# Patient Record
Sex: MALE | Race: WHITE | Employment: OTHER | URBAN - METROPOLITAN AREA
[De-identification: names, ages, dates, MRNs, and addresses within clinical notes are randomized per-mention and may not be internally consistent; named-entity substitution may affect disease eponyms.]

---

## 2017-08-23 ENCOUNTER — HOSPITAL ENCOUNTER (OUTPATIENT)
Dept: GENERAL RADIOLOGY | Age: 77
Discharge: HOME | End: 2017-08-23
Payer: MEDICARE

## 2017-08-23 DIAGNOSIS — Z77.090 ASBESTOS EXPOSURE: ICD-10-CM

## 2017-08-23 PROCEDURE — 71020 XR CHEST PA LAT: CPT

## 2019-01-01 ENCOUNTER — APPOINTMENT (OUTPATIENT)
Dept: GENERAL RADIOLOGY | Age: 79
DRG: 291 | End: 2019-01-01
Attending: EMERGENCY MEDICINE
Payer: MEDICARE

## 2019-01-01 ENCOUNTER — HOME CARE VISIT (OUTPATIENT)
Dept: SCHEDULING | Facility: HOME HEALTH | Age: 79
End: 2019-01-01
Payer: MEDICARE

## 2019-01-01 ENCOUNTER — HOSPITAL ENCOUNTER (EMERGENCY)
Age: 79
Discharge: HOME | End: 2019-09-12
Attending: EMERGENCY MEDICINE | Admitting: EMERGENCY MEDICINE
Payer: MEDICARE

## 2019-01-01 ENCOUNTER — APPOINTMENT (OUTPATIENT)
Dept: GENERAL RADIOLOGY | Age: 79
DRG: 286 | End: 2019-01-01
Attending: INTERNAL MEDICINE
Payer: MEDICARE

## 2019-01-01 ENCOUNTER — APPOINTMENT (OUTPATIENT)
Dept: NON INVASIVE DIAGNOSTICS | Age: 79
DRG: 291 | End: 2019-01-01
Attending: INTERNAL MEDICINE
Payer: MEDICARE

## 2019-01-01 ENCOUNTER — HOSPITAL ENCOUNTER (EMERGENCY)
Age: 79
Discharge: HOME | DRG: 291 | End: 2019-10-06
Attending: EMERGENCY MEDICINE
Payer: MEDICARE

## 2019-01-01 ENCOUNTER — HOSPITAL ENCOUNTER (INPATIENT)
Age: 79
LOS: 9 days | Discharge: HOME HEALTH | DRG: 291 | End: 2019-08-06
Attending: EMERGENCY MEDICINE | Admitting: INTERNAL MEDICINE
Payer: MEDICARE

## 2019-01-01 ENCOUNTER — APPOINTMENT (OUTPATIENT)
Dept: GENERAL RADIOLOGY | Age: 79
End: 2019-01-01
Attending: EMERGENCY MEDICINE
Payer: MEDICARE

## 2019-01-01 ENCOUNTER — HOSPICE ADMISSION (OUTPATIENT)
Dept: HOSPICE | Facility: HOSPICE | Age: 79
End: 2019-01-01

## 2019-01-01 ENCOUNTER — HOME HEALTH ADMISSION (OUTPATIENT)
Dept: HOME HEALTH SERVICES | Facility: HOME HEALTH | Age: 79
End: 2019-01-01
Payer: MEDICARE

## 2019-01-01 ENCOUNTER — APPOINTMENT (OUTPATIENT)
Dept: ULTRASOUND IMAGING | Age: 79
DRG: 291 | End: 2019-01-01
Attending: INTERNAL MEDICINE
Payer: MEDICARE

## 2019-01-01 ENCOUNTER — HOSPITAL ENCOUNTER (EMERGENCY)
Age: 79
Discharge: HOME | DRG: 291 | End: 2019-07-28
Attending: EMERGENCY MEDICINE
Payer: MEDICARE

## 2019-01-01 ENCOUNTER — HOME CARE VISIT (OUTPATIENT)
Dept: HOME HEALTH SERVICES | Facility: HOME HEALTH | Age: 79
End: 2019-01-01
Payer: MEDICARE

## 2019-01-01 ENCOUNTER — APPOINTMENT (OUTPATIENT)
Dept: CT IMAGING | Age: 79
DRG: 291 | End: 2019-01-01
Attending: EMERGENCY MEDICINE
Payer: MEDICARE

## 2019-01-01 ENCOUNTER — DOCUMENTATION ONLY (OUTPATIENT)
Dept: CASE MANAGEMENT | Age: 79
End: 2019-01-01

## 2019-01-01 ENCOUNTER — HOSPITAL ENCOUNTER (EMERGENCY)
Age: 79
Discharge: HOME | End: 2019-06-08
Attending: EMERGENCY MEDICINE | Admitting: EMERGENCY MEDICINE
Payer: MEDICARE

## 2019-01-01 ENCOUNTER — APPOINTMENT (OUTPATIENT)
Dept: GENERAL RADIOLOGY | Age: 79
DRG: 291 | End: 2019-01-01
Attending: INTERNAL MEDICINE
Payer: MEDICARE

## 2019-01-01 ENCOUNTER — HOSPITAL ENCOUNTER (OUTPATIENT)
Dept: CT IMAGING | Age: 79
Discharge: HOME | End: 2019-03-29
Attending: COLON & RECTAL SURGERY
Payer: MEDICARE

## 2019-01-01 ENCOUNTER — HOSPITAL ENCOUNTER (INPATIENT)
Age: 79
LOS: 11 days | Discharge: HOME HEALTH | DRG: 286 | End: 2019-10-02
Attending: EMERGENCY MEDICINE | Admitting: INTERNAL MEDICINE
Payer: MEDICARE

## 2019-01-01 ENCOUNTER — DOCUMENTATION ONLY (OUTPATIENT)
Dept: PHARMACY | Age: 79
End: 2019-01-01

## 2019-01-01 ENCOUNTER — APPOINTMENT (OUTPATIENT)
Dept: ULTRASOUND IMAGING | Age: 79
DRG: 286 | End: 2019-01-01
Attending: INTERNAL MEDICINE
Payer: MEDICARE

## 2019-01-01 ENCOUNTER — APPOINTMENT (OUTPATIENT)
Dept: GENERAL RADIOLOGY | Age: 79
DRG: 286 | End: 2019-01-01
Attending: EMERGENCY MEDICINE
Payer: MEDICARE

## 2019-01-01 ENCOUNTER — HOSPITAL ENCOUNTER (OUTPATIENT)
Dept: GENERAL RADIOLOGY | Age: 79
Discharge: HOME | End: 2019-06-27
Payer: MEDICARE

## 2019-01-01 ENCOUNTER — HOSPITAL ENCOUNTER (INPATIENT)
Age: 79
LOS: 3 days | DRG: 291 | End: 2019-10-10
Attending: EMERGENCY MEDICINE | Admitting: INTERNAL MEDICINE
Payer: MEDICARE

## 2019-01-01 VITALS
RESPIRATION RATE: 16 BRPM | BODY MASS INDEX: 25.52 KG/M2 | SYSTOLIC BLOOD PRESSURE: 96 MMHG | HEART RATE: 60 BPM | OXYGEN SATURATION: 93 % | TEMPERATURE: 97.7 F | DIASTOLIC BLOOD PRESSURE: 61 MMHG | WEIGHT: 172.84 LBS

## 2019-01-01 VITALS
RESPIRATION RATE: 16 BRPM | TEMPERATURE: 97.6 F | HEART RATE: 77 BPM | DIASTOLIC BLOOD PRESSURE: 60 MMHG | OXYGEN SATURATION: 98 % | SYSTOLIC BLOOD PRESSURE: 108 MMHG

## 2019-01-01 VITALS
WEIGHT: 190.92 LBS | OXYGEN SATURATION: 94 % | BODY MASS INDEX: 27.33 KG/M2 | TEMPERATURE: 99.3 F | SYSTOLIC BLOOD PRESSURE: 99 MMHG | HEART RATE: 78 BPM | HEIGHT: 70 IN | RESPIRATION RATE: 16 BRPM | DIASTOLIC BLOOD PRESSURE: 55 MMHG

## 2019-01-01 VITALS
SYSTOLIC BLOOD PRESSURE: 102 MMHG | HEART RATE: 103 BPM | TEMPERATURE: 98.2 F | OXYGEN SATURATION: 94 % | DIASTOLIC BLOOD PRESSURE: 58 MMHG

## 2019-01-01 VITALS
HEART RATE: 74 BPM | OXYGEN SATURATION: 97 % | DIASTOLIC BLOOD PRESSURE: 80 MMHG | WEIGHT: 166 LBS | TEMPERATURE: 98.3 F | BODY MASS INDEX: 24.51 KG/M2 | SYSTOLIC BLOOD PRESSURE: 128 MMHG | RESPIRATION RATE: 18 BRPM

## 2019-01-01 VITALS
BODY MASS INDEX: 25.8 KG/M2 | DIASTOLIC BLOOD PRESSURE: 68 MMHG | TEMPERATURE: 98.1 F | SYSTOLIC BLOOD PRESSURE: 108 MMHG | WEIGHT: 174.16 LBS | OXYGEN SATURATION: 95 % | RESPIRATION RATE: 23 BRPM | HEART RATE: 80 BPM | HEIGHT: 69 IN

## 2019-01-01 VITALS
TEMPERATURE: 98.5 F | HEART RATE: 74 BPM | DIASTOLIC BLOOD PRESSURE: 64 MMHG | SYSTOLIC BLOOD PRESSURE: 122 MMHG | OXYGEN SATURATION: 97 % | RESPIRATION RATE: 18 BRPM

## 2019-01-01 VITALS
WEIGHT: 165 LBS | DIASTOLIC BLOOD PRESSURE: 60 MMHG | OXYGEN SATURATION: 98 % | BODY MASS INDEX: 24.44 KG/M2 | RESPIRATION RATE: 20 BRPM | HEIGHT: 69 IN | HEART RATE: 111 BPM | SYSTOLIC BLOOD PRESSURE: 98 MMHG | TEMPERATURE: 97.2 F

## 2019-01-01 VITALS
HEART RATE: 101 BPM | SYSTOLIC BLOOD PRESSURE: 101 MMHG | RESPIRATION RATE: 17 BRPM | DIASTOLIC BLOOD PRESSURE: 64 MMHG | BODY MASS INDEX: 26.07 KG/M2 | WEIGHT: 176 LBS | OXYGEN SATURATION: 98 % | HEIGHT: 69 IN | TEMPERATURE: 97.8 F

## 2019-01-01 VITALS
WEIGHT: 170 LBS | TEMPERATURE: 98.4 F | OXYGEN SATURATION: 97 % | RESPIRATION RATE: 18 BRPM | SYSTOLIC BLOOD PRESSURE: 98 MMHG | HEART RATE: 74 BPM | BODY MASS INDEX: 25.1 KG/M2 | DIASTOLIC BLOOD PRESSURE: 58 MMHG

## 2019-01-01 VITALS
HEART RATE: 74 BPM | DIASTOLIC BLOOD PRESSURE: 58 MMHG | SYSTOLIC BLOOD PRESSURE: 98 MMHG | OXYGEN SATURATION: 97 % | RESPIRATION RATE: 20 BRPM

## 2019-01-01 VITALS
SYSTOLIC BLOOD PRESSURE: 103 MMHG | DIASTOLIC BLOOD PRESSURE: 60 MMHG | RESPIRATION RATE: 20 BRPM | BODY MASS INDEX: 27.74 KG/M2 | OXYGEN SATURATION: 97 % | TEMPERATURE: 99.1 F | HEART RATE: 67 BPM | WEIGHT: 187.83 LBS

## 2019-01-01 VITALS
RESPIRATION RATE: 18 BRPM | DIASTOLIC BLOOD PRESSURE: 68 MMHG | HEART RATE: 70 BPM | BODY MASS INDEX: 26.14 KG/M2 | SYSTOLIC BLOOD PRESSURE: 102 MMHG | WEIGHT: 177 LBS | OXYGEN SATURATION: 95 % | TEMPERATURE: 97.8 F

## 2019-01-01 VITALS
HEIGHT: 69 IN | BODY MASS INDEX: 26.26 KG/M2 | DIASTOLIC BLOOD PRESSURE: 48 MMHG | SYSTOLIC BLOOD PRESSURE: 93 MMHG | WEIGHT: 177.3 LBS | RESPIRATION RATE: 16 BRPM | OXYGEN SATURATION: 99 % | TEMPERATURE: 98 F | HEART RATE: 65 BPM

## 2019-01-01 VITALS
OXYGEN SATURATION: 96 % | TEMPERATURE: 98.2 F | RESPIRATION RATE: 18 BRPM | WEIGHT: 170 LBS | SYSTOLIC BLOOD PRESSURE: 110 MMHG | BODY MASS INDEX: 25.1 KG/M2 | DIASTOLIC BLOOD PRESSURE: 58 MMHG | HEART RATE: 76 BPM

## 2019-01-01 VITALS
BODY MASS INDEX: 26.02 KG/M2 | WEIGHT: 175.71 LBS | OXYGEN SATURATION: 99 % | DIASTOLIC BLOOD PRESSURE: 62 MMHG | HEIGHT: 69 IN | HEART RATE: 88 BPM | TEMPERATURE: 97.8 F | SYSTOLIC BLOOD PRESSURE: 98 MMHG | RESPIRATION RATE: 20 BRPM

## 2019-01-01 VITALS
OXYGEN SATURATION: 97 % | DIASTOLIC BLOOD PRESSURE: 63 MMHG | TEMPERATURE: 97.7 F | RESPIRATION RATE: 16 BRPM | HEART RATE: 72 BPM | SYSTOLIC BLOOD PRESSURE: 122 MMHG

## 2019-01-01 VITALS
SYSTOLIC BLOOD PRESSURE: 98 MMHG | DIASTOLIC BLOOD PRESSURE: 76 MMHG | TEMPERATURE: 98 F | WEIGHT: 176 LBS | HEART RATE: 92 BPM | RESPIRATION RATE: 18 BRPM | OXYGEN SATURATION: 98 % | BODY MASS INDEX: 25.99 KG/M2

## 2019-01-01 VITALS
OXYGEN SATURATION: 96 % | TEMPERATURE: 97.6 F | RESPIRATION RATE: 16 BRPM | HEART RATE: 77 BPM | DIASTOLIC BLOOD PRESSURE: 58 MMHG | SYSTOLIC BLOOD PRESSURE: 97 MMHG

## 2019-01-01 VITALS
TEMPERATURE: 97.9 F | HEART RATE: 76 BPM | RESPIRATION RATE: 16 BRPM | SYSTOLIC BLOOD PRESSURE: 91 MMHG | DIASTOLIC BLOOD PRESSURE: 52 MMHG | OXYGEN SATURATION: 97 %

## 2019-01-01 VITALS
SYSTOLIC BLOOD PRESSURE: 122 MMHG | DIASTOLIC BLOOD PRESSURE: 66 MMHG | HEART RATE: 77 BPM | OXYGEN SATURATION: 96 % | TEMPERATURE: 98.5 F

## 2019-01-01 VITALS
OXYGEN SATURATION: 97 % | DIASTOLIC BLOOD PRESSURE: 58 MMHG | TEMPERATURE: 97.4 F | HEART RATE: 77 BPM | RESPIRATION RATE: 16 BRPM | SYSTOLIC BLOOD PRESSURE: 108 MMHG

## 2019-01-01 DIAGNOSIS — R06.02 SOB (SHORTNESS OF BREATH): ICD-10-CM

## 2019-01-01 DIAGNOSIS — R50.9 FEVER, UNSPECIFIED FEVER CAUSE: Primary | ICD-10-CM

## 2019-01-01 DIAGNOSIS — K72.00 SHOCK LIVER: ICD-10-CM

## 2019-01-01 DIAGNOSIS — Z71.89 COUNSELING REGARDING ADVANCE CARE PLANNING AND GOALS OF CARE: ICD-10-CM

## 2019-01-01 DIAGNOSIS — R60.0 LOCALIZED EDEMA: ICD-10-CM

## 2019-01-01 DIAGNOSIS — Z71.89 ADVANCED DIRECTIVES, COUNSELING/DISCUSSION: ICD-10-CM

## 2019-01-01 DIAGNOSIS — R79.89 ELEVATED LFTS: ICD-10-CM

## 2019-01-01 DIAGNOSIS — R06.02 SOB (SHORTNESS OF BREATH): Primary | ICD-10-CM

## 2019-01-01 DIAGNOSIS — R06.02 SHORTNESS OF BREATH: ICD-10-CM

## 2019-01-01 DIAGNOSIS — E87.20 LACTIC ACIDOSIS: ICD-10-CM

## 2019-01-01 DIAGNOSIS — I13.2 CARDIORENAL SYNDROME WITH RENAL FAILURE, STAGE 5 CHRONIC KIDNEY DISEASE OR END STAGE RENAL DISEASE, WITH HEART FAILURE (HCC): ICD-10-CM

## 2019-01-01 DIAGNOSIS — R50.9 FEVER IN ADULT: ICD-10-CM

## 2019-01-01 DIAGNOSIS — K72.00 ACUTE LIVER FAILURE WITHOUT HEPATIC COMA: ICD-10-CM

## 2019-01-01 DIAGNOSIS — I42.4: ICD-10-CM

## 2019-01-01 DIAGNOSIS — E87.5 ACUTE HYPERKALEMIA: ICD-10-CM

## 2019-01-01 DIAGNOSIS — Z71.89 GOALS OF CARE, COUNSELING/DISCUSSION: ICD-10-CM

## 2019-01-01 DIAGNOSIS — R06.01 ORTHOPNEA: ICD-10-CM

## 2019-01-01 DIAGNOSIS — N17.9 ACUTE RENAL FAILURE, UNSPECIFIED ACUTE RENAL FAILURE TYPE (HCC): ICD-10-CM

## 2019-01-01 DIAGNOSIS — J90 BILATERAL PLEURAL EFFUSION: ICD-10-CM

## 2019-01-01 DIAGNOSIS — R10.9 RIGHT SIDED ABDOMINAL PAIN: ICD-10-CM

## 2019-01-01 DIAGNOSIS — I50.9 ACUTE ON CHRONIC CONGESTIVE HEART FAILURE, UNSPECIFIED HEART FAILURE TYPE (HCC): Primary | ICD-10-CM

## 2019-01-01 DIAGNOSIS — I50.9 CONGESTIVE HEART FAILURE, UNSPECIFIED HF CHRONICITY, UNSPECIFIED HEART FAILURE TYPE (HCC): ICD-10-CM

## 2019-01-01 DIAGNOSIS — Z77.090 H/O: ASBESTOS EXPOSURE: ICD-10-CM

## 2019-01-01 DIAGNOSIS — I13.0 CARDIORENAL SYNDROME WITH RENAL FAILURE, STAGE 1-4 OR UNSPECIFIED CHRONIC KIDNEY DISEASE, WITH HEART FAILURE (HCC): ICD-10-CM

## 2019-01-01 DIAGNOSIS — Z79.899 RECEIVING INOTROPIC MEDICATION: ICD-10-CM

## 2019-01-01 DIAGNOSIS — R53.83 FATIGUE, UNSPECIFIED TYPE: ICD-10-CM

## 2019-01-01 DIAGNOSIS — R57.0 CARDIOGENIC SHOCK (HCC): ICD-10-CM

## 2019-01-01 DIAGNOSIS — R60.9 EDEMA, UNSPECIFIED TYPE: ICD-10-CM

## 2019-01-01 DIAGNOSIS — R63.5 WEIGHT GAIN: ICD-10-CM

## 2019-01-01 DIAGNOSIS — R57.0 CARDIOGENIC SHOCK (HCC): Primary | ICD-10-CM

## 2019-01-01 DIAGNOSIS — R09.02 HYPOXIA: Primary | ICD-10-CM

## 2019-01-01 DIAGNOSIS — I42.0 CARDIOMYOPATHY, DILATED, NONISCHEMIC (HCC): Chronic | ICD-10-CM

## 2019-01-01 DIAGNOSIS — I50.23 ACUTE ON CHRONIC SYSTOLIC CHF (CONGESTIVE HEART FAILURE) (HCC): Primary | ICD-10-CM

## 2019-01-01 DIAGNOSIS — N17.9 AKI (ACUTE KIDNEY INJURY) (HCC): ICD-10-CM

## 2019-01-01 DIAGNOSIS — E87.1 ACUTE HYPONATREMIA: ICD-10-CM

## 2019-01-01 DIAGNOSIS — I50.23 ACUTE ON CHRONIC SYSTOLIC (CONGESTIVE) HEART FAILURE (HCC): ICD-10-CM

## 2019-01-01 DIAGNOSIS — R07.9 CHEST PAIN, UNSPECIFIED TYPE: ICD-10-CM

## 2019-01-01 LAB
ALBUMIN SERPL ELPH-MCNC: 3.7 G/DL (ref 2.9–4.4)
ALBUMIN SERPL-MCNC: 2.6 G/DL (ref 3.5–5)
ALBUMIN SERPL-MCNC: 2.7 G/DL (ref 3.5–5)
ALBUMIN SERPL-MCNC: 2.8 G/DL (ref 3.5–5)
ALBUMIN SERPL-MCNC: 2.8 G/DL (ref 3.5–5)
ALBUMIN SERPL-MCNC: 2.9 G/DL (ref 3.5–5)
ALBUMIN SERPL-MCNC: 3.2 G/DL (ref 3.5–5)
ALBUMIN SERPL-MCNC: 3.3 G/DL (ref 3.5–5)
ALBUMIN SERPL-MCNC: 3.3 G/DL (ref 3.5–5)
ALBUMIN SERPL-MCNC: 3.4 G/DL (ref 3.5–5)
ALBUMIN SERPL-MCNC: 3.4 G/DL (ref 3.5–5)
ALBUMIN SERPL-MCNC: 3.5 G/DL (ref 3.5–5)
ALBUMIN SERPL-MCNC: 3.6 G/DL (ref 3.5–5)
ALBUMIN SERPL-MCNC: 3.7 G/DL (ref 3.5–5)
ALBUMIN SERPL-MCNC: 3.8 G/DL (ref 3.5–5)
ALBUMIN SERPL-MCNC: 3.8 G/DL (ref 3.5–5)
ALBUMIN SERPL-MCNC: 3.9 G/DL (ref 3.5–5)
ALBUMIN SERPL-MCNC: 4 G/DL (ref 3.5–5)
ALBUMIN/GLOB SERPL: 0.7 {RATIO} (ref 1.1–2.2)
ALBUMIN/GLOB SERPL: 0.8 {RATIO} (ref 1.1–2.2)
ALBUMIN/GLOB SERPL: 0.9 {RATIO} (ref 1.1–2.2)
ALBUMIN/GLOB SERPL: 0.9 {RATIO} (ref 1.1–2.2)
ALBUMIN/GLOB SERPL: 1.1 {RATIO} (ref 1.1–2.2)
ALBUMIN/GLOB SERPL: 1.2 {RATIO} (ref 1.1–2.2)
ALBUMIN/GLOB SERPL: 1.2 {RATIO} (ref 1.1–2.2)
ALBUMIN/GLOB SERPL: 1.3 {RATIO} (ref 1.1–2.2)
ALBUMIN/GLOB SERPL: 1.3 {RATIO} (ref 1.1–2.2)
ALBUMIN/GLOB SERPL: 1.5 {RATIO} (ref 0.7–1.7)
ALBUMIN/GLOB SERPL: 1.5 {RATIO} (ref 1.1–2.2)
ALP SERPL-CCNC: 115 U/L (ref 45–117)
ALP SERPL-CCNC: 160 U/L (ref 45–117)
ALP SERPL-CCNC: 180 U/L (ref 45–117)
ALP SERPL-CCNC: 192 U/L (ref 45–117)
ALP SERPL-CCNC: 196 U/L (ref 45–117)
ALP SERPL-CCNC: 64 U/L (ref 45–117)
ALP SERPL-CCNC: 69 U/L (ref 45–117)
ALP SERPL-CCNC: 69 U/L (ref 45–117)
ALP SERPL-CCNC: 72 U/L (ref 45–117)
ALP SERPL-CCNC: 74 U/L (ref 45–117)
ALP SERPL-CCNC: 75 U/L (ref 45–117)
ALP SERPL-CCNC: 76 U/L (ref 45–117)
ALP SERPL-CCNC: 79 U/L (ref 45–117)
ALP SERPL-CCNC: 81 U/L (ref 45–117)
ALP SERPL-CCNC: 85 U/L (ref 45–117)
ALP SERPL-CCNC: 89 U/L (ref 45–117)
ALPHA1 GLOB SERPL ELPH-MCNC: 0.3 G/DL (ref 0–0.4)
ALPHA2 GLOB SERPL ELPH-MCNC: 0.6 G/DL (ref 0.4–1)
ALT SERPL-CCNC: 1142 U/L (ref 12–78)
ALT SERPL-CCNC: 123 U/L (ref 12–78)
ALT SERPL-CCNC: 1476 U/L (ref 12–78)
ALT SERPL-CCNC: 1666 U/L (ref 12–78)
ALT SERPL-CCNC: 181 U/L (ref 12–78)
ALT SERPL-CCNC: 183 U/L (ref 12–78)
ALT SERPL-CCNC: 186 U/L (ref 12–78)
ALT SERPL-CCNC: 19 U/L (ref 12–78)
ALT SERPL-CCNC: 202 U/L (ref 12–78)
ALT SERPL-CCNC: 218 U/L (ref 12–78)
ALT SERPL-CCNC: 25 U/L (ref 12–78)
ALT SERPL-CCNC: 33 U/L (ref 12–78)
ALT SERPL-CCNC: 376 U/L (ref 12–78)
ALT SERPL-CCNC: 75 U/L (ref 12–78)
ALT SERPL-CCNC: 962 U/L (ref 12–78)
ALT SERPL-CCNC: 97 U/L (ref 12–78)
ANION GAP SERPL CALC-SCNC: 10 MMOL/L (ref 5–15)
ANION GAP SERPL CALC-SCNC: 11 MMOL/L (ref 5–15)
ANION GAP SERPL CALC-SCNC: 12 MMOL/L (ref 5–15)
ANION GAP SERPL CALC-SCNC: 14 MMOL/L (ref 5–15)
ANION GAP SERPL CALC-SCNC: 4 MMOL/L (ref 5–15)
ANION GAP SERPL CALC-SCNC: 5 MMOL/L (ref 5–15)
ANION GAP SERPL CALC-SCNC: 5 MMOL/L (ref 5–15)
ANION GAP SERPL CALC-SCNC: 7 MMOL/L (ref 5–15)
ANION GAP SERPL CALC-SCNC: 8 MMOL/L (ref 5–15)
ANION GAP SERPL CALC-SCNC: 9 MMOL/L (ref 5–15)
APPEARANCE UR: CLEAR
AST SERPL-CCNC: 123 U/L (ref 15–37)
AST SERPL-CCNC: 1466 U/L (ref 15–37)
AST SERPL-CCNC: 16 U/L (ref 15–37)
AST SERPL-CCNC: 23 U/L (ref 15–37)
AST SERPL-CCNC: 28 U/L (ref 15–37)
AST SERPL-CCNC: 33 U/L (ref 15–37)
AST SERPL-CCNC: 39 U/L (ref 15–37)
AST SERPL-CCNC: 505 U/L (ref 15–37)
AST SERPL-CCNC: 70 U/L (ref 15–37)
AST SERPL-CCNC: 72 U/L (ref 15–37)
AST SERPL-CCNC: 728 U/L (ref 15–37)
AST SERPL-CCNC: 74 U/L (ref 15–37)
AST SERPL-CCNC: 81 U/L (ref 15–37)
AST SERPL-CCNC: 83 U/L (ref 15–37)
AST SERPL-CCNC: 86 U/L (ref 15–37)
AST SERPL-CCNC: 882 U/L (ref 15–37)
ATRIAL RATE: 100 BPM
ATRIAL RATE: 101 BPM
ATRIAL RATE: 106 BPM
ATRIAL RATE: 111 BPM
ATRIAL RATE: 111 BPM
ATRIAL RATE: 113 BPM
ATRIAL RATE: 74 BPM
ATRIAL RATE: 76 BPM
ATRIAL RATE: 84 BPM
ATRIAL RATE: 88 BPM
ATRIAL RATE: 89 BPM
B-GLOBULIN SERPL ELPH-MCNC: 0.7 G/DL (ref 0.7–1.3)
BACTERIA SPEC CULT: NORMAL
BACTERIA URNS QL MICRO: ABNORMAL /HPF
BACTERIA URNS QL MICRO: NEGATIVE /HPF
BASOPHILS # BLD: 0 K/UL (ref 0–0.1)
BASOPHILS # BLD: 0.1 K/UL (ref 0–0.1)
BASOPHILS # BLD: 0.1 K/UL (ref 0–0.1)
BASOPHILS NFR BLD: 0 % (ref 0–1)
BASOPHILS NFR BLD: 1 % (ref 0–1)
BILIRUB DIRECT SERPL-MCNC: 1.2 MG/DL (ref 0–0.2)
BILIRUB SERPL-MCNC: 0.6 MG/DL (ref 0.2–1)
BILIRUB SERPL-MCNC: 0.7 MG/DL (ref 0.2–1)
BILIRUB SERPL-MCNC: 0.8 MG/DL (ref 0.2–1)
BILIRUB SERPL-MCNC: 0.8 MG/DL (ref 0.2–1)
BILIRUB SERPL-MCNC: 0.9 MG/DL (ref 0.2–1)
BILIRUB SERPL-MCNC: 1 MG/DL (ref 0.2–1)
BILIRUB SERPL-MCNC: 1.1 MG/DL (ref 0.2–1)
BILIRUB SERPL-MCNC: 1.5 MG/DL (ref 0.2–1)
BILIRUB SERPL-MCNC: 1.8 MG/DL (ref 0.2–1)
BILIRUB SERPL-MCNC: 2.2 MG/DL (ref 0.2–1)
BILIRUB SERPL-MCNC: 2.3 MG/DL (ref 0.2–1)
BILIRUB SERPL-MCNC: 2.7 MG/DL (ref 0.2–1)
BILIRUB UR QL CFM: NEGATIVE
BILIRUB UR QL: NEGATIVE
BNP SERPL-MCNC: 4912 PG/ML
BNP SERPL-MCNC: 9636 PG/ML
BNP SERPL-MCNC: 9875 PG/ML
BNP SERPL-MCNC: ABNORMAL PG/ML
BUN SERPL-MCNC: 20 MG/DL (ref 6–20)
BUN SERPL-MCNC: 22 MG/DL (ref 6–20)
BUN SERPL-MCNC: 25 MG/DL (ref 6–20)
BUN SERPL-MCNC: 28 MG/DL (ref 6–20)
BUN SERPL-MCNC: 33 MG/DL (ref 6–20)
BUN SERPL-MCNC: 37 MG/DL (ref 6–20)
BUN SERPL-MCNC: 38 MG/DL (ref 6–20)
BUN SERPL-MCNC: 38 MG/DL (ref 6–20)
BUN SERPL-MCNC: 39 MG/DL (ref 6–20)
BUN SERPL-MCNC: 40 MG/DL (ref 6–20)
BUN SERPL-MCNC: 41 MG/DL (ref 6–20)
BUN SERPL-MCNC: 41 MG/DL (ref 6–20)
BUN SERPL-MCNC: 44 MG/DL (ref 6–20)
BUN SERPL-MCNC: 45 MG/DL (ref 6–20)
BUN SERPL-MCNC: 47 MG/DL (ref 6–20)
BUN SERPL-MCNC: 48 MG/DL (ref 6–20)
BUN SERPL-MCNC: 52 MG/DL (ref 6–20)
BUN SERPL-MCNC: 54 MG/DL (ref 6–20)
BUN SERPL-MCNC: 54 MG/DL (ref 6–20)
BUN SERPL-MCNC: 58 MG/DL (ref 6–20)
BUN SERPL-MCNC: 60 MG/DL (ref 6–20)
BUN SERPL-MCNC: 63 MG/DL (ref 6–20)
BUN SERPL-MCNC: 67 MG/DL (ref 6–20)
BUN SERPL-MCNC: 68 MG/DL (ref 6–20)
BUN SERPL-MCNC: 69 MG/DL (ref 6–20)
BUN SERPL-MCNC: 72 MG/DL (ref 6–20)
BUN SERPL-MCNC: 74 MG/DL (ref 6–20)
BUN SERPL-MCNC: 81 MG/DL (ref 6–20)
BUN/CREAT SERPL: 15 (ref 12–20)
BUN/CREAT SERPL: 16 (ref 12–20)
BUN/CREAT SERPL: 17 (ref 12–20)
BUN/CREAT SERPL: 17 (ref 12–20)
BUN/CREAT SERPL: 18 (ref 12–20)
BUN/CREAT SERPL: 18 (ref 12–20)
BUN/CREAT SERPL: 19 (ref 12–20)
BUN/CREAT SERPL: 19 (ref 12–20)
BUN/CREAT SERPL: 20 (ref 12–20)
BUN/CREAT SERPL: 22 (ref 12–20)
BUN/CREAT SERPL: 23 (ref 12–20)
BUN/CREAT SERPL: 24 (ref 12–20)
BUN/CREAT SERPL: 27 (ref 12–20)
BUN/CREAT SERPL: 28 (ref 12–20)
BUN/CREAT SERPL: 29 (ref 12–20)
BUN/CREAT SERPL: 30 (ref 12–20)
BUN/CREAT SERPL: 30 (ref 12–20)
BUN/CREAT SERPL: 32 (ref 12–20)
CALCIUM SERPL-MCNC: 7.4 MG/DL (ref 8.5–10.1)
CALCIUM SERPL-MCNC: 7.8 MG/DL (ref 8.5–10.1)
CALCIUM SERPL-MCNC: 8 MG/DL (ref 8.5–10.1)
CALCIUM SERPL-MCNC: 8 MG/DL (ref 8.5–10.1)
CALCIUM SERPL-MCNC: 8.1 MG/DL (ref 8.5–10.1)
CALCIUM SERPL-MCNC: 8.2 MG/DL (ref 8.5–10.1)
CALCIUM SERPL-MCNC: 8.3 MG/DL (ref 8.5–10.1)
CALCIUM SERPL-MCNC: 8.4 MG/DL (ref 8.5–10.1)
CALCIUM SERPL-MCNC: 8.5 MG/DL (ref 8.5–10.1)
CALCIUM SERPL-MCNC: 8.6 MG/DL (ref 8.5–10.1)
CALCIUM SERPL-MCNC: 8.7 MG/DL (ref 8.5–10.1)
CALCIUM SERPL-MCNC: 8.7 MG/DL (ref 8.5–10.1)
CALCIUM SERPL-MCNC: 8.8 MG/DL (ref 8.5–10.1)
CALCIUM SERPL-MCNC: 8.8 MG/DL (ref 8.5–10.1)
CALCIUM SERPL-MCNC: 8.9 MG/DL (ref 8.5–10.1)
CALCIUM SERPL-MCNC: 9.1 MG/DL (ref 8.5–10.1)
CALCIUM SERPL-MCNC: 9.3 MG/DL (ref 8.5–10.1)
CALCULATED P AXIS, ECG09: 140 DEGREES
CALCULATED P AXIS, ECG09: 38 DEGREES
CALCULATED P AXIS, ECG09: 41 DEGREES
CALCULATED P AXIS, ECG09: 45 DEGREES
CALCULATED P AXIS, ECG09: 53 DEGREES
CALCULATED R AXIS, ECG10: -100 DEGREES
CALCULATED R AXIS, ECG10: -100 DEGREES
CALCULATED R AXIS, ECG10: -103 DEGREES
CALCULATED R AXIS, ECG10: -104 DEGREES
CALCULATED R AXIS, ECG10: -109 DEGREES
CALCULATED R AXIS, ECG10: -110 DEGREES
CALCULATED R AXIS, ECG10: -111 DEGREES
CALCULATED R AXIS, ECG10: -111 DEGREES
CALCULATED R AXIS, ECG10: -78 DEGREES
CALCULATED R AXIS, ECG10: -83 DEGREES
CALCULATED R AXIS, ECG10: 166 DEGREES
CALCULATED T AXIS, ECG11: -42 DEGREES
CALCULATED T AXIS, ECG11: 105 DEGREES
CALCULATED T AXIS, ECG11: 122 DEGREES
CALCULATED T AXIS, ECG11: 125 DEGREES
CALCULATED T AXIS, ECG11: 128 DEGREES
CALCULATED T AXIS, ECG11: 128 DEGREES
CALCULATED T AXIS, ECG11: 129 DEGREES
CALCULATED T AXIS, ECG11: 131 DEGREES
CALCULATED T AXIS, ECG11: 135 DEGREES
CALCULATED T AXIS, ECG11: 42 DEGREES
CALCULATED T AXIS, ECG11: 47 DEGREES
CC UR VC: NORMAL
CHLORIDE SERPL-SCNC: 101 MMOL/L (ref 97–108)
CHLORIDE SERPL-SCNC: 102 MMOL/L (ref 97–108)
CHLORIDE SERPL-SCNC: 102 MMOL/L (ref 97–108)
CHLORIDE SERPL-SCNC: 103 MMOL/L (ref 97–108)
CHLORIDE SERPL-SCNC: 103 MMOL/L (ref 97–108)
CHLORIDE SERPL-SCNC: 104 MMOL/L (ref 97–108)
CHLORIDE SERPL-SCNC: 106 MMOL/L (ref 97–108)
CHLORIDE SERPL-SCNC: 106 MMOL/L (ref 97–108)
CHLORIDE SERPL-SCNC: 107 MMOL/L (ref 97–108)
CHLORIDE SERPL-SCNC: 108 MMOL/L (ref 97–108)
CHLORIDE SERPL-SCNC: 89 MMOL/L (ref 97–108)
CHLORIDE SERPL-SCNC: 91 MMOL/L (ref 97–108)
CHLORIDE SERPL-SCNC: 92 MMOL/L (ref 97–108)
CHLORIDE SERPL-SCNC: 92 MMOL/L (ref 97–108)
CHLORIDE SERPL-SCNC: 93 MMOL/L (ref 97–108)
CHLORIDE SERPL-SCNC: 95 MMOL/L (ref 97–108)
CHLORIDE SERPL-SCNC: 96 MMOL/L (ref 97–108)
CHLORIDE SERPL-SCNC: 97 MMOL/L (ref 97–108)
CHLORIDE SERPL-SCNC: 98 MMOL/L (ref 97–108)
CHLORIDE SERPL-SCNC: 99 MMOL/L (ref 97–108)
CK SERPL-CCNC: 18 U/L (ref 39–308)
CK SERPL-CCNC: 43 U/L (ref 39–308)
CK SERPL-CCNC: 50 U/L (ref 39–308)
CO2 SERPL-SCNC: 18 MMOL/L (ref 21–32)
CO2 SERPL-SCNC: 21 MMOL/L (ref 21–32)
CO2 SERPL-SCNC: 23 MMOL/L (ref 21–32)
CO2 SERPL-SCNC: 23 MMOL/L (ref 21–32)
CO2 SERPL-SCNC: 24 MMOL/L (ref 21–32)
CO2 SERPL-SCNC: 25 MMOL/L (ref 21–32)
CO2 SERPL-SCNC: 26 MMOL/L (ref 21–32)
CO2 SERPL-SCNC: 27 MMOL/L (ref 21–32)
CO2 SERPL-SCNC: 28 MMOL/L (ref 21–32)
CO2 SERPL-SCNC: 29 MMOL/L (ref 21–32)
CO2 SERPL-SCNC: 31 MMOL/L (ref 21–32)
CO2 SERPL-SCNC: 31 MMOL/L (ref 21–32)
CO2 SERPL-SCNC: 33 MMOL/L (ref 21–32)
CO2 SERPL-SCNC: 35 MMOL/L (ref 21–32)
COLOR UR: ABNORMAL
COLOR UR: NORMAL
CORTIS SERPL-MCNC: 35.1 UG/DL
CREAT BLD-MCNC: 1.2 MG/DL (ref 0.6–1.3)
CREAT SERPL-MCNC: 1.25 MG/DL (ref 0.7–1.3)
CREAT SERPL-MCNC: 1.3 MG/DL (ref 0.7–1.3)
CREAT SERPL-MCNC: 1.32 MG/DL (ref 0.7–1.3)
CREAT SERPL-MCNC: 1.32 MG/DL (ref 0.7–1.3)
CREAT SERPL-MCNC: 1.33 MG/DL (ref 0.7–1.3)
CREAT SERPL-MCNC: 1.35 MG/DL (ref 0.7–1.3)
CREAT SERPL-MCNC: 1.35 MG/DL (ref 0.7–1.3)
CREAT SERPL-MCNC: 1.37 MG/DL (ref 0.7–1.3)
CREAT SERPL-MCNC: 1.38 MG/DL (ref 0.7–1.3)
CREAT SERPL-MCNC: 1.5 MG/DL (ref 0.7–1.3)
CREAT SERPL-MCNC: 1.5 MG/DL (ref 0.7–1.3)
CREAT SERPL-MCNC: 1.51 MG/DL (ref 0.7–1.3)
CREAT SERPL-MCNC: 1.63 MG/DL (ref 0.7–1.3)
CREAT SERPL-MCNC: 1.93 MG/DL (ref 0.7–1.3)
CREAT SERPL-MCNC: 1.98 MG/DL (ref 0.7–1.3)
CREAT SERPL-MCNC: 2.08 MG/DL (ref 0.7–1.3)
CREAT SERPL-MCNC: 2.15 MG/DL (ref 0.7–1.3)
CREAT SERPL-MCNC: 2.44 MG/DL (ref 0.7–1.3)
CREAT SERPL-MCNC: 2.47 MG/DL (ref 0.7–1.3)
CREAT SERPL-MCNC: 2.58 MG/DL (ref 0.7–1.3)
CREAT SERPL-MCNC: 2.7 MG/DL (ref 0.7–1.3)
CREAT SERPL-MCNC: 2.75 MG/DL (ref 0.7–1.3)
CREAT SERPL-MCNC: 2.86 MG/DL (ref 0.7–1.3)
CREAT SERPL-MCNC: 2.96 MG/DL (ref 0.7–1.3)
CREAT SERPL-MCNC: 3.2 MG/DL (ref 0.7–1.3)
CREAT SERPL-MCNC: 3.33 MG/DL (ref 0.7–1.3)
CREAT SERPL-MCNC: 3.35 MG/DL (ref 0.7–1.3)
CREAT SERPL-MCNC: 3.72 MG/DL (ref 0.7–1.3)
CREAT SERPL-MCNC: 3.8 MG/DL (ref 0.7–1.3)
CREAT SERPL-MCNC: 4.05 MG/DL (ref 0.7–1.3)
CREAT SERPL-MCNC: 4.12 MG/DL (ref 0.7–1.3)
CREAT UR-MCNC: 166 MG/DL
DIAGNOSIS, 93000: NORMAL
DIFFERENTIAL METHOD BLD: ABNORMAL
EOSINOPHIL # BLD: 0 K/UL (ref 0–0.4)
EOSINOPHIL # BLD: 0.1 K/UL (ref 0–0.4)
EOSINOPHIL # BLD: 0.2 K/UL (ref 0–0.4)
EOSINOPHIL NFR BLD: 0 % (ref 0–7)
EOSINOPHIL NFR BLD: 1 % (ref 0–7)
EOSINOPHIL NFR BLD: 2 % (ref 0–7)
EOSINOPHIL NFR BLD: 3 % (ref 0–7)
EPITH CASTS URNS QL MICRO: ABNORMAL /LPF
EPITH CASTS URNS QL MICRO: NORMAL /LPF
ERYTHROCYTE [DISTWIDTH] IN BLOOD BY AUTOMATED COUNT: 13.1 % (ref 11.5–14.5)
ERYTHROCYTE [DISTWIDTH] IN BLOOD BY AUTOMATED COUNT: 14 % (ref 11.5–14.5)
ERYTHROCYTE [DISTWIDTH] IN BLOOD BY AUTOMATED COUNT: 14.1 % (ref 11.5–14.5)
ERYTHROCYTE [DISTWIDTH] IN BLOOD BY AUTOMATED COUNT: 14.1 % (ref 11.5–14.5)
ERYTHROCYTE [DISTWIDTH] IN BLOOD BY AUTOMATED COUNT: 14.2 % (ref 11.5–14.5)
ERYTHROCYTE [DISTWIDTH] IN BLOOD BY AUTOMATED COUNT: 14.3 % (ref 11.5–14.5)
ERYTHROCYTE [DISTWIDTH] IN BLOOD BY AUTOMATED COUNT: 14.5 % (ref 11.5–14.5)
ERYTHROCYTE [DISTWIDTH] IN BLOOD BY AUTOMATED COUNT: 15.9 % (ref 11.5–14.5)
ERYTHROCYTE [DISTWIDTH] IN BLOOD BY AUTOMATED COUNT: 16.2 % (ref 11.5–14.5)
ERYTHROCYTE [DISTWIDTH] IN BLOOD BY AUTOMATED COUNT: 16.5 % (ref 11.5–14.5)
ERYTHROCYTE [DISTWIDTH] IN BLOOD BY AUTOMATED COUNT: 16.5 % (ref 11.5–14.5)
ERYTHROCYTE [DISTWIDTH] IN BLOOD BY AUTOMATED COUNT: 16.7 % (ref 11.5–14.5)
ERYTHROCYTE [DISTWIDTH] IN BLOOD BY AUTOMATED COUNT: 16.7 % (ref 11.5–14.5)
ERYTHROCYTE [DISTWIDTH] IN BLOOD BY AUTOMATED COUNT: 16.8 % (ref 11.5–14.5)
ERYTHROCYTE [DISTWIDTH] IN BLOOD BY AUTOMATED COUNT: 17 % (ref 11.5–14.5)
ERYTHROCYTE [DISTWIDTH] IN BLOOD BY AUTOMATED COUNT: 17.1 % (ref 11.5–14.5)
ERYTHROCYTE [DISTWIDTH] IN BLOOD BY AUTOMATED COUNT: 17.2 % (ref 11.5–14.5)
ERYTHROCYTE [DISTWIDTH] IN BLOOD BY AUTOMATED COUNT: 17.5 % (ref 11.5–14.5)
FERRITIN SERPL-MCNC: 6468 NG/ML (ref 26–388)
GAMMA GLOB SERPL ELPH-MCNC: 1.1 G/DL (ref 0.4–1.8)
GLOBULIN SER CALC-MCNC: 2.6 G/DL (ref 2–4)
GLOBULIN SER CALC-MCNC: 2.7 G/DL (ref 2–4)
GLOBULIN SER CALC-MCNC: 2.8 G/DL (ref 2–4)
GLOBULIN SER CALC-MCNC: 3 G/DL (ref 2–4)
GLOBULIN SER CALC-MCNC: 3.1 G/DL (ref 2–4)
GLOBULIN SER CALC-MCNC: 3.2 G/DL (ref 2–4)
GLOBULIN SER CALC-MCNC: 3.4 G/DL (ref 2–4)
GLOBULIN SER CALC-MCNC: 3.4 G/DL (ref 2–4)
GLOBULIN SER CALC-MCNC: 3.5 G/DL (ref 2–4)
GLOBULIN SER CALC-MCNC: 3.5 G/DL (ref 2–4)
GLOBULIN SER CALC-MCNC: 3.8 G/DL (ref 2–4)
GLOBULIN SER CALC-MCNC: 3.9 G/DL (ref 2–4)
GLOBULIN SER CALC-MCNC: 4 G/DL (ref 2–4)
GLOBULIN SER CALC-MCNC: 4.2 G/DL (ref 2–4)
GLOBULIN SER-MCNC: 2.6 G/DL (ref 2.2–3.9)
GLUCOSE SERPL-MCNC: 101 MG/DL (ref 65–100)
GLUCOSE SERPL-MCNC: 102 MG/DL (ref 65–100)
GLUCOSE SERPL-MCNC: 103 MG/DL (ref 65–100)
GLUCOSE SERPL-MCNC: 104 MG/DL (ref 65–100)
GLUCOSE SERPL-MCNC: 105 MG/DL (ref 65–100)
GLUCOSE SERPL-MCNC: 106 MG/DL (ref 65–100)
GLUCOSE SERPL-MCNC: 108 MG/DL (ref 65–100)
GLUCOSE SERPL-MCNC: 109 MG/DL (ref 65–100)
GLUCOSE SERPL-MCNC: 109 MG/DL (ref 65–100)
GLUCOSE SERPL-MCNC: 112 MG/DL (ref 65–100)
GLUCOSE SERPL-MCNC: 114 MG/DL (ref 65–100)
GLUCOSE SERPL-MCNC: 115 MG/DL (ref 65–100)
GLUCOSE SERPL-MCNC: 116 MG/DL (ref 65–100)
GLUCOSE SERPL-MCNC: 116 MG/DL (ref 65–100)
GLUCOSE SERPL-MCNC: 118 MG/DL (ref 65–100)
GLUCOSE SERPL-MCNC: 119 MG/DL (ref 65–100)
GLUCOSE SERPL-MCNC: 119 MG/DL (ref 65–100)
GLUCOSE SERPL-MCNC: 120 MG/DL (ref 65–100)
GLUCOSE SERPL-MCNC: 121 MG/DL (ref 65–100)
GLUCOSE SERPL-MCNC: 131 MG/DL (ref 65–100)
GLUCOSE SERPL-MCNC: 133 MG/DL (ref 65–100)
GLUCOSE SERPL-MCNC: 140 MG/DL (ref 65–100)
GLUCOSE SERPL-MCNC: 149 MG/DL (ref 65–100)
GLUCOSE SERPL-MCNC: 152 MG/DL (ref 65–100)
GLUCOSE SERPL-MCNC: 173 MG/DL (ref 65–100)
GLUCOSE SERPL-MCNC: 187 MG/DL (ref 65–100)
GLUCOSE SERPL-MCNC: 84 MG/DL (ref 65–100)
GLUCOSE SERPL-MCNC: 86 MG/DL (ref 65–100)
GLUCOSE SERPL-MCNC: 86 MG/DL (ref 65–100)
GLUCOSE UR STRIP.AUTO-MCNC: NEGATIVE MG/DL
HAV IGM SER QL: NONREACTIVE
HBV CORE IGM SER QL: NONREACTIVE
HBV SURFACE AG SER QL: <0.1 INDEX
HBV SURFACE AG SER QL: NEGATIVE
HCT VFR BLD AUTO: 31.3 % (ref 36.6–50.3)
HCT VFR BLD AUTO: 31.3 % (ref 36.6–50.3)
HCT VFR BLD AUTO: 31.6 % (ref 36.6–50.3)
HCT VFR BLD AUTO: 32 % (ref 36.6–50.3)
HCT VFR BLD AUTO: 32.7 % (ref 36.6–50.3)
HCT VFR BLD AUTO: 33.8 % (ref 36.6–50.3)
HCT VFR BLD AUTO: 33.9 % (ref 36.6–50.3)
HCT VFR BLD AUTO: 34.5 % (ref 36.6–50.3)
HCT VFR BLD AUTO: 34.6 % (ref 36.6–50.3)
HCT VFR BLD AUTO: 35.4 % (ref 36.6–50.3)
HCT VFR BLD AUTO: 36 % (ref 36.6–50.3)
HCT VFR BLD AUTO: 36.5 % (ref 36.6–50.3)
HCT VFR BLD AUTO: 37.7 % (ref 36.6–50.3)
HCT VFR BLD AUTO: 37.8 % (ref 36.6–50.3)
HCT VFR BLD AUTO: 38.4 % (ref 36.6–50.3)
HCT VFR BLD AUTO: 38.6 % (ref 36.6–50.3)
HCT VFR BLD AUTO: 39.1 % (ref 36.6–50.3)
HCT VFR BLD AUTO: 39.4 % (ref 36.6–50.3)
HCT VFR BLD AUTO: 39.7 % (ref 36.6–50.3)
HCT VFR BLD AUTO: 42.4 % (ref 36.6–50.3)
HCV AB SERPL QL IA: NONREACTIVE
HCV COMMENT,HCGAC: NORMAL
HGB BLD-MCNC: 10.2 G/DL (ref 12.1–17)
HGB BLD-MCNC: 10.2 G/DL (ref 12.1–17)
HGB BLD-MCNC: 10.6 G/DL (ref 12.1–17)
HGB BLD-MCNC: 10.9 G/DL (ref 12.1–17)
HGB BLD-MCNC: 10.9 G/DL (ref 12.1–17)
HGB BLD-MCNC: 11 G/DL (ref 12.1–17)
HGB BLD-MCNC: 11.2 G/DL (ref 12.1–17)
HGB BLD-MCNC: 11.3 G/DL (ref 12.1–17)
HGB BLD-MCNC: 11.5 G/DL (ref 12.1–17)
HGB BLD-MCNC: 11.5 G/DL (ref 12.1–17)
HGB BLD-MCNC: 11.6 G/DL (ref 12.1–17)
HGB BLD-MCNC: 12 G/DL (ref 12.1–17)
HGB BLD-MCNC: 12 G/DL (ref 12.1–17)
HGB BLD-MCNC: 12.2 G/DL (ref 12.1–17)
HGB BLD-MCNC: 12.3 G/DL (ref 12.1–17)
HGB BLD-MCNC: 12.7 G/DL (ref 12.1–17)
HGB BLD-MCNC: 12.9 G/DL (ref 12.1–17)
HGB BLD-MCNC: 13.7 G/DL (ref 12.1–17)
HGB UR QL STRIP: NEGATIVE
HYALINE CASTS URNS QL MICRO: ABNORMAL /LPF (ref 0–5)
HYALINE CASTS URNS QL MICRO: ABNORMAL /LPF (ref 0–5)
HYALINE CASTS URNS QL MICRO: NORMAL /LPF (ref 0–5)
IGA SERPL-MCNC: 337 MG/DL (ref 61–437)
IGG SERPL-MCNC: 1068 MG/DL (ref 700–1600)
IGM SERPL-MCNC: 102 MG/DL (ref 15–143)
IMM GRANULOCYTES # BLD AUTO: 0 K/UL (ref 0–0.04)
IMM GRANULOCYTES # BLD AUTO: 0.1 K/UL (ref 0–0.04)
IMM GRANULOCYTES NFR BLD AUTO: 0 % (ref 0–0.5)
IMM GRANULOCYTES NFR BLD AUTO: 1 % (ref 0–0.5)
IMM GRANULOCYTES NFR BLD AUTO: 2 % (ref 0–0.5)
INR PPP: 2 (ref 0.9–1.1)
INTERPRETATION SERPL IEP-IMP: ABNORMAL
IRON SATN MFR SERPL: 35 % (ref 20–50)
IRON SERPL-MCNC: 79 UG/DL (ref 35–150)
KAPPA LC FREE SER-MCNC: 98.2 MG/L (ref 3.3–19.4)
KAPPA LC FREE/LAMBDA FREE SER: 1.86 {RATIO} (ref 0.26–1.65)
KETONES UR QL STRIP.AUTO: NEGATIVE MG/DL
LACTATE SERPL-SCNC: 1.9 MMOL/L (ref 0.4–2)
LACTATE SERPL-SCNC: 2 MMOL/L (ref 0.4–2)
LACTATE SERPL-SCNC: 5.2 MMOL/L (ref 0.4–2)
LAMBDA LC FREE SERPL-MCNC: 52.8 MG/L (ref 5.7–26.3)
LEUKOCYTE ESTERASE UR QL STRIP.AUTO: NEGATIVE
LIPASE SERPL-CCNC: 225 U/L (ref 73–393)
LIPASE SERPL-CCNC: 54 U/L (ref 73–393)
LYMPHOCYTES # BLD: 0.4 K/UL (ref 0.8–3.5)
LYMPHOCYTES # BLD: 0.4 K/UL (ref 0.8–3.5)
LYMPHOCYTES # BLD: 0.5 K/UL (ref 0.8–3.5)
LYMPHOCYTES # BLD: 0.6 K/UL (ref 0.8–3.5)
LYMPHOCYTES # BLD: 0.6 K/UL (ref 0.8–3.5)
LYMPHOCYTES # BLD: 0.7 K/UL (ref 0.8–3.5)
LYMPHOCYTES # BLD: 0.8 K/UL (ref 0.8–3.5)
LYMPHOCYTES # BLD: 0.9 K/UL (ref 0.8–3.5)
LYMPHOCYTES # BLD: 1 K/UL (ref 0.8–3.5)
LYMPHOCYTES # BLD: 1 K/UL (ref 0.8–3.5)
LYMPHOCYTES # BLD: 1.2 K/UL (ref 0.8–3.5)
LYMPHOCYTES # BLD: 1.4 K/UL (ref 0.8–3.5)
LYMPHOCYTES # BLD: 1.4 K/UL (ref 0.8–3.5)
LYMPHOCYTES NFR BLD: 10 % (ref 12–49)
LYMPHOCYTES NFR BLD: 13 % (ref 12–49)
LYMPHOCYTES NFR BLD: 13 % (ref 12–49)
LYMPHOCYTES NFR BLD: 16 % (ref 12–49)
LYMPHOCYTES NFR BLD: 22 % (ref 12–49)
LYMPHOCYTES NFR BLD: 23 % (ref 12–49)
LYMPHOCYTES NFR BLD: 27 % (ref 12–49)
LYMPHOCYTES NFR BLD: 5 % (ref 12–49)
LYMPHOCYTES NFR BLD: 7 % (ref 12–49)
LYMPHOCYTES NFR BLD: 7 % (ref 12–49)
LYMPHOCYTES NFR BLD: 8 % (ref 12–49)
LYMPHOCYTES NFR BLD: 9 % (ref 12–49)
M PROTEIN SERPL ELPH-MCNC: ABNORMAL G/DL
MAGNESIUM SERPL-MCNC: 2.1 MG/DL (ref 1.6–2.4)
MAGNESIUM SERPL-MCNC: 2.3 MG/DL (ref 1.6–2.4)
MAGNESIUM SERPL-MCNC: 2.4 MG/DL (ref 1.6–2.4)
MAGNESIUM SERPL-MCNC: 2.5 MG/DL (ref 1.6–2.4)
MCH RBC QN AUTO: 30 PG (ref 26–34)
MCH RBC QN AUTO: 30.2 PG (ref 26–34)
MCH RBC QN AUTO: 30.3 PG (ref 26–34)
MCH RBC QN AUTO: 30.4 PG (ref 26–34)
MCH RBC QN AUTO: 30.4 PG (ref 26–34)
MCH RBC QN AUTO: 30.5 PG (ref 26–34)
MCH RBC QN AUTO: 30.6 PG (ref 26–34)
MCH RBC QN AUTO: 30.6 PG (ref 26–34)
MCH RBC QN AUTO: 30.7 PG (ref 26–34)
MCH RBC QN AUTO: 30.7 PG (ref 26–34)
MCH RBC QN AUTO: 30.8 PG (ref 26–34)
MCH RBC QN AUTO: 30.8 PG (ref 26–34)
MCH RBC QN AUTO: 30.9 PG (ref 26–34)
MCH RBC QN AUTO: 30.9 PG (ref 26–34)
MCH RBC QN AUTO: 31.1 PG (ref 26–34)
MCH RBC QN AUTO: 31.4 PG (ref 26–34)
MCH RBC QN AUTO: 31.7 PG (ref 26–34)
MCH RBC QN AUTO: 31.7 PG (ref 26–34)
MCH RBC QN AUTO: 31.8 PG (ref 26–34)
MCH RBC QN AUTO: 32.4 PG (ref 26–34)
MCHC RBC AUTO-ENTMCNC: 31.6 G/DL (ref 30–36.5)
MCHC RBC AUTO-ENTMCNC: 31.8 G/DL (ref 30–36.5)
MCHC RBC AUTO-ENTMCNC: 32 G/DL (ref 30–36.5)
MCHC RBC AUTO-ENTMCNC: 32.2 G/DL (ref 30–36.5)
MCHC RBC AUTO-ENTMCNC: 32.3 G/DL (ref 30–36.5)
MCHC RBC AUTO-ENTMCNC: 32.3 G/DL (ref 30–36.5)
MCHC RBC AUTO-ENTMCNC: 32.4 G/DL (ref 30–36.5)
MCHC RBC AUTO-ENTMCNC: 32.5 G/DL (ref 30–36.5)
MCHC RBC AUTO-ENTMCNC: 32.6 G/DL (ref 30–36.5)
MCHC RBC AUTO-ENTMCNC: 32.9 G/DL (ref 30–36.5)
MCHC RBC AUTO-ENTMCNC: 33.3 G/DL (ref 30–36.5)
MCHC RBC AUTO-ENTMCNC: 33.4 G/DL (ref 30–36.5)
MCHC RBC AUTO-ENTMCNC: 33.6 G/DL (ref 30–36.5)
MCHC RBC AUTO-ENTMCNC: 33.6 G/DL (ref 30–36.5)
MCHC RBC AUTO-ENTMCNC: 33.9 G/DL (ref 30–36.5)
MCHC RBC AUTO-ENTMCNC: 34.1 G/DL (ref 30–36.5)
MCV RBC AUTO: 90.7 FL (ref 80–99)
MCV RBC AUTO: 91.8 FL (ref 80–99)
MCV RBC AUTO: 92.4 FL (ref 80–99)
MCV RBC AUTO: 92.7 FL (ref 80–99)
MCV RBC AUTO: 93.1 FL (ref 80–99)
MCV RBC AUTO: 93.7 FL (ref 80–99)
MCV RBC AUTO: 93.8 FL (ref 80–99)
MCV RBC AUTO: 94.3 FL (ref 80–99)
MCV RBC AUTO: 94.3 FL (ref 80–99)
MCV RBC AUTO: 94.5 FL (ref 80–99)
MCV RBC AUTO: 94.5 FL (ref 80–99)
MCV RBC AUTO: 94.7 FL (ref 80–99)
MCV RBC AUTO: 95.5 FL (ref 80–99)
MCV RBC AUTO: 95.7 FL (ref 80–99)
MCV RBC AUTO: 95.7 FL (ref 80–99)
MCV RBC AUTO: 95.8 FL (ref 80–99)
MCV RBC AUTO: 96.3 FL (ref 80–99)
MCV RBC AUTO: 96.3 FL (ref 80–99)
MCV RBC AUTO: 96.7 FL (ref 80–99)
MCV RBC AUTO: 98.1 FL (ref 80–99)
MONOCYTES # BLD: 0.4 K/UL (ref 0–1)
MONOCYTES # BLD: 0.6 K/UL (ref 0–1)
MONOCYTES # BLD: 0.7 K/UL (ref 0–1)
MONOCYTES # BLD: 0.8 K/UL (ref 0–1)
MONOCYTES # BLD: 0.8 K/UL (ref 0–1)
MONOCYTES # BLD: 1 K/UL (ref 0–1)
MONOCYTES # BLD: 1.1 K/UL (ref 0–1)
MONOCYTES # BLD: 1.2 K/UL (ref 0–1)
MONOCYTES # BLD: 1.4 K/UL (ref 0–1)
MONOCYTES NFR BLD: 10 % (ref 5–13)
MONOCYTES NFR BLD: 12 % (ref 5–13)
MONOCYTES NFR BLD: 13 % (ref 5–13)
MONOCYTES NFR BLD: 15 % (ref 5–13)
MONOCYTES NFR BLD: 16 % (ref 5–13)
MONOCYTES NFR BLD: 16 % (ref 5–13)
MONOCYTES NFR BLD: 6 % (ref 5–13)
MONOCYTES NFR BLD: 7 % (ref 5–13)
MONOCYTES NFR BLD: 8 % (ref 5–13)
MONOCYTES NFR BLD: 9 % (ref 5–13)
MONOCYTES NFR BLD: 9 % (ref 5–13)
MUCOUS THREADS URNS QL MICRO: ABNORMAL /LPF
MUCOUS THREADS URNS QL MICRO: ABNORMAL /LPF
NEUTS BAND NFR BLD MANUAL: 5 %
NEUTS SEG # BLD: 2.8 K/UL (ref 1.8–8)
NEUTS SEG # BLD: 3.9 K/UL (ref 1.8–8)
NEUTS SEG # BLD: 4.2 K/UL (ref 1.8–8)
NEUTS SEG # BLD: 4.5 K/UL (ref 1.8–8)
NEUTS SEG # BLD: 4.5 K/UL (ref 1.8–8)
NEUTS SEG # BLD: 4.7 K/UL (ref 1.8–8)
NEUTS SEG # BLD: 4.9 K/UL (ref 1.8–8)
NEUTS SEG # BLD: 5 K/UL (ref 1.8–8)
NEUTS SEG # BLD: 5 K/UL (ref 1.8–8)
NEUTS SEG # BLD: 5.9 K/UL (ref 1.8–8)
NEUTS SEG # BLD: 5.9 K/UL (ref 1.8–8)
NEUTS SEG # BLD: 6 K/UL (ref 1.8–8)
NEUTS SEG # BLD: 6.4 K/UL (ref 1.8–8)
NEUTS SEG # BLD: 6.5 K/UL (ref 1.8–8)
NEUTS SEG # BLD: 6.8 K/UL (ref 1.8–8)
NEUTS SEG # BLD: 7 K/UL (ref 1.8–8)
NEUTS SEG # BLD: 7 K/UL (ref 1.8–8)
NEUTS SEG # BLD: 7.5 K/UL (ref 1.8–8)
NEUTS SEG # BLD: 7.8 K/UL (ref 1.8–8)
NEUTS SEG NFR BLD: 61 % (ref 32–75)
NEUTS SEG NFR BLD: 64 % (ref 32–75)
NEUTS SEG NFR BLD: 65 % (ref 32–75)
NEUTS SEG NFR BLD: 67 % (ref 32–75)
NEUTS SEG NFR BLD: 69 % (ref 32–75)
NEUTS SEG NFR BLD: 72 % (ref 32–75)
NEUTS SEG NFR BLD: 72 % (ref 32–75)
NEUTS SEG NFR BLD: 73 % (ref 32–75)
NEUTS SEG NFR BLD: 75 % (ref 32–75)
NEUTS SEG NFR BLD: 76 % (ref 32–75)
NEUTS SEG NFR BLD: 78 % (ref 32–75)
NEUTS SEG NFR BLD: 78 % (ref 32–75)
NEUTS SEG NFR BLD: 80 % (ref 32–75)
NEUTS SEG NFR BLD: 81 % (ref 32–75)
NEUTS SEG NFR BLD: 82 % (ref 32–75)
NEUTS SEG NFR BLD: 84 % (ref 32–75)
NEUTS SEG NFR BLD: 88 % (ref 32–75)
NITRITE UR QL STRIP.AUTO: NEGATIVE
NRBC # BLD: 0 K/UL (ref 0–0.01)
NRBC # BLD: 0.02 K/UL (ref 0–0.01)
NRBC # BLD: 0.02 K/UL (ref 0–0.01)
NRBC # BLD: 0.03 K/UL (ref 0–0.01)
NRBC # BLD: 0.03 K/UL (ref 0–0.01)
NRBC # BLD: 0.13 K/UL (ref 0–0.01)
NRBC BLD-RTO: 0 PER 100 WBC
NRBC BLD-RTO: 0.2 PER 100 WBC
NRBC BLD-RTO: 0.3 PER 100 WBC
NRBC BLD-RTO: 0.3 PER 100 WBC
NRBC BLD-RTO: 0.4 PER 100 WBC
NRBC BLD-RTO: 1.5 PER 100 WBC
P-R INTERVAL, ECG05: 112 MS
P-R INTERVAL, ECG05: 128 MS
P-R INTERVAL, ECG05: 138 MS
P-R INTERVAL, ECG05: 220 MS
P-R INTERVAL, ECG05: 234 MS
P-R INTERVAL, ECG05: 72 MS
P-R INTERVAL, ECG05: 72 MS
P-R INTERVAL, ECG05: 74 MS
P-R INTERVAL, ECG05: 80 MS
P-R INTERVAL, ECG05: 80 MS
PH UR STRIP: 5 [PH] (ref 5–8)
PH UR STRIP: 7.5 [PH] (ref 5–8)
PHOSPHATE SERPL-MCNC: 2.9 MG/DL (ref 2.6–4.7)
PHOSPHATE SERPL-MCNC: 3 MG/DL (ref 2.6–4.7)
PHOSPHATE SERPL-MCNC: 3 MG/DL (ref 2.6–4.7)
PHOSPHATE SERPL-MCNC: 3.3 MG/DL (ref 2.6–4.7)
PHOSPHATE SERPL-MCNC: 3.9 MG/DL (ref 2.6–4.7)
PHOSPHATE SERPL-MCNC: 4.6 MG/DL (ref 2.6–4.7)
PHOSPHATE SERPL-MCNC: 5.1 MG/DL (ref 2.6–4.7)
PHOSPHATE SERPL-MCNC: 5.3 MG/DL (ref 2.6–4.7)
PHOSPHATE SERPL-MCNC: 5.6 MG/DL (ref 2.6–4.7)
PHOSPHATE SERPL-MCNC: 7.1 MG/DL (ref 2.6–4.7)
PLATELET # BLD AUTO: 112 K/UL (ref 150–400)
PLATELET # BLD AUTO: 112 K/UL (ref 150–400)
PLATELET # BLD AUTO: 116 K/UL (ref 150–400)
PLATELET # BLD AUTO: 126 K/UL (ref 150–400)
PLATELET # BLD AUTO: 129 K/UL (ref 150–400)
PLATELET # BLD AUTO: 129 K/UL (ref 150–400)
PLATELET # BLD AUTO: 141 K/UL (ref 150–400)
PLATELET # BLD AUTO: 150 K/UL (ref 150–400)
PLATELET # BLD AUTO: 154 K/UL (ref 150–400)
PLATELET # BLD AUTO: 165 K/UL (ref 150–400)
PLATELET # BLD AUTO: 170 K/UL (ref 150–400)
PLATELET # BLD AUTO: 185 K/UL (ref 150–400)
PLATELET # BLD AUTO: 187 K/UL (ref 150–400)
PLATELET # BLD AUTO: 188 K/UL (ref 150–400)
PLATELET # BLD AUTO: 196 K/UL (ref 150–400)
PLATELET # BLD AUTO: 206 K/UL (ref 150–400)
PLATELET # BLD AUTO: 216 K/UL (ref 150–400)
PLATELET # BLD AUTO: 218 K/UL (ref 150–400)
PLATELET # BLD AUTO: 243 K/UL (ref 150–400)
PLATELET # BLD AUTO: 251 K/UL (ref 150–400)
PMV BLD AUTO: 10.7 FL (ref 8.9–12.9)
PMV BLD AUTO: 10.7 FL (ref 8.9–12.9)
PMV BLD AUTO: 10.8 FL (ref 8.9–12.9)
PMV BLD AUTO: 10.9 FL (ref 8.9–12.9)
PMV BLD AUTO: 11 FL (ref 8.9–12.9)
PMV BLD AUTO: 11 FL (ref 8.9–12.9)
PMV BLD AUTO: 11.1 FL (ref 8.9–12.9)
PMV BLD AUTO: 11.2 FL (ref 8.9–12.9)
PMV BLD AUTO: 11.2 FL (ref 8.9–12.9)
PMV BLD AUTO: 11.4 FL (ref 8.9–12.9)
PMV BLD AUTO: 11.5 FL (ref 8.9–12.9)
PMV BLD AUTO: 11.6 FL (ref 8.9–12.9)
PMV BLD AUTO: 11.8 FL (ref 8.9–12.9)
PMV BLD AUTO: 11.9 FL (ref 8.9–12.9)
POTASSIUM SERPL-SCNC: 2.8 MMOL/L (ref 3.5–5.1)
POTASSIUM SERPL-SCNC: 2.9 MMOL/L (ref 3.5–5.1)
POTASSIUM SERPL-SCNC: 3.1 MMOL/L (ref 3.5–5.1)
POTASSIUM SERPL-SCNC: 3.2 MMOL/L (ref 3.5–5.1)
POTASSIUM SERPL-SCNC: 3.2 MMOL/L (ref 3.5–5.1)
POTASSIUM SERPL-SCNC: 3.4 MMOL/L (ref 3.5–5.1)
POTASSIUM SERPL-SCNC: 3.5 MMOL/L (ref 3.5–5.1)
POTASSIUM SERPL-SCNC: 3.6 MMOL/L (ref 3.5–5.1)
POTASSIUM SERPL-SCNC: 3.6 MMOL/L (ref 3.5–5.1)
POTASSIUM SERPL-SCNC: 3.7 MMOL/L (ref 3.5–5.1)
POTASSIUM SERPL-SCNC: 3.8 MMOL/L (ref 3.5–5.1)
POTASSIUM SERPL-SCNC: 3.9 MMOL/L (ref 3.5–5.1)
POTASSIUM SERPL-SCNC: 4.1 MMOL/L (ref 3.5–5.1)
POTASSIUM SERPL-SCNC: 4.2 MMOL/L (ref 3.5–5.1)
POTASSIUM SERPL-SCNC: 4.2 MMOL/L (ref 3.5–5.1)
POTASSIUM SERPL-SCNC: 4.3 MMOL/L (ref 3.5–5.1)
POTASSIUM SERPL-SCNC: 4.3 MMOL/L (ref 3.5–5.1)
POTASSIUM SERPL-SCNC: 4.6 MMOL/L (ref 3.5–5.1)
POTASSIUM SERPL-SCNC: 5 MMOL/L (ref 3.5–5.1)
POTASSIUM SERPL-SCNC: 5.2 MMOL/L (ref 3.5–5.1)
POTASSIUM SERPL-SCNC: 5.7 MMOL/L (ref 3.5–5.1)
POTASSIUM SERPL-SCNC: 6 MMOL/L (ref 3.5–5.1)
PROCALCITONIN SERPL-MCNC: 0.3 NG/ML
PROCALCITONIN SERPL-MCNC: 0.7 NG/ML
PROT SERPL-MCNC: 6.1 G/DL (ref 6.4–8.2)
PROT SERPL-MCNC: 6.3 G/DL (ref 6–8.5)
PROT SERPL-MCNC: 6.4 G/DL (ref 6.4–8.2)
PROT SERPL-MCNC: 6.5 G/DL (ref 6.4–8.2)
PROT SERPL-MCNC: 6.6 G/DL (ref 6.4–8.2)
PROT SERPL-MCNC: 6.6 G/DL (ref 6.4–8.2)
PROT SERPL-MCNC: 6.7 G/DL (ref 6.4–8.2)
PROT SERPL-MCNC: 6.7 G/DL (ref 6.4–8.2)
PROT SERPL-MCNC: 6.8 G/DL (ref 6.4–8.2)
PROT SERPL-MCNC: 7 G/DL (ref 6.4–8.2)
PROT SERPL-MCNC: 7.1 G/DL (ref 6.4–8.2)
PROT SERPL-MCNC: 7.7 G/DL (ref 6.4–8.2)
PROT UR STRIP-MCNC: 30 MG/DL
PROT UR STRIP-MCNC: ABNORMAL MG/DL
PROT UR STRIP-MCNC: NEGATIVE MG/DL
PROT UR STRIP-MCNC: NEGATIVE MG/DL
PROTHROMBIN TIME: 19.8 SEC (ref 9–11.1)
Q-T INTERVAL, ECG07: 228 MS
Q-T INTERVAL, ECG07: 372 MS
Q-T INTERVAL, ECG07: 372 MS
Q-T INTERVAL, ECG07: 388 MS
Q-T INTERVAL, ECG07: 396 MS
Q-T INTERVAL, ECG07: 402 MS
Q-T INTERVAL, ECG07: 426 MS
Q-T INTERVAL, ECG07: 430 MS
Q-T INTERVAL, ECG07: 468 MS
Q-T INTERVAL, ECG07: 492 MS
Q-T INTERVAL, ECG07: 540 MS
QRS DURATION, ECG06: 102 MS
QRS DURATION, ECG06: 108 MS
QRS DURATION, ECG06: 112 MS
QRS DURATION, ECG06: 114 MS
QRS DURATION, ECG06: 114 MS
QRS DURATION, ECG06: 122 MS
QRS DURATION, ECG06: 142 MS
QRS DURATION, ECG06: 154 MS
QRS DURATION, ECG06: 158 MS
QRS DURATION, ECG06: 98 MS
QRS DURATION, ECG06: 98 MS
QTC CALCULATION (BEZET), ECG08: 344 MS
QTC CALCULATION (BEZET), ECG08: 452 MS
QTC CALCULATION (BEZET), ECG08: 472 MS
QTC CALCULATION (BEZET), ECG08: 482 MS
QTC CALCULATION (BEZET), ECG08: 510 MS
QTC CALCULATION (BEZET), ECG08: 526 MS
QTC CALCULATION (BEZET), ECG08: 527 MS
QTC CALCULATION (BEZET), ECG08: 546 MS
QTC CALCULATION (BEZET), ECG08: 571 MS
QTC CALCULATION (BEZET), ECG08: 581 MS
QTC CALCULATION (BEZET), ECG08: 653 MS
RBC # BLD AUTO: 3.27 M/UL (ref 4.1–5.7)
RBC # BLD AUTO: 3.34 M/UL (ref 4.1–5.7)
RBC # BLD AUTO: 3.35 M/UL (ref 4.1–5.7)
RBC # BLD AUTO: 3.46 M/UL (ref 4.1–5.7)
RBC # BLD AUTO: 3.53 M/UL (ref 4.1–5.7)
RBC # BLD AUTO: 3.54 M/UL (ref 4.1–5.7)
RBC # BLD AUTO: 3.66 M/UL (ref 4.1–5.7)
RBC # BLD AUTO: 3.68 M/UL (ref 4.1–5.7)
RBC # BLD AUTO: 3.72 M/UL (ref 4.1–5.7)
RBC # BLD AUTO: 3.74 M/UL (ref 4.1–5.7)
RBC # BLD AUTO: 3.82 M/UL (ref 4.1–5.7)
RBC # BLD AUTO: 3.84 M/UL (ref 4.1–5.7)
RBC # BLD AUTO: 3.9 M/UL (ref 4.1–5.7)
RBC # BLD AUTO: 4.01 M/UL (ref 4.1–5.7)
RBC # BLD AUTO: 4.06 M/UL (ref 4.1–5.7)
RBC # BLD AUTO: 4.07 M/UL (ref 4.1–5.7)
RBC # BLD AUTO: 4.09 M/UL (ref 4.1–5.7)
RBC # BLD AUTO: 4.15 M/UL (ref 4.1–5.7)
RBC # BLD AUTO: 4.23 M/UL (ref 4.1–5.7)
RBC # BLD AUTO: 4.32 M/UL (ref 4.1–5.7)
RBC #/AREA URNS HPF: ABNORMAL /HPF (ref 0–5)
RBC #/AREA URNS HPF: NORMAL /HPF (ref 0–5)
RBC MORPH BLD: ABNORMAL
SERVICE CMNT-IMP: NORMAL
SODIUM SERPL-SCNC: 126 MMOL/L (ref 136–145)
SODIUM SERPL-SCNC: 127 MMOL/L (ref 136–145)
SODIUM SERPL-SCNC: 129 MMOL/L (ref 136–145)
SODIUM SERPL-SCNC: 130 MMOL/L (ref 136–145)
SODIUM SERPL-SCNC: 130 MMOL/L (ref 136–145)
SODIUM SERPL-SCNC: 131 MMOL/L (ref 136–145)
SODIUM SERPL-SCNC: 133 MMOL/L (ref 136–145)
SODIUM SERPL-SCNC: 134 MMOL/L (ref 136–145)
SODIUM SERPL-SCNC: 134 MMOL/L (ref 136–145)
SODIUM SERPL-SCNC: 135 MMOL/L (ref 136–145)
SODIUM SERPL-SCNC: 136 MMOL/L (ref 136–145)
SODIUM SERPL-SCNC: 136 MMOL/L (ref 136–145)
SODIUM SERPL-SCNC: 137 MMOL/L (ref 136–145)
SODIUM SERPL-SCNC: 137 MMOL/L (ref 136–145)
SODIUM SERPL-SCNC: 138 MMOL/L (ref 136–145)
SODIUM SERPL-SCNC: 139 MMOL/L (ref 136–145)
SODIUM SERPL-SCNC: 142 MMOL/L (ref 136–145)
SODIUM UR-SCNC: 14 MMOL/L
SP GR UR REFRACTOMETRY: 1.01 (ref 1–1.03)
SP GR UR REFRACTOMETRY: 1.02 (ref 1–1.03)
SP GR UR REFRACTOMETRY: 1.02 (ref 1–1.03)
SP GR UR REFRACTOMETRY: 1.03 (ref 1–1.03)
SP1: NORMAL
SP2: NORMAL
SP3: NORMAL
T4 SERPL-MCNC: 12.1 UG/DL (ref 4.5–12.1)
TIBC SERPL-MCNC: 223 UG/DL (ref 250–450)
TROPONIN I SERPL-MCNC: <0.05 NG/ML
TSH SERPL DL<=0.05 MIU/L-ACNC: 2.22 UIU/ML (ref 0.36–3.74)
UA: UC IF INDICATED,UAUC: ABNORMAL
UA: UC IF INDICATED,UAUC: NORMAL
UR CULT HOLD, URHOLD: NORMAL
UROBILINOGEN UR QL STRIP.AUTO: 0.2 EU/DL (ref 0.2–1)
UROBILINOGEN UR QL STRIP.AUTO: 1 EU/DL (ref 0.2–1)
VENTRICULAR RATE, ECG03: 100 BPM
VENTRICULAR RATE, ECG03: 101 BPM
VENTRICULAR RATE, ECG03: 106 BPM
VENTRICULAR RATE, ECG03: 111 BPM
VENTRICULAR RATE, ECG03: 111 BPM
VENTRICULAR RATE, ECG03: 137 BPM
VENTRICULAR RATE, ECG03: 74 BPM
VENTRICULAR RATE, ECG03: 76 BPM
VENTRICULAR RATE, ECG03: 84 BPM
VENTRICULAR RATE, ECG03: 88 BPM
VENTRICULAR RATE, ECG03: 89 BPM
WBC # BLD AUTO: 10.2 K/UL (ref 4.1–11.1)
WBC # BLD AUTO: 4.6 K/UL (ref 4.1–11.1)
WBC # BLD AUTO: 5.8 K/UL (ref 4.1–11.1)
WBC # BLD AUTO: 5.9 K/UL (ref 4.1–11.1)
WBC # BLD AUTO: 6 K/UL (ref 4.1–11.1)
WBC # BLD AUTO: 6.3 K/UL (ref 4.1–11.1)
WBC # BLD AUTO: 6.5 K/UL (ref 4.1–11.1)
WBC # BLD AUTO: 6.6 K/UL (ref 4.1–11.1)
WBC # BLD AUTO: 6.7 K/UL (ref 4.1–11.1)
WBC # BLD AUTO: 6.8 K/UL (ref 4.1–11.1)
WBC # BLD AUTO: 6.9 K/UL (ref 4.1–11.1)
WBC # BLD AUTO: 7.5 K/UL (ref 4.1–11.1)
WBC # BLD AUTO: 7.6 K/UL (ref 4.1–11.1)
WBC # BLD AUTO: 7.9 K/UL (ref 4.1–11.1)
WBC # BLD AUTO: 8 K/UL (ref 4.1–11.1)
WBC # BLD AUTO: 8.1 K/UL (ref 4.1–11.1)
WBC # BLD AUTO: 8.4 K/UL (ref 4.1–11.1)
WBC # BLD AUTO: 8.8 K/UL (ref 4.1–11.1)
WBC # BLD AUTO: 9.1 K/UL (ref 4.1–11.1)
WBC # BLD AUTO: 9.2 K/UL (ref 4.1–11.1)
WBC MORPH BLD: ABNORMAL
WBC URNS QL MICRO: ABNORMAL /HPF (ref 0–4)
WBC URNS QL MICRO: NORMAL /HPF (ref 0–4)

## 2019-01-01 PROCEDURE — 36415 COLL VENOUS BLD VENIPUNCTURE: CPT

## 2019-01-01 PROCEDURE — C1751 CATH, INF, PER/CENT/MIDLINE: HCPCS

## 2019-01-01 PROCEDURE — 74011250637 HC RX REV CODE- 250/637: Performed by: INTERNAL MEDICINE

## 2019-01-01 PROCEDURE — 3331090002 HH PPS REVENUE DEBIT

## 2019-01-01 PROCEDURE — 65660000000 HC RM CCU STEPDOWN

## 2019-01-01 PROCEDURE — 3331090001 HH PPS REVENUE CREDIT

## 2019-01-01 PROCEDURE — 80053 COMPREHEN METABOLIC PANEL: CPT

## 2019-01-01 PROCEDURE — 85025 COMPLETE CBC W/AUTO DIFF WBC: CPT

## 2019-01-01 PROCEDURE — 74011250636 HC RX REV CODE- 250/636: Performed by: INTERNAL MEDICINE

## 2019-01-01 PROCEDURE — 82570 ASSAY OF URINE CREATININE: CPT

## 2019-01-01 PROCEDURE — 5A2204Z RESTORATION OF CARDIAC RHYTHM, SINGLE: ICD-10-PCS | Performed by: INTERNAL MEDICINE

## 2019-01-01 PROCEDURE — 99285 EMERGENCY DEPT VISIT HI MDM: CPT

## 2019-01-01 PROCEDURE — 71046 X-RAY EXAM CHEST 2 VIEWS: CPT

## 2019-01-01 PROCEDURE — 83735 ASSAY OF MAGNESIUM: CPT

## 2019-01-01 PROCEDURE — 94760 N-INVAS EAR/PLS OXIMETRY 1: CPT

## 2019-01-01 PROCEDURE — 74011000258 HC RX REV CODE- 258: Performed by: INTERNAL MEDICINE

## 2019-01-01 PROCEDURE — 96365 THER/PROPH/DIAG IV INF INIT: CPT

## 2019-01-01 PROCEDURE — 84484 ASSAY OF TROPONIN QUANT: CPT

## 2019-01-01 PROCEDURE — 71275 CT ANGIOGRAPHY CHEST: CPT

## 2019-01-01 PROCEDURE — 82784 ASSAY IGA/IGD/IGG/IGM EACH: CPT

## 2019-01-01 PROCEDURE — G0299 HHS/HOSPICE OF RN EA 15 MIN: HCPCS

## 2019-01-01 PROCEDURE — G0151 HHCP-SERV OF PT,EA 15 MIN: HCPCS

## 2019-01-01 PROCEDURE — 74011250636 HC RX REV CODE- 250/636: Performed by: EMERGENCY MEDICINE

## 2019-01-01 PROCEDURE — 80069 RENAL FUNCTION PANEL: CPT

## 2019-01-01 PROCEDURE — C1894 INTRO/SHEATH, NON-LASER: HCPCS | Performed by: INTERNAL MEDICINE

## 2019-01-01 PROCEDURE — 83605 ASSAY OF LACTIC ACID: CPT

## 2019-01-01 PROCEDURE — 77010033678 HC OXYGEN DAILY

## 2019-01-01 PROCEDURE — P9047 ALBUMIN (HUMAN), 25%, 50ML: HCPCS | Performed by: INTERNAL MEDICINE

## 2019-01-01 PROCEDURE — 81001 URINALYSIS AUTO W/SCOPE: CPT

## 2019-01-01 PROCEDURE — 3331090003 HH PPS REVENUE ADJ

## 2019-01-01 PROCEDURE — 77030018729 HC ELECTRD DEFIB PAD CARD -B

## 2019-01-01 PROCEDURE — 74011250637 HC RX REV CODE- 250/637: Performed by: FAMILY MEDICINE

## 2019-01-01 PROCEDURE — 51798 US URINE CAPACITY MEASURE: CPT

## 2019-01-01 PROCEDURE — 99152 MOD SED SAME PHYS/QHP 5/>YRS: CPT

## 2019-01-01 PROCEDURE — 84145 PROCALCITONIN (PCT): CPT

## 2019-01-01 PROCEDURE — 94761 N-INVAS EAR/PLS OXIMETRY MLT: CPT

## 2019-01-01 PROCEDURE — 80048 BASIC METABOLIC PNL TOTAL CA: CPT

## 2019-01-01 PROCEDURE — 99233 SBSQ HOSP IP/OBS HIGH 50: CPT | Performed by: INTERNAL MEDICINE

## 2019-01-01 PROCEDURE — 83540 ASSAY OF IRON: CPT

## 2019-01-01 PROCEDURE — 74011000250 HC RX REV CODE- 250: Performed by: EMERGENCY MEDICINE

## 2019-01-01 PROCEDURE — 84443 ASSAY THYROID STIM HORMONE: CPT

## 2019-01-01 PROCEDURE — 74011000250 HC RX REV CODE- 250: Performed by: INTERNAL MEDICINE

## 2019-01-01 PROCEDURE — 96375 TX/PRO/DX INJ NEW DRUG ADDON: CPT

## 2019-01-01 PROCEDURE — 71045 X-RAY EXAM CHEST 1 VIEW: CPT

## 2019-01-01 PROCEDURE — 82550 ASSAY OF CK (CPK): CPT

## 2019-01-01 PROCEDURE — 97116 GAIT TRAINING THERAPY: CPT

## 2019-01-01 PROCEDURE — 83880 ASSAY OF NATRIURETIC PEPTIDE: CPT

## 2019-01-01 PROCEDURE — 74011636637 HC RX REV CODE- 636/637: Performed by: EMERGENCY MEDICINE

## 2019-01-01 PROCEDURE — 65610000006 HC RM INTENSIVE CARE

## 2019-01-01 PROCEDURE — 74011250636 HC RX REV CODE- 250/636

## 2019-01-01 PROCEDURE — G0495 RN CARE TRAIN/EDU IN HH: HCPCS

## 2019-01-01 PROCEDURE — 74011250636 HC RX REV CODE- 250/636: Performed by: HOSPITALIST

## 2019-01-01 PROCEDURE — 87040 BLOOD CULTURE FOR BACTERIA: CPT

## 2019-01-01 PROCEDURE — 97530 THERAPEUTIC ACTIVITIES: CPT

## 2019-01-01 PROCEDURE — 74011250637 HC RX REV CODE- 250/637: Performed by: EMERGENCY MEDICINE

## 2019-01-01 PROCEDURE — 82728 ASSAY OF FERRITIN: CPT

## 2019-01-01 PROCEDURE — 400013 HH SOC

## 2019-01-01 PROCEDURE — 77030027138 HC INCENT SPIROMETER -A

## 2019-01-01 PROCEDURE — 99152 MOD SED SAME PHYS/QHP 5/>YRS: CPT | Performed by: INTERNAL MEDICINE

## 2019-01-01 PROCEDURE — 77030029684 HC NEB SM VOL KT MONA -A

## 2019-01-01 PROCEDURE — 93005 ELECTROCARDIOGRAM TRACING: CPT

## 2019-01-01 PROCEDURE — 94640 AIRWAY INHALATION TREATMENT: CPT

## 2019-01-01 PROCEDURE — 96368 THER/DIAG CONCURRENT INF: CPT

## 2019-01-01 PROCEDURE — 87086 URINE CULTURE/COLONY COUNT: CPT

## 2019-01-01 PROCEDURE — 96374 THER/PROPH/DIAG INJ IV PUSH: CPT

## 2019-01-01 PROCEDURE — 74011636320 HC RX REV CODE- 636/320: Performed by: EMERGENCY MEDICINE

## 2019-01-01 PROCEDURE — 83690 ASSAY OF LIPASE: CPT

## 2019-01-01 PROCEDURE — 74011250637 HC RX REV CODE- 250/637: Performed by: HOSPITALIST

## 2019-01-01 PROCEDURE — 76775 US EXAM ABDO BACK WALL LIM: CPT

## 2019-01-01 PROCEDURE — 97535 SELF CARE MNGMENT TRAINING: CPT | Performed by: OCCUPATIONAL THERAPIST

## 2019-01-01 PROCEDURE — 84436 ASSAY OF TOTAL THYROXINE: CPT

## 2019-01-01 PROCEDURE — 80076 HEPATIC FUNCTION PANEL: CPT

## 2019-01-01 PROCEDURE — 97165 OT EVAL LOW COMPLEX 30 MIN: CPT

## 2019-01-01 PROCEDURE — 96367 TX/PROPH/DG ADDL SEQ IV INF: CPT

## 2019-01-01 PROCEDURE — 93312 ECHO TRANSESOPHAGEAL: CPT

## 2019-01-01 PROCEDURE — 96366 THER/PROPH/DIAG IV INF ADDON: CPT

## 2019-01-01 PROCEDURE — 76705 ECHO EXAM OF ABDOMEN: CPT

## 2019-01-01 PROCEDURE — 77030040361 HC SLV COMPR DVT MDII -B

## 2019-01-01 PROCEDURE — P9045 ALBUMIN (HUMAN), 5%, 250 ML: HCPCS | Performed by: INTERNAL MEDICINE

## 2019-01-01 PROCEDURE — 02HV33Z INSERTION OF INFUSION DEVICE INTO SUPERIOR VENA CAVA, PERCUTANEOUS APPROACH: ICD-10-PCS | Performed by: INTERNAL MEDICINE

## 2019-01-01 PROCEDURE — 36573 INSJ PICC RS&I 5 YR+: CPT | Performed by: INTERNAL MEDICINE

## 2019-01-01 PROCEDURE — 74011250636 HC RX REV CODE- 250/636: Performed by: PHYSICIAN ASSISTANT

## 2019-01-01 PROCEDURE — 85610 PROTHROMBIN TIME: CPT

## 2019-01-01 PROCEDURE — 97535 SELF CARE MNGMENT TRAINING: CPT

## 2019-01-01 PROCEDURE — 84100 ASSAY OF PHOSPHORUS: CPT

## 2019-01-01 PROCEDURE — 97110 THERAPEUTIC EXERCISES: CPT

## 2019-01-01 PROCEDURE — 97165 OT EVAL LOW COMPLEX 30 MIN: CPT | Performed by: OCCUPATIONAL THERAPIST

## 2019-01-01 PROCEDURE — 82533 TOTAL CORTISOL: CPT

## 2019-01-01 PROCEDURE — 97116 GAIT TRAINING THERAPY: CPT | Performed by: PHYSICAL THERAPIST

## 2019-01-01 PROCEDURE — 84300 ASSAY OF URINE SODIUM: CPT

## 2019-01-01 PROCEDURE — 97161 PT EVAL LOW COMPLEX 20 MIN: CPT | Performed by: PHYSICAL THERAPIST

## 2019-01-01 PROCEDURE — 85027 COMPLETE CBC AUTOMATED: CPT

## 2019-01-01 PROCEDURE — 71250 CT THORAX DX C-: CPT

## 2019-01-01 PROCEDURE — B246ZZ4 ULTRASONOGRAPHY OF RIGHT AND LEFT HEART, TRANSESOPHAGEAL: ICD-10-PCS | Performed by: INTERNAL MEDICINE

## 2019-01-01 PROCEDURE — 74011000258 HC RX REV CODE- 258: Performed by: EMERGENCY MEDICINE

## 2019-01-01 PROCEDURE — 4A023N6 MEASUREMENT OF CARDIAC SAMPLING AND PRESSURE, RIGHT HEART, PERCUTANEOUS APPROACH: ICD-10-PCS | Performed by: INTERNAL MEDICINE

## 2019-01-01 PROCEDURE — 99153 MOD SED SAME PHYS/QHP EA: CPT | Performed by: INTERNAL MEDICINE

## 2019-01-01 PROCEDURE — C1751 CATH, INF, PER/CENT/MIDLINE: HCPCS | Performed by: INTERNAL MEDICINE

## 2019-01-01 PROCEDURE — G0152 HHCP-SERV OF OT,EA 15 MIN: HCPCS

## 2019-01-01 PROCEDURE — 76937 US GUIDE VASCULAR ACCESS: CPT

## 2019-01-01 PROCEDURE — 77030018786 HC NDL GD F/USND BARD -B

## 2019-01-01 PROCEDURE — 74177 CT ABD & PELVIS W/CONTRAST: CPT

## 2019-01-01 PROCEDURE — 80074 ACUTE HEPATITIS PANEL: CPT

## 2019-01-01 PROCEDURE — G0157 HHC PT ASSISTANT EA 15: HCPCS

## 2019-01-01 PROCEDURE — 97161 PT EVAL LOW COMPLEX 20 MIN: CPT

## 2019-01-01 PROCEDURE — 65270000029 HC RM PRIVATE

## 2019-01-01 PROCEDURE — 93451 RIGHT HEART CATH: CPT | Performed by: INTERNAL MEDICINE

## 2019-01-01 PROCEDURE — 74011636320 HC RX REV CODE- 636/320: Performed by: COLON & RECTAL SURGERY

## 2019-01-01 PROCEDURE — 82565 ASSAY OF CREATININE: CPT

## 2019-01-01 RX ORDER — TORSEMIDE 20 MG/1
20 TABLET ORAL 2 TIMES DAILY
Status: DISCONTINUED | OUTPATIENT
Start: 2019-01-01 | End: 2019-01-01

## 2019-01-01 RX ORDER — POTASSIUM CHLORIDE 20 MEQ/1
40 TABLET, EXTENDED RELEASE ORAL
Status: COMPLETED | OUTPATIENT
Start: 2019-01-01 | End: 2019-01-01

## 2019-01-01 RX ORDER — MORPHINE SULFATE 2 MG/ML
2 INJECTION, SOLUTION INTRAMUSCULAR; INTRAVENOUS
Status: DISCONTINUED | OUTPATIENT
Start: 2019-01-01 | End: 2019-01-01 | Stop reason: HOSPADM

## 2019-01-01 RX ORDER — HEPARIN 100 UNIT/ML
300 SYRINGE INTRAVENOUS AS NEEDED
Status: DISCONTINUED | OUTPATIENT
Start: 2019-01-01 | End: 2019-01-01 | Stop reason: HOSPADM

## 2019-01-01 RX ORDER — DOBUTAMINE HYDROCHLORIDE 200 MG/100ML
0-10 INJECTION INTRAVENOUS
Status: DISCONTINUED | OUTPATIENT
Start: 2019-01-01 | End: 2019-01-01

## 2019-01-01 RX ORDER — HEPARIN SODIUM 200 [USP'U]/100ML
INJECTION, SOLUTION INTRAVENOUS
Status: DISCONTINUED | OUTPATIENT
Start: 2019-01-01 | End: 2019-01-01

## 2019-01-01 RX ORDER — POTASSIUM CHLORIDE 750 MG/1
40 TABLET, FILM COATED, EXTENDED RELEASE ORAL
Status: COMPLETED | OUTPATIENT
Start: 2019-01-01 | End: 2019-01-01

## 2019-01-01 RX ORDER — GUAIFENESIN 100 MG/5ML
100 SOLUTION ORAL
Status: DISCONTINUED | OUTPATIENT
Start: 2019-01-01 | End: 2019-01-01 | Stop reason: HOSPADM

## 2019-01-01 RX ORDER — BUMETANIDE 1 MG/1
2 TABLET ORAL 2 TIMES DAILY
Status: DISCONTINUED | OUTPATIENT
Start: 2019-01-01 | End: 2019-01-01 | Stop reason: HOSPADM

## 2019-01-01 RX ORDER — DOBUTAMINE HYDROCHLORIDE 400 MG/100ML
7.5 INJECTION INTRAVENOUS CONTINUOUS
Qty: 250 ML | Refills: 11 | Status: SHIPPED
Start: 2019-01-01 | End: 2020-09-30

## 2019-01-01 RX ORDER — LORAZEPAM 2 MG/ML
0.5 INJECTION INTRAMUSCULAR
Status: DISCONTINUED | OUTPATIENT
Start: 2019-01-01 | End: 2019-01-01 | Stop reason: HOSPADM

## 2019-01-01 RX ORDER — MIDAZOLAM HYDROCHLORIDE 1 MG/ML
INJECTION, SOLUTION INTRAMUSCULAR; INTRAVENOUS AS NEEDED
Status: DISCONTINUED | OUTPATIENT
Start: 2019-01-01 | End: 2019-01-01

## 2019-01-01 RX ORDER — SODIUM BICARBONATE 84 MG/ML
50 INJECTION, SOLUTION INTRAVENOUS ONCE
Status: DISCONTINUED | OUTPATIENT
Start: 2019-01-01 | End: 2019-01-01

## 2019-01-01 RX ORDER — SCOLOPAMINE TRANSDERMAL SYSTEM 1 MG/1
1 PATCH, EXTENDED RELEASE TRANSDERMAL
Status: DISCONTINUED | OUTPATIENT
Start: 2019-01-01 | End: 2019-01-01 | Stop reason: HOSPADM

## 2019-01-01 RX ORDER — LEVOFLOXACIN 5 MG/ML
750 INJECTION, SOLUTION INTRAVENOUS
Status: DISCONTINUED | OUTPATIENT
Start: 2019-01-01 | End: 2019-01-01

## 2019-01-01 RX ORDER — MORPHINE SULFATE 2 MG/ML
1 INJECTION, SOLUTION INTRAMUSCULAR; INTRAVENOUS
Status: DISPENSED | OUTPATIENT
Start: 2019-01-01 | End: 2019-01-01

## 2019-01-01 RX ORDER — BUMETANIDE 0.25 MG/ML
1 INJECTION INTRAMUSCULAR; INTRAVENOUS
Status: COMPLETED | OUTPATIENT
Start: 2019-01-01 | End: 2019-01-01

## 2019-01-01 RX ORDER — POLYETHYLENE GLYCOL 3350 17 G/17G
17 POWDER, FOR SOLUTION ORAL DAILY
Status: DISCONTINUED | OUTPATIENT
Start: 2019-01-01 | End: 2019-01-01 | Stop reason: HOSPADM

## 2019-01-01 RX ORDER — AMIODARONE HYDROCHLORIDE 200 MG/1
200 TABLET ORAL 2 TIMES DAILY
Status: DISCONTINUED | OUTPATIENT
Start: 2019-01-01 | End: 2019-01-01

## 2019-01-01 RX ORDER — SODIUM CHLORIDE 9 MG/ML
50 INJECTION, SOLUTION INTRAVENOUS CONTINUOUS
Status: DISCONTINUED | OUTPATIENT
Start: 2019-01-01 | End: 2019-01-01

## 2019-01-01 RX ORDER — FUROSEMIDE 10 MG/ML
20 INJECTION INTRAMUSCULAR; INTRAVENOUS DAILY
Status: DISCONTINUED | OUTPATIENT
Start: 2019-01-01 | End: 2019-01-01

## 2019-01-01 RX ORDER — LEVOTHYROXINE SODIUM 100 UG/1
100 TABLET ORAL EVERY EVENING
Status: DISCONTINUED | OUTPATIENT
Start: 2019-01-01 | End: 2019-01-01 | Stop reason: HOSPADM

## 2019-01-01 RX ORDER — DOBUTAMINE HYDROCHLORIDE 400 MG/100ML
0-10 INJECTION INTRAVENOUS
Status: DISCONTINUED | OUTPATIENT
Start: 2019-01-01 | End: 2019-01-01

## 2019-01-01 RX ORDER — DOBUTAMINE HYDROCHLORIDE 200 MG/100ML
2.5-1 INJECTION INTRAVENOUS
Status: DISCONTINUED | OUTPATIENT
Start: 2019-01-01 | End: 2019-01-01

## 2019-01-01 RX ORDER — DOBUTAMINE HYDROCHLORIDE 200 MG/100ML
2.5-1 INJECTION INTRAVENOUS
Status: DISPENSED | OUTPATIENT
Start: 2019-01-01 | End: 2019-01-01

## 2019-01-01 RX ORDER — PREDNISOLONE ACETATE 10 MG/ML
1 SUSPENSION/ DROPS OPHTHALMIC 2 TIMES DAILY
COMMUNITY

## 2019-01-01 RX ORDER — POTASSIUM CHLORIDE 1500 MG/1
20 TABLET, FILM COATED, EXTENDED RELEASE ORAL DAILY
Qty: 30 TAB | Refills: 1 | Status: SHIPPED | OUTPATIENT
Start: 2019-01-01

## 2019-01-01 RX ORDER — FUROSEMIDE 10 MG/ML
40 INJECTION INTRAMUSCULAR; INTRAVENOUS 2 TIMES DAILY
Status: DISCONTINUED | OUTPATIENT
Start: 2019-01-01 | End: 2019-01-01

## 2019-01-01 RX ORDER — SODIUM CHLORIDE 0.9 % (FLUSH) 0.9 %
5-10 SYRINGE (ML) INJECTION AS NEEDED
Status: DISCONTINUED | OUTPATIENT
Start: 2019-01-01 | End: 2019-01-01 | Stop reason: HOSPADM

## 2019-01-01 RX ORDER — NALOXONE HYDROCHLORIDE 0.4 MG/ML
0.4 INJECTION, SOLUTION INTRAMUSCULAR; INTRAVENOUS; SUBCUTANEOUS AS NEEDED
Status: DISCONTINUED | OUTPATIENT
Start: 2019-01-01 | End: 2019-01-01 | Stop reason: HOSPADM

## 2019-01-01 RX ORDER — DOBUTAMINE HYDROCHLORIDE 400 MG/100ML
7.5 INJECTION INTRAVENOUS CONTINUOUS
Status: DISCONTINUED | OUTPATIENT
Start: 2019-01-01 | End: 2019-01-01 | Stop reason: HOSPADM

## 2019-01-01 RX ORDER — KETOROLAC TROMETHAMINE 30 MG/ML
30 INJECTION, SOLUTION INTRAMUSCULAR; INTRAVENOUS
Status: DISCONTINUED | OUTPATIENT
Start: 2019-01-01 | End: 2019-01-01

## 2019-01-01 RX ORDER — LORATADINE 10 MG/1
10 TABLET ORAL DAILY
Status: DISCONTINUED | OUTPATIENT
Start: 2019-01-01 | End: 2019-01-01 | Stop reason: HOSPADM

## 2019-01-01 RX ORDER — FUROSEMIDE 10 MG/ML
20 INJECTION INTRAMUSCULAR; INTRAVENOUS
Status: COMPLETED | OUTPATIENT
Start: 2019-01-01 | End: 2019-01-01

## 2019-01-01 RX ORDER — VANCOMYCIN HYDROCHLORIDE
1250
Status: DISCONTINUED | OUTPATIENT
Start: 2019-01-01 | End: 2019-01-01

## 2019-01-01 RX ORDER — SODIUM CHLORIDE 0.9 % (FLUSH) 0.9 %
5-40 SYRINGE (ML) INJECTION EVERY 8 HOURS
Status: DISCONTINUED | OUTPATIENT
Start: 2019-01-01 | End: 2019-01-01

## 2019-01-01 RX ORDER — SODIUM CHLORIDE 9 MG/ML
250 INJECTION, SOLUTION INTRAVENOUS ONCE
Status: COMPLETED | OUTPATIENT
Start: 2019-01-01 | End: 2019-01-01

## 2019-01-01 RX ORDER — IPRATROPIUM BROMIDE AND ALBUTEROL SULFATE 2.5; .5 MG/3ML; MG/3ML
3 SOLUTION RESPIRATORY (INHALATION)
Status: DISCONTINUED | OUTPATIENT
Start: 2019-01-01 | End: 2019-01-01 | Stop reason: HOSPADM

## 2019-01-01 RX ORDER — PRAVASTATIN SODIUM 40 MG/1
80 TABLET ORAL
Status: DISCONTINUED | OUTPATIENT
Start: 2019-01-01 | End: 2019-01-01

## 2019-01-01 RX ORDER — BACITRACIN 500 UNIT/G
1 PACKET (EA) TOPICAL AS NEEDED
Status: DISCONTINUED | OUTPATIENT
Start: 2019-01-01 | End: 2019-01-01 | Stop reason: HOSPADM

## 2019-01-01 RX ORDER — CARVEDILOL 6.25 MG/1
6.25 TABLET ORAL 2 TIMES DAILY WITH MEALS
Status: DISCONTINUED | OUTPATIENT
Start: 2019-01-01 | End: 2019-01-01

## 2019-01-01 RX ORDER — PREDNISOLONE ACETATE 10 MG/ML
1 SUSPENSION/ DROPS OPHTHALMIC 2 TIMES DAILY
Status: DISCONTINUED | OUTPATIENT
Start: 2019-01-01 | End: 2019-01-01 | Stop reason: HOSPADM

## 2019-01-01 RX ORDER — SODIUM CHLORIDE 0.9 % (FLUSH) 0.9 %
5-40 SYRINGE (ML) INJECTION AS NEEDED
Status: DISCONTINUED | OUTPATIENT
Start: 2019-01-01 | End: 2019-01-01 | Stop reason: HOSPADM

## 2019-01-01 RX ORDER — DOCUSATE SODIUM 100 MG/1
100 CAPSULE, LIQUID FILLED ORAL
Status: DISCONTINUED | OUTPATIENT
Start: 2019-01-01 | End: 2019-01-01 | Stop reason: HOSPADM

## 2019-01-01 RX ORDER — SODIUM CHLORIDE 0.9 % (FLUSH) 0.9 %
5-40 SYRINGE (ML) INJECTION EVERY 8 HOURS
Status: DISCONTINUED | OUTPATIENT
Start: 2019-01-01 | End: 2019-01-01 | Stop reason: HOSPADM

## 2019-01-01 RX ORDER — FUROSEMIDE 10 MG/ML
80 INJECTION INTRAMUSCULAR; INTRAVENOUS DAILY
Status: DISCONTINUED | OUTPATIENT
Start: 2019-01-01 | End: 2019-01-01

## 2019-01-01 RX ORDER — FUROSEMIDE 40 MG/1
TABLET ORAL
Qty: 30 TAB | Refills: 3 | Status: SHIPPED | OUTPATIENT
Start: 2019-01-01 | End: 2019-01-01

## 2019-01-01 RX ORDER — ACETAMINOPHEN 325 MG/1
650 TABLET ORAL
Status: DISCONTINUED | OUTPATIENT
Start: 2019-01-01 | End: 2019-01-01

## 2019-01-01 RX ORDER — DOBUTAMINE HYDROCHLORIDE 200 MG/100ML
5 INJECTION INTRAVENOUS CONTINUOUS
Status: DISCONTINUED | OUTPATIENT
Start: 2019-01-01 | End: 2019-01-01

## 2019-01-01 RX ORDER — MIDAZOLAM HYDROCHLORIDE 1 MG/ML
.5-1 INJECTION, SOLUTION INTRAMUSCULAR; INTRAVENOUS
Status: DISCONTINUED | OUTPATIENT
Start: 2019-01-01 | End: 2019-01-01

## 2019-01-01 RX ORDER — SIMETHICONE 80 MG
80 TABLET,CHEWABLE ORAL
Status: DISCONTINUED | OUTPATIENT
Start: 2019-01-01 | End: 2019-01-01 | Stop reason: HOSPADM

## 2019-01-01 RX ORDER — FUROSEMIDE 10 MG/ML
40 INJECTION INTRAMUSCULAR; INTRAVENOUS
Status: COMPLETED | OUTPATIENT
Start: 2019-01-01 | End: 2019-01-01

## 2019-01-01 RX ORDER — SODIUM POLYSTYRENE SULFONATE 15 G/60ML
30 SUSPENSION ORAL; RECTAL
Status: COMPLETED | OUTPATIENT
Start: 2019-01-01 | End: 2019-01-01

## 2019-01-01 RX ORDER — AMIODARONE HYDROCHLORIDE 200 MG/1
200 TABLET ORAL 2 TIMES DAILY
Qty: 60 TAB | Refills: 2 | Status: SHIPPED | OUTPATIENT
Start: 2019-01-01 | End: 2019-01-01

## 2019-01-01 RX ORDER — MAG HYDROX/ALUMINUM HYD/SIMETH 200-200-20
30 SUSPENSION, ORAL (FINAL DOSE FORM) ORAL DAILY PRN
Status: DISCONTINUED | OUTPATIENT
Start: 2019-01-01 | End: 2019-01-01 | Stop reason: HOSPADM

## 2019-01-01 RX ORDER — VANCOMYCIN 1.75 GRAM/500 ML IN 0.9 % SODIUM CHLORIDE INTRAVENOUS
1750 ONCE
Status: COMPLETED | OUTPATIENT
Start: 2019-01-01 | End: 2019-01-01

## 2019-01-01 RX ORDER — AMIODARONE HYDROCHLORIDE 200 MG/1
200 TABLET ORAL DAILY
Status: DISCONTINUED | OUTPATIENT
Start: 2019-01-01 | End: 2019-01-01

## 2019-01-01 RX ORDER — TRAZODONE HYDROCHLORIDE 50 MG/1
25 TABLET ORAL
Status: DISCONTINUED | OUTPATIENT
Start: 2019-01-01 | End: 2019-01-01 | Stop reason: HOSPADM

## 2019-01-01 RX ORDER — LEVOTHYROXINE SODIUM 100 UG/1
100 TABLET ORAL EVERY EVENING
COMMUNITY

## 2019-01-01 RX ORDER — PRAVASTATIN SODIUM 40 MG/1
80 TABLET ORAL DAILY
Status: DISCONTINUED | OUTPATIENT
Start: 2019-01-01 | End: 2019-01-01

## 2019-01-01 RX ORDER — ACETAMINOPHEN 325 MG/1
650 TABLET ORAL
Status: DISCONTINUED | OUTPATIENT
Start: 2019-01-01 | End: 2019-01-01 | Stop reason: HOSPADM

## 2019-01-01 RX ORDER — BUMETANIDE 2 MG/1
2 TABLET ORAL 2 TIMES DAILY
Qty: 60 TAB | Refills: 1 | Status: SHIPPED | OUTPATIENT
Start: 2019-01-01

## 2019-01-01 RX ORDER — BUMETANIDE 0.25 MG/ML
1 INJECTION INTRAMUSCULAR; INTRAVENOUS
Status: DISCONTINUED | OUTPATIENT
Start: 2019-01-01 | End: 2019-01-01

## 2019-01-01 RX ORDER — HALOPERIDOL 2 MG/ML
2 SOLUTION ORAL
Status: DISCONTINUED | OUTPATIENT
Start: 2019-01-01 | End: 2019-01-01 | Stop reason: HOSPADM

## 2019-01-01 RX ORDER — SODIUM BICARBONATE 84 MG/ML
50 INJECTION, SOLUTION INTRAVENOUS ONCE
Status: COMPLETED | OUTPATIENT
Start: 2019-01-01 | End: 2019-01-01

## 2019-01-01 RX ORDER — FUROSEMIDE 10 MG/ML
80 INJECTION INTRAMUSCULAR; INTRAVENOUS 2 TIMES DAILY
Status: DISCONTINUED | OUTPATIENT
Start: 2019-01-01 | End: 2019-01-01 | Stop reason: HOSPADM

## 2019-01-01 RX ORDER — FUROSEMIDE 10 MG/ML
120 INJECTION INTRAMUSCULAR; INTRAVENOUS 2 TIMES DAILY
Status: DISCONTINUED | OUTPATIENT
Start: 2019-01-01 | End: 2019-01-01

## 2019-01-01 RX ORDER — AMIODARONE HYDROCHLORIDE 200 MG/1
200 TABLET ORAL DAILY
COMMUNITY
End: 2019-01-01

## 2019-01-01 RX ORDER — SIMETHICONE 80 MG
40 TABLET,CHEWABLE ORAL
Status: DISCONTINUED | OUTPATIENT
Start: 2019-01-01 | End: 2019-01-01

## 2019-01-01 RX ORDER — ONDANSETRON 2 MG/ML
2 INJECTION INTRAMUSCULAR; INTRAVENOUS
Status: DISCONTINUED | OUTPATIENT
Start: 2019-01-01 | End: 2019-01-01 | Stop reason: HOSPADM

## 2019-01-01 RX ORDER — ACETAMINOPHEN 500 MG
1000 TABLET ORAL ONCE
Status: COMPLETED | OUTPATIENT
Start: 2019-01-01 | End: 2019-01-01

## 2019-01-01 RX ORDER — METOLAZONE 5 MG/1
5 TABLET ORAL ONCE
Status: COMPLETED | OUTPATIENT
Start: 2019-01-01 | End: 2019-01-01

## 2019-01-01 RX ORDER — SPIRONOLACTONE 25 MG/1
25 TABLET ORAL DAILY
Status: DISCONTINUED | OUTPATIENT
Start: 2019-01-01 | End: 2019-01-01

## 2019-01-01 RX ORDER — LIDOCAINE HYDROCHLORIDE 10 MG/ML
INJECTION, SOLUTION EPIDURAL; INFILTRATION; INTRACAUDAL; PERINEURAL AS NEEDED
Status: DISCONTINUED | OUTPATIENT
Start: 2019-01-01 | End: 2019-01-01

## 2019-01-01 RX ORDER — HALOPERIDOL 5 MG/ML
2 INJECTION INTRAMUSCULAR
Status: DISCONTINUED | OUTPATIENT
Start: 2019-01-01 | End: 2019-01-01 | Stop reason: HOSPADM

## 2019-01-01 RX ORDER — BISACODYL 5 MG
5 TABLET, DELAYED RELEASE (ENTERIC COATED) ORAL DAILY PRN
Status: DISCONTINUED | OUTPATIENT
Start: 2019-01-01 | End: 2019-01-01 | Stop reason: HOSPADM

## 2019-01-01 RX ORDER — SODIUM CHLORIDE 0.9 % (FLUSH) 0.9 %
5-40 SYRINGE (ML) INJECTION AS NEEDED
Status: DISCONTINUED | OUTPATIENT
Start: 2019-01-01 | End: 2019-01-01

## 2019-01-01 RX ORDER — ALBUMIN HUMAN 250 G/1000ML
25 SOLUTION INTRAVENOUS 3 TIMES DAILY
Status: COMPLETED | OUTPATIENT
Start: 2019-01-01 | End: 2019-01-01

## 2019-01-01 RX ORDER — ALBUMIN HUMAN 50 G/1000ML
25 SOLUTION INTRAVENOUS ONCE
Status: COMPLETED | OUTPATIENT
Start: 2019-01-01 | End: 2019-01-01

## 2019-01-01 RX ORDER — FUROSEMIDE 40 MG/1
40 TABLET ORAL DAILY
Status: DISCONTINUED | OUTPATIENT
Start: 2019-01-01 | End: 2019-01-01 | Stop reason: HOSPADM

## 2019-01-01 RX ORDER — ALLOPURINOL 100 MG/1
100 TABLET ORAL DAILY
Status: DISCONTINUED | OUTPATIENT
Start: 2019-01-01 | End: 2019-01-01

## 2019-01-01 RX ORDER — POTASSIUM CHLORIDE 750 MG/1
20 TABLET, FILM COATED, EXTENDED RELEASE ORAL DAILY
Status: DISCONTINUED | OUTPATIENT
Start: 2019-01-01 | End: 2019-01-01 | Stop reason: HOSPADM

## 2019-01-01 RX ORDER — FENTANYL CITRATE 50 UG/ML
25-50 INJECTION, SOLUTION INTRAMUSCULAR; INTRAVENOUS
Status: DISCONTINUED | OUTPATIENT
Start: 2019-01-01 | End: 2019-01-01

## 2019-01-01 RX ORDER — THERA TABS 400 MCG
1 TAB ORAL DAILY
Status: DISCONTINUED | OUTPATIENT
Start: 2019-01-01 | End: 2019-01-01 | Stop reason: HOSPADM

## 2019-01-01 RX ORDER — SPIRONOLACTONE 25 MG/1
12.5 TABLET ORAL EVERY OTHER DAY
Status: DISCONTINUED | OUTPATIENT
Start: 2019-01-01 | End: 2019-01-01

## 2019-01-01 RX ORDER — CALCIUM GLUCONATE 94 MG/ML
2 INJECTION, SOLUTION INTRAVENOUS
Status: DISCONTINUED | OUTPATIENT
Start: 2019-01-01 | End: 2019-01-01

## 2019-01-01 RX ORDER — PREDNISOLONE ACETATE 10 MG/ML
1 SUSPENSION/ DROPS OPHTHALMIC 4 TIMES DAILY
Status: DISCONTINUED | OUTPATIENT
Start: 2019-01-01 | End: 2019-01-01 | Stop reason: HOSPADM

## 2019-01-01 RX ORDER — HEPARIN SODIUM 5000 [USP'U]/ML
5000 INJECTION, SOLUTION INTRAVENOUS; SUBCUTANEOUS EVERY 8 HOURS
Status: DISCONTINUED | OUTPATIENT
Start: 2019-01-01 | End: 2019-01-01

## 2019-01-01 RX ORDER — LANOLIN ALCOHOL/MO/W.PET/CERES
3 CREAM (GRAM) TOPICAL
Status: DISCONTINUED | OUTPATIENT
Start: 2019-01-01 | End: 2019-01-01 | Stop reason: HOSPADM

## 2019-01-01 RX ORDER — LEVOTHYROXINE SODIUM 50 UG/1
100 TABLET ORAL EVERY EVENING
Status: DISCONTINUED | OUTPATIENT
Start: 2019-01-01 | End: 2019-01-01 | Stop reason: HOSPADM

## 2019-01-01 RX ORDER — ALLOPURINOL 100 MG/1
200 TABLET ORAL DAILY
Status: DISCONTINUED | OUTPATIENT
Start: 2019-01-01 | End: 2019-01-01 | Stop reason: HOSPADM

## 2019-01-01 RX ORDER — LEVOTHYROXINE SODIUM 100 UG/1
100 TABLET ORAL
Status: DISCONTINUED | OUTPATIENT
Start: 2019-01-01 | End: 2019-01-01 | Stop reason: HOSPADM

## 2019-01-01 RX ORDER — LIDOCAINE HYDROCHLORIDE 20 MG/ML
15 SOLUTION OROPHARYNGEAL ONCE
Status: COMPLETED | OUTPATIENT
Start: 2019-01-01 | End: 2019-01-01

## 2019-01-01 RX ORDER — FENTANYL CITRATE 50 UG/ML
INJECTION, SOLUTION INTRAMUSCULAR; INTRAVENOUS AS NEEDED
Status: DISCONTINUED | OUTPATIENT
Start: 2019-01-01 | End: 2019-01-01

## 2019-01-01 RX ORDER — ALBUMIN HUMAN 250 G/1000ML
12.5 SOLUTION INTRAVENOUS ONCE
Status: COMPLETED | OUTPATIENT
Start: 2019-01-01 | End: 2019-01-01

## 2019-01-01 RX ORDER — BENZONATATE 100 MG/1
100 CAPSULE ORAL
Qty: 30 CAP | Refills: 0 | Status: SHIPPED | OUTPATIENT
Start: 2019-01-01 | End: 2019-01-01

## 2019-01-01 RX ORDER — FUROSEMIDE 40 MG/1
40 TABLET ORAL 2 TIMES DAILY
Qty: 10 TAB | Refills: 0 | Status: SHIPPED | OUTPATIENT
Start: 2019-01-01 | End: 2019-01-01

## 2019-01-01 RX ORDER — FUROSEMIDE 10 MG/ML
80 INJECTION INTRAMUSCULAR; INTRAVENOUS 2 TIMES DAILY
Status: DISCONTINUED | OUTPATIENT
Start: 2019-01-01 | End: 2019-01-01

## 2019-01-01 RX ORDER — AMIODARONE HYDROCHLORIDE 200 MG/1
200 TABLET ORAL 2 TIMES DAILY
Status: DISCONTINUED | OUTPATIENT
Start: 2019-01-01 | End: 2019-01-01 | Stop reason: HOSPADM

## 2019-01-01 RX ORDER — METOLAZONE 10 MG/1
5 TABLET ORAL DAILY
Qty: 7 TAB | Refills: 0 | Status: SHIPPED | OUTPATIENT
Start: 2019-01-01 | End: 2019-10-20

## 2019-01-01 RX ORDER — HYDROCODONE BITARTRATE AND ACETAMINOPHEN 5; 325 MG/1; MG/1
1 TABLET ORAL
Status: DISCONTINUED | OUTPATIENT
Start: 2019-01-01 | End: 2019-01-01 | Stop reason: HOSPADM

## 2019-01-01 RX ORDER — TORSEMIDE 20 MG/1
40 TABLET ORAL 2 TIMES DAILY
Status: DISCONTINUED | OUTPATIENT
Start: 2019-01-01 | End: 2019-01-01

## 2019-01-01 RX ORDER — FACIAL-BODY WIPES
10 EACH TOPICAL DAILY PRN
Status: DISCONTINUED | OUTPATIENT
Start: 2019-01-01 | End: 2019-01-01 | Stop reason: HOSPADM

## 2019-01-01 RX ORDER — DOBUTAMINE HYDROCHLORIDE 200 MG/100ML
2 INJECTION INTRAVENOUS CONTINUOUS
Status: DISCONTINUED | OUTPATIENT
Start: 2019-01-01 | End: 2019-01-01

## 2019-01-01 RX ORDER — BENZONATATE 100 MG/1
100 CAPSULE ORAL
COMMUNITY

## 2019-01-01 RX ORDER — ALBUTEROL SULFATE 0.83 MG/ML
2.5 SOLUTION RESPIRATORY (INHALATION)
Status: DISCONTINUED | OUTPATIENT
Start: 2019-01-01 | End: 2019-01-01 | Stop reason: HOSPADM

## 2019-01-01 RX ORDER — AMOXICILLIN AND CLAVULANATE POTASSIUM 875; 125 MG/1; MG/1
1 TABLET, FILM COATED ORAL EVERY 12 HOURS
Status: COMPLETED | OUTPATIENT
Start: 2019-01-01 | End: 2019-01-01

## 2019-01-01 RX ORDER — BUMETANIDE 0.25 MG/ML
2 INJECTION INTRAMUSCULAR; INTRAVENOUS
Status: COMPLETED | OUTPATIENT
Start: 2019-01-01 | End: 2019-01-01

## 2019-01-01 RX ORDER — IPRATROPIUM BROMIDE AND ALBUTEROL SULFATE 2.5; .5 MG/3ML; MG/3ML
3 SOLUTION RESPIRATORY (INHALATION)
Status: DISCONTINUED | OUTPATIENT
Start: 2019-01-01 | End: 2019-01-01

## 2019-01-01 RX ORDER — PREDNISOLONE ACETATE 10 MG/ML
1 SUSPENSION/ DROPS OPHTHALMIC 4 TIMES DAILY
COMMUNITY

## 2019-01-01 RX ORDER — POTASSIUM CHLORIDE 7.45 MG/ML
10 INJECTION INTRAVENOUS
Status: COMPLETED | OUTPATIENT
Start: 2019-01-01 | End: 2019-01-01

## 2019-01-01 RX ORDER — BENZONATATE 100 MG/1
100 CAPSULE ORAL
Status: DISCONTINUED | OUTPATIENT
Start: 2019-01-01 | End: 2019-01-01 | Stop reason: HOSPADM

## 2019-01-01 RX ORDER — SODIUM CHLORIDE 0.9 % (FLUSH) 0.9 %
10 SYRINGE (ML) INJECTION
Status: COMPLETED | OUTPATIENT
Start: 2019-01-01 | End: 2019-01-01

## 2019-01-01 RX ORDER — MOXIFLOXACIN 5 MG/ML
1 SOLUTION/ DROPS OPHTHALMIC 2 TIMES DAILY
Status: ON HOLD | COMMUNITY
End: 2019-01-01 | Stop reason: CLARIF

## 2019-01-01 RX ORDER — ONDANSETRON 2 MG/ML
4 INJECTION INTRAMUSCULAR; INTRAVENOUS
Status: DISCONTINUED | OUTPATIENT
Start: 2019-01-01 | End: 2019-01-01 | Stop reason: HOSPADM

## 2019-01-01 RX ORDER — MOXIFLOXACIN 5 MG/ML
1 SOLUTION/ DROPS OPHTHALMIC 2 TIMES DAILY
COMMUNITY

## 2019-01-01 RX ORDER — LEVOTHYROXINE SODIUM 100 UG/1
100 TABLET ORAL EVERY EVENING
Status: DISCONTINUED | OUTPATIENT
Start: 2019-01-01 | End: 2019-01-01

## 2019-01-01 RX ORDER — ACETAMINOPHEN 500 MG
500 TABLET ORAL
Status: DISCONTINUED | OUTPATIENT
Start: 2019-01-01 | End: 2019-01-01 | Stop reason: HOSPADM

## 2019-01-01 RX ORDER — SPIRONOLACTONE 25 MG/1
12.5 TABLET ORAL
Status: DISCONTINUED | OUTPATIENT
Start: 2019-01-01 | End: 2019-01-01

## 2019-01-01 RX ORDER — FAMOTIDINE 20 MG/1
10 TABLET, FILM COATED ORAL DAILY
Status: DISCONTINUED | OUTPATIENT
Start: 2019-01-01 | End: 2019-01-01 | Stop reason: HOSPADM

## 2019-01-01 RX ORDER — ONDANSETRON 2 MG/ML
4 INJECTION INTRAMUSCULAR; INTRAVENOUS
Status: DISCONTINUED | OUTPATIENT
Start: 2019-01-01 | End: 2019-01-01

## 2019-01-01 RX ORDER — ALBUMIN HUMAN 50 G/1000ML
12.5 SOLUTION INTRAVENOUS ONCE
Status: COMPLETED | OUTPATIENT
Start: 2019-01-01 | End: 2019-01-01

## 2019-01-01 RX ORDER — DOBUTAMINE HYDROCHLORIDE 200 MG/100ML
7.5 INJECTION INTRAVENOUS CONTINUOUS
Status: DISCONTINUED | OUTPATIENT
Start: 2019-01-01 | End: 2019-01-01 | Stop reason: HOSPADM

## 2019-01-01 RX ORDER — LORAZEPAM 0.5 MG/1
0.5 TABLET ORAL
Status: DISCONTINUED | OUTPATIENT
Start: 2019-01-01 | End: 2019-01-01

## 2019-01-01 RX ORDER — FUROSEMIDE 10 MG/ML
60 INJECTION INTRAMUSCULAR; INTRAVENOUS 2 TIMES DAILY
Status: DISCONTINUED | OUTPATIENT
Start: 2019-01-01 | End: 2019-01-01

## 2019-01-01 RX ORDER — DEXTROSE 50 % IN WATER (D50W) INTRAVENOUS SYRINGE
50
Status: COMPLETED | OUTPATIENT
Start: 2019-01-01 | End: 2019-01-01

## 2019-01-01 RX ORDER — CARVEDILOL 3.12 MG/1
3.12 TABLET ORAL 2 TIMES DAILY WITH MEALS
Status: DISCONTINUED | OUTPATIENT
Start: 2019-01-01 | End: 2019-01-01

## 2019-01-01 RX ORDER — DOBUTAMINE HYDROCHLORIDE 200 MG/100ML
INJECTION INTRAVENOUS
Status: DISCONTINUED | OUTPATIENT
Start: 2019-01-01 | End: 2019-01-01

## 2019-01-01 RX ADMIN — LEVOTHYROXINE SODIUM 100 MCG: 100 TABLET ORAL at 18:39

## 2019-01-01 RX ADMIN — AMOXICILLIN AND CLAVULANATE POTASSIUM 1 TABLET: 875; 125 TABLET, FILM COATED ORAL at 21:11

## 2019-01-01 RX ADMIN — FENTANYL CITRATE 25 MCG: 50 INJECTION, SOLUTION INTRAMUSCULAR; INTRAVENOUS at 13:07

## 2019-01-01 RX ADMIN — ALLOPURINOL 200 MG: 100 TABLET ORAL at 09:42

## 2019-01-01 RX ADMIN — TRAZODONE HYDROCHLORIDE 25 MG: 50 TABLET ORAL at 21:31

## 2019-01-01 RX ADMIN — GUAIFENESIN 100 MG: 200 SOLUTION ORAL at 09:00

## 2019-01-01 RX ADMIN — AMIODARONE HYDROCHLORIDE 200 MG: 200 TABLET ORAL at 09:45

## 2019-01-01 RX ADMIN — IOHEXOL 50 ML: 240 INJECTION, SOLUTION INTRATHECAL; INTRAVASCULAR; INTRAVENOUS; ORAL at 14:05

## 2019-01-01 RX ADMIN — FUROSEMIDE 40 MG: 10 INJECTION, SOLUTION INTRAMUSCULAR; INTRAVENOUS at 08:50

## 2019-01-01 RX ADMIN — Medication 10 ML: at 06:00

## 2019-01-01 RX ADMIN — Medication 10 ML: at 22:34

## 2019-01-01 RX ADMIN — ALLOPURINOL 200 MG: 100 TABLET ORAL at 10:54

## 2019-01-01 RX ADMIN — FUROSEMIDE 120 MG: 10 INJECTION, SOLUTION INTRAMUSCULAR; INTRAVENOUS at 17:46

## 2019-01-01 RX ADMIN — APIXABAN 2.5 MG: 2.5 TABLET, FILM COATED ORAL at 09:26

## 2019-01-01 RX ADMIN — SODIUM POLYSTYRENE SULFONATE 30 G: 15 SUSPENSION ORAL; RECTAL at 06:35

## 2019-01-01 RX ADMIN — APIXABAN 5 MG: 5 TABLET, FILM COATED ORAL at 22:18

## 2019-01-01 RX ADMIN — Medication 1 AMPULE: at 00:20

## 2019-01-01 RX ADMIN — Medication 10 ML: at 05:25

## 2019-01-01 RX ADMIN — Medication 10 ML: at 14:00

## 2019-01-01 RX ADMIN — CEFEPIME HYDROCHLORIDE 2 G: 2 INJECTION, POWDER, FOR SOLUTION INTRAVENOUS at 13:40

## 2019-01-01 RX ADMIN — SACUBITRIL AND VALSARTAN 1 TABLET: 49; 51 TABLET, FILM COATED ORAL at 21:30

## 2019-01-01 RX ADMIN — AMIODARONE HYDROCHLORIDE 200 MG: 200 TABLET ORAL at 10:10

## 2019-01-01 RX ADMIN — PRAVASTATIN SODIUM 80 MG: 40 TABLET ORAL at 21:41

## 2019-01-01 RX ADMIN — ALBUMIN (HUMAN) 25 G: 0.25 INJECTION, SOLUTION INTRAVENOUS at 15:59

## 2019-01-01 RX ADMIN — CEFEPIME HYDROCHLORIDE 2 G: 2 INJECTION, POWDER, FOR SOLUTION INTRAVENOUS at 14:55

## 2019-01-01 RX ADMIN — Medication 10 ML: at 06:01

## 2019-01-01 RX ADMIN — Medication 10 ML: at 13:25

## 2019-01-01 RX ADMIN — Medication 10 ML: at 21:30

## 2019-01-01 RX ADMIN — FUROSEMIDE 60 MG: 10 INJECTION, SOLUTION INTRAMUSCULAR; INTRAVENOUS at 09:01

## 2019-01-01 RX ADMIN — LORAZEPAM 0.5 MG: 0.5 TABLET ORAL at 10:30

## 2019-01-01 RX ADMIN — THERA TABS 1 TABLET: TAB at 09:55

## 2019-01-01 RX ADMIN — CARVEDILOL 6.25 MG: 6.25 TABLET, FILM COATED ORAL at 09:32

## 2019-01-01 RX ADMIN — PRAVASTATIN SODIUM 80 MG: 40 TABLET ORAL at 20:56

## 2019-01-01 RX ADMIN — Medication 10 ML: at 15:58

## 2019-01-01 RX ADMIN — FUROSEMIDE 80 MG: 10 INJECTION, SOLUTION INTRAMUSCULAR; INTRAVENOUS at 18:13

## 2019-01-01 RX ADMIN — LEVOTHYROXINE SODIUM 100 MCG: 100 TABLET ORAL at 06:53

## 2019-01-01 RX ADMIN — ALLOPURINOL 200 MG: 100 TABLET ORAL at 09:55

## 2019-01-01 RX ADMIN — FUROSEMIDE 5 MG/HR: 10 INJECTION, SOLUTION INTRAMUSCULAR; INTRAVENOUS at 23:19

## 2019-01-01 RX ADMIN — Medication 10 ML: at 13:17

## 2019-01-01 RX ADMIN — CEFEPIME HYDROCHLORIDE 2 G: 2 INJECTION, POWDER, FOR SOLUTION INTRAVENOUS at 03:56

## 2019-01-01 RX ADMIN — LEVOTHYROXINE SODIUM 100 MCG: 100 TABLET ORAL at 06:21

## 2019-01-01 RX ADMIN — AMIODARONE HYDROCHLORIDE 200 MG: 200 TABLET ORAL at 09:14

## 2019-01-01 RX ADMIN — ALLOPURINOL 200 MG: 100 TABLET ORAL at 09:01

## 2019-01-01 RX ADMIN — DOBUTAMINE HYDROCHLORIDE 5 MCG/KG/MIN: 200 INJECTION INTRAVENOUS at 13:34

## 2019-01-01 RX ADMIN — HEPARIN SODIUM 5000 UNITS: 5000 INJECTION INTRAVENOUS; SUBCUTANEOUS at 14:56

## 2019-01-01 RX ADMIN — ALLOPURINOL 200 MG: 100 TABLET ORAL at 10:10

## 2019-01-01 RX ADMIN — FUROSEMIDE 80 MG: 10 INJECTION, SOLUTION INTRAMUSCULAR; INTRAVENOUS at 12:51

## 2019-01-01 RX ADMIN — ALBUMIN (HUMAN) 25 G: 0.25 INJECTION, SOLUTION INTRAVENOUS at 22:34

## 2019-01-01 RX ADMIN — Medication 10 ML: at 13:08

## 2019-01-01 RX ADMIN — APIXABAN 5 MG: 5 TABLET, FILM COATED ORAL at 09:29

## 2019-01-01 RX ADMIN — Medication 10 ML: at 06:07

## 2019-01-01 RX ADMIN — ALLOPURINOL 200 MG: 100 TABLET ORAL at 09:04

## 2019-01-01 RX ADMIN — Medication 10 ML: at 21:35

## 2019-01-01 RX ADMIN — DOBUTAMINE IN DEXTROSE 7.5 MCG/KG/MIN: 400 INJECTION, SOLUTION INTRAVENOUS at 11:27

## 2019-01-01 RX ADMIN — Medication 10 ML: at 14:09

## 2019-01-01 RX ADMIN — POTASSIUM CHLORIDE 40 MEQ: 750 TABLET, FILM COATED, EXTENDED RELEASE ORAL at 09:32

## 2019-01-01 RX ADMIN — Medication 10 ML: at 22:20

## 2019-01-01 RX ADMIN — SIMETHICONE 80 MG: 80 TABLET, CHEWABLE ORAL at 21:31

## 2019-01-01 RX ADMIN — DOBUTAMINE HYDROCHLORIDE 2 MCG/KG/MIN: 200 INJECTION INTRAVENOUS at 03:56

## 2019-01-01 RX ADMIN — FUROSEMIDE 120 MG: 10 INJECTION, SOLUTION INTRAMUSCULAR; INTRAVENOUS at 17:35

## 2019-01-01 RX ADMIN — Medication 10 ML: at 14:16

## 2019-01-01 RX ADMIN — LORATADINE 10 MG: 10 TABLET ORAL at 09:14

## 2019-01-01 RX ADMIN — SODIUM CHLORIDE 0.25 MCG/KG/MIN: 900 INJECTION, SOLUTION INTRAVENOUS at 07:59

## 2019-01-01 RX ADMIN — PRAVASTATIN SODIUM 80 MG: 40 TABLET ORAL at 21:31

## 2019-01-01 RX ADMIN — BENZOCAINE, BUTAMBEN, AND TETRACAINE HYDROCHLORIDE 1 SPRAY: .028; .004; .004 AEROSOL, SPRAY TOPICAL at 13:03

## 2019-01-01 RX ADMIN — LEVOTHYROXINE SODIUM 100 MCG: 100 TABLET ORAL at 05:28

## 2019-01-01 RX ADMIN — THERA TABS 1 TABLET: TAB at 09:15

## 2019-01-01 RX ADMIN — LIDOCAINE HYDROCHLORIDE 15 ML: 20 SOLUTION ORAL; TOPICAL at 13:01

## 2019-01-01 RX ADMIN — LORAZEPAM 0.5 MG: 2 INJECTION INTRAMUSCULAR; INTRAVENOUS at 22:35

## 2019-01-01 RX ADMIN — Medication 10 ML: at 21:43

## 2019-01-01 RX ADMIN — AMIODARONE HYDROCHLORIDE 200 MG: 200 TABLET ORAL at 08:51

## 2019-01-01 RX ADMIN — ALUMINUM HYDROXIDE, MAGNESIUM HYDROXIDE, AND SIMETHICONE 30 ML: 200; 200; 20 SUSPENSION ORAL at 18:03

## 2019-01-01 RX ADMIN — Medication 10 ML: at 21:08

## 2019-01-01 RX ADMIN — THERA TABS 1 TABLET: TAB at 10:10

## 2019-01-01 RX ADMIN — LEVOTHYROXINE SODIUM 100 MCG: 100 TABLET ORAL at 17:51

## 2019-01-01 RX ADMIN — AMIODARONE HYDROCHLORIDE 0.5 MG/MIN: 50 INJECTION, SOLUTION INTRAVENOUS at 05:51

## 2019-01-01 RX ADMIN — Medication 10 ML: at 21:48

## 2019-01-01 RX ADMIN — SODIUM CHLORIDE 500 ML: 900 INJECTION, SOLUTION INTRAVENOUS at 22:43

## 2019-01-01 RX ADMIN — Medication 10 ML: at 05:18

## 2019-01-01 RX ADMIN — LORATADINE 10 MG: 10 TABLET ORAL at 09:10

## 2019-01-01 RX ADMIN — SIMETHICONE 80 MG: 80 TABLET, CHEWABLE ORAL at 14:50

## 2019-01-01 RX ADMIN — APIXABAN 5 MG: 5 TABLET, FILM COATED ORAL at 08:51

## 2019-01-01 RX ADMIN — Medication 10 ML: at 13:32

## 2019-01-01 RX ADMIN — ALBUMIN (HUMAN) 25 G: 12.5 INJECTION, SOLUTION INTRAVENOUS at 10:36

## 2019-01-01 RX ADMIN — BUMETANIDE 2 MG: 1 TABLET ORAL at 09:25

## 2019-01-01 RX ADMIN — CARVEDILOL 6.25 MG: 6.25 TABLET, FILM COATED ORAL at 17:36

## 2019-01-01 RX ADMIN — SPIRONOLACTONE 12.5 MG: 25 TABLET ORAL at 22:22

## 2019-01-01 RX ADMIN — BUMETANIDE 2 MG: 1 TABLET ORAL at 17:40

## 2019-01-01 RX ADMIN — PRAVASTATIN SODIUM 80 MG: 40 TABLET ORAL at 22:16

## 2019-01-01 RX ADMIN — SODIUM CHLORIDE 500 ML: 900 INJECTION, SOLUTION INTRAVENOUS at 22:46

## 2019-01-01 RX ADMIN — LORATADINE 10 MG: 10 TABLET ORAL at 10:03

## 2019-01-01 RX ADMIN — PRAVASTATIN SODIUM 80 MG: 40 TABLET ORAL at 21:03

## 2019-01-01 RX ADMIN — DOBUTAMINE HYDROCHLORIDE 5 MCG/KG/MIN: 200 INJECTION INTRAVENOUS at 09:10

## 2019-01-01 RX ADMIN — DOBUTAMINE HYDROCHLORIDE 5 MCG/KG/MIN: 200 INJECTION INTRAVENOUS at 08:59

## 2019-01-01 RX ADMIN — SIMETHICONE 80 MG: 80 TABLET, CHEWABLE ORAL at 14:22

## 2019-01-01 RX ADMIN — DOCUSATE SODIUM 100 MG: 100 CAPSULE, LIQUID FILLED ORAL at 18:10

## 2019-01-01 RX ADMIN — LORATADINE 10 MG: 10 TABLET ORAL at 09:45

## 2019-01-01 RX ADMIN — AMIODARONE HYDROCHLORIDE 200 MG: 200 TABLET ORAL at 09:10

## 2019-01-01 RX ADMIN — AMOXICILLIN AND CLAVULANATE POTASSIUM 1 TABLET: 875; 125 TABLET, FILM COATED ORAL at 21:28

## 2019-01-01 RX ADMIN — IPRATROPIUM BROMIDE AND ALBUTEROL SULFATE 3 ML: .5; 3 SOLUTION RESPIRATORY (INHALATION) at 15:36

## 2019-01-01 RX ADMIN — HYDROCODONE BITARTRATE AND ACETAMINOPHEN 1 TABLET: 5; 325 TABLET ORAL at 19:53

## 2019-01-01 RX ADMIN — POTASSIUM CHLORIDE 10 MEQ: 10 INJECTION, SOLUTION INTRAVENOUS at 09:12

## 2019-01-01 RX ADMIN — FUROSEMIDE 40 MG: 40 TABLET ORAL at 09:29

## 2019-01-01 RX ADMIN — GUAIFENESIN 100 MG: 200 SOLUTION ORAL at 18:39

## 2019-01-01 RX ADMIN — FUROSEMIDE 40 MG: 10 INJECTION, SOLUTION INTRAMUSCULAR; INTRAVENOUS at 17:37

## 2019-01-01 RX ADMIN — FUROSEMIDE 40 MG: 10 INJECTION, SOLUTION INTRAMUSCULAR; INTRAVENOUS at 09:34

## 2019-01-01 RX ADMIN — POTASSIUM CHLORIDE 40 MEQ: 750 TABLET, FILM COATED, EXTENDED RELEASE ORAL at 12:29

## 2019-01-01 RX ADMIN — MIDAZOLAM HYDROCHLORIDE 1 MG: 1 INJECTION, SOLUTION INTRAMUSCULAR; INTRAVENOUS at 13:17

## 2019-01-01 RX ADMIN — ACETAMINOPHEN 1000 MG: 500 TABLET ORAL at 21:17

## 2019-01-01 RX ADMIN — HEPARIN SODIUM 5000 UNITS: 5000 INJECTION INTRAVENOUS; SUBCUTANEOUS at 21:11

## 2019-01-01 RX ADMIN — DOBUTAMINE IN DEXTROSE 7.5 MCG/KG/MIN: 200 INJECTION, SOLUTION INTRAVENOUS at 08:32

## 2019-01-01 RX ADMIN — FUROSEMIDE 60 MG: 10 INJECTION, SOLUTION INTRAMUSCULAR; INTRAVENOUS at 18:18

## 2019-01-01 RX ADMIN — POTASSIUM CHLORIDE 40 MEQ: 20 TABLET, EXTENDED RELEASE ORAL at 09:10

## 2019-01-01 RX ADMIN — FUROSEMIDE 40 MG: 10 INJECTION, SOLUTION INTRAMUSCULAR; INTRAVENOUS at 16:52

## 2019-01-01 RX ADMIN — LACTULOSE 45 ML: 10 SOLUTION ORAL at 17:27

## 2019-01-01 RX ADMIN — Medication 10 ML: at 21:33

## 2019-01-01 RX ADMIN — SODIUM CHLORIDE 0.25 MCG/KG/MIN: 900 INJECTION, SOLUTION INTRAVENOUS at 18:01

## 2019-01-01 RX ADMIN — TRAZODONE HYDROCHLORIDE 25 MG: 50 TABLET ORAL at 21:03

## 2019-01-01 RX ADMIN — SACUBITRIL AND VALSARTAN 1 TABLET: 49; 51 TABLET, FILM COATED ORAL at 08:32

## 2019-01-01 RX ADMIN — MELATONIN 3 MG: at 22:22

## 2019-01-01 RX ADMIN — POTASSIUM CHLORIDE 20 MEQ: 750 TABLET, FILM COATED, EXTENDED RELEASE ORAL at 09:26

## 2019-01-01 RX ADMIN — MIDAZOLAM HYDROCHLORIDE 1 MG: 1 INJECTION, SOLUTION INTRAMUSCULAR; INTRAVENOUS at 13:16

## 2019-01-01 RX ADMIN — SODIUM CHLORIDE 0.38 MCG/KG/MIN: 900 INJECTION, SOLUTION INTRAVENOUS at 19:03

## 2019-01-01 RX ADMIN — METOLAZONE 5 MG: 5 TABLET ORAL at 17:36

## 2019-01-01 RX ADMIN — LORATADINE 10 MG: 10 TABLET ORAL at 08:51

## 2019-01-01 RX ADMIN — LEVOTHYROXINE SODIUM 100 MCG: 100 TABLET ORAL at 18:40

## 2019-01-01 RX ADMIN — SODIUM CHLORIDE 50 ML/HR: 900 INJECTION, SOLUTION INTRAVENOUS at 10:04

## 2019-01-01 RX ADMIN — Medication 10 ML: at 21:22

## 2019-01-01 RX ADMIN — ALLOPURINOL 200 MG: 100 TABLET ORAL at 09:25

## 2019-01-01 RX ADMIN — APIXABAN 5 MG: 5 TABLET, FILM COATED ORAL at 21:41

## 2019-01-01 RX ADMIN — AMIODARONE HYDROCHLORIDE 200 MG: 200 TABLET ORAL at 09:55

## 2019-01-01 RX ADMIN — THERA TABS 1 TABLET: TAB at 09:14

## 2019-01-01 RX ADMIN — Medication 10 ML: at 17:52

## 2019-01-01 RX ADMIN — POTASSIUM CHLORIDE 40 MEQ: 750 TABLET, FILM COATED, EXTENDED RELEASE ORAL at 13:32

## 2019-01-01 RX ADMIN — SPIRONOLACTONE 12.5 MG: 25 TABLET ORAL at 21:10

## 2019-01-01 RX ADMIN — Medication 10 ML: at 06:21

## 2019-01-01 RX ADMIN — DOCUSATE SODIUM 100 MG: 100 CAPSULE, LIQUID FILLED ORAL at 22:21

## 2019-01-01 RX ADMIN — LORATADINE 10 MG: 10 TABLET ORAL at 09:15

## 2019-01-01 RX ADMIN — LORATADINE 10 MG: 10 TABLET ORAL at 09:41

## 2019-01-01 RX ADMIN — DEXTROSE 50 % IN WATER (D50W) INTRAVENOUS SYRINGE 25 G: at 18:57

## 2019-01-01 RX ADMIN — SACUBITRIL AND VALSARTAN 1 TABLET: 24; 26 TABLET, FILM COATED ORAL at 18:40

## 2019-01-01 RX ADMIN — Medication 10 ML: at 21:29

## 2019-01-01 RX ADMIN — TRAZODONE HYDROCHLORIDE 25 MG: 50 TABLET ORAL at 21:34

## 2019-01-01 RX ADMIN — Medication 10 ML: at 14:47

## 2019-01-01 RX ADMIN — CARVEDILOL 6.25 MG: 6.25 TABLET, FILM COATED ORAL at 09:03

## 2019-01-01 RX ADMIN — LEVOTHYROXINE SODIUM 100 MCG: 100 TABLET ORAL at 21:35

## 2019-01-01 RX ADMIN — CARVEDILOL 6.25 MG: 6.25 TABLET, FILM COATED ORAL at 18:40

## 2019-01-01 RX ADMIN — APIXABAN 5 MG: 5 TABLET, FILM COATED ORAL at 09:55

## 2019-01-01 RX ADMIN — THERA TABS 1 TABLET: TAB at 09:25

## 2019-01-01 RX ADMIN — Medication 10 ML: at 05:45

## 2019-01-01 RX ADMIN — POTASSIUM CHLORIDE 40 MEQ: 20 TABLET, EXTENDED RELEASE ORAL at 09:58

## 2019-01-01 RX ADMIN — SACUBITRIL AND VALSARTAN 1 TABLET: 49; 51 TABLET, FILM COATED ORAL at 21:13

## 2019-01-01 RX ADMIN — DOBUTAMINE IN DEXTROSE 7.5 MCG/KG/MIN: 200 INJECTION, SOLUTION INTRAVENOUS at 18:25

## 2019-01-01 RX ADMIN — CARVEDILOL 6.25 MG: 6.25 TABLET, FILM COATED ORAL at 08:50

## 2019-01-01 RX ADMIN — ACETAMINOPHEN 500 MG: 500 TABLET ORAL at 22:52

## 2019-01-01 RX ADMIN — Medication 5 ML: at 06:00

## 2019-01-01 RX ADMIN — AMOXICILLIN AND CLAVULANATE POTASSIUM 1 TABLET: 875; 125 TABLET, FILM COATED ORAL at 09:01

## 2019-01-01 RX ADMIN — APIXABAN 5 MG: 5 TABLET, FILM COATED ORAL at 21:09

## 2019-01-01 RX ADMIN — LEVOTHYROXINE SODIUM 100 MCG: 100 TABLET ORAL at 17:35

## 2019-01-01 RX ADMIN — DOBUTAMINE IN DEXTROSE 7.5 MCG/KG/MIN: 400 INJECTION, SOLUTION INTRAVENOUS at 13:49

## 2019-01-01 RX ADMIN — GUAIFENESIN 100 MG: 200 SOLUTION ORAL at 22:28

## 2019-01-01 RX ADMIN — FUROSEMIDE 120 MG: 10 INJECTION, SOLUTION INTRAMUSCULAR; INTRAVENOUS at 09:41

## 2019-01-01 RX ADMIN — LEVOTHYROXINE SODIUM 100 MCG: 100 TABLET ORAL at 17:11

## 2019-01-01 RX ADMIN — ALBUMIN (HUMAN) 25 G: 0.25 INJECTION, SOLUTION INTRAVENOUS at 09:13

## 2019-01-01 RX ADMIN — AMOXICILLIN AND CLAVULANATE POTASSIUM 1 TABLET: 875; 125 TABLET, FILM COATED ORAL at 08:43

## 2019-01-01 RX ADMIN — DOBUTAMINE IN DEXTROSE 7.5 MCG/KG/MIN: 400 INJECTION, SOLUTION INTRAVENOUS at 18:06

## 2019-01-01 RX ADMIN — LEVOTHYROXINE SODIUM 100 MCG: 100 TABLET ORAL at 21:43

## 2019-01-01 RX ADMIN — VANCOMYCIN HYDROCHLORIDE 1750 MG: 10 INJECTION, POWDER, LYOPHILIZED, FOR SOLUTION INTRAVENOUS at 14:50

## 2019-01-01 RX ADMIN — FUROSEMIDE 80 MG: 10 INJECTION, SOLUTION INTRAMUSCULAR; INTRAVENOUS at 08:50

## 2019-01-01 RX ADMIN — POTASSIUM CHLORIDE 40 MEQ: 750 TABLET, FILM COATED, EXTENDED RELEASE ORAL at 09:25

## 2019-01-01 RX ADMIN — TRAZODONE HYDROCHLORIDE 25 MG: 50 TABLET ORAL at 20:57

## 2019-01-01 RX ADMIN — AMIODARONE HYDROCHLORIDE 200 MG: 200 TABLET ORAL at 09:03

## 2019-01-01 RX ADMIN — AMIODARONE HYDROCHLORIDE 0.5 MG/MIN: 50 INJECTION, SOLUTION INTRAVENOUS at 02:28

## 2019-01-01 RX ADMIN — FUROSEMIDE 120 MG: 10 INJECTION, SOLUTION INTRAMUSCULAR; INTRAVENOUS at 17:23

## 2019-01-01 RX ADMIN — AMIODARONE HYDROCHLORIDE 200 MG: 200 TABLET ORAL at 17:09

## 2019-01-01 RX ADMIN — LORATADINE 10 MG: 10 TABLET ORAL at 08:32

## 2019-01-01 RX ADMIN — HUMAN INSULIN 10 UNITS: 100 INJECTION, SOLUTION SUBCUTANEOUS at 18:57

## 2019-01-01 RX ADMIN — LEVOTHYROXINE SODIUM 100 MCG: 100 TABLET ORAL at 05:20

## 2019-01-01 RX ADMIN — APIXABAN 5 MG: 5 TABLET, FILM COATED ORAL at 21:31

## 2019-01-01 RX ADMIN — AMIODARONE HYDROCHLORIDE 200 MG: 200 TABLET ORAL at 09:15

## 2019-01-01 RX ADMIN — SPIRONOLACTONE 12.5 MG: 25 TABLET ORAL at 21:07

## 2019-01-01 RX ADMIN — GUAIFENESIN 100 MG: 200 SOLUTION ORAL at 21:22

## 2019-01-01 RX ADMIN — LEVOTHYROXINE SODIUM 100 MCG: 100 TABLET ORAL at 18:21

## 2019-01-01 RX ADMIN — LORATADINE 10 MG: 10 TABLET ORAL at 09:02

## 2019-01-01 RX ADMIN — FUROSEMIDE 20 MG: 10 INJECTION, SOLUTION INTRAMUSCULAR; INTRAVENOUS at 09:55

## 2019-01-01 RX ADMIN — LEVOTHYROXINE SODIUM 100 MCG: 100 TABLET ORAL at 17:05

## 2019-01-01 RX ADMIN — AMIODARONE HYDROCHLORIDE 200 MG: 200 TABLET ORAL at 09:26

## 2019-01-01 RX ADMIN — SIMETHICONE 80 MG: 80 TABLET, CHEWABLE ORAL at 15:59

## 2019-01-01 RX ADMIN — THERA TABS 1 TABLET: TAB at 10:54

## 2019-01-01 RX ADMIN — SODIUM BICARBONATE 50 MEQ: 84 INJECTION, SOLUTION INTRAVENOUS at 09:15

## 2019-01-01 RX ADMIN — SACUBITRIL AND VALSARTAN 1 TABLET: 49; 51 TABLET, FILM COATED ORAL at 17:38

## 2019-01-01 RX ADMIN — FUROSEMIDE 60 MG: 10 INJECTION, SOLUTION INTRAMUSCULAR; INTRAVENOUS at 08:33

## 2019-01-01 RX ADMIN — SODIUM POLYSTYRENE SULFONATE 30 G: 15 SUSPENSION ORAL; RECTAL at 23:19

## 2019-01-01 RX ADMIN — AMOXICILLIN AND CLAVULANATE POTASSIUM 1 TABLET: 875; 125 TABLET, FILM COATED ORAL at 09:30

## 2019-01-01 RX ADMIN — APIXABAN 5 MG: 5 TABLET, FILM COATED ORAL at 09:45

## 2019-01-01 RX ADMIN — FUROSEMIDE 40 MG: 40 TABLET ORAL at 09:02

## 2019-01-01 RX ADMIN — Medication 10 ML: at 22:12

## 2019-01-01 RX ADMIN — Medication 10 ML: at 14:41

## 2019-01-01 RX ADMIN — SPIRONOLACTONE 25 MG: 25 TABLET ORAL at 09:26

## 2019-01-01 RX ADMIN — FUROSEMIDE 40 MG: 10 INJECTION, SOLUTION INTRAMUSCULAR; INTRAVENOUS at 18:40

## 2019-01-01 RX ADMIN — CEFTRIAXONE 2 G: 2 INJECTION, POWDER, FOR SOLUTION INTRAMUSCULAR; INTRAVENOUS at 21:17

## 2019-01-01 RX ADMIN — LEVOTHYROXINE SODIUM 100 MCG: 100 TABLET ORAL at 17:37

## 2019-01-01 RX ADMIN — AMIODARONE HYDROCHLORIDE 150 MG: 50 INJECTION, SOLUTION INTRAVENOUS at 20:19

## 2019-01-01 RX ADMIN — LORATADINE 10 MG: 10 TABLET ORAL at 09:26

## 2019-01-01 RX ADMIN — LORATADINE 10 MG: 10 TABLET ORAL at 09:05

## 2019-01-01 RX ADMIN — APIXABAN 5 MG: 5 TABLET, FILM COATED ORAL at 09:02

## 2019-01-01 RX ADMIN — CARVEDILOL 6.25 MG: 6.25 TABLET, FILM COATED ORAL at 08:43

## 2019-01-01 RX ADMIN — FUROSEMIDE 80 MG: 10 INJECTION, SOLUTION INTRAMUSCULAR; INTRAVENOUS at 17:37

## 2019-01-01 RX ADMIN — POTASSIUM CHLORIDE 10 MEQ: 10 INJECTION, SOLUTION INTRAVENOUS at 06:52

## 2019-01-01 RX ADMIN — ONDANSETRON 2 MG: 2 INJECTION INTRAMUSCULAR; INTRAVENOUS at 13:56

## 2019-01-01 RX ADMIN — LORATADINE 10 MG: 10 TABLET ORAL at 09:25

## 2019-01-01 RX ADMIN — PRAVASTATIN SODIUM 80 MG: 40 TABLET ORAL at 22:00

## 2019-01-01 RX ADMIN — Medication 10 ML: at 05:44

## 2019-01-01 RX ADMIN — SACUBITRIL AND VALSARTAN 1 TABLET: 49; 51 TABLET, FILM COATED ORAL at 09:07

## 2019-01-01 RX ADMIN — IOPAMIDOL 100 ML: 755 INJECTION, SOLUTION INTRAVENOUS at 14:04

## 2019-01-01 RX ADMIN — BISACODYL 5 MG: 5 TABLET, COATED ORAL at 10:34

## 2019-01-01 RX ADMIN — FUROSEMIDE 120 MG: 10 INJECTION, SOLUTION INTRAMUSCULAR; INTRAVENOUS at 09:29

## 2019-01-01 RX ADMIN — Medication 20 ML: at 16:28

## 2019-01-01 RX ADMIN — FUROSEMIDE 60 MG: 10 INJECTION, SOLUTION INTRAMUSCULAR; INTRAVENOUS at 17:05

## 2019-01-01 RX ADMIN — THERA TABS 1 TABLET: TAB at 09:45

## 2019-01-01 RX ADMIN — MIDAZOLAM HYDROCHLORIDE 1 MG: 1 INJECTION, SOLUTION INTRAMUSCULAR; INTRAVENOUS at 13:19

## 2019-01-01 RX ADMIN — POLYETHYLENE GLYCOL 3350 17 G: 17 POWDER, FOR SOLUTION ORAL at 12:56

## 2019-01-01 RX ADMIN — CALCIUM GLUCONATE 2 G: 98 INJECTION, SOLUTION INTRAVENOUS at 23:18

## 2019-01-01 RX ADMIN — SACUBITRIL AND VALSARTAN 1 TABLET: 49; 51 TABLET, FILM COATED ORAL at 22:32

## 2019-01-01 RX ADMIN — SACUBITRIL AND VALSARTAN 1 TABLET: 49; 51 TABLET, FILM COATED ORAL at 09:31

## 2019-01-01 RX ADMIN — ALLOPURINOL 200 MG: 100 TABLET ORAL at 09:02

## 2019-01-01 RX ADMIN — AMIODARONE HYDROCHLORIDE 200 MG: 200 TABLET ORAL at 09:41

## 2019-01-01 RX ADMIN — ALLOPURINOL 200 MG: 100 TABLET ORAL at 09:31

## 2019-01-01 RX ADMIN — Medication 10 ML: at 21:04

## 2019-01-01 RX ADMIN — THERA TABS 1 TABLET: TAB at 08:51

## 2019-01-01 RX ADMIN — Medication 10 ML: at 14:31

## 2019-01-01 RX ADMIN — APIXABAN 5 MG: 5 TABLET, FILM COATED ORAL at 10:10

## 2019-01-01 RX ADMIN — FUROSEMIDE 120 MG: 10 INJECTION, SOLUTION INTRAMUSCULAR; INTRAVENOUS at 09:14

## 2019-01-01 RX ADMIN — Medication 10 ML: at 14:56

## 2019-01-01 RX ADMIN — AMIODARONE HYDROCHLORIDE 200 MG: 200 TABLET ORAL at 18:44

## 2019-01-01 RX ADMIN — FUROSEMIDE 20 MG: 10 INJECTION, SOLUTION INTRAMUSCULAR; INTRAVENOUS at 14:16

## 2019-01-01 RX ADMIN — MELATONIN 3 MG: at 02:17

## 2019-01-01 RX ADMIN — TRAZODONE HYDROCHLORIDE 25 MG: 50 TABLET ORAL at 21:47

## 2019-01-01 RX ADMIN — DOBUTAMINE IN DEXTROSE 5 MCG/KG/MIN: 400 INJECTION, SOLUTION INTRAVENOUS at 10:47

## 2019-01-01 RX ADMIN — LORATADINE 10 MG: 10 TABLET ORAL at 10:54

## 2019-01-01 RX ADMIN — AMIODARONE HYDROCHLORIDE 0.5 MG/MIN: 50 INJECTION, SOLUTION INTRAVENOUS at 18:13

## 2019-01-01 RX ADMIN — APIXABAN 5 MG: 5 TABLET, FILM COATED ORAL at 21:43

## 2019-01-01 RX ADMIN — DOBUTAMINE IN DEXTROSE 7.5 MCG/KG/MIN: 400 INJECTION, SOLUTION INTRAVENOUS at 15:51

## 2019-01-01 RX ADMIN — LORATADINE 10 MG: 10 TABLET ORAL at 09:55

## 2019-01-01 RX ADMIN — SACUBITRIL AND VALSARTAN 1 TABLET: 24; 26 TABLET, FILM COATED ORAL at 08:50

## 2019-01-01 RX ADMIN — HEPARIN SODIUM 5000 UNITS: 5000 INJECTION INTRAVENOUS; SUBCUTANEOUS at 05:54

## 2019-01-01 RX ADMIN — GUAIFENESIN 200 MG: 200 SOLUTION ORAL at 21:10

## 2019-01-01 RX ADMIN — PRAVASTATIN SODIUM 80 MG: 40 TABLET ORAL at 21:34

## 2019-01-01 RX ADMIN — FUROSEMIDE 40 MG: 10 INJECTION, SOLUTION INTRAMUSCULAR; INTRAVENOUS at 17:42

## 2019-01-01 RX ADMIN — HEPARIN SODIUM 5000 UNITS: 5000 INJECTION INTRAVENOUS; SUBCUTANEOUS at 22:21

## 2019-01-01 RX ADMIN — Medication 10 ML: at 21:11

## 2019-01-01 RX ADMIN — SODIUM CHLORIDE 0.25 MCG/KG/MIN: 900 INJECTION, SOLUTION INTRAVENOUS at 18:26

## 2019-01-01 RX ADMIN — PRAVASTATIN SODIUM 80 MG: 40 TABLET ORAL at 21:09

## 2019-01-01 RX ADMIN — Medication 10 ML: at 10:01

## 2019-01-01 RX ADMIN — Medication 10 ML: at 23:22

## 2019-01-01 RX ADMIN — ALLOPURINOL 200 MG: 100 TABLET ORAL at 09:10

## 2019-01-01 RX ADMIN — ALLOPURINOL 200 MG: 100 TABLET ORAL at 08:50

## 2019-01-01 RX ADMIN — SACUBITRIL AND VALSARTAN 1 TABLET: 49; 51 TABLET, FILM COATED ORAL at 21:14

## 2019-01-01 RX ADMIN — THERA TABS 1 TABLET: TAB at 09:41

## 2019-01-01 RX ADMIN — ALBUMIN (HUMAN) 12.5 G: 0.25 INJECTION, SOLUTION INTRAVENOUS at 22:08

## 2019-01-01 RX ADMIN — ALLOPURINOL 200 MG: 100 TABLET ORAL at 08:32

## 2019-01-01 RX ADMIN — VANCOMYCIN HYDROCHLORIDE 1250 MG: 10 INJECTION, POWDER, LYOPHILIZED, FOR SOLUTION INTRAVENOUS at 12:20

## 2019-01-01 RX ADMIN — ALLOPURINOL 200 MG: 100 TABLET ORAL at 09:14

## 2019-01-01 RX ADMIN — HEPARIN SODIUM 5000 UNITS: 5000 INJECTION INTRAVENOUS; SUBCUTANEOUS at 21:07

## 2019-01-01 RX ADMIN — TRAZODONE HYDROCHLORIDE 25 MG: 50 TABLET ORAL at 21:40

## 2019-01-01 RX ADMIN — Medication 10 ML: at 14:05

## 2019-01-01 RX ADMIN — HEPARIN SODIUM 5000 UNITS: 5000 INJECTION INTRAVENOUS; SUBCUTANEOUS at 06:15

## 2019-01-01 RX ADMIN — TRAZODONE HYDROCHLORIDE 25 MG: 50 TABLET ORAL at 22:00

## 2019-01-01 RX ADMIN — POLYETHYLENE GLYCOL 3350 17 G: 17 POWDER, FOR SOLUTION ORAL at 09:24

## 2019-01-01 RX ADMIN — Medication 10 ML: at 03:53

## 2019-01-01 RX ADMIN — AZITHROMYCIN MONOHYDRATE 500 MG: 500 INJECTION, POWDER, LYOPHILIZED, FOR SOLUTION INTRAVENOUS at 22:12

## 2019-01-01 RX ADMIN — HEPARIN SODIUM 5000 UNITS: 5000 INJECTION INTRAVENOUS; SUBCUTANEOUS at 14:41

## 2019-01-01 RX ADMIN — GUAIFENESIN 100 MG: 200 SOLUTION ORAL at 08:44

## 2019-01-01 RX ADMIN — PRAVASTATIN SODIUM 80 MG: 40 TABLET ORAL at 22:32

## 2019-01-01 RX ADMIN — AMIODARONE HYDROCHLORIDE 200 MG: 200 TABLET ORAL at 18:37

## 2019-01-01 RX ADMIN — AMOXICILLIN AND CLAVULANATE POTASSIUM 1 TABLET: 875; 125 TABLET, FILM COATED ORAL at 09:10

## 2019-01-01 RX ADMIN — SODIUM CHLORIDE 250 ML: 9 INJECTION, SOLUTION INTRAVENOUS at 17:00

## 2019-01-01 RX ADMIN — IOPAMIDOL 100 ML: 755 INJECTION, SOLUTION INTRAVENOUS at 10:01

## 2019-01-01 RX ADMIN — TRAZODONE HYDROCHLORIDE 25 MG: 50 TABLET ORAL at 21:09

## 2019-01-01 RX ADMIN — LEVOTHYROXINE SODIUM 100 MCG: 100 TABLET ORAL at 18:44

## 2019-01-01 RX ADMIN — MORPHINE SULFATE 1 MG: 2 INJECTION, SOLUTION INTRAMUSCULAR; INTRAVENOUS at 12:42

## 2019-01-01 RX ADMIN — SPIRONOLACTONE 25 MG: 25 TABLET ORAL at 12:31

## 2019-01-01 RX ADMIN — ALBUMIN (HUMAN) 12.5 G: 12.5 INJECTION, SOLUTION INTRAVENOUS at 16:06

## 2019-01-01 RX ADMIN — AMIODARONE HYDROCHLORIDE 200 MG: 200 TABLET ORAL at 09:30

## 2019-01-01 RX ADMIN — APIXABAN 5 MG: 5 TABLET, FILM COATED ORAL at 17:51

## 2019-01-01 RX ADMIN — ALLOPURINOL 200 MG: 100 TABLET ORAL at 08:43

## 2019-01-01 RX ADMIN — ALLOPURINOL 200 MG: 100 TABLET ORAL at 09:15

## 2019-01-01 RX ADMIN — GUAIFENESIN 100 MG: 200 SOLUTION ORAL at 18:12

## 2019-01-01 RX ADMIN — LORAZEPAM 0.5 MG: 2 INJECTION INTRAMUSCULAR; INTRAVENOUS at 22:12

## 2019-01-01 RX ADMIN — Medication 10 ML: at 22:15

## 2019-01-01 RX ADMIN — LEVOTHYROXINE SODIUM 100 MCG: 100 TABLET ORAL at 06:18

## 2019-01-01 RX ADMIN — THERA TABS 1 TABLET: TAB at 09:26

## 2019-01-01 RX ADMIN — DOBUTAMINE IN DEXTROSE 7.5 MCG/KG/MIN: 200 INJECTION, SOLUTION INTRAVENOUS at 08:01

## 2019-01-01 RX ADMIN — TRAZODONE HYDROCHLORIDE 25 MG: 50 TABLET ORAL at 22:31

## 2019-01-01 RX ADMIN — LEVOTHYROXINE SODIUM 100 MCG: 100 TABLET ORAL at 06:29

## 2019-01-01 RX ADMIN — SODIUM BICARBONATE 50 MEQ: 84 INJECTION, SOLUTION INTRAVENOUS at 14:56

## 2019-01-01 RX ADMIN — Medication 10 ML: at 06:49

## 2019-01-01 RX ADMIN — POTASSIUM CHLORIDE 40 MEQ: 750 TABLET, FILM COATED, EXTENDED RELEASE ORAL at 06:58

## 2019-01-01 RX ADMIN — AMOXICILLIN AND CLAVULANATE POTASSIUM 1 TABLET: 875; 125 TABLET, FILM COATED ORAL at 08:32

## 2019-01-01 RX ADMIN — ALLOPURINOL 200 MG: 100 TABLET ORAL at 09:26

## 2019-01-01 RX ADMIN — AMIODARONE HYDROCHLORIDE 200 MG: 200 TABLET ORAL at 08:50

## 2019-01-01 RX ADMIN — AMOXICILLIN AND CLAVULANATE POTASSIUM 1 TABLET: 875; 125 TABLET, FILM COATED ORAL at 21:22

## 2019-01-01 RX ADMIN — BISACODYL 5 MG: 5 TABLET, COATED ORAL at 14:22

## 2019-01-01 RX ADMIN — GUAIFENESIN 100 MG: 200 SOLUTION ORAL at 17:05

## 2019-01-01 RX ADMIN — Medication 10 ML: at 21:42

## 2019-01-01 RX ADMIN — AMIODARONE HYDROCHLORIDE 200 MG: 200 TABLET ORAL at 09:02

## 2019-01-01 RX ADMIN — Medication 10 ML: at 21:36

## 2019-01-01 RX ADMIN — Medication 10 ML: at 15:44

## 2019-01-01 RX ADMIN — AMIODARONE HYDROCHLORIDE 1 MG/MIN: 50 INJECTION, SOLUTION INTRAVENOUS at 20:37

## 2019-01-01 RX ADMIN — AMIODARONE HYDROCHLORIDE 200 MG: 200 TABLET ORAL at 09:05

## 2019-01-01 RX ADMIN — APIXABAN 5 MG: 5 TABLET, FILM COATED ORAL at 09:41

## 2019-01-01 RX ADMIN — ALBUMIN (HUMAN) 25 G: 0.25 INJECTION, SOLUTION INTRAVENOUS at 13:00

## 2019-01-01 RX ADMIN — LEVOTHYROXINE SODIUM 100 MCG: 100 TABLET ORAL at 06:01

## 2019-01-01 RX ADMIN — BUMETANIDE 2 MG: 0.25 INJECTION INTRAMUSCULAR; INTRAVENOUS at 10:45

## 2019-01-01 RX ADMIN — Medication 10 ML: at 13:51

## 2019-01-01 RX ADMIN — ALLOPURINOL 200 MG: 100 TABLET ORAL at 09:45

## 2019-01-01 RX ADMIN — Medication 10 ML: at 20:55

## 2019-01-01 RX ADMIN — AMIODARONE HYDROCHLORIDE 200 MG: 200 TABLET ORAL at 17:58

## 2019-01-01 RX ADMIN — POLYETHYLENE GLYCOL 3350 17 G: 17 POWDER, FOR SOLUTION ORAL at 09:14

## 2019-01-01 RX ADMIN — APIXABAN 5 MG: 5 TABLET, FILM COATED ORAL at 22:00

## 2019-01-01 RX ADMIN — POTASSIUM CHLORIDE 40 MEQ: 750 TABLET, FILM COATED, EXTENDED RELEASE ORAL at 16:27

## 2019-01-01 RX ADMIN — Medication 10 ML: at 22:01

## 2019-01-01 RX ADMIN — SPIRONOLACTONE 25 MG: 25 TABLET ORAL at 09:42

## 2019-01-01 RX ADMIN — Medication 10 ML: at 13:56

## 2019-01-01 RX ADMIN — HEPARIN SODIUM 5000 UNITS: 5000 INJECTION INTRAVENOUS; SUBCUTANEOUS at 05:57

## 2019-01-01 RX ADMIN — AMIODARONE HYDROCHLORIDE 200 MG: 200 TABLET ORAL at 08:43

## 2019-01-01 RX ADMIN — SACUBITRIL AND VALSARTAN 1 TABLET: 49; 51 TABLET, FILM COATED ORAL at 09:02

## 2019-01-01 RX ADMIN — ALBUMIN (HUMAN) 12.5 G: 0.25 INJECTION, SOLUTION INTRAVENOUS at 18:33

## 2019-01-01 RX ADMIN — HEPARIN SODIUM 5000 UNITS: 5000 INJECTION INTRAVENOUS; SUBCUTANEOUS at 14:16

## 2019-01-01 RX ADMIN — FUROSEMIDE 60 MG: 10 INJECTION, SOLUTION INTRAMUSCULAR; INTRAVENOUS at 08:44

## 2019-01-01 RX ADMIN — MELATONIN 3 MG: at 22:16

## 2019-01-01 RX ADMIN — AMIODARONE HYDROCHLORIDE 200 MG: 200 TABLET ORAL at 10:54

## 2019-01-01 RX ADMIN — DOBUTAMINE IN DEXTROSE 5 MCG/KG/MIN: 400 INJECTION, SOLUTION INTRAVENOUS at 09:24

## 2019-01-01 RX ADMIN — ACETAMINOPHEN 650 MG: 325 TABLET ORAL at 05:31

## 2019-01-01 RX ADMIN — Medication 10 ML: at 06:15

## 2019-01-01 RX ADMIN — GUAIFENESIN 100 MG: 200 SOLUTION ORAL at 16:32

## 2019-01-01 RX ADMIN — CALCIUM GLUCONATE 2 G: 98 INJECTION, SOLUTION INTRAVENOUS at 18:58

## 2019-01-01 RX ADMIN — APIXABAN 5 MG: 5 TABLET, FILM COATED ORAL at 09:26

## 2019-01-01 RX ADMIN — FUROSEMIDE 120 MG: 10 INJECTION, SOLUTION INTRAMUSCULAR; INTRAVENOUS at 18:07

## 2019-01-01 RX ADMIN — DOBUTAMINE IN DEXTROSE 2.5 MCG/KG/MIN: 400 INJECTION, SOLUTION INTRAVENOUS at 09:16

## 2019-01-01 RX ADMIN — LORATADINE 10 MG: 10 TABLET ORAL at 10:10

## 2019-01-01 RX ADMIN — AMOXICILLIN AND CLAVULANATE POTASSIUM 1 TABLET: 875; 125 TABLET, FILM COATED ORAL at 22:39

## 2019-01-01 RX ADMIN — ALLOPURINOL 200 MG: 100 TABLET ORAL at 08:51

## 2019-01-01 RX ADMIN — Medication 20 ML: at 14:00

## 2019-01-01 RX ADMIN — Medication 1 AMPULE: at 08:54

## 2019-01-01 RX ADMIN — AMIODARONE HYDROCHLORIDE 200 MG: 200 TABLET ORAL at 09:13

## 2019-01-01 RX ADMIN — AMIODARONE HYDROCHLORIDE 200 MG: 200 TABLET ORAL at 17:51

## 2019-01-01 RX ADMIN — GUAIFENESIN 100 MG: 200 SOLUTION ORAL at 14:10

## 2019-01-01 RX ADMIN — FUROSEMIDE 120 MG: 10 INJECTION, SOLUTION INTRAMUSCULAR; INTRAVENOUS at 08:51

## 2019-01-01 RX ADMIN — IPRATROPIUM BROMIDE AND ALBUTEROL SULFATE 3 ML: .5; 3 SOLUTION RESPIRATORY (INHALATION) at 07:27

## 2019-01-01 RX ADMIN — APIXABAN 2.5 MG: 2.5 TABLET, FILM COATED ORAL at 17:40

## 2019-01-01 RX ADMIN — TRAZODONE HYDROCHLORIDE 25 MG: 50 TABLET ORAL at 22:17

## 2019-01-01 RX ADMIN — Medication 10 ML: at 13:41

## 2019-01-01 RX ADMIN — LORATADINE 10 MG: 10 TABLET ORAL at 09:01

## 2019-01-01 RX ADMIN — SODIUM POLYSTYRENE SULFONATE 45 G: 15 SUSPENSION ORAL; RECTAL at 11:36

## 2019-01-01 RX ADMIN — FUROSEMIDE 60 MG: 10 INJECTION, SOLUTION INTRAMUSCULAR; INTRAVENOUS at 09:10

## 2019-01-01 RX ADMIN — FUROSEMIDE 40 MG: 10 INJECTION, SOLUTION INTRAMUSCULAR; INTRAVENOUS at 09:06

## 2019-01-01 RX ADMIN — AMOXICILLIN AND CLAVULANATE POTASSIUM 1 TABLET: 875; 125 TABLET, FILM COATED ORAL at 21:43

## 2019-01-01 RX ADMIN — CARVEDILOL 6.25 MG: 6.25 TABLET, FILM COATED ORAL at 17:35

## 2019-01-01 RX ADMIN — BUMETANIDE 1 MG: 0.25 INJECTION INTRAMUSCULAR; INTRAVENOUS at 11:09

## 2019-01-01 RX ADMIN — BISACODYL 10 MG: 10 SUPPOSITORY RECTAL at 18:17

## 2019-01-01 RX ADMIN — LACTULOSE 45 ML: 10 SOLUTION ORAL at 16:28

## 2019-01-01 RX ADMIN — FUROSEMIDE 60 MG: 10 INJECTION, SOLUTION INTRAMUSCULAR; INTRAVENOUS at 17:09

## 2019-01-01 RX ADMIN — Medication 10 ML: at 05:29

## 2019-01-01 RX ADMIN — Medication 10 ML: at 21:41

## 2019-01-01 RX ADMIN — PREDNISOLONE ACETATE 1 DROP: 10 SUSPENSION/ DROPS OPHTHALMIC at 23:18

## 2019-06-08 NOTE — DISCHARGE INSTRUCTIONS
Patient Education        Learning About Heart Failure  What is heart failure? Heart failure means that your heart muscle does not pump as much blood as your body needs. Failure does not mean that your heart has stopped. It means that your heart is not pumping as well as it should. Your body has an amazing ability to make up for heart failure. It may do such a good job that you don't know you have a disease. But at some point, your heart and body will no longer be able to keep up. Then fluid starts to build up in your lungs and other parts of your body. What can you expect when you have heart failure? Heart failure is a lifelong (chronic) disease. Treatment may be able to slow the disease and help you feel better. But heart failure tends to get worse over time. Despite this, there are many steps you can take to feel better and stay healthy longer. Early on, your symptoms may not be too bad. As heart failure gets worse, symptoms typically get worse, and you may need to limit your activities. Heart failure can also get worse suddenly. If this happens, you need emergency care. Then, after treatment, your symptoms may go back to being stable (which means they stay the same) for a long time. Heart failure can lead to other health problems, such as heart rhythm problems. Over time, your treatment options may change, especially as your symptoms get worse. As heart failure gets worse, palliative care can help improve the quality of your life. You can do advance care planning to decide what kind of care you want at the end of your life. What are the symptoms? Symptoms of heart failure start to happen when your heart can't pump enough blood to the rest of your body. In the early stages of heart failure, you may:  · Feel tired easily. · Be short of breath when you exert yourself. · Feel like your heart is pounding or racing (palpitations). · Feel weak or dizzy.   As heart failure gets worse, fluid starts to build up in your lungs and other parts of your body. This may cause you to:  · Feel short of breath even at rest.  · Have swelling (edema), especially in your legs, ankles, and feet. · Gain weight. This may happen over just a day or two, or more slowly. · Cough or wheeze, especially when you lie down. How is heart failure treated? · You'll probably take several medicines. · You might attend cardiac rehabilitation (rehab) to get education and support that help you make lifestyle changes and stay as healthy as possible. · You may get a heart device. A pacemaker helps your heart pump blood. An ICD can stop abnormal heart rhythms. How can you care for yourself? There are many steps you can take to feel better and stay healthy longer. These steps are an important part of treatment. They can help you stay active and enjoy life. · Take your medicine the right way. Avoid medicines that can make your symptoms worse. · Check your weight and symptoms every day. Know what to do if your symptoms get worse. · Limit sodium. This helps keep fluid from building up. It may help you feel better. · Be active. Exercise regularly, but don't exercise too hard. · Be heart-healthy. Eat healthy foods, stay at a healthy weight, limit alcohol, and don't smoke. · Stay as healthy as possible. Avoid colds and flu, get help for depression and anxiety, and manage stress. Follow-up care is a key part of your treatment and safety. Be sure to make and go to all appointments, and call your doctor if you are having problems. It's also a good idea to know your test results and keep a list of the medicines you take. Where can you learn more? Go to http://jordan-rolly.info/. Enter I622 in the search box to learn more about \"Learning About Heart Failure. \"  Current as of: July 22, 2018  Content Version: 11.9  © 9210-8834 [employer].  Care instructions adapted under license by [employer] (which disclaims liability or warranty for this information). If you have questions about a medical condition or this instruction, always ask your healthcare professional. [place] [address] any warranty or liability for your use of this information.

## 2019-06-08 NOTE — ED NOTES
Assumed care of patient with Jonathan Chandra RN. Patient resting comfortably, family at bedside. Pulse ox currently 97% on room air, no signs of distress. Patient denies chest pain. Pending imaging results. Call light in reach, VSS. BP systolic in 84C, will continue to monitor.

## 2019-06-08 NOTE — ED PROVIDER NOTES
EMERGENCY DEPARTMENT HISTORY AND PHYSICAL EXAM 
 
 
Date: 6/8/2019 Patient Name: Gen Francis History of Presenting Illness Chief Complaint Patient presents with  Shortness of Breath  
  pt complaining of not being able to sleep last night and being SOB, pt also having frequent urination History Provided By: Patient HPI: Gen Francis, 77 y.o. male with PMHx significant for non-ischemic cardiomyopathy and chf, presents to the ED orthopnea and sob last night. Denies chest pain, fever, chills, cough, LE edema. His symptoms have improved today, but he wanted to come in and get evaluated. There are no other complaints, changes, or physical findings at this time. PCP: Abdulaziz Guan MD 
 
No current facility-administered medications on file prior to encounter. Current Outpatient Medications on File Prior to Encounter Medication Sig Dispense Refill  sacubitril-valsartan (ENTRESTO) 24 mg/26 mg tablet Take 1 Tab by mouth two (2) times a day.  amiodarone (CORDARONE) 200 mg tablet Take 200 mg by mouth.  levothyroxine (SYNTHROID) 100 mcg tablet Take 100 mcg by mouth Daily (before breakfast).  carvedilol (COREG) 25 mg tablet Take 25 mg by mouth two (2) times daily (with meals).  furosemide (LASIX) 20 mg tablet Take  by mouth daily.  spironolactone (ALDACTONE) 25 mg tablet Take  by mouth every other day.  allopurinol (ZYLOPRIM) 100 mg tablet Take  by mouth daily. Indications: 2 pills daily  loratadine 10 mg Cap Take  by mouth daily.  MULTIVITAMIN PO Take  by mouth.  pravastatin (PRAVACHOL) 40 mg tablet Take 80 mg by mouth daily.  lisinopril (PRINIVIL, ZESTRIL) 10 mg tablet Take  by mouth daily. Past History Past Medical History: 
Past Medical History:  
Diagnosis Date  AICD (automatic cardioverter/defibrillator) present  Arthritis  Cancer ([facility] [address] skin  Heart failure ([facility] [address]  Other ill-defined conditions(799.89)   
 high cholesterol Past Surgical History: 
Past Surgical History:  
Procedure Laterality Date  ABDOMEN SURGERY PROC UNLISTED  6/2/11  
 colon resection  HX HEENT    
 repaired right eardrum  HX OTHER SURGICAL    
 benign cyst removed from back and thyroid  HX OTHER SURGICAL    
 skin graft  HX PACEMAKER    
 aicd  AR COLONOSCOPY FLX DX W/COLLJ SPEC WHEN PFRMD  6/6/2012  AR COLONOSCOPY W/BIOPSY SINGLE/MULTIPLE  4/27/2011 Family History: 
Family History Problem Relation Age of Onset  Cancer Mother   
     colon  Heart Disease Father  Heart Disease Brother Social History: 
Social History Tobacco Use  Smoking status: Former Smoker Substance Use Topics  Alcohol use: Yes Alcohol/week: 2.5 oz Types: 5 Glasses of wine per week  Drug use: No  
 
 
Allergies: 
No Known Allergies Review of Systems Review of Systems Constitutional: Negative for appetite change, chills and fever. Respiratory: Positive for shortness of breath. Negative for cough and chest tightness. Cardiovascular: Negative for chest pain, palpitations and leg swelling. Gastrointestinal: Negative for abdominal distention, abdominal pain, blood in stool, constipation, diarrhea, nausea and vomiting. Genitourinary: Negative for decreased urine volume, difficulty urinating, dysuria, flank pain, frequency and hematuria. Musculoskeletal: Negative for arthralgias, joint swelling, myalgias, neck pain and neck stiffness. Neurological: Negative for dizziness, weakness, light-headedness, numbness and headaches. Hematological: Negative for adenopathy. All other systems reviewed and are negative. Physical Exam  
Physical Exam  
Constitutional: He is oriented to person, place, and time. He appears well-developed and well-nourished. No distress. HENT:  
Head: Atraumatic.   
Mouth/Throat: Oropharynx is clear and moist.  
 Eyes: Pupils are equal, round, and reactive to light. Conjunctivae and EOM are normal. No scleral icterus. Neck: Normal range of motion. Neck supple. No JVD present. Cardiovascular: Normal rate, regular rhythm, normal heart sounds and intact distal pulses. Pulmonary/Chest: Effort normal and breath sounds normal. He exhibits no tenderness. Abdominal: Soft. Bowel sounds are normal. He exhibits no distension. There is no tenderness. Musculoskeletal: Normal range of motion. He exhibits no edema. Neurological: He is alert and oriented to person, place, and time. No cranial nerve deficit. Skin: Skin is warm and dry. He is not diaphoretic. Nursing note and vitals reviewed. Diagnostic Study Results Labs - Recent Results (from the past 24 hour(s)) EKG, 12 LEAD, INITIAL Collection Time: 06/08/19  8:59 AM  
Result Value Ref Range Ventricular Rate 76 BPM  
 Atrial Rate 76 BPM  
 P-R Interval 220 ms QRS Duration 154 ms Q-T Interval 468 ms QTC Calculation (Bezet) 526 ms Calculated P Axis 45 degrees Calculated R Axis -78 degrees Calculated T Axis 42 degrees Diagnosis Sinus rhythm with 1st degree AV block Right bundle branch block Left anterior fascicular block ** Bifascicular block ** When compared with ECG of 24-MAY-2011 12:38, 
VA interval has increased T wave inversion no longer evident in Inferior leads QT has lengthened Radiologic Studies -  
XR CHEST PA LAT    (Results Pending) CT Results  (Last 48 hours) None CXR Results  (Last 48 hours) None Medical Decision Making I am the first provider for this patient. I reviewed the vital signs, available nursing notes, past medical history, past surgical history, family history and social history. Vital Signs-Reviewed the patient's vital signs. Patient Vitals for the past 24 hrs: 
 Temp Pulse Resp BP SpO2  
06/08/19 0854 99.1 °F (37.3 °C) 72 18 105/64 98 % Records Reviewed: Nursing Notes Differential Diagnosis: acs, chf, pna 
 
Provider Notes (Medical Decision Making): Well-appearing 76 yo male presents with sob and orthopnea which she describes began yesterday evening and lasted most of the night. During the course of the day today, most of his symptoms resolved, however. No chest pain, his vital signs are normal now (slightly increased temp but no fever; this resolved without intervention, so unclear significance) and his exam is unremarkable. His history suggests possible flash pulmonary edema that may have improved or resolved during the course of the day morning. He takes a diuretic and reports good urinary output. Workup here today shows no acute abnormalities including no renal dysfunction, no anemia, no electrolyte abnormalities. 2 serial troponins are negative. His bnp is elevated, but he has known chf and I do not have a baseline level. As he has been diuresing adequately, I will continue his home lasix as prescribed. ED Course:  
 
Initial assessment performed. The patients presenting problems have been discussed, and they are in agreement with the care plan formulated and outlined with them. I have encouraged them to ask questions as they arise throughout their visit. CXR is normal. 
  
Pt has remained asymptomatic with normal vital signs and feels comfortable going home. I think this is reasonable as no findings today suggest a life-threatening condition. Pt was discharged and was provided written discharge instructions. Additional verbal instructions and return precautions were given and discussed in detail. Pt was asked to return to the ED immediately for any new or concerning symptoms or if they worsen. Advised to follow-up with PMD or specialist as instructed. Pt was in agreement, endorsed understanding, and all questions were answered. Disposition: 
dc 
 
 
Diagnosis Clinical Impression: 1. SOB (shortness of breath) 2. Congestive heart failure, unspecified HF chronicity, unspecified heart failure type ([facility]

## 2019-07-28 PROBLEM — I50.9 CHF (CONGESTIVE HEART FAILURE) (HCC): Status: ACTIVE | Noted: 2019-01-01

## 2019-07-28 NOTE — ED PROVIDER NOTES
EMERGENCY DEPARTMENT HISTORY AND PHYSICAL EXAM          Date: 7/27/2019  Patient Name: Pravin Andersen    History of Presenting Illness     Chief Complaint   Patient presents with    Shortness of Breath    Fever       History Provided By: Patient    HPI: Pravin Andersen is a 77 y.o. male, pmhx CHF, AICD, arthritis, high cholesterol, who presents ambulatory to the ED c/o fever    Patient notes that he was doing better after his last visit to the emergency room and his cough is clearing but he notes over the past 2 weeks he feels like it is progressively worsening. He states is dry and not really coughing up much phlegm but he has developed some pain in the posterior shoulder blade as well as some mild shortness of breath and fever today. [facility] He had labs obtained which appeared normal and a chest x-ray without obvious pneumonia but he was prescribed cefuroxime and doxycycline which he started today. Patient specifically denies any recent nausea, vomiting, diarrhea, abd pain, CP, urinary sxs, changes in BM, or headache and states he is been eating and drinking well. PCP: Gabi Espinosa MD    Allergies: nkda  Social Hx: Former tobacco, +EtOH, -Illicit Drugs    There are no other complaints, changes, or physical findings at this time. Current Outpatient Medications   Medication Sig Dispense Refill    benzonatate (TESSALON PERLES) 100 mg capsule Take 1 Cap by mouth three (3) times daily as needed for Cough for up to 10 days. 30 Cap 0    sacubitril-valsartan (ENTRESTO) 24 mg/26 mg tablet Take 1 Tab by mouth two (2) times a day.  amiodarone (CORDARONE) 200 mg tablet Take 200 mg by mouth.  levothyroxine (SYNTHROID) 100 mcg tablet Take 100 mcg by mouth Daily (before breakfast).  lisinopril (PRINIVIL, ZESTRIL) 10 mg tablet Take  by mouth daily.  carvedilol (COREG) 25 mg tablet Take 25 mg by mouth two (2) times daily (with meals).       furosemide (LASIX) 20 mg tablet Take  by mouth daily.  spironolactone (ALDACTONE) 25 mg tablet Take  by mouth every other day.  allopurinol (ZYLOPRIM) 100 mg tablet Take  by mouth daily. Indications: 2 pills daily      loratadine 10 mg Cap Take  by mouth daily.  MULTIVITAMIN PO Take  by mouth.  pravastatin (PRAVACHOL) 40 mg tablet Take 80 mg by mouth daily. Past History     Past Medical History:  Past Medical History:   Diagnosis Date    AICD (automatic cardioverter/defibrillator) present     Arthritis     Cancer ([facility] [address]     skin    Heart failure ([facility] [address]     Other ill-defined conditions(799.89)     high cholesterol       Past Surgical History:  Past Surgical History:   Procedure Laterality Date    ABDOMEN SURGERY PROC UNLISTED  6/2/11    colon resection    HX HEENT      repaired right eardrum    HX OTHER SURGICAL      benign cyst removed from back and thyroid    HX OTHER SURGICAL      skin graft    HX PACEMAKER      aicd    MO COLONOSCOPY FLX DX W/COLLJ SPEC WHEN PFRMD  6/6/2012         MO COLONOSCOPY W/BIOPSY SINGLE/MULTIPLE  4/27/2011            Family History:  Family History   Problem Relation Age of Onset    Cancer Mother         colon    Heart Disease Father     Heart Disease Brother        Social History:  Social History     Tobacco Use    Smoking status: Former Smoker   Substance Use Topics    Alcohol use: Yes     Alcohol/week: 4.2 standard drinks     Types: 5 Glasses of wine per week    Drug use: No       Allergies:  No Known Allergies      Review of Systems   Review of Systems   Constitutional: Positive for fever. Negative for activity change, appetite change, chills and unexpected weight change. HENT: Negative for congestion. Eyes: Negative for pain and visual disturbance. Respiratory: Positive for cough and shortness of breath. Cardiovascular: Negative for chest pain. Gastrointestinal: Negative for abdominal pain, diarrhea, nausea and vomiting. Genitourinary: Negative for dysuria. Musculoskeletal: Negative for back pain. Skin: Negative for rash. Neurological: Negative for headaches. Physical Exam   Physical Exam   Constitutional: He is oriented to person, place, and time. He appears well-developed and well-nourished. Elderly gentleman presenting in mild distress   HENT:   Head: Normocephalic and atraumatic. Mouth/Throat: Oropharynx is clear and moist.   Eyes: Pupils are equal, round, and reactive to light. Conjunctivae and EOM are normal. Right eye exhibits no discharge. Left eye exhibits no discharge. Neck: Normal range of motion. Neck supple. Cardiovascular: Normal rate, regular rhythm and normal heart sounds. No murmur heard. Pulmonary/Chest: Effort normal and breath sounds normal. No respiratory distress. He has no wheezes. He has no rales. He exhibits no tenderness. Abdominal: Soft. Bowel sounds are normal. He exhibits no distension. There is no tenderness. There is no rebound and no guarding. Musculoskeletal: Normal range of motion. He exhibits no edema. Neurological: He is alert and oriented to person, place, and time. No cranial nerve deficit. He exhibits normal muscle tone. Skin: Skin is warm and dry. No rash noted. He is not diaphoretic. Hot to touch   Nursing note and vitals reviewed.       Diagnostic Study Results     Labs -     Recent Results (from the past 12 hour(s))   EKG, 12 LEAD, INITIAL    Collection Time: 07/27/19  9:02 PM   Result Value Ref Range    Ventricular Rate 89 BPM    Atrial Rate 89 BPM    P-R Interval 80 ms    QRS Duration 98 ms    Q-T Interval 372 ms    QTC Calculation (Bezet) 452 ms    Calculated P Axis 38 degrees    Calculated R Axis -100 degrees    Calculated T Axis 122 degrees    Diagnosis       Sinus rhythm with short NJ  Inferior-posterior infarct , possibly acute  Anterolateral infarct , new  ** ** ACUTE MI / STEMI ** **  Consider right ventricular involvement in acute inferior infarct  When compared with ECG of 08-JUN-2019 08:59,  Significant changes have occurred     LACTIC ACID    Collection Time: 07/27/19  9:10 PM   Result Value Ref Range    Lactic acid 2.0 0.4 - 2.0 MMOL/L   URINALYSIS W/ REFLEX CULTURE    Collection Time: 07/27/19  9:10 PM   Result Value Ref Range    Color YELLOW/STRAW      Appearance CLEAR CLEAR      Specific gravity 1.028 1.003 - 1.030      pH (UA) 5.0 5.0 - 8.0      Protein 30 (A) NEG mg/dL    Glucose NEGATIVE  NEG mg/dL    Ketone NEGATIVE  NEG mg/dL    Bilirubin NEGATIVE  NEG      Blood NEGATIVE  NEG      Urobilinogen 1.0 0.2 - 1.0 EU/dL    Nitrites NEGATIVE  NEG      Leukocyte Esterase NEGATIVE  NEG      WBC 0-4 0 - 4 /hpf    RBC 0-5 0 - 5 /hpf    Epithelial cells FEW FEW /lpf    Bacteria NEGATIVE  NEG /hpf    UA:UC IF INDICATED CULTURE NOT INDICATED BY UA RESULT CNI      Mucus 1+ (A) NEG /lpf   URINE CULTURE HOLD SAMPLE    Collection Time: 07/27/19  9:10 PM   Result Value Ref Range    Urine culture hold        URINE ON HOLD IN MICROBIOLOGY DEPT FOR 3 DAYS. IF UNPRESERVED URINE IS SUBMITTED, IT CANNOT BE USED FOR ADDITIONAL TESTING AFTER 24 HRS, RECOLLECTION WILL BE REQUIRED. METABOLIC PANEL, COMPREHENSIVE    Collection Time: 07/27/19  9:10 PM   Result Value Ref Range    Sodium 142 136 - 145 mmol/L    Potassium 4.1 3.5 - 5.1 mmol/L    Chloride 108 97 - 108 mmol/L    CO2 25 21 - 32 mmol/L    Anion gap 9 5 - 15 mmol/L    Glucose 173 (H) 65 - 100 mg/dL    BUN 20 6 - 20 MG/DL    Creatinine 1.32 (H) 0.70 - 1.30 MG/DL    BUN/Creatinine ratio 15 12 - 20      GFR est AA >60 >60 ml/min/1.73m2    GFR est non-AA 52 (L) >60 ml/min/1.73m2    Calcium 8.0 (L) 8.5 - 10.1 MG/DL    Bilirubin, total 0.6 0.2 - 1.0 MG/DL    ALT (SGPT) 25 12 - 78 U/L    AST (SGOT) 23 15 - 37 U/L    Alk.  phosphatase 64 45 - 117 U/L    Protein, total 6.6 6.4 - 8.2 g/dL    Albumin 3.4 (L) 3.5 - 5.0 g/dL    Globulin 3.2 2.0 - 4.0 g/dL    A-G Ratio 1.1 1.1 - 2.2     CBC WITH AUTOMATED DIFF    Collection Time: 07/27/19  9:10 PM   Result Value Ref Range    WBC 6.9 4.1 - 11.1 K/uL    RBC 4.09 (L) 4.10 - 5.70 M/uL    HGB 12.7 12.1 - 17.0 g/dL    HCT 39.4 36.6 - 50.3 %    MCV 96.3 80.0 - 99.0 FL    MCH 31.1 26.0 - 34.0 PG    MCHC 32.2 30.0 - 36.5 g/dL    RDW 14.3 11.5 - 14.5 %    PLATELET 291 (L) 214 - 400 K/uL    MPV 11.1 8.9 - 12.9 FL    NRBC 0.0 0  WBC    ABSOLUTE NRBC 0.00 0.00 - 0.01 K/uL    NEUTROPHILS 81 (H) 32 - 75 %    BAND NEUTROPHILS 5 %    LYMPHOCYTES 8 (L) 12 - 49 %    MONOCYTES 6 5 - 13 %    EOSINOPHILS 0 0 - 7 %    BASOPHILS 0 0 - 1 %    IMMATURE GRANULOCYTES 0 0.0 - 0.5 %    ABS. NEUTROPHILS 5.9 1.8 - 8.0 K/UL    ABS. LYMPHOCYTES 0.6 (L) 0.8 - 3.5 K/UL    ABS. MONOCYTES 0.4 0.0 - 1.0 K/UL    ABS. EOSINOPHILS 0.0 0.0 - 0.4 K/UL    ABS. BASOPHILS 0.0 0.0 - 0.1 K/UL    ABS. IMM. GRANS. 0.0 0.00 - 0.04 K/UL    DF AUTOMATED      RBC COMMENTS NORMOCYTIC, NORMOCHROMIC      WBC COMMENTS REACTIVE LYMPHS     CK W/ REFLX CKMB    Collection Time: 07/27/19  9:10 PM   Result Value Ref Range    CK 50 39 - 308 U/L   TROPONIN I    Collection Time: 07/27/19  9:10 PM   Result Value Ref Range    Troponin-I, Qt. <0.05 <0.05 ng/mL       Radiologic Studies -   CT CHEST WO CONT   Final Result   IMPRESSION: No acute disease. XR CHEST PA LAT    (Results Pending)     CT Results  (Last 48 hours)               07/27/19 2310  CT CHEST WO CONT Final result    Impression:  IMPRESSION: No acute disease. Narrative:  INDICATION:  fever, PNA        EXAM: Chest CT   CT dose reduction was achieved through use of a standardized protocol tailored   for this examination and automatic exposure control for dose modulation. Contrast: None. COMPARISON: CXR 6/27/2019. FINDINGS: Lungs show no infiltrate or edema. 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 There is aortic calcification without   aneurysm. There is coronary artery calcification. ICD is present.  There is no significant adenopathy. There is no pleural or pericardial effusion. Adrenals   are not enlarged. Visualized thyroid and lower neck soft tissues are   unremarkable for age. CXR Results  (Last 48 hours)    None            Medical Decision Making   I am the first provider for this patient. I reviewed the vital signs, available nursing notes, past medical history, past surgical history, family history and social history. Vital Signs-Reviewed the patient's vital signs. Patient Vitals for the past 12 hrs:   Temp Pulse Resp BP SpO2   07/28/19 0100  78 16 99/55 94 %   07/28/19 0022  77 21 103/57 93 %   07/28/19 0015  77 19 100/57 94 %   07/27/19 2324    95/54    07/27/19 2256  78 19  94 %   07/27/19 2230  79 21 (!) 88/48 93 %   07/27/19 2210 99.3 °F (37.4 °C)       07/27/19 2115  88 19 98/52 96 %   07/27/19 2050 (!) 102 °F (38.9 °C) 89 20 127/65 92 %       Pulse Oximetry Analysis - 92% on RA    Cardiac Monitor:   Rate: 90bpm  Rhythm: Normal Sinus Rhythm      Records Reviewed: Nursing Notes, Old Medical Records, Previous electrocardiograms, Previous Radiology Studies and Previous Laboratory Studies  [facility]. Patient has some scattered pulmonary nodules which do not appear changed from 2016 imaging. I do not see any pneumothorax or significant, obvious pneumonia. Provider Notes (Medical Decision Making):   MDM: Elderly male presenting with significant fever this only ROS is a cough. Recent diagnosis of pneumonia does not appear to be improving but [facility] does not show an obvious infiltrate. Will initiate sepsis evaluation and send for CT of his chest.    ED Course:   Initial assessment performed. The patients presenting problems have been discussed, and they are in agreement with the care plan formulated and outlined with them. I have encouraged them to ask questions as they arise throughout their visit.     EKG interpretation: (Preliminary)  Rhythm: normal sinus rhythm; and regular . Rate (approx.): 89; Axis: right axis deviation; TX interval: normal; QRS interval: normal ; ST/T wave: T wave flattening 1, aVL, lateral at V4 through 6; Q waves present inferior leads; poor R wave progression. This EKG appears unchanged compared to June 2019. PROGRESS NOTE:  10:30 PM  Pt appears unchanged. He is sitting awake without complaints. Blood pressure is notably dropped from presentation and his temperature is now at baseline. IV fluid bolus to be given and will monitor for fluid overload. 11:30 PM  Patient continues to do well. Blood pressures increasing into the mid 90s. Continue IV fluid infusion While awaiting CT scan results. 12:45 AM  Upon chart review patient's blood pressure is normally in the mid 90s to low 100 range he appears to be at his baseline now 105/70 after 1 L fluid bolus. Recommend patient to follow-up with his primary care physician this week. He will continue the antibiotics as [facility] evening and return to the emergency room for any high fevers vomiting worsening shortness of breath. Discharge note:  01:00AM  Pt re-evaluated and noted to be feeling better, ready for discharge. Updated pt and family on all final lab findings. Will follow up as instructed. All questions have been answered, pt voiced understanding and agreement with plan. Specific return precautions provided as well as instructions to return to the ED should sx worsen at any time. Vital signs stable for discharge. Critical Care Time:   0      Diagnosis     Clinical Impression:   1. Fever, unspecified fever cause        PLAN:  1. Discharge Medication List as of 7/28/2019  1:02 AM      START taking these medications    Details   benzonatate (TESSALON PERLES) 100 mg capsule Take 1 Cap by mouth three (3) times daily as needed for Cough for up to 10 days. , Print, Disp-30 Cap, R-0         CONTINUE these medications which have NOT CHANGED    Details   sacubitril-valsartan (ENTRESTO) 24 mg/26 mg tablet Take 1 Tab by mouth two (2) times a day., Historical Med      amiodarone (CORDARONE) 200 mg tablet Take 200 mg by mouth., Historical Med      levothyroxine (SYNTHROID) 100 mcg tablet Take 100 mcg by mouth Daily (before breakfast). , Historical Med      lisinopril (PRINIVIL, ZESTRIL) 10 mg tablet Take  by mouth daily. , Historical Med      carvedilol (COREG) 25 mg tablet Take 25 mg by mouth two (2) times daily (with meals). , Historical Med      furosemide (LASIX) 20 mg tablet Take  by mouth daily. , Historical Med      spironolactone (ALDACTONE) 25 mg tablet Take  by mouth every other day., Historical Med      allopurinol (ZYLOPRIM) 100 mg tablet Take  by mouth daily. Indications: 2 pills daily, Historical Med      loratadine 10 mg Cap Take  by mouth daily. , Historical Med      MULTIVITAMIN PO Take  by mouth., Historical Med      pravastatin (PRAVACHOL) 40 mg tablet Take 80 mg by mouth daily. , Historical Med           2. Follow-up Information     Follow up With Specialties Details Why Contact Info    Curry Odonnell MD [facility]  Monday for recheck of her symptoms and vital signs [address] [place] [address]  [phone]      [facility] EMERGENCY DEPT Emergency Medicine  If symptoms worsen [address] [place], [place] [address] [facility]  [phone]        Return to ED if worse     Disposition:  home      Please note, this dictation was completed with Down, the [employer] voice recognition software. Quite often unanticipated grammatical, syntax, homophones, and other interpretive errors are inadvertently transcribed by the computer software. Please disregard these errors. Please excuse any errors that have escaped final proof reading.

## 2019-07-28 NOTE — ED NOTES
Bedside and Verbal shift change report given to Chica Dooley RN (oncoming nurse) by Christine Mckeon RN (offgoing nurse). Report included the following information SBAR, Kardex, ED Summary, [facility] and Recent Results.

## 2019-07-28 NOTE — PROGRESS NOTES
Pharmacy Clarification of the Prior to Admission Medication Regimen Retrospective to the Admission Medication Reconciliation    The patient was interviewed regarding clarification of the prior to admission medication regimen. Wife and children were present in room and obtained permission from patient to discuss drug regimen with visitor(s) present. Patient was questioned regarding use of any other inhalers, topical products, over the counter medications, herbal medications, vitamin products or ophthalmic/nasal/otic medication use. Information Obtained From: Patient, med list, pill organizer, RX Query    Recommendations/Findings: The following amendments were made to the patient's active medication list on file at [facility]:     1) Additions: NONE    2) Removals: NONE    3) Changes:  allopurinol (ZYLOPRIM) 100 mg tablet (Old regimen: daily /New regimen: 200 mg daily)  spironolactone (ALDACTONE) 25 mg tablet (Old regimen: 12.5 mg every other day /New regimen: 12.5 mg QHS)    4) Pertinent Pharmacy Findings:  Updated patient[facility] [facility] [place] [id number] [facility]  benzonatate (TESSALON PERLES) 100 mg capsule: This agent was prescribed 7/27/19. Patient has not picked this agent up form the pharmacy as of 7/28/19     PTA medication list was corrected to the following:     Prior to Admission Medications   Prescriptions Last Dose Informant Patient Reported? Taking? MULTIVITAMIN PO 7/27/2019 at Unknown time Self Yes Yes   Sig: Take 1 Tab by mouth daily. Indications: Centrum Silver   allopurinol (ZYLOPRIM) 100 mg tablet 7/27/2019 at Unknown time Self Yes Yes   Sig: Take 200 mg by mouth daily. amiodarone (CORDARONE) 200 mg tablet 7/27/2019 at Unknown time Self Yes Yes   Sig: Take 200 mg by mouth daily.    benzonatate (TESSALON PERLES) 100 mg capsule Not Taking at Unknown time Self No No   Sig: Take 1 Cap by mouth three (3) times daily as needed for Cough for up to 10 days. carvedilol (COREG) 6.25 mg tablet 7/27/2019 at Unknown time Self Yes Yes   Sig: Take 6.25 mg by mouth two (2) times daily (with meals). furosemide (LASIX) 20 mg tablet 7/27/2019 at Unknown time Self Yes Yes   Sig: Take 10 mg by mouth two (2) times a day. levothyroxine (SYNTHROID) 100 mcg tablet 7/26/2019 at Unknown time Self Yes Yes   Sig: Take 100 mcg by mouth every evening.   loratadine (CLARITIN) 10 mg tablet 7/27/2019 at Unknown time Self Yes Yes   Sig: Take 10 mg by mouth daily. pravastatin (PRAVACHOL) 80 mg tablet 7/26/2019 at Unknown time Self Yes Yes   Sig: Take 80 mg by mouth nightly. sacubitril-valsartan (ENTRESTO) 24 mg/26 mg tablet 7/27/2019 at Unknown time Self Yes Yes   Sig: Take 1 Tab by mouth two (2) times a day. spironolactone (ALDACTONE) 25 mg tablet 7/26/2019 at Unknown time Self Yes Yes   Sig: Take 12.5 mg by mouth nightly.       Facility-Administered Medications: None          Thank you,  Liseth Kamara [facility]  Medication History Pharmacy Technician

## 2019-07-28 NOTE — PROGRESS NOTES
Pt seen and examined    CHF on diuretics   ? PNA on abx  ? Hepatitis work up ordered    Full note to follow.

## 2019-07-28 NOTE — ED PROVIDER NOTES
EMERGENCY DEPARTMENT HISTORY AND PHYSICAL EXAM      Date: 7/28/2019  Patient Name: Aldo Dias    History of Presenting Illness     Chief Complaint   Patient presents with    Abdominal Pain     ambulatory to triage, reports abdominal pain x2 days, [facility] that [facility], was seen in the ED and was diagnosed with fever of unspecified source. Was prescribed tessalon and sent home. States that his symptoms continue with SOB and abd pain       History Provided By: Patient and Patient's Wife    HPI: Aldo Dias, 77 y.o. male  presents to the ED with cc of left-sided abdominal pain for 2 days. Patient was seen in our emergency department within the past 24 hours. He was being seen for fever and shortness of breath. [facility] about a week ago and was placed on antibiotics. CT scan last night did not show any signs of pneumonia. The patient continues to have some cough. He is not on any home oxygen and was 88% on room air when he arrived to the emergency department. He has no nausea vomiting. He states he has been constipated has not had a bowel movement 4 days. Denies any urinary symptoms. He states he did have a fever of 100.9 this morning. There are no other complaints, changes, or physical findings at this time.     PCP: Kyung Laboy MD    Current Facility-Administered Medications on File Prior to Encounter   Medication Dose Route Frequency Provider Last Rate Last Dose    [COMPLETED] acetaminophen (TYLENOL) tablet 1,000 mg  1,000 mg Oral ONCE Malcom Puentes MD   1,000 mg at 07/27/19 2117    [COMPLETED] sodium chloride 0.9 % bolus infusion 500 mL  500 mL IntraVENous ONCE Malcom Puentes MD   Stopped at 07/28/19 0118    [COMPLETED] sodium chloride 0.9 % bolus infusion 500 mL  500 mL IntraVENous ONCE Malcom Puentes MD   Stopped at 07/28/19 0118    [DISCONTINUED] sodium chloride (NS) flush 5-40 mL  5-40 mL IntraVENous Q8H Jovita Puentes MD        [DISCONTINUED] sodium chloride (NS) flush 5-40 mL  5-40 mL IntraVENous PRN Jovita Puentes MD        [DISCONTINUED] sodium chloride (NS) flush 5-10 mL  5-10 mL IntraVENous PRN Jovita Puentes MD        [DISCONTINUED] cefTRIAXone (ROCEPHIN) 2 g in 0.9% sodium chloride (MBP/ADV) 50 mL  2 g IntraVENous Q24H Jovita Puentes MD   Stopped at 07/28/19 0117    [DISCONTINUED] azithromycin (ZITHROMAX) 500 mg in 0.9% sodium chloride (MBP/ADV) 250 mL  500 mg IntraVENous Q24H Jovita Puentes MD   Stopped at 07/28/19 0117     Current Outpatient Medications on File Prior to Encounter   Medication Sig Dispense Refill    benzonatate (TESSALON PERLES) 100 mg capsule Take 1 Cap by mouth three (3) times daily as needed for Cough for up to 10 days. 30 Cap 0    sacubitril-valsartan (ENTRESTO) 24 mg/26 mg tablet Take 1 Tab by mouth two (2) times a day.  amiodarone (CORDARONE) 200 mg tablet Take 200 mg by mouth.  levothyroxine (SYNTHROID) 100 mcg tablet Take 100 mcg by mouth Daily (before breakfast).  lisinopril (PRINIVIL, ZESTRIL) 10 mg tablet Take  by mouth daily.  carvedilol (COREG) 25 mg tablet Take 25 mg by mouth two (2) times daily (with meals).  furosemide (LASIX) 20 mg tablet Take  by mouth daily.  spironolactone (ALDACTONE) 25 mg tablet Take  by mouth every other day.  allopurinol (ZYLOPRIM) 100 mg tablet Take  by mouth daily. Indications: 2 pills daily      loratadine 10 mg Cap Take  by mouth daily.  MULTIVITAMIN PO Take  by mouth.  pravastatin (PRAVACHOL) 40 mg tablet Take 80 mg by mouth daily.          Past History     Past Medical History:  Past Medical History:   Diagnosis Date    AICD (automatic cardioverter/defibrillator) present     Arthritis     Cancer ([address]     skin    Heart failure ([address]     Other ill-defined conditions(799.89)     high cholesterol       Past Surgical History:  Past Surgical History:   Procedure Laterality Date    ABDOMEN SURGERY PROC UNLISTED  6/2/11    colon resection    HX HEENT      repaired right eardrum    HX OTHER SURGICAL      benign cyst removed from back and thyroid    HX OTHER SURGICAL      skin graft    HX PACEMAKER      aicd    MI COLONOSCOPY FLX DX W/COLLJ SPEC WHEN PFRMD  6/6/2012         MI COLONOSCOPY W/BIOPSY SINGLE/MULTIPLE  4/27/2011            Family History:  Family History   Problem Relation Age of Onset    Cancer Mother         colon    Heart Disease Father     Heart Disease Brother        Social History:  Social History     Tobacco Use    Smoking status: Former Smoker   Substance Use Topics    Alcohol use: Yes     Alcohol/week: 4.2 standard drinks     Types: 5 Glasses of wine per week    Drug use: No       Allergies:  No Known Allergies      Review of Systems   Review of Systems   Constitutional: Positive for fever. Negative for chills. HENT: Negative for congestion, ear pain, rhinorrhea, sore throat and trouble swallowing. Eyes: Negative for visual disturbance. Respiratory: Positive for cough and shortness of breath. Negative for chest tightness. Cardiovascular: Negative for chest pain and palpitations. Gastrointestinal: Positive for abdominal pain and constipation. Negative for blood in stool, diarrhea, nausea and vomiting. Genitourinary: Negative for decreased urine volume, difficulty urinating, dysuria and frequency. Musculoskeletal: Negative for back pain and neck pain. Skin: Negative for color change and rash. Neurological: Negative for dizziness, weakness, light-headedness and headaches. Physical Exam   Physical Exam   Constitutional: He is oriented to person, place, and time. He appears well-developed and well-nourished. He does not appear ill. No distress. HENT:   Mouth/Throat: Oropharynx is clear and moist.   Eyes: Conjunctivae are normal.   Neck: Neck supple.    Cardiovascular: Normal rate and regular rhythm. Pulmonary/Chest: Effort normal and breath sounds normal. No accessory muscle usage. No respiratory distress. Abdominal: Soft. He exhibits no distension. There is tenderness in the left upper quadrant and left lower quadrant. There is no rebound, no guarding and no CVA tenderness. Lymphadenopathy:     He has no cervical adenopathy. Neurological: He is alert and oriented to person, place, and time. He has normal strength. No cranial nerve deficit or sensory deficit. Skin: Skin is warm and dry. Nursing note and vitals reviewed.       Diagnostic Study Results     Labs -     Recent Results (from the past 24 hour(s))   EKG, 12 LEAD, INITIAL    Collection Time: 07/27/19  9:02 PM   Result Value Ref Range    Ventricular Rate 89 BPM    Atrial Rate 89 BPM    P-R Interval 80 ms    QRS Duration 98 ms    Q-T Interval 372 ms    QTC Calculation (Bezet) 452 ms    Calculated P Axis 38 degrees    Calculated R Axis -100 degrees    Calculated T Axis 122 degrees    Diagnosis       Sinus rhythm  Right bundle branch block  Left anterior fascicular block  When compared with ECG of 08-JUN-2019 08:59,  No significant change was found    Confirmed by Jorge Luis Granados ([id number]) on 7/28/2019 9:33:31 AM     CULTURE, BLOOD    Collection Time: 07/27/19  9:10 PM   Result Value Ref Range    Special Requests: NO SPECIAL REQUESTS      Culture result: NO GROWTH AFTER 11 HOURS     LACTIC ACID    Collection Time: 07/27/19  9:10 PM   Result Value Ref Range    Lactic acid 2.0 0.4 - 2.0 MMOL/L   URINALYSIS W/ REFLEX CULTURE    Collection Time: 07/27/19  9:10 PM   Result Value Ref Range    Color YELLOW/STRAW      Appearance CLEAR CLEAR      Specific gravity 1.028 1.003 - 1.030      pH (UA) 5.0 5.0 - 8.0      Protein 30 (A) NEG mg/dL    Glucose NEGATIVE  NEG mg/dL    Ketone NEGATIVE  NEG mg/dL    Bilirubin NEGATIVE  NEG      Blood NEGATIVE  NEG      Urobilinogen 1.0 0.2 - 1.0 EU/dL    Nitrites NEGATIVE  NEG      Leukocyte Esterase NEGATIVE NEG      WBC 0-4 0 - 4 /hpf    RBC 0-5 0 - 5 /hpf    Epithelial cells FEW FEW /lpf    Bacteria NEGATIVE  NEG /hpf    UA:UC IF INDICATED CULTURE NOT INDICATED BY UA RESULT CNI      Mucus 1+ (A) NEG /lpf   URINE CULTURE HOLD SAMPLE    Collection Time: 07/27/19  9:10 PM   Result Value Ref Range    Urine culture hold        URINE ON HOLD IN MICROBIOLOGY DEPT FOR 3 DAYS. IF UNPRESERVED URINE IS SUBMITTED, IT CANNOT BE USED FOR ADDITIONAL TESTING AFTER 24 HRS, RECOLLECTION WILL BE REQUIRED. METABOLIC PANEL, COMPREHENSIVE    Collection Time: 07/27/19  9:10 PM   Result Value Ref Range    Sodium 142 136 - 145 mmol/L    Potassium 4.1 3.5 - 5.1 mmol/L    Chloride 108 97 - 108 mmol/L    CO2 25 21 - 32 mmol/L    Anion gap 9 5 - 15 mmol/L    Glucose 173 (H) 65 - 100 mg/dL    BUN 20 6 - 20 MG/DL    Creatinine 1.32 (H) 0.70 - 1.30 MG/DL    BUN/Creatinine ratio 15 12 - 20      GFR est AA >60 >60 ml/min/1.73m2    GFR est non-AA 52 (L) >60 ml/min/1.73m2    Calcium 8.0 (L) 8.5 - 10.1 MG/DL    Bilirubin, total 0.6 0.2 - 1.0 MG/DL    ALT (SGPT) 25 12 - 78 U/L    AST (SGOT) 23 15 - 37 U/L    Alk. phosphatase 64 45 - 117 U/L    Protein, total 6.6 6.4 - 8.2 g/dL    Albumin 3.4 (L) 3.5 - 5.0 g/dL    Globulin 3.2 2.0 - 4.0 g/dL    A-G Ratio 1.1 1.1 - 2.2     CBC WITH AUTOMATED DIFF    Collection Time: 07/27/19  9:10 PM   Result Value Ref Range    WBC 6.9 4.1 - 11.1 K/uL    RBC 4.09 (L) 4.10 - 5.70 M/uL    HGB 12.7 12.1 - 17.0 g/dL    HCT 39.4 36.6 - 50.3 %    MCV 96.3 80.0 - 99.0 FL    MCH 31.1 26.0 - 34.0 PG    MCHC 32.2 30.0 - 36.5 g/dL    RDW 14.3 11.5 - 14.5 %    PLATELET 439 (L) 965 - 400 K/uL    MPV 11.1 8.9 - 12.9 FL    NRBC 0.0 0  WBC    ABSOLUTE NRBC 0.00 0.00 - 0.01 K/uL    NEUTROPHILS 81 (H) 32 - 75 %    BAND NEUTROPHILS 5 %    LYMPHOCYTES 8 (L) 12 - 49 %    MONOCYTES 6 5 - 13 %    EOSINOPHILS 0 0 - 7 %    BASOPHILS 0 0 - 1 %    IMMATURE GRANULOCYTES 0 0.0 - 0.5 %    ABS. NEUTROPHILS 5.9 1.8 - 8.0 K/UL    ABS.  LYMPHOCYTES 0.6 (L) 0.8 - 3.5 K/UL    ABS. MONOCYTES 0.4 0.0 - 1.0 K/UL    ABS. EOSINOPHILS 0.0 0.0 - 0.4 K/UL    ABS. BASOPHILS 0.0 0.0 - 0.1 K/UL    ABS. IMM. GRANS. 0.0 0.00 - 0.04 K/UL    DF AUTOMATED      RBC COMMENTS NORMOCYTIC, NORMOCHROMIC      WBC COMMENTS REACTIVE LYMPHS     CK W/ REFLX CKMB    Collection Time: 07/27/19  9:10 PM   Result Value Ref Range    CK 50 39 - 308 U/L   TROPONIN I    Collection Time: 07/27/19  9:10 PM   Result Value Ref Range    Troponin-I, Qt. <0.05 <0.05 ng/mL   CULTURE, BLOOD    Collection Time: 07/27/19  9:27 PM   Result Value Ref Range    Special Requests: NO SPECIAL REQUESTS      Culture result: NO GROWTH AFTER 10 HOURS     EKG, 12 LEAD, INITIAL    Collection Time: 07/28/19  9:24 AM   Result Value Ref Range    Ventricular Rate 88 BPM    Atrial Rate 88 BPM    P-R Interval 80 ms    QRS Duration 98 ms    Q-T Interval 540 ms    QTC Calculation (Bezet) 653 ms    Calculated R Axis -100 degrees    Calculated T Axis 135 degrees    Diagnosis       Sinus rhythm with short [place]  Inferior-posterior infarct , possibly acute  Anterolateral infarct , age undetermined  Prolonged QT  ** ** ACUTE MI / STEMI ** **  Consider right ventricular involvement in acute inferior infarct  When compared with ECG of 27-JUL-2019 21:02,  MANUAL COMPARISON REQUIRED, DATA IS UNCONFIRMED         Radiologic Studies -   CTA CHEST W OR W WO CONT    (Results Pending)   CT ABD PELV W CONT    (Results Pending)     CT Results  (Last 48 hours)               07/27/19 2310  CT CHEST WO CONT Final result    Impression:  IMPRESSION: No acute disease. Narrative:  INDICATION:  fever, PNA        EXAM: Chest CT   CT dose reduction was achieved through use of a standardized protocol tailored   for this examination and automatic exposure control for dose modulation. Contrast: None. COMPARISON: CXR 6/27/2019. FINDINGS: Lungs show no infiltrate or edema.  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 There is aortic calcification without   aneurysm. There is coronary artery calcification. ICD is present. There is no   significant adenopathy. There is no pleural or pericardial effusion. Adrenals   are not enlarged. Visualized thyroid and lower neck soft tissues are   unremarkable for age. CXR Results  (Last 48 hours)    None            Medical Decision Making   I am the first provider for this patient. I reviewed the vital signs, available nursing notes, past medical history, past surgical history, family history and social history. Vital Signs-Reviewed the patient's vital signs. Patient Vitals for the past 24 hrs:   Temp Pulse Resp SpO2   07/28/19 0920 98.4 °F (36.9 °C) 88 20 (!) 88 %       Pulse Oximetry Analysis - 88% on RA    Cardiac Monitor:   Rate: 88 bpm  Rhythm: Normal Sinus Rhythm      EKG interpretation: (Preliminary)  Obtained at 9:24 AM: Normal sinus rhythm, rate 88, regular rate, normal FL interval, right bundle branch block, left anterior fascicular block, nonspecific ST changes, prolonged QT. Records Reviewed: Nursing Notes, Old Medical Records, Previous Radiology Studies and Previous Laboratory Studies    Provider Notes (Medical Decision Making):   Patient presents with abdominal pain today. He has been seen within the past 24 to 48 hours. This all started with [facility] however his most recent ER visit showed a clear CT scan of the lungs. His abdominal pain is new. He is also hypoxic with sats in the mid 80s. CTA of the chest today does not show PE. He does have groundglass opacities in the base of the lungs consistent with possible pneumonia or pulmonary edema. He does have a history of CHF. He may have received too much fluids in the past visit to our department. This may have set him over the edge and to heart failure and pulmonary edema which is causing his hypoxia.   CT of the abdomen does show fluid around his liver which the radiologist is considering acute hepatitis however his liver enzymes are normal.  The patient does continue to have a fever at home. This clouds the whole scenario. This fever could be related to pneumonia in his lungs or could be related to what were seen on the abdominal CT. At this time I have too many unanswered questions and I would recommend admission to the hospitalist service for further work-up. ED Course:   Initial assessment performed. The patients presenting problems have been discussed, and they are in agreement with the care plan formulated and outlined with them. I have encouraged them to ask questions as they arise throughout their visit. Orders Placed This Encounter    CTA CHEST W OR W WO CONT    CT ABDOMEN PELVIS WITH CONTRAST    CBC WITH AUTOMATED DIFF    COMPREHENSIVE METABOLIC PANEL    LIPASE    TROPONIN I    EKG 12 LEAD INITIAL    SALINE LOCK IV ONE TIME STAT    sodium chloride (NS) flush 10 mL    iopamidol (ISOVUE-370) 76 % injection 100 mL         Critical Care Time:   0    Disposition:  Admit        Diagnosis     Clinical Impression:   1. Hypoxia    2. Fever in adult            This note will not be viewable in MyChart.

## 2019-07-28 NOTE — PROGRESS NOTES
Pharmacy Automatic Renal Dosing Protocol - Antimicrobials  Indication for Antimicrobials: possible PNA    Current Regimen of Each Antimicrobial:  Cefepime 2mg IV x1, 1 gm IV q 8h    (Start Date 19 ; Day # 1 of 7)  Vancomycin 1750 mg IV x1, then pharmacy to dose    (Start Date 19 ; Day # 1)    Previous Antimicrobial Therapy:  Zithromax 500mg IV x 1 in ED     Goal Level: VANCOMYCIN TROUGH GOAL RANGE  Vancomycin Trough: 15 - 20 mcg/mL    Date Dose & Interval Measured (mcg/mL) Extrapolated (mcg/mL)                       Date & time of next level:  trough level check before the third dose or ~ =  before the 2200 dose on 19  Significant Cultures:   19  Paired Blood = pending    Radiology / Imaging results: (X-ray, CT scan or MRI):   Paralysis, amputations, malnutrition:     Labs:  Recent Labs     19  0946 19  2110   CREA 1.32* 1.32*   BUN 22* 20   WBC 7.9 6.9     Temp (24hrs), Av.4 °F (37.4 °C), Min:97.4 °F (36.3 °C), Max:102 °F (38.9 °C)    Creatinine Clearance (mL/min) or Dialysis: 45    Impression/Plan:   · Will Cefepime to 2 gm IV q 12h for renal function and indication  · Vancomycin loading dose of 1750 mg IV given, will continue with 1250 mg IV q 18h for trough of 15-20 mcg/ml - trough level check before the third dose as above  · Daily BMP per Vancomycin dosing protocol ordered  · Antimicrobial stop date: Cefepime = 7 days, Vancomycin = to be determined     Pharmacy will follow daily and adjust medications as appropriate for renal function and/or serum levels.     Thank you,  Christiano Ken, [facility]

## 2019-07-28 NOTE — ED NOTES
TRANSFER - OUT REPORT:    Verbal report given to MANJIT(name) on Deepa Arana  being transferred to [facility](unit) for routine progression of care       Report consisted of patients Situation, Background, Assessment and   Recommendations(SBAR). Information from the following report(s) SBAR, Kardex, ED Summary and Recent Results was reviewed with the receiving nurse. Lines:   Peripheral IV 07/28/19 Left Antecubital (Active)   Site Assessment Clean, dry, & intact 7/28/2019  9:46 AM   Phlebitis Assessment 0 7/28/2019  9:46 AM   Infiltration Assessment 0 7/28/2019  9:46 AM   Dressing Status Clean, dry, & intact 7/28/2019  9:46 AM        Opportunity for questions and clarification was provided.       Patient transported with:   O2 @ 2 liters  Tech

## 2019-07-28 NOTE — DISCHARGE INSTRUCTIONS
Patient Education        Learning About Fever  What is a fever? A fever is a high body temperature. It's one way your body fights being sick. A fever shows that the body is responding to infection or other illnesses, both minor and severe. A fever is a symptom, not an illness by itself. A fever can be a sign that you are ill, but most fevers are not caused by a serious problem. You may have a fever with a minor illness, such as a cold. But sometimes a very serious infection may cause little or no fever. It is important to look at other symptoms, other conditions you have, and how you feel in general. In children, notice how they act and see what symptoms they complain of. What is a normal body temperature? A normal body temperature is about 98. 6ºF. Some people have a normal temperature that is a little higher or a little lower than this. Your temperature may be a little lower in the morning than it is later in the day. It may go up during hot weather or when you exercise, wear heavy clothes, or take a hot bath. Your temperature may also be different depending on how you take it. A temperature taken in the mouth (oral) or under the arm may be a little lower than your core temperature (rectal). What is a fever temperature? A core temperature of 100.4°F or above is considered a fever. What can cause a fever? A fever may be caused by:  · Infections. This is the most common cause of a fever. Examples of infections that can cause a fever include the flu, a kidney infection, or pneumonia. · Some medicines. · Severe trauma or injury, such as a heart attack, stroke, heatstroke, or burns. · Other medical conditions, such as arthritis and some cancers. How can you treat a fever at home? · Ask your doctor if you can take an over-the-counter pain medicine, such as acetaminophen (Tylenol), ibuprofen (Advil, Motrin), or naproxen (Aleve). Be safe with medicines. Read and follow all instructions on the label.   · To prevent dehydration, drink plenty of fluids. Choose water and other caffeine-free clear liquids until you feel better. If you have kidney, heart, or liver disease and have to limit fluids, talk with your doctor before you increase the amount of fluids you drink. Follow-up care is a key part of your treatment and safety. Be sure to make and go to all appointments, and call your doctor if you are having problems. It's also a good idea to know your test results and keep a list of the medicines you take. Where can you learn more? Go to http://jordan-rolly.info/. Enter S214 in the search box to learn more about \"Learning About Fever. \"  Current as of: September 23, 2018  Content Version: 12.1  © 8672-5024 [employer]. Care instructions adapted under license by [employer] (which disclaims liability or warranty for this information). If you have questions about a medical condition or this instruction, always ask your healthcare professional. [address] any warranty or liability for your use of this information.

## 2019-07-28 NOTE — ED NOTES
Pt arrives accompanied by wife after being discharged last night Pt reports continued chills and abd pain since being evaluated last night Pt states he began with fever and SOB went to [facility] and was Dx with pneumonia. Pt seen in ED last night and had negative CT scan for pneumonia. Pt reports continued cough and temp today PTA was 100.9 Pt arrived with pulse ox 88% denies home oxygen use. Denies any nausea/vomiting +constipation Last BM 4 days ago.      Pt alert oriented x 4 [place] Wife bedside     Pt placed on monitor x 3 Updated on plan with pending evaluation by MD

## 2019-07-28 NOTE — ACP (ADVANCE CARE PLANNING)
Advance Care Planning Note      NAME: Jayesh Daniels   :  1940   MRN:  [id number]     Date/Time:  2019 7:38 PM    Active Diagnoses:  Hospital Problems  Date Reviewed: 2015          Codes Class Noted POA    CHF (congestive heart failure) ([facility] [address] ICD-10-CM: I50.9  ICD-9-CM: 428.0  2019 Unknown          CHF s/p AICD  Atrial Fib  HTN  Dyslipidemia    These active diagnoses are of sufficient risk that focused discussion on advance care planning is indicated in order to allow the patient to thoughtfully consider personal goals of care, and if situations arise that prevent the ability to personally give input, to ensure appropriate representation of their personal desires for different levels and aggressiveness of care. Discussion:   Code status addressed and wants to be a DNR . Patient wants central line and vasopressors if needed. Patient  would like to assign wife Sisi  as the surrogate decision maker. Persons present and participating in discussion: Cathi Benson MD, Wife Lorin Farias. Time Spent:   Total time spent face-to-face in education and discussion:  16   minutes.          Kalyn Martinez MD   Hospitalist Quality 110: Preventive Care And Screening: Influenza Immunization: Influenza immunization was not ordered or administered, reason not given Detail Level: Detailed

## 2019-07-28 NOTE — H&P
Hospitalist Admission Note    NAME: Harlan Ramos   :  1940   MRN:  [id number]     Date/Time:  2019 7:30 PM    Patient PCP: Che Power MD  ________________________________________________________________________    My assessment of this patient's clinical condition and my plan of care is as follows.     Assessment / Plan:  Acute hypoxic respiratory failure due to  Acute congestive heart failure exacerbation unclear type  Possible healthcare associated pneumonia  -CT chest shows groundglass no opacities.  BN pep is elevated at 9600  -No recent echocardiogram  -Start Lasix 40 mg IV twice daily.  Resume home Entresto and Aldactone.  Resume home Coreg  -Strict I's and O's, daily weights and low-salt diet  -Start empiric vancomycin and cefepime.  Blood cultures have been sent  -Continue oxygen by nasal cannula  -We will add duo neb due to no wheezing    Suspected acute hepatitis due to congestion   -CT shows evidence of nonspecific hepatic and gallbladder disease  -ALT and AST are elevated in the 70s  -Could be related to hepatic condition  -Continue IV Lasix.  Check ultrasound of the liver and biliary tree  -Repeat CMP in a.m.  -Check hepatic panel    History of atrial fibrillation  -Resume home amiodarone and Coreg    Hypertension  Dyslipidemia  Hypothyroidism  -Resume home Coreg, statin and levothyroxine                      Code Status: DNR  Surrogate Decision Maker: Wife angela    DVT Prophylaxis: heparin   GI Prophylaxis: not indicated    Baseline: From home independent        Subjective:   CHIEF COMPLAINT: Sob    HISTORY OF PRESENT ILLNESS:     This is a 78-year-old male with past medical history of congestive heart failure status post AICD, atrial fibrillation, dyslipidemia is coming to the hospital with chief complaints of shortness of breath and also cough.  Patient reports being in his usual state of health until about a week ago when he started not feeling well. Antoinette Caldera reports shortness of breath is worse with even minimal exertion along with cough but not much phlegm.  He also reports having fever and chills at home. [facility] also reports mild swelling of the legs as well.  He also reports feeling weak and in general. [facility] initially presented to the emergency department yesterday and was given prescription for cough and also antibiotics and sent home but in spite of that he continued to get worse with more shortness of breath and he presented to the emergency department again today. On arrival to the hospital today he was noted to have saturations of 88% on room air.  On lab work he was noted to have a creatinine of 1.32.  First troponin was 0.05.  BN pep is elevated at [address] [facility] had a CT angiogram of the chest which showed evidence of nonspecific hepatic and gallbladder findings along with patchy groundglass opacities.  He also had a CT abdomen which was noted as above.  He was given Lasix in the emergency department. We were asked to admit for work up and evaluation of the above problems. Past Medical History:   Diagnosis Date    AICD (automatic cardioverter/defibrillator) present     Arthritis     Cancer ([address]     skin    Heart failure ([address]     Other ill-defined conditions(799.89)     high cholesterol        Past Surgical History:   Procedure Laterality Date    ABDOMEN SURGERY PROC UNLISTED  6/2/11    colon resection    HX HEENT      repaired right eardrum    HX OTHER SURGICAL      benign cyst removed from back and thyroid    HX OTHER SURGICAL      skin graft    HX PACEMAKER      aicd    MO COLONOSCOPY FLX DX W/COLLJ SPEC WHEN PFRMD  6/6/2012         MO COLONOSCOPY W/BIOPSY SINGLE/MULTIPLE  4/27/2011            Social History     Tobacco Use    Smoking status: Former Smoker   Substance Use Topics    Alcohol use:  Yes     Alcohol/week: 4.2 standard drinks     Types: 5 Glasses of wine per week        Family History   Problem Relation Age of Onset    Cancer Mother         colon    Heart Disease Father     Heart Disease Brother      No Known Allergies     Prior to Admission medications    Medication Sig Start Date End Date Taking? Authorizing Provider   sacubitril-valsartan (ENTRESTO) 24 mg/26 mg tablet Take 1 Tab by mouth two (2) times a day. Yes Other, MD Stefani   amiodarone (CORDARONE) 200 mg tablet Take 200 mg by mouth daily. Yes Other, MD Stefani   levothyroxine (SYNTHROID) 100 mcg tablet Take 100 mcg by mouth every evening. Yes Other, MD Stefani   carvedilol (COREG) 6.25 mg tablet Take 6.25 mg by mouth two (2) times daily (with meals). Yes Provider, Historical   furosemide (LASIX) 20 mg tablet Take 10 mg by mouth two (2) times a day. Yes Provider, Historical   spironolactone (ALDACTONE) 25 mg tablet Take 12.5 mg by mouth nightly. 4/26/11  Yes Provider, Historical   allopurinol (ZYLOPRIM) 100 mg tablet Take 200 mg by mouth daily. Indications: 2 pills daily   Yes Provider, Historical   loratadine (CLARITIN) 10 mg tablet Take 10 mg by mouth daily. 4/26/11  Yes Provider, Historical   MULTIVITAMIN PO Take 1 Tab by mouth daily. Indications: Centrum Silver   Yes Provider, Historical   pravastatin (PRAVACHOL) 80 mg tablet Take 80 mg by mouth nightly. Yes Provider, Historical   benzonatate (TESSALON PERLES) 100 mg capsule Take 1 Cap by mouth three (3) times daily as needed for Cough for up to 10 days. 7/28/19 8/7/19  Dawna Puentes MD       REVIEW OF SYSTEMS:     I am not able to complete the review of systems because:    The patient is intubated and sedated    The patient has altered mental status due to his acute medical problems    The patient has baseline aphasia from prior stroke(s)    The patient has baseline dementia and is not reliable historian    The patient is in acute medical distress and unable to provide information           Total of 12 systems reviewed as follows:       POSITIVE= underlined text  Negative = text not underlined  General:  fever, chills, sweats, generalized weakness, weight loss/gain,      loss of appetite   Eyes:    blurred vision, eye pain, loss of vision, double vision  ENT:    rhinorrhea, pharyngitis   Respiratory:   cough, sputum production, SOB, MURILLO, wheezing, pleuritic pain   Cardiology:   chest pain, palpitations, orthopnea, PND, edema, syncope   Gastrointestinal:  abdominal pain , N/V, diarrhea, dysphagia, constipation, bleeding   Genitourinary:  frequency, urgency, dysuria, hematuria, incontinence   Muskuloskeletal :  arthralgia, myalgia, back pain  Hematology:  easy bruising, nose or gum bleeding, lymphadenopathy   Dermatological: rash, ulceration, pruritis, color change / jaundice  Endocrine:   hot flashes or polydipsia   Neurological:  headache, dizziness, confusion, focal weakness, paresthesia,     Speech difficulties, memory loss, gait difficulty  Psychological: Feelings of anxiety, depression, agitation    Objective:   VITALS:    Visit Vitals  /59   Pulse 88   Temp 98.5 °F (36.9 °C)   Resp 22   Ht 5' 9\" (1.753 m)   Wt 86.8 kg (191 lb 5.8 oz)   SpO2 92%   BMI 28.26 kg/m²       PHYSICAL EXAM:    General:    Alert, cooperative, no distress, appears stated age. HEENT: Atraumatic, anicteric sclerae, pink conjunctivae     No oral ulcers, mucosa moist  Neck:  Supple, symmetrical,  thyroid: non tender  Lungs:   Crackles +, mild wheezing +, b/l air entry   Chest wall:  No tenderness  No Accessory muscle use. Heart:   Regular  rhythm,  No  murmur   No edema  Abdomen:   Soft, non-tender. Not distended. Bowel sounds normal  Extremities: No cyanosis. No clubbing,      Skin turgor normal, Capillary refill normal, Radial dial pulse 2+  Skin:     Not pale. Not Jaundiced  No rashes   Psych:  Not anxious or agitated. Neurologic: EOMs intact. No facial asymmetry. No aphasia or slurred speech. Symmetrical strength, Sensation grossly intact. Alert and oriented X 4. _______________________________________________________________________  Care Plan discussed with:    Comments   Patient y    Family      RN y    Care Manager                    Consultant:      _______________________________________________________________________  Expected  Disposition:   Home with Family y   HH/PT/OT/RN    SNF/LTC    LEN    ________________________________________________________________________  TOTAL TIME:  61  Minutes    Critical Care Provided     Minutes non procedure based      Comments    y Reviewed previous records   >50% of visit spent in counseling and coordination of care y Discussion with patient and/or family and questions answered       ________________________________________________________________________  Signed: Amandeep Virgen MD    Procedures: see electronic medical records for all procedures/Xrays and details which were not copied into this note but were reviewed prior to creation of Plan.     LAB DATA REVIEWED:    Recent Results (from the past 24 hour(s))   EKG, 12 LEAD, INITIAL    Collection Time: 07/27/19  9:02 PM   Result Value Ref Range    Ventricular Rate 89 BPM    Atrial Rate 89 BPM    P-R Interval 80 ms    QRS Duration 98 ms    Q-T Interval 372 ms    QTC Calculation (Bezet) 452 ms    Calculated P Axis 38 degrees    Calculated R Axis -100 degrees    Calculated T Axis 122 degrees    Diagnosis       Sinus rhythm  Right bundle branch block  Left anterior fascicular block  When compared with ECG of 08-JUN-2019 08:59,  No significant change was found    Confirmed by Jorge Luis Farooq ([id number]) on 7/28/2019 9:33:31 AM     CULTURE, BLOOD    Collection Time: 07/27/19  9:10 PM   Result Value Ref Range    Special Requests: NO SPECIAL REQUESTS      Culture result: NO GROWTH AFTER 11 HOURS     LACTIC ACID    Collection Time: 07/27/19  9:10 PM   Result Value Ref Range    Lactic acid 2.0 0.4 - 2.0 MMOL/L   URINALYSIS W/ REFLEX CULTURE    Collection Time: 07/27/19  9:10 PM   Result Value Ref Range    Color YELLOW/STRAW      Appearance CLEAR CLEAR      Specific gravity 1.028 1.003 - 1.030      pH (UA) 5.0 5.0 - 8.0      Protein 30 (A) NEG mg/dL    Glucose NEGATIVE  NEG mg/dL    Ketone NEGATIVE  NEG mg/dL    Bilirubin NEGATIVE  NEG      Blood NEGATIVE  NEG      Urobilinogen 1.0 0.2 - 1.0 EU/dL    Nitrites NEGATIVE  NEG      Leukocyte Esterase NEGATIVE  NEG      WBC 0-4 0 - 4 /hpf    RBC 0-5 0 - 5 /hpf    Epithelial cells FEW FEW /lpf    Bacteria NEGATIVE  NEG /hpf    UA:UC IF INDICATED CULTURE NOT INDICATED BY UA RESULT CNI      Mucus 1+ (A) NEG /lpf   URINE CULTURE HOLD SAMPLE    Collection Time: 07/27/19  9:10 PM   Result Value Ref Range    Urine culture hold        URINE ON HOLD IN MICROBIOLOGY DEPT FOR 3 DAYS. IF UNPRESERVED URINE IS SUBMITTED, IT CANNOT BE USED FOR ADDITIONAL TESTING AFTER 24 HRS, RECOLLECTION WILL BE REQUIRED. METABOLIC PANEL, COMPREHENSIVE    Collection Time: 07/27/19  9:10 PM   Result Value Ref Range    Sodium 142 136 - 145 mmol/L    Potassium 4.1 3.5 - 5.1 mmol/L    Chloride 108 97 - 108 mmol/L    CO2 25 21 - 32 mmol/L    Anion gap 9 5 - 15 mmol/L    Glucose 173 (H) 65 - 100 mg/dL    BUN 20 6 - 20 MG/DL    Creatinine 1.32 (H) 0.70 - 1.30 MG/DL    BUN/Creatinine ratio 15 12 - 20      GFR est AA >60 >60 ml/min/1.73m2    GFR est non-AA 52 (L) >60 ml/min/1.73m2    Calcium 8.0 (L) 8.5 - 10.1 MG/DL    Bilirubin, total 0.6 0.2 - 1.0 MG/DL    ALT (SGPT) 25 12 - 78 U/L    AST (SGOT) 23 15 - 37 U/L    Alk.  phosphatase 64 45 - 117 U/L    Protein, total 6.6 6.4 - 8.2 g/dL    Albumin 3.4 (L) 3.5 - 5.0 g/dL    Globulin 3.2 2.0 - 4.0 g/dL    A-G Ratio 1.1 1.1 - 2.2     CBC WITH AUTOMATED DIFF    Collection Time: 07/27/19  9:10 PM   Result Value Ref Range    WBC 6.9 4.1 - 11.1 K/uL    RBC 4.09 (L) 4.10 - 5.70 M/uL    HGB 12.7 12.1 - 17.0 g/dL    HCT 39.4 36.6 - 50.3 %    MCV 96.3 80.0 - 99.0 FL    MCH 31.1 26.0 - 34.0 PG    MCHC 32.2 30.0 - 36.5 g/dL    RDW 14.3 11.5 - 14.5 % PLATELET 923 (L) 750 - 400 K/uL    MPV 11.1 8.9 - 12.9 FL    NRBC 0.0 0  WBC    ABSOLUTE NRBC 0.00 0.00 - 0.01 K/uL    NEUTROPHILS 81 (H) 32 - 75 %    BAND NEUTROPHILS 5 %    LYMPHOCYTES 8 (L) 12 - 49 %    MONOCYTES 6 5 - 13 %    EOSINOPHILS 0 0 - 7 %    BASOPHILS 0 0 - 1 %    IMMATURE GRANULOCYTES 0 0.0 - 0.5 %    ABS. NEUTROPHILS 5.9 1.8 - 8.0 K/UL    ABS. LYMPHOCYTES 0.6 (L) 0.8 - 3.5 K/UL    ABS. MONOCYTES 0.4 0.0 - 1.0 K/UL    ABS. EOSINOPHILS 0.0 0.0 - 0.4 K/UL    ABS. BASOPHILS 0.0 0.0 - 0.1 K/UL    ABS. IMM.  GRANS. 0.0 0.00 - 0.04 K/UL    DF AUTOMATED      RBC COMMENTS NORMOCYTIC, NORMOCHROMIC      WBC COMMENTS REACTIVE LYMPHS     CK W/ REFLX CKMB    Collection Time: 07/27/19  9:10 PM   Result Value Ref Range    CK 50 39 - 308 U/L   TROPONIN I    Collection Time: 07/27/19  9:10 PM   Result Value Ref Range    Troponin-I, Qt. <0.05 <0.05 ng/mL   CULTURE, BLOOD    Collection Time: 07/27/19  9:27 PM   Result Value Ref Range    Special Requests: NO SPECIAL REQUESTS      Culture result: NO GROWTH AFTER 10 HOURS     EKG, 12 LEAD, INITIAL    Collection Time: 07/28/19  9:24 AM   Result Value Ref Range    Ventricular Rate 88 BPM    Atrial Rate 88 BPM    P-R Interval 80 ms    QRS Duration 98 ms    Q-T Interval 540 ms    QTC Calculation (Bezet) 653 ms    Calculated R Axis -100 degrees    Calculated T Axis 135 degrees    Diagnosis       Sinus rhythm with short IN  Inferior-posterior infarct , possibly acute  Anterolateral infarct , age undetermined  Prolonged QT  ** ** ACUTE MI / STEMI ** **  Consider right ventricular involvement in acute inferior infarct  When compared with ECG of 27-JUL-2019 21:02,  MANUAL COMPARISON REQUIRED, DATA IS UNCONFIRMED     CBC WITH AUTOMATED DIFF    Collection Time: 07/28/19  9:46 AM   Result Value Ref Range    WBC 7.9 4.1 - 11.1 K/uL    RBC 3.90 (L) 4.10 - 5.70 M/uL    HGB 12.0 (L) 12.1 - 17.0 g/dL    HCT 37.7 36.6 - 50.3 %    MCV 96.7 80.0 - 99.0 FL    MCH 30.8 26.0 - 34.0 PG MCHC 31.8 30.0 - 36.5 g/dL    RDW 14.5 11.5 - 14.5 %    PLATELET 473 (L) 588 - 400 K/uL    MPV 11.8 8.9 - 12.9 FL    NRBC 0.0 0  WBC    ABSOLUTE NRBC 0.00 0.00 - 0.01 K/uL    NEUTROPHILS 76 (H) 32 - 75 %    LYMPHOCYTES 10 (L) 12 - 49 %    MONOCYTES 13 5 - 13 %    EOSINOPHILS 0 0 - 7 %    BASOPHILS 0 0 - 1 %    IMMATURE GRANULOCYTES 1 (H) 0.0 - 0.5 %    ABS. NEUTROPHILS 6.0 1.8 - 8.0 K/UL    ABS. LYMPHOCYTES 0.8 0.8 - 3.5 K/UL    ABS. MONOCYTES 1.0 0.0 - 1.0 K/UL    ABS. EOSINOPHILS 0.0 0.0 - 0.4 K/UL    ABS. BASOPHILS 0.0 0.0 - 0.1 K/UL    ABS. IMM. GRANS. 0.1 (H) 0.00 - 0.04 K/UL    DF AUTOMATED      RBC COMMENTS NORMOCYTIC, NORMOCHROMIC     METABOLIC PANEL, COMPREHENSIVE    Collection Time: 07/28/19  9:46 AM   Result Value Ref Range    Sodium 139 136 - 145 mmol/L    Potassium 4.1 3.5 - 5.1 mmol/L    Chloride 106 97 - 108 mmol/L    CO2 24 21 - 32 mmol/L    Anion gap 9 5 - 15 mmol/L    Glucose 140 (H) 65 - 100 mg/dL    BUN 22 (H) 6 - 20 MG/DL    Creatinine 1.32 (H) 0.70 - 1.30 MG/DL    BUN/Creatinine ratio 17 12 - 20      GFR est AA >60 >60 ml/min/1.73m2    GFR est non-AA 52 (L) >60 ml/min/1.73m2    Calcium 8.3 (L) 8.5 - 10.1 MG/DL    Bilirubin, total 0.8 0.2 - 1.0 MG/DL    ALT (SGPT) 75 12 - 78 U/L    AST (SGOT) 70 (H) 15 - 37 U/L    Alk.  phosphatase 69 45 - 117 U/L    Protein, total 6.8 6.4 - 8.2 g/dL    Albumin 3.3 (L) 3.5 - 5.0 g/dL    Globulin 3.5 2.0 - 4.0 g/dL    A-G Ratio 0.9 (L) 1.1 - 2.2     LIPASE    Collection Time: 07/28/19  9:46 AM   Result Value Ref Range    Lipase 54 (L) 73 - 393 U/L   TROPONIN I    Collection Time: 07/28/19  9:46 AM   Result Value Ref Range    Troponin-I, Qt. <0.05 <0.05 ng/mL   NT-PRO BNP    Collection Time: 07/28/19  9:46 AM   Result Value Ref Range    NT pro-BNP 9,636 (H) <450 PG/ML   LACTIC ACID    Collection Time: 07/28/19 12:50 PM   Result Value Ref Range    Lactic acid 1.9 0.4 - 2.0 MMOL/L   PROCALCITONIN    Collection Time: 07/28/19 12:50 PM   Result Value Ref Range Procalcitonin 0.7 ng/mL

## 2019-07-28 NOTE — ED NOTES
Patient with increased wheezing, especially in the left lung. O2 sats 92% on 2L NC.   Dr Irma Irene paged

## 2019-07-28 NOTE — ED NOTES
Assumed care of patient at this time. Pt reports [facility]. He states he was rx'ed antibiotics but is having worsening shortness of breath and fever.

## 2019-07-28 NOTE — ED TRIAGE NOTES
Pt states he has had a cough for a couple of months and was [facility] and diagnosed with pneumonia but getting worse. Pt has fever of 102.0.

## 2019-07-29 NOTE — CDMP QUERY
Patient admitted with Acute/chronic Systolic CHF noted to have Afib. If possible, please document in progress notes and d/c summary- the type of atrial fibrillation:   >> Chronic   >> Paroxysmal   >> Permanent   >> Persistent   >> Other (please specify type)__________________   >> Clinically unable to determine   >> Unknown      The medical record reflects the following:     Risk Factors: Acute/chronic Systolic CHF     Clinical Indicators: history of AFIB,      Treatment: resume home meds Coreg and amiodarone     Please clarify and document your clinical opinion in the progress notes and discharge summary including the definitive and/or presumptive diagnosis, (suspected or probable), related to the above clinical findings. Please include clinical findings supporting your diagnosis.     Thank you  Mauri Nicholson RN/CCDS  [phone]

## 2019-07-29 NOTE — PROGRESS NOTES
Bedside and Verbal shift change report GIVEN TO Desi Winn RN. Report included the following information SBAR, Kardex, ED Summary, Procedure Summary, Intake/Output, MAR, Recent Results and Cardiac Rhythm (NSR/Paced). [facility] NURSING NOTE   Admission Date 7/28/2019   Admission Diagnosis CHF (congestive heart failure) ([address] [I50.9]   Consults IP CONSULT TO CARDIOLOGY      Cardiac Monitoring [x] Yes [] No      Purposeful Hourly Rounding [x] Yes    Froylan Score Total Score: 3   Froylan score 3 or > [x] Bed Alarm [] Avasys [] 1:1 sitter [] Patient refused (Signed refusal form in chart)   Arsen Score Arsen Score: 20   Arsen score 14 or < [] PMT consult [] Wound Care consult    []  Specialty bed  [] Nutrition consult      Influenza Vaccine Received Flu Vaccine for Current Season (usually Sept-March): Not Flu Season           Oxygen needs? [] Room air Oxygen @  []1L    []2L    [x]3L   []4L    []5L   []6L via  NC   Chronic home O2 use? [] Yes [x] No  Perform O2 challenge test and document in progress note using [employer] [employer]Home[employer])      Last bowel movement Last Bowel Movement Date: 07/24/19      Urinary Catheter             LDAs               Peripheral IV 07/28/19 Left Antecubital (Active)   Site Assessment Clean, dry, & intact 7/29/2019  3:53 AM   Phlebitis Assessment 0 7/29/2019  3:53 AM   Infiltration Assessment 0 7/29/2019  3:53 AM   Dressing Status Clean, dry, & intact 7/29/2019  3:53 AM   Dressing Type Transparent 7/29/2019  3:53 AM   Hub Color/Line Status Pink; Infusing 7/29/2019  3:53 AM       Peripheral IV 07/28/19 Right Antecubital (Active)   Site Assessment Clean, dry, & intact 7/29/2019  3:53 AM   Phlebitis Assessment 0 7/29/2019  3:53 AM   Infiltration Assessment 0 7/29/2019  3:53 AM   Dressing Status Clean, dry, & intact 7/29/2019  3:53 AM   Dressing Type Transparent 7/29/2019  3:53 AM   Hub Color/Line Status Pink;Flushed 7/29/2019  3:53 AM                         Readmission Risk Assessment Tool Score Low Risk            11       Total Score        2 . Living with Significant Other. Assisted Living. LTAC. SNF. or   Rehab    5 Pt.  Coverage (Medicare=5 , Medicaid, or Self-Pay=4)    4 Charlson Comorbidity Score (Age + Comorbid Conditions)        Criteria that do not apply:    Has Seen PCP in Last 6 Months (Yes=3, No=0)    Patient Length of Stay (>5 days = 3)    IP Visits Last 12 Months (1-3=4, 4=9, >4=11)       Expected Length of Stay - - -   Actual Length of Stay 1

## 2019-07-29 NOTE — PROGRESS NOTES
Consult called for cardiology eval    Pt with ICD implanted - follows with Dr Katey Lancaster. Asked RN to call [facility]    Thank you for allowing me to participate in this patients care.     Nevaeh Jasso MD, [facility]

## 2019-07-29 NOTE — PROGRESS NOTES
[id number] INTERDISCIPLINARY ROUNDS    Cardiopulmonary Care Interdisciplinary Rounds were held today to discuss patient's plan of care and outcomes. The following members were present:  Pharmacy, Nursing and Case Management.   Expected Length of Stay:  4d 2h    PLAN OF CARE:   Continue current treatment plan

## 2019-07-29 NOTE — CONSULTS
Consult    NAME: Kay Tucker   :  1940   MRN:  [id number]     Date/Time:  2019 10:06 AM    Patient PCP: Ricco Garcia MD  ________________________________________________________________________     Assessment:     1. Acute on chronic systolic heart failure  2. Dilated NICM; EF 25%. Echo  w/ EF 20%, dil LV, nml RV, mild MR/TR  3. Remote ICD implantation  4. Recurrent ventricular tachycardia  5. Hyperlipidemia  6. Former smoker  7. DNR  8. Usual Cardiologist:  Dr. Leela Senior now Dr. Elizabeth Whalen (appt )        Plan: TnI neg  BNP 9600  sCr 1.3    EKG; SR, 1st deg AVB, RBBB, IWMI, poor anterior forces    No documented history of atrial fibrillation as detailed in other notes. Says his \"dry weight\" is ~ 180#; today 190#    Last 3 Recorded Weights in this Encounter    19 0920 19 0422   Weight: 86.8 kg (191 lb 5.8 oz) 86.4 kg (190 lb 7.6 oz)     1. Continue diuresis  2. Continue amiodarone 200mg daily  3. Continue coreg 6.25mg BID  4. Continue Entresto; increase to 49/51  5. Continue aldactone  6. Strict I/Os  7. Daily weights    Thank you for this consult and allowing me to take part in this patients care. Please call with questions. [x]        High complexity decision making was performed        Subjective:   CHIEF COMPLAINT: SOB    HISTORY OF PRESENT ILLNESS:     Matty Meier is a 77 y.o.    male who \"is coming to the hospital with chief complaints of shortness of breath and also cough.  Patient reports being in his usual state of health until about a week ago when he started not feeling well.  He reports shortness of breath is worse with even minimal exertion along with cough but not much phlegm.  He also reports having fever and chills at home. Raymond Cervantes also reports mild swelling of the legs as well.  He also reports feeling weak and in general. Raymond Cervantes initially presented to the emergency department yesterday and was given prescription for cough and also antibiotics and sent home but in spite of that he continued to get worse with more shortness of breath and he presented to the emergency department again today. On arrival to the hospital today he was noted to have saturations of 88% on room air.  On lab work he was noted to have a creatinine of 1.32.  First troponin was 0.05.  BN pep is elevated at [address] had a CT angiogram of the chest which showed evidence of nonspecific hepatic and gallbladder findings along with patchy groundglass opacities.  He also had a CT abdomen which was noted as above.  He was given Lasix in the emergency department. \"      We were asked to consult for work up and evaluation of the above problems. Past Medical History:   Diagnosis Date    AICD (automatic cardioverter/defibrillator) present     Arthritis     Cancer ([address]     skin    Heart failure ([address]     Other ill-defined conditions(799.89)     high cholesterol      Past Surgical History:   Procedure Laterality Date    ABDOMEN SURGERY PROC UNLISTED  6/2/11    colon resection    HX HEENT      repaired right eardrum    HX OTHER SURGICAL      benign cyst removed from back and thyroid    HX OTHER SURGICAL      skin graft    HX PACEMAKER      aicd    CA COLONOSCOPY FLX DX W/COLLJ SPEC WHEN PFRMD  6/6/2012         CA COLONOSCOPY W/BIOPSY SINGLE/MULTIPLE  4/27/2011          No Known Allergies   Meds:  See below  Social History     Tobacco Use    Smoking status: Former Smoker   Substance Use Topics    Alcohol use:  Yes     Alcohol/week: 4.2 standard drinks     Types: 5 Glasses of wine per week      Family History   Problem Relation Age of Onset    Cancer Mother         colon    Heart Disease Father     Heart Disease Brother        REVIEW OF SYSTEMS:     []         Unable to obtain  ROS due to ---   [x]         Total of 12 systems reviewed as follows:    Constitutional: negative fever, negative chills, negative weight loss  Eyes:   negative visual changes  ENT:   negative sore throat, tongue or lip swelling  Respiratory:  negative cough, negative dyspnea  Cards:  negative for chest pain, palpitations, lower extremity edema  GI:   negative for nausea, vomiting, diarrhea, and abdominal pain  Genitourinary: negative for frequency, dysuria  Integument:  negative for rash   Hematologic:  negative for easy bruising and gum/nose bleeding  Musculoskel: negative for myalgias,  back pain  Neurological:  negative for headaches, dizziness, vertigo, weakness  Behavl/Psych: negative for feelings of anxiety, depression     Pertinent Positives include :    Objective:      Physical Exam:    Last 24hrs VS reviewed since prior progress note. Most recent are:    Visit Vitals  BP 99/61   Pulse 93   Temp 98.6 °F (37 °C)   Resp 18   Ht 5' 9\" (1.753 m)   Wt 86.4 kg (190 lb 7.6 oz)   SpO2 97%   BMI 28.13 kg/m²       Intake/Output Summary (Last 24 hours) at 7/29/2019 1006  Last data filed at 7/29/2019 0423  Gross per 24 hour   Intake 100 ml   Output 650 ml   Net -550 ml        General Appearance: Well developed, well nourished, alert & oriented x 3,    no acute distress. Ears/Nose/Mouth/Throat: Pupils equal and round, Hearing grossly normal.  Neck: Supple. JVP within normal limits. Carotids good upstrokes, with no bruit. Chest: Lungs dec bilaterally  Cardiovascular: Regular rate and rhythm, S1S2 normal, no murmur, rubs, gallops. Abdomen: Soft, non-tender, bowel sounds are active. No organomegaly. Extremities: No edema bilaterally. Femoral pulses +2, Distal Pulses +1. Skin: Warm and dry. Neuro: CN II-XII grossly intact, Strength and sensation grossly intact. []         Post-cath site without hematoma, bruit, tenderness, or thrill. Distal pulses intact. Data:      Telemetry:  SR    EKG:  SR, 1st deg AVB, RBBB, IWMI, poor anterior forces  []  No new EKG for review. Prior to Admission medications    Medication Sig Start Date End Date Taking?  Authorizing Provider   sacubitril-valsartan Anthony Bella) 24 mg/26 mg tablet Take 1 Tab by mouth two (2) times a day. Yes Other, MD Stefani   amiodarone (CORDARONE) 200 mg tablet Take 200 mg by mouth daily. Yes Other, MD Stefani   levothyroxine (SYNTHROID) 100 mcg tablet Take 100 mcg by mouth every evening. Yes Other, MD Stefani   carvedilol (COREG) 6.25 mg tablet Take 6.25 mg by mouth two (2) times daily (with meals). Yes Provider, Historical   furosemide (LASIX) 20 mg tablet Take 10 mg by mouth two (2) times a day. Yes Provider, Historical   spironolactone (ALDACTONE) 25 mg tablet Take 12.5 mg by mouth nightly. 4/26/11  Yes Provider, Historical   allopurinol (ZYLOPRIM) 100 mg tablet Take 200 mg by mouth daily. Indications: 2 pills daily   Yes Provider, Historical   loratadine (CLARITIN) 10 mg tablet Take 10 mg by mouth daily. 4/26/11  Yes Provider, Historical   MULTIVITAMIN PO Take 1 Tab by mouth daily. Indications: Centrum Silver   Yes Provider, Historical   pravastatin (PRAVACHOL) 80 mg tablet Take 80 mg by mouth nightly. Yes Provider, Historical   benzonatate (TESSALON PERLES) 100 mg capsule Take 1 Cap by mouth three (3) times daily as needed for Cough for up to 10 days.  7/28/19 8/7/19  Delmi Puentes., MD       Recent Results (from the past 24 hour(s))   CULTURE, BLOOD, PAIRED    Collection Time: 07/28/19 12:20 PM   Result Value Ref Range    Special Requests: NO SPECIAL REQUESTS      Culture result: NO GROWTH AFTER 18 HOURS     LACTIC ACID    Collection Time: 07/28/19 12:50 PM   Result Value Ref Range    Lactic acid 1.9 0.4 - 2.0 MMOL/L   PROCALCITONIN    Collection Time: 07/28/19 12:50 PM   Result Value Ref Range    Procalcitonin 0.7 ng/mL   URINALYSIS W/ REFLEX CULTURE    Collection Time: 07/28/19 11:44 PM   Result Value Ref Range    Color YELLOW/STRAW      Appearance CLEAR CLEAR      Specific gravity 1.021 1.003 - 1.030      pH (UA) 5.0 5.0 - 8.0      Protein NEGATIVE  NEG mg/dL    Glucose NEGATIVE  NEG mg/dL    Ketone NEGATIVE  NEG mg/dL    Bilirubin NEGATIVE  NEG      Blood NEGATIVE  NEG      Urobilinogen 0.2 0.2 - 1.0 EU/dL    Nitrites NEGATIVE  NEG      Leukocyte Esterase NEGATIVE  NEG      WBC 0-4 0 - 4 /hpf    RBC 0-5 0 - 5 /hpf    Epithelial cells FEW FEW /lpf    Bacteria 1+ (A) NEG /hpf    UA:UC IF INDICATED URINE CULTURE ORDERED (A) CNI      Mucus TRACE (A) NEG /lpf    Hyaline cast 5-10 0 - 5 /lpf   CBC WITH AUTOMATED DIFF    Collection Time: 07/29/19  4:00 AM   Result Value Ref Range    WBC 9.2 4.1 - 11.1 K/uL    RBC 3.27 (L) 4.10 - 5.70 M/uL    HGB 10.6 (L) 12.1 - 17.0 g/dL    HCT 31.3 (L) 36.6 - 50.3 %    MCV 95.7 80.0 - 99.0 FL    MCH 32.4 26.0 - 34.0 PG    MCHC 33.9 30.0 - 36.5 g/dL    RDW 14.2 11.5 - 14.5 %    PLATELET 038 (L) 295 - 400 K/uL    MPV 11.6 8.9 - 12.9 FL    NRBC 0.0 0  WBC    ABSOLUTE NRBC 0.00 0.00 - 0.01 K/uL    NEUTROPHILS 76 (H) 32 - 75 %    LYMPHOCYTES 9 (L) 12 - 49 %    MONOCYTES 15 (H) 5 - 13 %    EOSINOPHILS 0 0 - 7 %    BASOPHILS 0 0 - 1 %    IMMATURE GRANULOCYTES 0 0.0 - 0.5 %    ABS. NEUTROPHILS 7.0 1.8 - 8.0 K/UL    ABS. LYMPHOCYTES 0.8 0.8 - 3.5 K/UL    ABS. MONOCYTES 1.4 (H) 0.0 - 1.0 K/UL    ABS. EOSINOPHILS 0.0 0.0 - 0.4 K/UL    ABS. BASOPHILS 0.0 0.0 - 0.1 K/UL    ABS. IMM.  GRANS. 0.0 0.00 - 0.04 K/UL    DF AUTOMATED     METABOLIC PANEL, BASIC    Collection Time: 07/29/19  4:00 AM   Result Value Ref Range    Sodium 138 136 - 145 mmol/L    Potassium 3.9 3.5 - 5.1 mmol/L    Chloride 106 97 - 108 mmol/L    CO2 23 21 - 32 mmol/L    Anion gap 9 5 - 15 mmol/L    Glucose 119 (H) 65 - 100 mg/dL    BUN 28 (H) 6 - 20 MG/DL    Creatinine 1.30 0.70 - 1.30 MG/DL    BUN/Creatinine ratio 22 (H) 12 - 20      GFR est AA >60 >60 ml/min/1.73m2    GFR est non-AA 53 (L) >60 ml/min/1.73m2    Calcium 8.2 (L) 8.5 - 10.1 MG/DL        Sarah Tipton III, DO

## 2019-07-29 NOTE — PROGRESS NOTES
Problem: Falls - Risk of  Goal: *Absence of Falls  Description  Document Binta Richardson Fall Risk and appropriate interventions in the flowsheet.   Outcome: Progressing Towards Goal  Note:   Fall Risk Interventions:  Mobility Interventions: Bed/chair exit alarm, Patient to call before getting OOB         Medication Interventions: Evaluate medications/consider consulting pharmacy, Patient to call before getting OOB, Teach patient to arise slowly         History of Falls Interventions: Bed/chair exit alarm, Evaluate medications/consider consulting pharmacy, Investigate reason for fall         Problem: Patient Education: Go to Patient Education Activity  Goal: Patient/Family Education  Outcome: Progressing Towards Goal

## 2019-07-29 NOTE — PROGRESS NOTES
Problem: Falls - Risk of  Goal: *Absence of Falls  Description  Document Christopher Figueroa Fall Risk and appropriate interventions in the flowsheet. Outcome: Progressing Towards Goal  Note:   Fall Risk Interventions:            Medication Interventions: Teach patient to arise slowly                   Problem: Heart Failure: Day 1  Goal: Off Pathway (Use only if patient is Off Pathway)  Outcome: Progressing Towards Goal  Goal: Consults, if ordered  Outcome: Progressing Towards Goal  Goal: Nutrition/Diet  Outcome: Progressing Towards Goal     Problem: Pressure Injury - Risk of  Goal: *Prevention of pressure injury  Description  Document Arsen Scale and appropriate interventions in the flowsheet.   Outcome: Progressing Towards Goal  Note:   Pressure Injury Interventions:  Sensory Interventions: Assess changes in LOC, Avoid rigorous massage over bony prominences    Moisture Interventions: Absorbent underpads, Apply protective barrier, creams and emollients, Limit adult briefs    Activity Interventions: Increase time out of bed, Pressure redistribution bed/mattress(bed type), PT/OT evaluation    Mobility Interventions: Assess need for specialty bed, Chair cushion, Pressure redistribution bed/mattress (bed type), PT/OT evaluation    Nutrition Interventions: Discuss nutritional consult with provider, Document food/fluid/supplement intake    Friction and Shear Interventions: Foam dressings/transparent film/skin sealants, Minimize layers

## 2019-07-29 NOTE — PROGRESS NOTES
Bedside and Verbal shift change report GIVEN TO [facility], RN. Report included the following information SBAR, Kardex, ED Summary, Intake/Output and Cardiac Rhythm paced. SIGNIFICANT CHANGES DURING SHIFT:        CONCERNS TO ADDRESS WITH MD:            [facility] NURSING NOTE   Admission Date 7/28/2019   Admission Diagnosis CHF (congestive heart failure) ([address] [I50.9]   Consults IP CONSULT TO CARDIOLOGY      Cardiac Monitoring [x] Yes [] No      Purposeful Hourly Rounding [x] Yes    Froylan Score Total Score: 1   Froylan score 3 or > [] Bed Alarm [] Avasys [] 1:1 sitter [] Patient refused (Signed refusal form in chart)   Arsen Score Arsen Score: 17   Arsen score 14 or < [] PMT consult [] Wound Care consult    []  Specialty bed  [] Nutrition consult      Influenza Vaccine Received Flu Vaccine for Current Season (usually Sept-March): Not Flu Season           Oxygen needs? [] Room air Oxygen @  []1L    []2L    [x]3L   []4L    []5L   []6L via  NC 3.5L   Chronic home O2 use?  [] Yes [x] No  Perform O2 challenge test and document in progress note using smartphrase (.Homeoxygen)      Last bowel movement Last Bowel Movement Date: 07/24/19      Urinary Catheter             LDAs               Peripheral IV 07/28/19 Left Antecubital (Active)   Site Assessment Clean, dry, & intact 7/28/2019  6:50 PM   Phlebitis Assessment 0 7/28/2019  6:50 PM   Infiltration Assessment 0 7/28/2019  6:50 PM   Dressing Status Clean, dry, & intact 7/28/2019  6:50 PM   Dressing Type Tape;Transparent 7/28/2019  6:50 PM   Hub Color/Line Status Pink 7/28/2019  6:50 PM       Peripheral IV 07/28/19 Left Antecubital (Active)   Site Assessment Clean, dry, & intact 7/28/2019  6:50 PM   Phlebitis Assessment 0 7/28/2019  6:50 PM   Infiltration Assessment 0 7/28/2019  6:50 PM   Dressing Status Clean, dry, & intact 7/28/2019  6:50 PM   Dressing Type Tape;Transparent 7/28/2019  6:50 PM   Hub Color/Line Status Pink 7/28/2019  6:50 PM Readmission Risk Assessment Tool Score Low Risk            11       Total Score        2 . Living with Significant Other. Assisted Living. LTAC. SNF. or   Rehab    5 Pt.  Coverage (Medicare=5 , Medicaid, or Self-Pay=4)    4 Charlson Comorbidity Score (Age + Comorbid Conditions)        Criteria that do not apply:    Has Seen PCP in Last 6 Months (Yes=3, No=0)    Patient Length of Stay (>5 days = 3)    IP Visits Last 12 Months (1-3=4, 4=9, >4=11)       Expected Length of Stay - - -   Actual Length of Stay 0

## 2019-07-29 NOTE — PROGRESS NOTES
Patient had a temp of 100.1 at 1635 and then 98.5 at 1717. MD Siddiqui notified. MD stated that he does not want to give tylenol do too the patient's liver enzyme labs, however if patient spikes a fever again to notify him or on call doctor about possible for fever controll.

## 2019-07-30 NOTE — PROGRESS NOTES
Hospitalist Progress Note    NAME: Janice Saeed   :  1940   MRN:  [id number]       Assessment / Plan:  Acute hypoxic respiratory failure due to  Acute systolic congestive heart failure exacerbation   Possible healthcare associated pneumonia less likely   -CT chest shows groundglass no opacities.  BN pep is elevated at 9600  -Per cards recent ECHO shows EF of 25 %   -cont Lasix 40 mg IV twice daily.  Cont  home Entresto at increased dose and Aldactone.  cont  home Coreg  -Strict I's and O's, daily weights and low-salt diet  -stop  vancomycin and cefepime as PNA is less likely and will start 4 more days of Augmentin and stop due to borderline fever.   Blood cultures NGTD. -Continue oxygen by nasal cannula  -cont  duo neb due to no wheezing    Acute hepatitis due to congestion   -CT shows evidence of nonspecific hepatic and gallbladder disease  -US liver confirms congestive hepatopathy  -Continue IV Lasix.    -Repeat CMP in a.m.  - hepatic panel pending    History of atrial fibrillation  -cont  amiodarone and Coreg    Hypertension  Dyslipidemia  Hypothyroidism  -cont  home Coreg, statin and levothyroxine                           Code Status: DNR  Surrogate Decision Maker: Wife angela     DVT Prophylaxis: heparin   GI Prophylaxis: not indicated     Baseline: From home independent        Body mass index is 28.13 kg/m². Subjective:     Chief Complaint / Reason for Physician Visit  Sob is improving. Mild cough today but no phlegm. No chest pain     Review of Systems:  Symptom Y/N Comments  Symptom Y/N Comments   Fever/Chills    Chest Pain     Poor Appetite    Edema     Cough    Abdominal Pain     Sputum    Joint Pain     SOB/MURILLO    Pruritis/Rash     Nausea/vomit    Tolerating PT/OT     Diarrhea    Tolerating Diet     Constipation    Other       Could NOT obtain due to:      Objective:     VITALS:   Last 24hrs VS reviewed since prior progress note.  Most recent are:  Patient Vitals for the past 24 hrs:   Temp Pulse Resp BP SpO2   07/29/19 2002 99.6 °F (37.6 °C) 73 18 94/50 95 %   07/29/19 1851    93/62    07/29/19 1845    96/58    07/29/19 1840    (!) 83/38    07/29/19 1458 98.3 °F (36.8 °C) 74 16 94/54 92 %   07/29/19 1215    90/55    07/29/19 1205 98.4 °F (36.9 °C) 69 18 (!) 86/50 95 %   07/29/19 0845  93  99/61 97 %   07/29/19 0759 98.6 °F (37 °C) 91 18 92/53 94 %   07/29/19 0353     95 %   07/29/19 0348 98.4 °F (36.9 °C) 71 20 94/64 97 %   07/28/19 2223 98.1 °F (36.7 °C) 77 20 100/52 94 %       Intake/Output Summary (Last 24 hours) at 7/29/2019 2107  Last data filed at 7/29/2019 1136  Gross per 24 hour   Intake 100 ml   Output 550 ml   Net -450 ml        PHYSICAL EXAM:  General: cooperative, no acute distress    EENT:  EOMI. Anicteric sclerae. MMM  Resp:  Mild crackles at bases, wheezing +, no crackles  CV:  Regular  rhythm,  No edema  GI:  Soft, Non distended, Non tender.  +Bowel sounds  Neurologic:  Alert and oriented X 3, normal speech,   Psych:   Good insight. Not anxious nor agitated  Skin:  No rashes.   No jaundice    Reviewed most current lab test results and cultures  YES  Reviewed most current radiology test results   YES  Review and summation of old records today    NO  Reviewed patient's current orders and MAR    YES  PMH/SH reviewed - no change compared to H&P          Current Facility-Administered Medications:     sacubitril-valsartan (ENTRESTO) 49-51 mg tablet 1 Tab, 1 Tab, Oral, Q12H, Jose L Remy III, DO    guaiFENesin (ROBITUSSIN) 100 mg/5 mL oral liquid 100 mg, 100 mg, Oral, TID PRN, Claudie Fabry, MD, 100 mg at 07/29/19 1839    amiodarone (CORDARONE) tablet 200 mg, 200 mg, Oral, DAILY, Chen, Alexx K, MD, 200 mg at 07/29/19 0850    carvedilol (COREG) tablet 6.25 mg, 6.25 mg, Oral, BID WITH MEALS, Claudie Fabry, MD, Stopped at 07/29/19 1900    levothyroxine (SYNTHROID) tablet 100 mcg, 100 mcg, Oral, QPM, Claudie Fabry, MD, 100 mcg at 07/29/19 1839   sodium chloride (NS) flush 5-40 mL, 5-40 mL, IntraVENous, Q8H, Alexx Siddiqui MD, 10 mL at 07/29/19 1456    sodium chloride (NS) flush 5-40 mL, 5-40 mL, IntraVENous, PRN, Cristiana Siddiqui MD    ondansetron (ZOFRAN) injection 4 mg, 4 mg, IntraVENous, Q6H PRN, Cristiana Villanueva MD    docusate sodium (COLACE) capsule 100 mg, 100 mg, Oral, DAILY PRN, Milton Sena MD, 100 mg at 07/28/19 2221    heparin (porcine) injection 5,000 Units, 5,000 Units, SubCUTAneous, Q8H, Alexx Siddiqui MD, 5,000 Units at 07/29/19 1456    furosemide (LASIX) injection 40 mg, 40 mg, IntraVENous, BID, Cristiana Villanueva MD, Stopped at 07/29/19 1800    morphine injection 1 mg, 1 mg, IntraVENous, Q4H PRN, Luna Felty K, MD, 1 mg at 07/28/19 1242    spironolactone (ALDACTONE) tablet 12.5 mg, 12.5 mg, Oral, QHS, Alexx Siddiqui MD, 12.5 mg at 07/28/19 2222    allopurinol (ZYLOPRIM) tablet 200 mg, 200 mg, Oral, DAILY, Alexx Siddiqui MD, 200 mg at 07/29/19 0850    albuterol-ipratropium (DUO-NEB) 2.5 MG-0.5 MG/3 ML, 3 mL, Nebulization, Q6H PRN, Milton Sena MD, 3 mL at 07/28/19 1536    cefepime (MAXIPIME) 2 g in 0.9% sodium chloride (MBP/ADV) 100 mL, 2 g, IntraVENous, Q12H, Alexx Siddiqui MD, Last Rate: 200 mL/hr at 07/29/19 1455, 2 g at 07/29/19 1455    vancomycin (VANCOCIN) 1250 mg in  ml infusion, 1,250 mg, IntraVENous, Q18H, Milton Sena MD, Last Rate: 125 mL/hr at 07/29/19 1220, 1,250 mg at 07/29/19 1220    melatonin tablet 3 mg, 3 mg, Oral, QHS PRN, Damon Perez, DO, 3 mg at 07/28/19 2222  ________________________________________________________________________  Care Plan discussed with:    Comments   Patient y    Family      RN y    Care Manager     Consultant                        Multidiciplinary team rounds were held today with , nursing, pharmacist and clinical coordinator. Patient's plan of care was discussed; medications were reviewed and discharge planning was addressed. ________________________________________________________________________  Total NON critical care TIME:  35   Minutes    Total CRITICAL CARE TIME Spent:   Minutes non procedure based      Comments   >50% of visit spent in counseling and coordination of care     ________________________________________________________________________  Amos Eng MD     Procedures: see electronic medical records for all procedures/Xrays and details which were not copied into this note but were reviewed prior to creation of Plan. LABS:  I reviewed today's most current labs and imaging studies.   Pertinent labs include:  Recent Labs     07/29/19  0400 07/28/19  0946 07/27/19 2110   WBC 9.2 7.9 6.9   HGB 10.6* 12.0* 12.7   HCT 31.3* 37.7 39.4   * 116* 126*     Recent Labs     07/29/19  0400 07/28/19  0946 07/27/19 2110    139 142   K 3.9 4.1 4.1    106 108   CO2 23 24 25   * 140* 173*   BUN 28* 22* 20   CREA 1.30 1.32* 1.32*   CA 8.2* 8.3* 8.0*   ALB  --  3.3* 3.4*   TBILI  --  0.8 0.6   SGOT  --  70* 23   ALT  --  75 25       Signed: Amos Eng MD

## 2019-07-30 NOTE — PROGRESS NOTES
Hospitalist Progress Note    NAME: Karthik Medina   :  1940   MRN:  [id number]       Assessment / Plan:  Acute hypoxic respiratory failure due to  Acute systolic congestive heart failure exacerbation   Possible healthcare associated pneumonia less likely   -CT chest shows groundglass no opacities.  BN pep is elevated at 9600  -Per cards recent ECHO shows EF of 25 %   -cont Lasix 40 mg IV twice daily.  Cont  home Entresto at increased dose and Aldactone.  cont  home Coreg. Wt up today 191  -Strict I's and O's, daily weights and low-salt diet  -stop  vancomycin and cefepime as PNA is less likely and will start 4 more days of Augmentin and stop due to borderline fever.   Blood cultures NGTD. -Continue oxygen by nasal cannula  -cont  duo neb due to no wheezing    Acute hepatitis due to congestion   -CT shows evidence of nonspecific hepatic and gallbladder disease  -US liver confirms congestive hepatopathy  -hepatitis panel neg  -trend LFT    History of atrial fibrillation  -cont  amiodarone and Coreg    Hypertension  Dyslipidemia  Hypothyroidism  -cont  home Coreg, statin and levothyroxine           Code Status: DNR  Surrogate Decision Maker: Wife angela   DVT Prophylaxis: heparin   GI Prophylaxis: not indicated   Baseline: From home independent  Overweight by definition  Body mass index is 28.26 kg/m². Subjective:     Chief Complaint / Reason for Physician Visit  No complaint. Son at bedside, updated pt and his son in regards to plan of care  No chest pain     Review of Systems:  Symptom Y/N Comments  Symptom Y/N Comments   Fever/Chills n   Chest Pain n    Poor Appetite    Edema     Cough    Abdominal Pain n    Sputum    Joint Pain     SOB/MURILLO n   Pruritis/Rash     Nausea/vomit    Tolerating PT/OT     Diarrhea    Tolerating Diet     Constipation    Other       Could NOT obtain due to:      Objective:     VITALS:   Last 24hrs VS reviewed since prior progress note.  Most recent are:  Patient Vitals for the past 24 hrs:   Temp Pulse Resp BP SpO2   07/30/19 1350    (!) 89/48 94 %   07/30/19 1344  75  (!) 78/44 (!) 86 %   07/30/19 1338  74  92/55 92 %   07/30/19 1044 98.5 °F (36.9 °C) 71 18 91/57 94 %   07/30/19 0812 98.4 °F (36.9 °C) 72 20 97/56 94 %   07/30/19 0328 98.2 °F (36.8 °C) 72 20 101/59 94 %   07/29/19 2244 98 °F (36.7 °C) 74 18 95/56 92 %   07/29/19 2002 99.6 °F (37.6 °C) 73 18 94/50 95 %   07/29/19 1851    93/62    07/29/19 1845    96/58    07/29/19 1840    (!) 83/38    07/29/19 1458 98.3 °F (36.8 °C) 74 16 94/54 92 %       Intake/Output Summary (Last 24 hours) at 7/30/2019 1416  Last data filed at 7/30/2019 1306  Gross per 24 hour   Intake 160 ml   Output    Net 160 ml        PHYSICAL EXAM:  General: cooperative, no acute distress    EENT:  EOMI. Anicteric sclerae. MMM  Resp:  Mild crackles at bases, wheezing +, no crackles  CV:  Regular  rhythm,  No edema  GI:  Soft, Non distended, Non tender.  +Bowel sounds  Neurologic:  Alert and oriented X 3, normal speech,   Psych:   Good insight. Not anxious nor agitated  Skin:  No rashes.   No jaundice    Reviewed most current lab test results and cultures  YES  Reviewed most current radiology test results   YES  Review and summation of old records today    NO  Reviewed patient's current orders and MAR    YES  PMH/SH reviewed - no change compared to H&P          Current Facility-Administered Medications:     simethicone (MYLICON) tablet 80 mg, 80 mg, Oral, QID PRN, Musa Lozano MD    sacubitril-valsartan (ENTRESTO) 49-51 mg tablet 1 Tab, 1 Tab, Oral, Q12H, Jose L Remy III, DO, 1 Tab at 07/30/19 0902    guaiFENesin (ROBITUSSIN) 100 mg/5 mL oral liquid 100 mg, 100 mg, Oral, TID PRN, Alex Goodman MD, 100 mg at 07/29/19 1839    amoxicillin-clavulanate (AUGMENTIN) 875-125 mg per tablet 1 Tab, 1 Tab, Oral, Q12H, Alex Goodman MD, 1 Tab at 07/30/19 0901    amiodarone (CORDARONE) tablet 200 mg, 200 mg, Oral, DAILY, Alexx Siddiqui, MD, 200 mg at 07/30/19 0903    carvedilol (COREG) tablet 6.25 mg, 6.25 mg, Oral, BID WITH MEALS, Alexx Palacios MD, 6.25 mg at 07/30/19 0903    levothyroxine (SYNTHROID) tablet 100 mcg, 100 mcg, Oral, QPM, Riki Mckeon MD, 100 mcg at 07/29/19 1839    sodium chloride (NS) flush 5-40 mL, 5-40 mL, IntraVENous, Q8H, Alexx Siddiqui MD, 10 mL at 07/30/19 0600    sodium chloride (NS) flush 5-40 mL, 5-40 mL, IntraVENous, PRN, Janusz Siddiqui MD    ondansetron (ZOFRAN) injection 4 mg, 4 mg, IntraVENous, Q6H PRN, Janusz Palacios MD    docusate sodium (COLACE) capsule 100 mg, 100 mg, Oral, DAILY PRN, Riki Mckeon MD, 100 mg at 07/28/19 2221    heparin (porcine) injection 5,000 Units, 5,000 Units, SubCUTAneous, Q8H, Alexx Siddiqui MD, 5,000 Units at 07/30/19 0554    furosemide (LASIX) injection 40 mg, 40 mg, IntraVENous, BID, Alexx Siddiqui MD, 40 mg at 07/30/19 0906    morphine injection 1 mg, 1 mg, IntraVENous, Q4H PRN, Germaine HAIRSTON MD, 1 mg at 07/28/19 1242    spironolactone (ALDACTONE) tablet 12.5 mg, 12.5 mg, Oral, QHS, Alexx Siddiqui MD, 12.5 mg at 07/29/19 2107    allopurinol (ZYLOPRIM) tablet 200 mg, 200 mg, Oral, DAILY, Alexx Siddiqui MD, 200 mg at 07/30/19 0902    albuterol-ipratropium (DUO-NEB) 2.5 MG-0.5 MG/3 ML, 3 mL, Nebulization, Q6H PRN, Riki Mckeon MD, 3 mL at 07/28/19 1536    melatonin tablet 3 mg, 3 mg, Oral, QHS PRN, Pablo Lemons DO, 3 mg at 07/28/19 2222  ________________________________________________________________________  Care Plan discussed with:    Comments   Patient y    Family  y Pt's son   RN y    Care Manager     Consultant                        Multidiciplinary team rounds were held today with , nursing, pharmacist and clinical coordinator. Patient's plan of care was discussed; medications were reviewed and discharge planning was addressed.      ________________________________________________________________________  Total NON critical care TIME:  35   Minutes    Total CRITICAL CARE TIME Spent:   Minutes non procedure based      Comments   >50% of visit spent in counseling and coordination of care     ________________________________________________________________________  Radha Rodrgiuez MD     Procedures: see electronic medical records for all procedures/Xrays and details which were not copied into this note but were reviewed prior to creation of Plan. LABS:  I reviewed today's most current labs and imaging studies.   Pertinent labs include:  Recent Labs     07/29/19  0400 07/28/19  0946 07/27/19  2110   WBC 9.2 7.9 6.9   HGB 10.6* 12.0* 12.7   HCT 31.3* 37.7 39.4   * 116* 126*     Recent Labs     07/30/19  0600 07/29/19  0400 07/28/19  0946 07/27/19  2110    138 139 142   K 3.9 3.9 4.1 4.1    106 106 108   CO2 21 23 24 25   * 119* 140* 173*   BUN 37* 28* 22* 20   CREA 1.35* 1.30 1.32* 1.32*   CA 8.3* 8.2* 8.3* 8.0*   ALB 2.7*  --  3.3* 3.4*   TBILI 1.1*  --  0.8 0.6   SGOT 123*  --  70* 23   *  --  75 25       Signed: Radha Rodriguez MD

## 2019-07-30 NOTE — PROGRESS NOTES
Problem: Falls - Risk of  Goal: *Absence of Falls  Description  Document Daniela Richardson Fall Risk and appropriate interventions in the flowsheet.   Outcome: Progressing Towards Goal  Note:   Fall Risk Interventions:  Mobility Interventions: Bed/chair exit alarm, Communicate number of staff needed for ambulation/transfer, Patient to call before getting OOB, Strengthening exercises (ROM-active/passive), Utilize walker, cane, or other assistive device         Medication Interventions: Patient to call before getting OOB, Teach patient to arise slowly         History of Falls Interventions: Bed/chair exit alarm, Consult care management for discharge planning, Door open when patient unattended         Problem: Patient Education: Go to Patient Education Activity  Goal: Patient/Family Education  Outcome: Progressing Towards Goal

## 2019-07-30 NOTE — PROGRESS NOTES
Problem: Self Care Deficits Care Plan (Adult)  Goal: *Acute Goals and Plan of Care (Insert Text)  Description  Occupational Therapy Goals  Initiated 7/30/2019  1. Patient will perform grooming standing at the sink with modified independence within 7 day(s). 2.  Patient will perform bathing with supervision using AE as needed within 7 day(s). 3.  Patient will perform lower body dressing with modified independence using AE as needed within 7 day(s). 4.  Patient will perform toilet transfers with modified independence within 7 day(s). 5.  Patient will perform all aspects of toileting with modified independence within 7 day(s). 6.  Patient will independently utilize energy conservation and recommended equipment during ADL within 7 day(s). Outcome: Progressing Towards Goal    OCCUPATIONAL THERAPY EVALUATION  Patient: Rachelle Walker (53 y.o. male)  Date: 7/30/2019  Primary Diagnosis: CHF (congestive heart failure) ([facility]) [I50.9]        Precautions:        ASSESSMENT :  Based on the objective data described below, the patient presents with deficits in self-care, primarily due to decreased activity tolerance, decreased dynamic balance, and general weakness. He was on 2.5 liters O2; attempted room air during activity, but his SpO2 dropped to 86% with minimal activity. Replaced nasal canula and educated on pursed lip breathing. Patient is functioning slightly below his baseline and will benefit from skilled OT treatment to maximize independence and safety in ADL. Patient will benefit from skilled intervention to address the above impairments. Patients rehabilitation potential is considered to be Good  Factors which may influence rehabilitation potential include:   ? None noted  ? Mental ability/status  ? Medical condition  ? Home/family situation and support systems  ? Safety awareness  ? Pain tolerance/management  ?              Other: PLAN :  Recommendations and Planned Interventions:  ?               Self Care Training                  ? Therapeutic Activities  ? Functional Mobility Training    ? Cognitive Retraining  ? Therapeutic Exercises           ? Endurance Activities  ? Balance Training                   ? Neuromuscular Re-Education  ? Visual/Perceptual Training     ? Home Safety Training  ? Patient Education                 ? Family Training/Education  ? Other (comment):    Frequency/Duration: Patient will be followed by occupational therapy 3 times a week to address goals. Discharge Recommendations: None for OT  Further Equipment Recommendations for Discharge: Possible dressing and bathing equipment      SUBJECTIVE:   Patient stated What do you want me to do?     OBJECTIVE DATA SUMMARY:   HISTORY:   Past Medical History:   Diagnosis Date    AICD (automatic cardioverter/defibrillator) present     Arthritis     Cancer ([facility] [address]     skin    Heart failure ([facility] [address]     Other ill-defined conditions(799.89)     high cholesterol     Past Surgical History:   Procedure Laterality Date    ABDOMEN SURGERY PROC UNLISTED  6/2/11    colon resection    HX HEENT      repaired right eardrum    HX OTHER SURGICAL      benign cyst removed from back and thyroid    HX OTHER SURGICAL      skin graft    HX PACEMAKER      aicd    AZ COLONOSCOPY FLX DX W/COLLJ SPEC WHEN PFRMD  6/6/2012         [place] COLONOSCOPY W/BIOPSY SINGLE/MULTIPLE  4/27/2011            Prior Level of Function/Environment/Context: Independent   Expanded or extensive additional review of patient history:     Home Situation  Home Environment: Private residence  # Steps to Enter: 5  Rails to Enter: Yes  Wheelchair Ramp: Yes  One/Two Story Residence: One story(with basement)  Living Alone: No  Support Systems: Spouse/Significant Other/Partner, Friends \ neighbors  Patient Expects to be Discharged to[de-identified] Private residence  Current DME Used/Available at Home: [address], rolling, Wheelchair, Thereasa Marlon, straight, Grab bars  Tub or Shower Type: Shower    Hand dominance: Right    EXAMINATION OF PERFORMANCE DEFICITS:  Cognitive/Behavioral Status:  Neurologic State: Alert  Orientation Level: Oriented X4  Cognition: Appropriate for age attention/concentration; Follows commands  Perception: Appears intact  Perseveration: No perseveration noted  Safety/Judgement: Awareness of environment; Insight into deficits;Home safety; Fall prevention    Hearing: Auditory  Auditory Impairment: Hard of hearing, bilateral    Vision/Perceptual:         Acuity: Within Defined Limits    Corrective Lenses: Glasses    Range of Motion:  AROM: Within functional limits  PROM: Within functional limits                      Strength:  Strength: Generally decreased, functional                Coordination:  Coordination: Generally decreased, functional  Fine Motor Skills-Upper: Left Intact; Right Intact    Gross Motor Skills-Upper: Left Intact; Right Intact    Tone & Sensation:  Tone: Normal  Sensation: Intact                      Balance:  Sitting: Intact  Standing: Impaired  Standing - Static: Good  Standing - Dynamic : Fair    Functional Mobility and Transfers for ADLs:  Bed Mobility:  Rolling: Supervision  Supine to Sit: Supervision  Sit to Supine: Supervision  Scooting: Supervision    Transfers:  Sit to Stand: Stand-by assistance  Stand to Sit: Stand-by assistance  Bathroom Mobility: Stand-by assistance  Toilet Transfer : Stand-by assistance    ADL Assessment:  Feeding: Independent    Oral Facial Hygiene/Grooming: Stand-by assistance    Bathing: Minimum assistance    Upper Body Dressing: Supervision    Lower Body Dressing: Stand-by assistance    Toileting: Stand by assistance                ADL Intervention and task modifications:  Patient educated on pursed lip breathing techniques to increase/maximize SpO2 during activity.   He is not on home O2 and wanted to try activity on room air, but he dropped to 86% with minimal activity. Educated him on the importance of using it at all times for now. Discussed bathroom safety and fall prevention. Cognitive Retraining  Safety/Judgement: Awareness of environment; Insight into deficits;Home safety; Fall prevention    Functional Measure:  Barthel Index:    Bathin  Bladder: 10  Bowels: 10  Groomin  Dressin  Feeding: 10  Mobility: 0  Stairs: 0  Toilet Use: 5  Transfer (Bed to Chair and Back): 10  Total: 55/100        Percentage of impairment   0%   1-19%   20-39%   40-59%   60-79%   80-99%   100%   Barthel Score 0-100 100 99-80 79-60 59-40 20-39 1-19   0     The Barthel ADL Index: Guidelines  1. The index should be used as a record of what a patient does, not as a record of what a patient could do. 2. The main aim is to establish degree of independence from any help, physical or verbal, however minor and for whatever reason. 3. The need for supervision renders the patient not independent. 4. A patient's performance should be established using the best available evidence. Asking the patient, friends/relatives and nurses are the usual sources, but direct observation and common sense are also important. However direct testing is not needed. 5. Usually the patient's performance over the preceding 24-48 hours is important, but occasionally longer periods will be relevant. 6. Middle categories imply that the patient supplies over 50 per cent of the effort. 7. Use of aids to be independent is allowed. John Moore., Barthel, D.W. (0362). Functional evaluation: the Barthel Index. [facility] (14)2. Fior Huber lawanda CARO Wren, Marilu Ramirez., Inez Whitney., Matthew, [address] Measuring the change indisability after inpatient rehabilitation; comparison of the responsiveness of the Barthel Index and Functional Long Measure.  Journal of Neurology, Neurosurgery, and Psychiatry, 66(4), [phone]. MARILYN Lopez, ASHLEY Tamez, & Ching Mrurell M.A. (2004.) Assessment of post-stroke quality of life in cost-effectiveness studies: The usefulness of the Barthel Index and the EuroQoL-5D. Quality of Life Research, 15, 588-93        Occupational Therapy Evaluation Charge Determination   History Examination Decision-Making   LOW Complexity : Brief history review  LOW Complexity : 1-3 performance deficits relating to physical, cognitive , or psychosocial skils that result in activity limitations and / or participation restrictions  LOW Complexity : No comorbidities that affect functional and no verbal or physical assistance needed to complete eval tasks       Based on the above components, the patient evaluation is determined to be of the following complexity level: LOW   Pain:  Pain Scale 1: Numeric (0 - 10)  Pain Intensity 1: 0              Activity Tolerance:   Fair  After treatment:   ? Patient left in no apparent distress sitting up in chair  ? Patient left in no apparent distress in bed  ? Call bell left within reach  ? Nursing notified  ? Caregiver present  ? Bed alarm activated    COMMUNICATION/EDUCATION:   The patients plan of care was discussed with: Physical Therapist and Registered Nurse.  ? Home safety education was provided and the patient/caregiver indicated understanding. ? Patient/family have participated as able in goal setting and plan of care. ? Patient/family agree to work toward stated goals and plan of care. ? Patient understands intent and goals of therapy, but is neutral about his/her participation. ? Patient is unable to participate in goal setting and plan of care. This patients plan of care is appropriate for delegation to [facility].     Thank you for this referral.  MAYRA Sullivan/L  Time Calculation: 29 mins

## 2019-07-30 NOTE — PROGRESS NOTES
Bedside and Verbal shift change report GIVEN TO Liset Moya RN. Report included the following information SBAR, Kardex, ED Summary, Procedure Summary, Intake/Output, MAR, Recent Results and Cardiac Rhythm (NSR). [facility] NURSING NOTE   Admission Date 7/28/2019   Admission Diagnosis CHF (congestive heart failure) ([facility] [address] [I50.9]   Consults IP CONSULT TO CARDIOLOGY      Cardiac Monitoring [x] Yes [] No      Purposeful Hourly Rounding [x] Yes    Froylan Score Total Score: 3   Froylan score 3 or > [x] Bed Alarm [] Avasys [] 1:1 sitter [] Patient refused (Signed refusal form in chart)   Arsen Score Arsen Score: 20   Arsen score 14 or < [] PMT consult [] Wound Care consult    []  Specialty bed  [] Nutrition consult      Influenza Vaccine Received Flu Vaccine for Current Season (usually Sept-March): Not Flu Season           Oxygen needs? [] Room air Oxygen @  []1L    [x]2L    [x]3L   []4L    []5L   []6L via  NC   Chronic home O2 use?  [] Yes [x] No  Perform O2 challenge test and document in progress note using [employer]Home[employer])      Last bowel movement Last Bowel Movement Date: 07/29/19      Urinary Catheter             LDAs               Peripheral IV 07/28/19 Left Antecubital (Active)   Site Assessment Clean, dry, & intact 7/30/2019  2:32 AM   Phlebitis Assessment 0 7/30/2019  2:32 AM   Infiltration Assessment 0 7/30/2019  2:32 AM   Dressing Status Clean, dry, & intact 7/30/2019  2:32 AM   Dressing Type Transparent 7/30/2019  2:32 AM   Hub Color/Line Status Pink;Flushed 7/30/2019  2:32 AM       Peripheral IV 07/28/19 Right Antecubital (Active)   Site Assessment Clean, dry, & intact 7/30/2019  2:32 AM   Phlebitis Assessment 0 7/30/2019  2:32 AM   Infiltration Assessment 0 7/30/2019  2:32 AM   Dressing Status Clean, dry, & intact 7/30/2019  2:32 AM   Dressing Type Transparent 7/30/2019  2:32 AM   Hub Color/Line Status Pink;Flushed 7/30/2019  2:32 AM                         Readmission Risk Assessment Tool Score Low Risk            11       Total Score        2 . Living with Significant Other. Assisted Living. LTAC. SNF. or   Rehab    5 Pt.  Coverage (Medicare=5 , Medicaid, or Self-Pay=4)    4 Charlson Comorbidity Score (Age + Comorbid Conditions)        Criteria that do not apply:    Has Seen PCP in Last 6 Months (Yes=3, No=0)    Patient Length of Stay (>5 days = 3)    IP Visits Last 12 Months (1-3=4, 4=9, >4=11)       Expected Length of Stay 4d 2h   Actual Length of Stay 2
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
## 2019-07-30 NOTE — CARDIO/PULMONARY
Cardiac Rehab Note: chart review     Pt is a 78 yo admitted with acute congestive heart failure exacerbation. CHF BUNDLE      EF 20%  on 7/2018 per echo. Smoking history assessed. Patient is a former smoker. Smoking Cessation Program link has not been added to the AVS.     Current inpatient diet: DIET CARDIAC     \"Living with Heart Failure\" book with daily weight calendar and s/s of heart failure magnet provided to Juni Echevarria on 7/30/19. Educated using teach back method. Instruction given on s/s of CHF, checking weight every am and calling MD if weight is up 2-3 lbs in a day or 5 lbs in a week (or as directed by the physician), fluid/Na restrictions, s/s of worsening CHF and when to call MD. Pt has already begun sodium restriction. Has scale and weighs every morning pre-shower. He has a pillbox for medication compliance. Pt is active in the yard as tolerated. Reviewed activity as tolerated with frequent rest periods as needed, taking medications as prescribed, and the importance of follow up visits with physician. Juni Echevarria verbalized understanding with questions answered.   Neha Seay RN

## 2019-07-30 NOTE — PROGRESS NOTES
0700: Bedside shift change report given to Nico Palma (oncoming nurse) by Lenora Jaramillo (offgoing nurse). Report included the following information SBAR, Kardex, Intake/Output, MAR, Recent Results and Cardiac Rhythm NSR.     1126: Spoke with Dr. Betzy Cervantes regarding PT/OT orders. Verbal orders received for PT/OT. Will place orders. 1201: Pt's wife approached RN regarding pt's behavior. Per wife, pt has stated, \"I don't know how much more of this I can take. \" Pt denies any suicidal ideation. Per wife, pt is having a decreased appetite. Will notify MD. Will continue to monitor. 1416: Pt weaned to 1.5L NC. Sats 94%. Will continue to monitor. 1450: Tigertext sent to Dr. Ger Alaniz regarding pt's SBP in the 90s and medications not having hold parameters. 1551: Spoke to Dr. Ger Alaniz regarding pt's BP and BP medications. Orders received to decrease lasix to 40mg BID. No hold parameters added for other BP medications. 1632: Pt weaned to 0.5L NC. Sats 92%. Will continue to monitor. 1900: Bedside shift change report GIVEN to Aliya Snow RN (on-coming nurse). Report included the following information SBAR, Kardex, Intake/Output, MAR, Recent Results and Cardiac Rhythm NSR.       [facility] PHONE #:  [phone]      SIGNIFICANT CHANGES DURING SHIFT:  See above. CONCERNS TO ADDRESS WITH MD:  None. [facility] NURSING NOTE   Admission Date 7/28/2019   Admission Diagnosis CHF (congestive heart failure) ([facility] [I50.9]   Consults IP CONSULT TO CARDIOLOGY      Cardiac Monitoring [x] Yes [] No      Purposeful Hourly Rounding [x] Yes    Froylan Score Total Score: 3   Froylan score 3 or > [x] Bed Alarm [] Avasys [] 1:1 sitter [] Patient refused (Signed refusal form in chart)   Arsen Score Arsen Score: 20   Arsen score 14 or < [] PMT consult [] Wound Care consult    []  Specialty bed  [] Nutrition consult      Influenza Vaccine Received Flu Vaccine for Current Season (usually Sept-March): Not Flu Season           Oxygen needs?  [] Room air Oxygen @  [x]1L    []2L    []3L   []4L    []5L   []6L via  NC   Chronic home O2 use? [] Yes [x] No  Perform O2 challenge test and document in progress note using smartphrase (.Homeoxygen)      Last bowel movement Last Bowel Movement Date: 07/29/19      Urinary Catheter             LDAs               Peripheral IV 07/28/19 Left Antecubital (Active)   Site Assessment Clean, dry, & intact 7/30/2019  3:45 PM   Phlebitis Assessment 0 7/30/2019  3:45 PM   Infiltration Assessment 0 7/30/2019  3:45 PM   Dressing Status Clean, dry, & intact 7/30/2019  3:45 PM   Dressing Type Tape;Transparent 7/30/2019  3:45 PM   Hub Color/Line Status Pink;Flushed 7/30/2019  3:45 PM       Peripheral IV 07/28/19 Right Antecubital (Active)   Site Assessment Clean, dry, & intact 7/30/2019  3:45 PM   Phlebitis Assessment 0 7/30/2019  3:45 PM   Infiltration Assessment 0 7/30/2019  3:45 PM   Dressing Status Clean, dry, & intact 7/30/2019  3:45 PM   Dressing Type Tape;Transparent 7/30/2019  3:45 PM   Hub Color/Line Status Pink;Flushed 7/30/2019  3:45 PM                         Readmission Risk Assessment Tool Score Medium Risk            14       Total Score        3 Has Seen PCP in Last 6 Months (Yes=3, No=0)    2 . Living with Significant Other. Assisted Living. LTAC. SNF. or   Rehab    5 Pt.  Coverage (Medicare=5 , Medicaid, or Self-Pay=4)    4 Charlson Comorbidity Score (Age + Comorbid Conditions)        Criteria that do not apply:    Patient Length of Stay (>5 days = 3)    IP Visits Last 12 Months (1-3=4, 4=9, >4=11)       Expected Length of Stay 4d 2h   Actual Length of Stay 2

## 2019-07-30 NOTE — PROGRESS NOTES
Problem: Falls - Risk of  Goal: *Absence of Falls  Description  Document Helene Muñiz Fall Risk and appropriate interventions in the flowsheet.   Outcome: Progressing Towards Goal  Note:   Fall Risk Interventions:  Mobility Interventions: Bed/chair exit alarm, Patient to call before getting OOB         Medication Interventions: Bed/chair exit alarm, Evaluate medications/consider consulting pharmacy, Patient to call before getting OOB, Teach patient to arise slowly         History of Falls Interventions: Bed/chair exit alarm, Evaluate medications/consider consulting pharmacy, Investigate reason for fall         Problem: Patient Education: Go to Patient Education Activity  Goal: Patient/Family Education  Outcome: Progressing Towards Goal

## 2019-07-31 NOTE — PROGRESS NOTES
Bedside report received from Aishwarya Gross [facility]. Assumed care of patient.

## 2019-07-31 NOTE — PROGRESS NOTES
9 pm dose of Entresto given early by accident. Blood pressure 103/58. Patient also received Lasix and coreg, and metolozone. MD paged. 1832: Blood pressure rechecked 98/57, heart rate 75. 
 
1839: Spoke to Dr. Martha James regarding Rozella Crow, per MD will hold all other blood pressure medication tonight. 1854: Blood pressure rechecked 96/56, heart rate 76.

## 2019-07-31 NOTE — PROGRESS NOTES
FRANCINE: 
1. [facility] referral - sent 2. OP f/u with PCP and Cardiologist 
3. HF Bundle/Sound Bundle/HUG 
4. O2 walking challenge 24 hours prior to d/c to assess for home O2 needs Reason for Admission: Respiratory failure due to CHF  
               
RRAT Score:   14 Do you (patient/family) have any concerns for transition/discharge? None reported Plan for utilizing home health: [facility] Current Advanced Directive/Advance Care Plan:  AMD not on file. DNR code. Transition of Care Plan:  [facility] w/ follow-up appointments CM met with patient and his wife at the bedside to introduce role, verify demographics, and discuss discharge planning. Patient was awake and alert/oriented x4. Pt is a 76 yo  male admitted to [facility] on 7/28/19 for respiratory failure due to CHF and is being treated for possible PNA and acute hepatitis. PMHx includes chronic a-fib, HTN, dyslipidemia, hypothyroidism, etc. Pt is currently requiring supplemental oxygen which is not chronic. He will need an O2 walking challenge prior to d/c to assess for home O2 needs. Pt is active with his PCP and Cardiologist and understands he will need to follow-up with each after d/c. Pt lives with his wife in a single story home with 5 JUSTEN. Pt is ambulatory and has access to a WC, RW, and cane. Pt does not require post-acute therapy needs at this time. However, he will benefit from a [facility] visit in which he and his wife are agreeable. Referral for [facility] sent to [facility] via [facility]. Pt is independent with ADL/IADLs although his wife and son are available to assist if needed. Pt's wife will provide transportation at discharge as well as to his follow-up appointments. CM will continue to follow-up and facilitate an appropriate FRANCINE plan. PCP: Dr. India Ferreira Cardiologist: Dr. Arleen Torres HF Bundle NN: Nasrin Treadwell Pharmacy: [employer] [facility] [employer] [facility] [address] [facility] Care Management Interventions PCP Verified by CM: Yes(Dr. Leticia Duenas) Last Visit to PCP: 01/01/19 Mode of Transport at Discharge: Other (see comment)(wife) Transition of Care Consult (CM Consult): Discharge Planning(initial eval - anticipate H2H with f/u appts) MyChart Signup: No 
Discharge Durable Medical Equipment: No(has WC, RW, cane) Physical Therapy Consult: Yes Occupational Therapy Consult: Yes Speech Therapy Consult: No 
Current Support Network: Lives with Spouse, Family Lives Nearby(lives with wife in single story home with 5 JUSTEN) Confirm Follow Up Transport: Family Plan discussed with Pt/Family/Caregiver: Yes(discussed with pt and wife at the bedside) Discharge Location Discharge Placement: Home with family assistance Kerby Crigler, MSW Care Manager [phone]

## 2019-07-31 NOTE — PROGRESS NOTES
ADULT PROTOCOL: JET AEROSOL ASSESSMENT Patient  Pravin Andersen     77 y.o.   male     7/31/2019  8:35 AM 
 
Breath Sounds Pre Procedure: Right Breath Sounds: Scattered wheezing Left Breath Sounds: Scattered wheezing Breath Sounds Post Procedure: Right Breath Sounds: Scattered wheezing Left Breath Sounds: Scattered wheezing Breathing pattern: Pre procedure Breathing Pattern: Regular Post procedure Breathing Pattern: Regular Heart Rate: Pre procedure Pulse: 72 Post procedure Resp Rate: Pre procedure Respirations: 18 Post procedure Respirations: 18 
 
            
Oxygen: 1.5 L/NC Changed: NO SpO2: Pre procedure SpO2: 94 % Post procedure SpO2: 94 % Nebulizer Therapy: Current 
medications Aerosolized Medications: DuoNeb Changed:NO Problem List:  
Patient Active Problem List  
Diagnosis Code  Encounter for colonoscopy due to history of colonic polyp Z12.11, Z86.010  
 CHF (congestive heart failure) ([facility]) I50.9 Respiratory Therapist: Airam Napier, RT

## 2019-07-31 NOTE — PROGRESS NOTES
OT note: 
Visit attempted pt politely refused stating he had just walked in hallway and wanted to rest now. Nurse confirmed. Will defer at this time and continue to follow

## 2019-07-31 NOTE — PROGRESS NOTES
0700: Bedside shift change report given to Sai Maria (oncoming nurse) by Kanwal Henley (offgoing nurse). Report included the following information SBAR, Kardex, Intake/Output, MAR and Recent Results. 1310: Pt with c/o coughing up bloody sputum. Will notify MD.  
 
9149: Spoke with Dr. Sofi Mcgovern regarding the bloody sputum. Orders received to discontinue heparin and place SCDs on pt. 1230: Spoke with Dr. Sandy Snider regarding pt's SBP in the 80s. Orders received to hold entresto and coreg this PM if SBP less than 100 and to give metolazone with evening medications if SBP is acceptable. 1747: Pt weaned to 1L NC. Sats 95%. Will continue to monitor. 1759: Spoke with Alma Womack regarding entresto being given early by accident. Per Dr. Alma Ross, will continue to monitor pt and keep pt in the bed. No new orders received. Will continue to monitor. 1848: Pt instructed to use urinal when needing to void, however pt will continue to void in toilet. Pt educated on need for accurate measurement of urine output due to diuresing pt. Pt instructed to call nursing staff when pt uses urinal in order to measure and empty them. 1900: Bedside shift change report GIVEN to Kanwal Henley, RN (on-coming nurse). Report included the following information SBAR, Kardex, Intake/Output, MAR and Recent Results. ZONE PHONE #:  [phone] SIGNIFICANT CHANGES DURING SHIFT:  See above. CONCERNS TO ADDRESS WITH MD:  None. 1360 Emilia Anderson NURSING NOTE Admission Date 7/28/2019 Admission Diagnosis CHF (congestive heart failure) ([facility] [I50.9] Consults IP CONSULT TO CARDIOLOGY Cardiac Monitoring [x] Yes [] No  
  
Purposeful Hourly Rounding [x] Yes   
Froylan Score Total Score: 2 Froylan score 3 or > [] Bed Alarm [] Avasys [] 1:1 sitter [] Patient refused (Signed refusal form in chart) Arsen Score Arsen Score: 20 Arsen score 14 or < [] PMT consult [] Wound Care consult  
 []  Specialty bed  [] Nutrition consult Influenza Vaccine Received Flu Vaccine for Current Season (usually Sept-March): Not Flu Season Oxygen needs? [] Room air Oxygen @  [x]1L    []2L    []3L   []4L    []5L   []6L via NC Chronic home O2 use? [] Yes [x] No 
Perform O2 challenge test and document in progress note using smartphrase (.Homeoxygen) Last bowel movement Last Bowel Movement Date: 07/29/19 Urinary Catheter LDAs Peripheral IV 07/28/19 Left Antecubital (Active) Site Assessment Clean, dry, & intact 7/31/2019  3:54 PM  
Phlebitis Assessment 0 7/31/2019  3:54 PM  
Infiltration Assessment 0 7/31/2019  3:54 PM  
Dressing Status Clean, dry, & intact 7/31/2019  3:54 PM  
Dressing Type Tape;Transparent 7/31/2019  3:54 PM  
Hub Color/Line Status Pink;Flushed 7/31/2019  3:54 PM  
   
Peripheral IV 07/28/19 Right Antecubital (Active) Site Assessment Clean, dry, & intact 7/31/2019  3:54 PM  
Phlebitis Assessment 0 7/31/2019  3:54 PM  
Infiltration Assessment 0 7/31/2019  3:54 PM  
Dressing Status Clean, dry, & intact 7/31/2019  3:54 PM  
Dressing Type Tape;Transparent 7/31/2019  3:54 PM  
Hub Color/Line Status Pink;Flushed 7/31/2019  3:54 PM  
                  
  
Readmission Risk Assessment Tool Score Medium Risk 15 Total Score 3 Has Seen PCP in Last 6 Months (Yes=3, No=0) 2 . Living with Significant Other. Assisted Living. LTAC. SNF. or  
Rehab  
 5 Pt. Coverage (Medicare=5 , Medicaid, or Self-Pay=4) 4 Charlson Comorbidity Score (Age + Comorbid Conditions) Criteria that do not apply:  
 Patient Length of Stay (>5 days = 3) IP Visits Last 12 Months (1-3=4, 4=9, >4=11) Expected Length of Stay 4d 2h Actual Length of Stay 3

## 2019-07-31 NOTE — PROGRESS NOTES
Bedside report given to Margurette Goldberg, RN that included SBAR, recent results, and cardiac rhythm. No acute events overnight.

## 2019-07-31 NOTE — PROGRESS NOTES
Problem: Falls - Risk of 
Goal: *Absence of Falls Description Document Amnaliz Bernheim Fall Risk and appropriate interventions in the flowsheet. Outcome: Progressing Towards Goal 
Note:  
Fall Risk Interventions: 
Mobility Interventions: Bed/chair exit alarm, Communicate number of staff needed for ambulation/transfer, OT consult for ADLs, Patient to call before getting OOB, PT Consult for mobility concerns, Strengthening exercises (ROM-active/passive), Utilize walker, cane, or other assistive device Medication Interventions: Bed/chair exit alarm, Patient to call before getting OOB, Teach patient to arise slowly History of Falls Interventions: Bed/chair exit alarm, Door open when patient unattended, Consult care management for discharge planning Problem: Patient Education: Go to Patient Education Activity Goal: Patient/Family Education Outcome: Progressing Towards Goal 
  
Problem: Pressure Injury - Risk of 
Goal: *Prevention of pressure injury Description Document Arsen Scale and appropriate interventions in the flowsheet. Outcome: Progressing Towards Goal 
Note:  
Pressure Injury Interventions: 
Sensory Interventions: Assess changes in LOC Moisture Interventions: Absorbent underpads, Minimize layers Activity Interventions: Increase time out of bed, PT/OT evaluation Mobility Interventions: Float heels, HOB 30 degrees or less, PT/OT evaluation Nutrition Interventions: Document food/fluid/supplement intake, Offer support with meals,snacks and hydration Friction and Shear Interventions: HOB 30 degrees or less, Lift sheet, Minimize layers Problem: Patient Education: Go to Patient Education Activity Goal: Patient/Family Education Outcome: Progressing Towards Goal

## 2019-07-31 NOTE — PROGRESS NOTES
Visit attempted pt politely refused stating he had just walked in hallway and wanted to rest now. Nurse confirmed. Will defer at this time and continue to follow.

## 2019-07-31 NOTE — PROGRESS NOTES
Problem: Falls - Risk of  Goal: *Absence of Falls  Description  Document Christopher Figueroa Fall Risk and appropriate interventions in the flowsheet.   Outcome: Progressing Towards Goal  Note:   Fall Risk Interventions:  Mobility Interventions: Bed/chair exit alarm, OT consult for ADLs, Patient to call before getting OOB, PT Consult for mobility concerns, Strengthening exercises (ROM-active/passive), Utilize walker, cane, or other assistive device, Communicate number of staff needed for ambulation/transfer         Medication Interventions: Patient to call before getting OOB, Teach patient to arise slowly         History of Falls Interventions: Bed/chair exit alarm, Consult care management for discharge planning, Door open when patient unattended         Problem: Patient Education: Go to Patient Education Activity  Goal: Patient/Family Education  Outcome: Progressing Towards Goal     Problem: Patient Education: Go to Patient Education Activity  Goal: Patient/Family Education  Outcome: Progressing Towards Goal     Problem: Heart Failure: Day 1  Goal: Off Pathway (Use only if patient is Off Pathway)  Outcome: Progressing Towards Goal  Goal: Activity/Safety  Outcome: Progressing Towards Goal  Goal: Consults, if ordered  Outcome: Progressing Towards Goal  Goal: Diagnostic Test/Procedures  Outcome: Progressing Towards Goal  Goal: Nutrition/Diet  Outcome: Progressing Towards Goal  Goal: Discharge Planning  Outcome: Progressing Towards Goal  Goal: Medications  Outcome: Progressing Towards Goal  Goal: Respiratory  Outcome: Progressing Towards Goal  Goal: Treatments/Interventions/Procedures  Outcome: Progressing Towards Goal  Goal: Psychosocial  Outcome: Progressing Towards Goal  Goal: *Oxygen saturation within defined limits  Outcome: Progressing Towards Goal  Goal: *Hemodynamically stable  Outcome: Progressing Towards Goal  Goal: *Optimal pain control at patient's stated goal  Outcome: Progressing Towards Goal  Goal: *Anxiety reduced or absent  Outcome: Progressing Towards Goal     Problem: Heart Failure: Day 2  Goal: Off Pathway (Use only if patient is Off Pathway)  Outcome: Progressing Towards Goal  Goal: Activity/Safety  Outcome: Progressing Towards Goal  Goal: Consults, if ordered  Outcome: Progressing Towards Goal  Goal: Diagnostic Test/Procedures  Outcome: Progressing Towards Goal  Goal: Nutrition/Diet  Outcome: Progressing Towards Goal  Goal: Discharge Planning  Outcome: Progressing Towards Goal  Goal: Medications  Outcome: Progressing Towards Goal  Goal: Respiratory  Outcome: Progressing Towards Goal  Goal: Treatments/Interventions/Procedures  Outcome: Progressing Towards Goal  Goal: Psychosocial  Outcome: Progressing Towards Goal  Goal: *Oxygen saturation within defined limits  Outcome: Progressing Towards Goal  Goal: *Hemodynamically stable  Outcome: Progressing Towards Goal  Goal: *Optimal pain control at patient's stated goal  Outcome: Progressing Towards Goal  Goal: *Anxiety reduced or absent  Outcome: Progressing Towards Goal  Goal: *Demonstrates progressive activity  Outcome: Progressing Towards Goal     Problem: Heart Failure: Day 3  Goal: Off Pathway (Use only if patient is Off Pathway)  Outcome: Progressing Towards Goal  Goal: Activity/Safety  Outcome: Progressing Towards Goal  Goal: Diagnostic Test/Procedures  Outcome: Progressing Towards Goal  Goal: Nutrition/Diet  Outcome: Progressing Towards Goal  Goal: Discharge Planning  Outcome: Progressing Towards Goal  Goal: Medications  Outcome: Progressing Towards Goal  Goal: Respiratory  Outcome: Progressing Towards Goal  Goal: Treatments/Interventions/Procedures  Outcome: Progressing Towards Goal  Goal: Psychosocial  Outcome: Progressing Towards Goal  Goal: *Oxygen saturation within defined limits  Outcome: Progressing Towards Goal  Goal: *Hemodynamically stable  Outcome: Progressing Towards Goal  Goal: *Optimal pain control at patient's stated goal  Outcome: Progressing Towards Goal  Goal: *Anxiety reduced or absent  Outcome: Progressing Towards Goal  Goal: *Demonstrates progressive activity  Outcome: Progressing Towards Goal     Problem: Heart Failure: Day 4  Goal: Off Pathway (Use only if patient is Off Pathway)  Outcome: Progressing Towards Goal  Goal: Activity/Safety  Outcome: Progressing Towards Goal  Goal: Diagnostic Test/Procedures  Outcome: Progressing Towards Goal  Goal: Nutrition/Diet  Outcome: Progressing Towards Goal  Goal: Discharge Planning  Outcome: Progressing Towards Goal  Goal: Medications  Outcome: Progressing Towards Goal  Goal: Respiratory  Outcome: Progressing Towards Goal  Goal: Treatments/Interventions/Procedures  Outcome: Progressing Towards Goal  Goal: Psychosocial  Outcome: Progressing Towards Goal  Goal: *Oxygen saturation within defined limits  Outcome: Progressing Towards Goal  Goal: *Hemodynamically stable  Outcome: Progressing Towards Goal  Goal: *Optimal pain control at patient's stated goal  Outcome: Progressing Towards Goal  Goal: *Anxiety reduced or absent  Outcome: Progressing Towards Goal  Goal: *Demonstrates progressive activity  Outcome: Progressing Towards Goal     Problem: Heart Failure: Day 5  Goal: Off Pathway (Use only if patient is Off Pathway)  Outcome: Progressing Towards Goal  Goal: Activity/Safety  Outcome: Progressing Towards Goal  Goal: Diagnostic Test/Procedures  Outcome: Progressing Towards Goal  Goal: Nutrition/Diet  Outcome: Progressing Towards Goal  Goal: Discharge Planning  Outcome: Progressing Towards Goal  Goal: Medications  Outcome: Progressing Towards Goal  Goal: Respiratory  Outcome: Progressing Towards Goal  Goal: Treatments/Interventions/Procedures  Outcome: Progressing Towards Goal  Goal: Psychosocial  Outcome: Progressing Towards Goal     Problem: Heart Failure: Discharge Outcomes  Goal: *Demonstrates ability to perform prescribed activity without shortness of breath or discomfort  Outcome: Progressing Towards Goal  Goal: *Left ventricular function assessment completed prior to or during stay, or planned for post-discharge  Outcome: Progressing Towards Goal  Goal: *ACEI prescribed if LVEF less than 40% and no contraindications or ARB prescribed  Outcome: Progressing Towards Goal  Goal: *Verbalizes understanding and describes prescribed diet  Outcome: Progressing Towards Goal  Goal: *Verbalizes understanding/describes prescribed medications  Outcome: Progressing Towards Goal  Goal: *Describes available resources and support systems  Description  (eg: Home Health, Palliative Care, Advanced Medical Directive)  Outcome: Progressing Towards Goal  Goal: *Describes smoking cessation resources  Outcome: Progressing Towards Goal  Goal: *Understands and describes signs and symptoms to report to providers(Stroke Metric)  Outcome: Progressing Towards Goal  Goal: *Describes/verbalizes understanding of follow-up/return appt  Description  (eg: to physicians, diabetes treatment coordinator, and other resources  Outcome: Progressing Towards Goal  Goal: *Describes importance of continuing daily weights and changes to report to physician  Outcome: Progressing Towards Goal

## 2019-07-31 NOTE — PROGRESS NOTES
Hospitalist Progress Note NAME: Deepa Arana :  1940 MRN:  [id number] Assessment / Plan: 
Acute hypoxic respiratory failure due to Acute systolic congestive heart failure exacerbation Possible healthcare associated pneumonia less likely  
-CT chest shows groundglass no opacities.  BN pep is elevated at 9600 
-Per cards recent ECHO shows EF of 25 %  
-cont Lasix 40 mg IV twice daily.  Cont  home Entresto at increased dose and Aldactone.  cont  home Coreg. Metolazone 5mg daily added 
-Strict I's and O's, daily weights and low-salt diet 
-stop  vancomycin and cefepime as PNA is less likely and will start 4 more days of Augmentin and stop due to borderline fever.   Blood cultures NGTD. -Continue oxygen by nasal cannula 
-cont  duo neb due to no wheezing Acute hepatitis due to congestion  
-CT shows evidence of nonspecific hepatic and gallbladder disease 
-US liver confirms congestive hepatopathy 
-hepatitis panel neg, slightly improving 
-trend LFTs History of atrial fibrillation 
-cont  amiodarone and Coreg Hypertension Dyslipidemia Hypothyroidism 
-cont  home Coreg, statin and levothyroxine 
  
  
  
Code Status: DNR Surrogate Decision Maker: Wife angela  
DVT Prophylaxis: heparin GI Prophylaxis: not indicated  
Baseline: From home independent Overweight by definition Body mass index is 27.76 kg/m². Subjective: Chief Complaint / Reason for Physician Visit No complaint. Son at bedside, updated pt and his son in regards to plan of care No chest pain Review of Systems: 
Symptom Y/N Comments  Symptom Y/N Comments Fever/Chills n   Chest Pain n   
Poor Appetite    Edema Cough    Abdominal Pain n   
Sputum    Joint Pain SOB/MURILLO n   Pruritis/Rash Nausea/vomit    Tolerating PT/OT Diarrhea    Tolerating Diet Constipation    Other Could NOT obtain due to:   
 
Objective: VITALS:  
 Last 24hrs VS reviewed since prior progress note. Most recent are: 
Patient Vitals for the past 24 hrs: 
 Temp Pulse Resp BP SpO2  
07/31/19 1212    94/55   
07/31/19 1205    (!) 88/56   
07/31/19 1148 98.2 °F (36.8 °C) 70 20 (!) 86/46 96 % 07/31/19 0934  (!) 111  110/56   
07/31/19 0930  (!) 111  110/56   
07/31/19 0736 98.7 °F (37.1 °C) 72 20 94/60 93 % 07/31/19 0728     94 % 07/31/19 0409 99 °F (37.2 °C) 74 20 104/61 93 % 07/30/19 2315 99.4 °F (37.4 °C) 74 21 105/57 91 %  
07/30/19 1959 99.5 °F (37.5 °C) 79 20 100/52 (!) 86 %  
07/30/19 1733  73  106/53   
07/30/19 1634     92 % 07/30/19 1519 98.4 °F (36.9 °C) 72 20 95/53 95 % 07/30/19 1418     94 % 07/30/19 1350    (!) 89/48 94 % 07/30/19 1344  75  (!) 78/44 (!) 86 %  
07/30/19 1338  74  92/55 92 % Intake/Output Summary (Last 24 hours) at 7/31/2019 1230 Last data filed at 7/31/2019 1053 Gross per 24 hour Intake 320 ml Output 750 ml Net -430 ml PHYSICAL EXAM: 
General: cooperative, no acute distress   
EENT:  EOMI. Anicteric sclerae. MMM Resp:  Mild crackles at bases, wheezing +, no crackles CV:  Regular  rhythm,  No edema GI:  Soft, Non distended, Non tender.  +Bowel sounds Neurologic:  Alert and oriented X 3, normal speech Psych:   Good insight. Not anxious nor agitated Skin:  No rashes. No jaundice Reviewed most current lab test results and cultures  YES Reviewed most current radiology test results   YES Review and summation of old records today    NO Reviewed patient's current orders and MAR    YES 
PMH/SH reviewed - no change compared to H&P Current Facility-Administered Medications:  
  metOLazone (ZAROXOLYN) tablet 5 mg, 5 mg, Oral, ONCE, Jose L Remy III, DO 
  simethicone (MYLICON) tablet 80 mg, 80 mg, Oral, QID PRN, Hesham Chen MD 
  furosemide (LASIX) injection 40 mg, 40 mg, IntraVENous, BID, Estuardo Pinon MD, 40 mg at 07/31/19 7890   sacubitril-valsartan (ENTRESTO) 49-51 mg tablet 1 Tab, 1 Tab, Oral, Q12H, Jose L Remy III, DO, 1 Tab at 07/31/19 0931 
  guaiFENesin (ROBITUSSIN) 100 mg/5 mL oral liquid 100 mg, 100 mg, Oral, TID PRN, Teressa Castrejon MD, 200 mg at 07/30/19 2110 
  amoxicillin-clavulanate (AUGMENTIN) 875-125 mg per tablet 1 Tab, 1 Tab, Oral, Q12H, Mckinley Siddiqui MD, 1 Tab at 07/31/19 0930 
  amiodarone (CORDARONE) tablet 200 mg, 200 mg, Oral, DAILY, Alexx Siddiqui MD, 200 mg at 07/31/19 0930   carvedilol (COREG) tablet 6.25 mg, 6.25 mg, Oral, BID WITH MEALS, Mckinley Garcia MD, 6.25 mg at 07/31/19 7939   levothyroxine (SYNTHROID) tablet 100 mcg, 100 mcg, Oral, QPM, Teressa Castrejon MD, 100 mcg at 07/30/19 1735   sodium chloride (NS) flush 5-40 mL, 5-40 mL, IntraVENous, Q8H, Alexx Siddiqui MD, 10 mL at 07/31/19 0244   sodium chloride (NS) flush 5-40 mL, 5-40 mL, IntraVENous, PRN, Mckinley Siddiqui MD 
  ondansetron (ZOFRAN) injection 4 mg, 4 mg, IntraVENous, Q6H PRN, Mckinley Garcia MD 
  docusate sodium (COLACE) capsule 100 mg, 100 mg, Oral, DAILY PRN, Teressa Castrejon MD, 100 mg at 07/28/19 2221   morphine injection 1 mg, 1 mg, IntraVENous, Q4H PRN, Teressa Castrejon MD, 1 mg at 07/28/19 1242   spironolactone (ALDACTONE) tablet 12.5 mg, 12.5 mg, Oral, QHS, Alexx Siddiqui MD, 12.5 mg at 07/30/19 2110 
  allopurinol (ZYLOPRIM) tablet 200 mg, 200 mg, Oral, DAILY, Alexx Siddiqui MD, 200 mg at 07/31/19 0931 
  albuterol-ipratropium (DUO-NEB) 2.5 MG-0.5 MG/3 ML, 3 mL, Nebulization, Q6H PRN, Veryl MD Cash, 3 mL at 07/31/19 0727 
  melatonin tablet 3 mg, 3 mg, Oral, QHS PRN, Adelita HAIRSTON DO, 3 mg at 07/28/19 2222 
________________________________________________________________________ Care Plan discussed with: 
  Comments Patient y Family  y Pt's son/wife RN y   
Care Manager Consultant                   Multidiciplinary team rounds were held today with case manager, nursing, pharmacist and clinical coordinator. Patient's plan of care was discussed; medications were reviewed and discharge planning was addressed. ________________________________________________________________________ Total NON critical care TIME:  35   Minutes Total CRITICAL CARE TIME Spent:   Minutes non procedure based Comments >50% of visit spent in counseling and coordination of care    
________________________________________________________________________ Phani Motley MD  
 
Procedures: see electronic medical records for all procedures/Xrays and details which were not copied into this note but were reviewed prior to creation of Plan. LABS: 
I reviewed today's most current labs and imaging studies. Pertinent labs include: 
Recent Labs  
  07/31/19 
0311 07/29/19 
0400 WBC 6.7 9.2 HGB 10.2* 10.6* HCT 31.6* 31.3*  
* 112* Recent Labs  
  07/31/19 
0311 07/30/19 
0600 07/29/19 
0400  137 138  
K 3.6 3.9 3.9  107 106 CO2 25 21 23 * 102* 119* BUN 38* 37* 28* CREA 1.33* 1.35* 1.30  
CA 8.2* 8.3* 8.2* ALB 2.7* 2.7*  --   
TBILI 1.0 1.1*  --   
SGOT 83* 123*  --   
* 218*  --   
 
 
Signed: Phani Motley MD

## 2019-07-31 NOTE — PROGRESS NOTES
Progress Note      7/30/2019 8:38 PM  NAME: Pravin Andersen   MRN:  [id number]   Admit [facility] [[facility]     Assessment:     1. Acute on chronic systolic heart failure  2. Dilated NICM; EF 25%. Echo 7/18 w/ EF 20%, dil LV, nml RV, mild MR/TR  3. Remote ICD implantation  4. Recurrent ventricular tachycardia  5. Hyperlipidemia  6. Former smoker  7. DNR  8. Usual Cardiologist:  Dr. Chester Lora now Dr. David Wynne (appt 8/1)      Plan:     Did not diurese much. Borderline BP ,     Continue current meds. [x]        High complexity decision making was performed    Subjective:     Pravin Andersen denies chest pain, dyspnea. Discussed with RN events overnight. Patient Active Problem List   Diagnosis Code    Encounter for colonoscopy due to history of colonic polyp Z12.11, Z86.010    CHF (congestive heart failure) ([facility]) I50.9       Review of Systems:    Symptom Y/N Comments  Symptom Y/N Comments   Fever/Chills N   Chest Pain N    Poor Appetite N   Edema N    Cough N   Abdominal Pain N    Sputum N   Joint Pain N    SOB/MURILLO N   Pruritis/Rash N    Nausea/vomit N   Tolerating PT/OT Y    Diarrhea N   Tolerating Diet Y    Constipation N   Other       Could NOT obtain due to:      Objective:      Physical Exam:    Last 24hrs VS reviewed since prior progress note. Most recent are:    Visit Vitals  /52 (BP 1 Location: Right arm, BP Patient Position: At rest)   Pulse 79   Temp 99.5 °F (37.5 °C)   Resp 20   Ht 5' 9\" (1.753 m)   Wt 86.8 kg (191 lb 5.8 oz)   SpO2 (!) 86% Comment: RN notified   BMI 28.26 kg/m²       Intake/Output Summary (Last 24 hours) at 7/30/2019 2038  Last data filed at 7/30/2019 1741  Gross per 24 hour   Intake 260 ml   Output 350 ml   Net -90 ml        General Appearance: Well developed, well nourished, alert & oriented x 3,    no acute distress. Ears/Nose/Mouth/Throat: Hearing grossly normal.  Neck: Supple.   Chest: Lungs clear to auscultation bilaterally. Cardiovascular: Regular rate and rhythm, S1S2 normal, no murmur. Abdomen: Soft, non-tender, bowel sounds are active. Extremities: No edema bilaterally. Skin: Warm and dry. PMH/SH reviewed - no change compared to H&P    Data Review    Telemetry: normal sinus rhythm     Lab Data Personally Reviewed:    Recent Labs     07/29/19  0400 07/28/19  0946   WBC 9.2 7.9   HGB 10.6* 12.0*   HCT 31.3* 37.7   * 116*   LABRCNT(INR:3,PTP:3,APTT:3,)  Recent Labs     07/30/19  0600 07/29/19  0400 07/28/19  0946    138 139   K 3.9 3.9 4.1    106 106   CO2 21 23 24   BUN 37* 28* 22*   CREA 1.35* 1.30 1.32*   * 119* 140*   CA 8.3* 8.2* 8.3*   LABRCNT(CPK:3,CpKMB:3,ckndx:3,troiq:3)No results found for: CHOL, CHOLX, CHLST, CHOLV, HDL, LDL, LDLC, DLDLP, TGLX, TRIGL, TRIGP, CHHD, CHHDXLABRCNT(sgot:3,gpt:3,ap:3,tbiL:3,TP:3,ALB:3,GLOB:3,ggt:3,aml:3,amyp:3,lpse:3,hlpse:3)No results for input(s): PH, PCO2, PO2 in the last 72 hours. No results found for: CHOL, CHOLX, CHLST, CHOLV, HDL, LDL, LDLC, DLDLP, TGLX, TRIGL, TRIGP, CHHD, CHHDXMEDTABLETimlaura Burnett MD  No results for input(s): PH, PCO2, PO2 in the last 72 hours.     Medications Personally Reviewed:    Current Facility-Administered Medications   Medication Dose Route Frequency    simethicone (MYLICON) tablet 80 mg  80 mg Oral QID PRN    furosemide (LASIX) injection 40 mg  40 mg IntraVENous BID    sacubitril-valsartan (ENTRESTO) 49-51 mg tablet 1 Tab  1 Tab Oral Q12H    guaiFENesin (ROBITUSSIN) 100 mg/5 mL oral liquid 100 mg  100 mg Oral TID PRN    amoxicillin-clavulanate (AUGMENTIN) 875-125 mg per tablet 1 Tab  1 Tab Oral Q12H    amiodarone (CORDARONE) tablet 200 mg  200 mg Oral DAILY    carvedilol (COREG) tablet 6.25 mg  6.25 mg Oral BID WITH MEALS    levothyroxine (SYNTHROID) tablet 100 mcg  100 mcg Oral QPM    sodium chloride (NS) flush 5-40 mL  5-40 mL IntraVENous Q8H    sodium chloride (NS) flush 5-40 mL  5-40 mL IntraVENous PRN    ondansetron (ZOFRAN) injection 4 mg  4 mg IntraVENous Q6H PRN    docusate sodium (COLACE) capsule 100 mg  100 mg Oral DAILY PRN    heparin (porcine) injection 5,000 Units  5,000 Units SubCUTAneous Q8H    morphine injection 1 mg  1 mg IntraVENous Q4H PRN    spironolactone (ALDACTONE) tablet 12.5 mg  12.5 mg Oral QHS    allopurinol (ZYLOPRIM) tablet 200 mg  200 mg Oral DAILY    albuterol-ipratropium (DUO-NEB) 2.5 MG-0.5 MG/3 ML  3 mL Nebulization Q6H PRN    melatonin tablet 3 mg  3 mg Oral QHS PRN         Alice Stover MD

## 2019-07-31 NOTE — PROGRESS NOTES
Progress Note 7/31/2019 8:38 PM 
NAME: Patricia Fonseca MRN:  [id number] Admit Diagnosis: [facility] [I50.9] Assessment: 1. Acute on chronic systolic heart failure 2. Dilated NICM; EF 25%. Echo 7/18 w/ EF 20%, dil LV, nml RV, mild MR/TR 3. Remote ICD implantation 4. Recurrent ventricular tachycardia 5. Hyperlipidemia 6. Former smoker 7. DNR 
8. Usual Cardiologist:  Dr. Joshua Olvera now Dr. Lani Guo (appt 8/1) Plan:  
 
Did not diurese much. Borderline BP Continue current meds. Add metolazone 5mg today. Gaylin Nickels Hold evening Entresto and coreg if BP < 100 [x]        High complexity decision making was performed Subjective:  
 
Patricia Fonseca denies chest pain. Still w/ dyspnea on O2 Discussed with RN events overnight. Patient Active Problem List  
Diagnosis Code  Encounter for colonoscopy due to history of colonic polyp Z12.11, Z86.010  
 CHF (congestive heart failure) ([facility]) I50.9 Review of Systems: 
 
Symptom Y/N Comments  Symptom Y/N Comments Fever/Chills N   Chest Pain N Poor Appetite N   Edema N   
Cough N   Abdominal Pain N Sputum N   Joint Pain N   
SOB/MURILLO N   Pruritis/Rash N   
Nausea/vomit N   Tolerating PT/OT Y Diarrhea N   Tolerating Diet Y Constipation N   Other Could NOT obtain due to:   
 
Objective:  
  
Physical Exam: 
 
Last 24hrs VS reviewed since prior progress note. Most recent are: 
 
Visit Vitals /56 Pulse (!) 111 Temp 98.7 °F (37.1 °C) Resp 20 Ht 5' 9\" (1.753 m) Wt 85.3 kg (188 lb) SpO2 93% BMI 27.76 kg/m² Intake/Output Summary (Last 24 hours) at 7/31/2019 1113 Last data filed at 7/31/2019 1053 Gross per 24 hour Intake 320 ml Output 750 ml Net -430 ml General Appearance: Well developed, well nourished, alert & oriented x 3,  
 no acute distress. Ears/Nose/Mouth/Throat: Hearing grossly normal. 
Neck: Supple. Chest: Lungs clear to auscultation bilaterally. Cardiovascular: Regular rate and rhythm, S1S2 normal, no murmur. Abdomen: Soft, non-tender, bowel sounds are active. Extremities: Trivial edema bilaterally. Skin: Warm and dry. PMH/SH reviewed - no change compared to H&P Data Review Telemetry: sinus rhythm Lab Data Personally Reviewed: 
 
Recent Labs  
  07/31/19 
0311 07/29/19 
0400 WBC 6.7 9.2 HGB 10.2* 10.6* HCT 31.6* 31.3*  
* 112* LABRCNT(INR:3,PTP:3,APTT:3,) Recent Labs  
  07/31/19 
0311 07/30/19 
0600 07/29/19 
0400  137 138  
K 3.6 3.9 3.9  107 106 CO2 25 21 23 BUN 38* 37* 28* CREA 1.33* 1.35* 1.30 * 102* 119* CA 8.2* 8.3* 8.2* LABRCNT(CPK:3,CpKMB:3,ckndx:3,troiq:3)No results found for: CHOL, CHOLX, CHLST, CHOLV, HDL, LDL, LDLC, DLDLP, TGLX, TRIGL, TRIGP, CHHD, CHHDXLABRCNT(sgot:3,gpt:3,ap:3,tbiL:3,TP:3,ALB:3,GLOB:3,ggt:3,aml:3,amyp:3,lpse:3,hlpse:3)No results for input(s): PH, PCO2, PO2 in the last 72 hours. No results found for: CHOL, CHOLX, CHLST, CHOLV, HDL, LDL, LDLC, DLDLP, TGLX, TRIGL, TRIGP, CHHD, CHHDXMEDTABLEGeorge H Remy III, DO No results for input(s): PH, PCO2, PO2 in the last 72 hours. Medications Personally Reviewed: 
 
Current Facility-Administered Medications Medication Dose Route Frequency  metOLazone (ZAROXOLYN) tablet 5 mg  5 mg Oral ONCE  
 simethicone (MYLICON) tablet 80 mg  80 mg Oral QID PRN  
 furosemide (LASIX) injection 40 mg  40 mg IntraVENous BID  sacubitril-valsartan (ENTRESTO) 49-51 mg tablet 1 Tab  1 Tab Oral Q12H  
 guaiFENesin (ROBITUSSIN) 100 mg/5 mL oral liquid 100 mg  100 mg Oral TID PRN  
 amoxicillin-clavulanate (AUGMENTIN) 875-125 mg per tablet 1 Tab  1 Tab Oral Q12H  
 amiodarone (CORDARONE) tablet 200 mg  200 mg Oral DAILY  carvedilol (COREG) tablet 6.25 mg  6.25 mg Oral BID WITH MEALS  
 levothyroxine (SYNTHROID) tablet 100 mcg  100 mcg Oral QPM  
  sodium chloride (NS) flush 5-40 mL  5-40 mL IntraVENous Q8H  
 sodium chloride (NS) flush 5-40 mL  5-40 mL IntraVENous PRN  
 ondansetron (ZOFRAN) injection 4 mg  4 mg IntraVENous Q6H PRN  
 docusate sodium (COLACE) capsule 100 mg  100 mg Oral DAILY PRN  
 morphine injection 1 mg  1 mg IntraVENous Q4H PRN  
 spironolactone (ALDACTONE) tablet 12.5 mg  12.5 mg Oral QHS  allopurinol (ZYLOPRIM) tablet 200 mg  200 mg Oral DAILY  albuterol-ipratropium (DUO-NEB) 2.5 MG-0.5 MG/3 ML  3 mL Nebulization Q6H PRN  
 melatonin tablet 3 mg  3 mg Oral QHS PRN Joey Baltazar III, DO

## 2019-08-01 NOTE — PROGRESS NOTES
Pt with MEWS score of 3 due to soft bp. Pt symptomatic and stable. Charge RN aware. No further action required at this time, will continue to monitor.

## 2019-08-01 NOTE — PROGRESS NOTES
Problem: Mobility Impaired (Adult and Pediatric) Goal: *Acute Goals and Plan of Care (Insert Text) Description Physical Therapy Goals Initiated 7/30/2019 1. Patient will move from supine to sit and sit to supine  in bed with modified independence within 7 day(s). 2.  Patient will transfer from bed to chair and chair to bed with modified independence using the least restrictive device within 7 day(s). 3.  Patient will perform sit to stand with modified independence within 7 day(s). 4.  Patient will ambulate with modified independence for 250 feet with the least restrictive device within 7 day(s). 5.  Patient will ascend/descend 5 stairs with bilateral handrail(s) with modified independence within 7 day(s). Outcome: Progressing Towards Goal 
 PHYSICAL THERAPY TREATMENT Patient: Beltran Day (29 y.o. male) Date: 8/1/2019 Diagnosis: [facility] <principal problem not specified> Precautions:   
Chart, physical therapy assessment, plan of care and goals were reviewed. ASSESSMENT: 
Pt cleared by nurse to mobilize. Pt received in bed supine. Pt agreeable to therapy. Pt transferred supine to sit at supervision. Pt performed sit to stand x2 transfer at SBA/supervision to RW with cueing for hand placement. Assisted pt to bathroom for personal care and hygiene at [facility] with RW. Pt ambulated 120ft x2 with RW at [facility]. Pt with increased ambulation distance and tolerance from last session. After first bout pt required a seated rest break due to mild SOB. Visually cued pt for PLB. Pt performed correctly. Throughout mobility O2 stats drop to 82% but increased to 92% in less than 1 min. Pt returned to bed supine at SBA/supervision with call bell in reach. Educated pt on using incentive spirometer throughout the day to improve breathing. Pt with understanding. Pt will benefit from outpatient pulmonary rehab to improve endurance and safe mobility. Progression toward goals: 
?    Improving appropriately and progressing toward goals ? Improving slowly and progressing toward goals ? Not making progress toward goals and plan of care will be adjusted PLAN: 
Patient continues to benefit from skilled intervention to address the above impairments. Continue treatment per established plan of care. Discharge Recommendations:  Outpatient Pulmonary Rehab Further Equipment Recommendations for Discharge:  TBD by rehab SUBJECTIVE:  
Patient stated \"Can my preacher come with me during my walk?  OBJECTIVE DATA SUMMARY:  
Critical Behavior: 
Neurologic State: Alert Orientation Level: Oriented X4 Cognition: Appropriate decision making, Appropriate for age attention/concentration, Decreased attention/concentration Safety/Judgement: Awareness of environment, Insight into deficits, Home safety, Fall prevention Functional Mobility Training: 
Bed Mobility: 
Rolling: Supervision Supine to Sit: Supervision Sit to Supine: Supervision Scooting: Supervision Transfers: 
Sit to Stand: Stand-by assistance Stand to Sit: Stand-by assistance Balance: 
Sitting: Intact Standing: Intact Standing - Static: Good Standing - Dynamic : Good Ambulation/Gait Training: 
Distance (ft): 120 Feet (ft)(x2) 
Assistive Device: Gait belt;Walker, rolling Ambulation - Level of Assistance: Contact guard assistance Gait Abnormalities: Decreased step clearance Base of Support: Widened Stance: Left decreased;Right decreased Speed/Silva: Pace decreased (<100 feet/min) Step Length: Right shortened;Left shortened Pain: 
Pain Scale 1: Numeric (0 - 10) Pain Intensity 1: 0 Activity Tolerance:  
Pt tolerated session fairly with an increase in ambulation distance but with SOB during ambulation. ? Patient left in no apparent distress sitting up in chair ? Patient left in no apparent distress in bed 
? Call bell left within reach ?    Nursing notified ? Caregiver present ? Bed alarm activated COMMUNICATION/COLLABORATION:  
The patients plan of care was discussed with: Registered Nurse Capri Villatoro PTA Student Time Calculation: 24 mins

## 2019-08-01 NOTE — PROGRESS NOTES
Bedside report received from Robynn Severin, [facility]. Assumed care of patient.

## 2019-08-01 NOTE — PROGRESS NOTES
Problem: Falls - Risk of 
Goal: *Absence of Falls Description Document Mariia Cruz Fall Risk and appropriate interventions in the flowsheet. Outcome: Progressing Towards Goal 
Note:  
Fall Risk Interventions: 
Mobility Interventions: Patient to call before getting OOB, PT Consult for mobility concerns, Strengthening exercises (ROM-active/passive), Utilize walker, cane, or other assistive device Medication Interventions: Patient to call before getting OOB, Teach patient to arise slowly History of Falls Interventions: Consult care management for discharge planning Problem: Patient Education: Go to Patient Education Activity Goal: Patient/Family Education Outcome: Progressing Towards Goal 
  
Problem: Patient Education: Go to Patient Education Activity Goal: Patient/Family Education Outcome: Progressing Towards Goal 
  
Problem: Heart Failure: Day 1 Goal: Off Pathway (Use only if patient is Off Pathway) Outcome: Progressing Towards Goal 
Goal: Activity/Safety Outcome: Progressing Towards Goal 
Goal: Consults, if ordered Outcome: Progressing Towards Goal 
Goal: Diagnostic Test/Procedures Outcome: Progressing Towards Goal 
Goal: Nutrition/Diet Outcome: Progressing Towards Goal 
Goal: Discharge Planning Outcome: Progressing Towards Goal 
Goal: Medications Outcome: Progressing Towards Goal 
Goal: Respiratory Outcome: Progressing Towards Goal 
Goal: Treatments/Interventions/Procedures Outcome: Progressing Towards Goal 
Goal: Psychosocial 
Outcome: Progressing Towards Goal 
Goal: *Oxygen saturation within defined limits Outcome: Progressing Towards Goal 
Goal: *Hemodynamically stable Outcome: Progressing Towards Goal 
Goal: *Optimal pain control at patient's stated goal 
Outcome: Progressing Towards Goal 
Goal: *Anxiety reduced or absent Outcome: Progressing Towards Goal 
  
Problem: Heart Failure: Day 2 Goal: Off Pathway (Use only if patient is Off Pathway) Outcome: Progressing Towards Goal 
Goal: Activity/Safety Outcome: Progressing Towards Goal 
Goal: Consults, if ordered Outcome: Progressing Towards Goal 
Goal: Diagnostic Test/Procedures Outcome: Progressing Towards Goal 
Goal: Nutrition/Diet Outcome: Progressing Towards Goal 
Goal: Discharge Planning Outcome: Progressing Towards Goal 
Goal: Medications Outcome: Progressing Towards Goal 
Goal: Respiratory Outcome: Progressing Towards Goal 
Goal: Treatments/Interventions/Procedures Outcome: Progressing Towards Goal 
Goal: Psychosocial 
Outcome: Progressing Towards Goal 
Goal: *Oxygen saturation within defined limits Outcome: Progressing Towards Goal 
Goal: *Hemodynamically stable Outcome: Progressing Towards Goal 
Goal: *Optimal pain control at patient's stated goal 
Outcome: Progressing Towards Goal 
Goal: *Anxiety reduced or absent Outcome: Progressing Towards Goal 
Goal: *Demonstrates progressive activity Outcome: Progressing Towards Goal 
  
Problem: Heart Failure: Day 3 Goal: Off Pathway (Use only if patient is Off Pathway) Outcome: Progressing Towards Goal 
Goal: Activity/Safety Outcome: Progressing Towards Goal 
Goal: Diagnostic Test/Procedures Outcome: Progressing Towards Goal 
Goal: Nutrition/Diet Outcome: Progressing Towards Goal 
Goal: Discharge Planning Outcome: Progressing Towards Goal 
Goal: Medications Outcome: Progressing Towards Goal 
Goal: Respiratory Outcome: Progressing Towards Goal 
Goal: Treatments/Interventions/Procedures Outcome: Progressing Towards Goal 
Goal: Psychosocial 
Outcome: Progressing Towards Goal 
Goal: *Oxygen saturation within defined limits Outcome: Progressing Towards Goal 
Goal: *Hemodynamically stable Outcome: Progressing Towards Goal 
Goal: *Optimal pain control at patient's stated goal 
Outcome: Progressing Towards Goal 
Goal: *Anxiety reduced or absent Outcome: Progressing Towards Goal 
Goal: *Demonstrates progressive activity Outcome: Progressing Towards Goal 
  
Problem: Heart Failure: Day 4 Goal: Off Pathway (Use only if patient is Off Pathway) Outcome: Progressing Towards Goal 
Goal: Activity/Safety Outcome: Progressing Towards Goal 
Goal: Diagnostic Test/Procedures Outcome: Progressing Towards Goal 
Goal: Nutrition/Diet Outcome: Progressing Towards Goal 
Goal: Discharge Planning Outcome: Progressing Towards Goal 
Goal: Medications Outcome: Progressing Towards Goal 
Goal: Respiratory Outcome: Progressing Towards Goal 
Goal: Treatments/Interventions/Procedures Outcome: Progressing Towards Goal 
Goal: Psychosocial 
Outcome: Progressing Towards Goal 
Goal: *Oxygen saturation within defined limits Outcome: Progressing Towards Goal 
Goal: *Hemodynamically stable Outcome: Progressing Towards Goal 
Goal: *Optimal pain control at patient's stated goal 
Outcome: Progressing Towards Goal 
Goal: *Anxiety reduced or absent Outcome: Progressing Towards Goal 
Goal: *Demonstrates progressive activity Outcome: Progressing Towards Goal 
  
Problem: Heart Failure: Day 5 Goal: Off Pathway (Use only if patient is Off Pathway) Outcome: Progressing Towards Goal 
Goal: Activity/Safety Outcome: Progressing Towards Goal 
Goal: Diagnostic Test/Procedures Outcome: Progressing Towards Goal 
Goal: Nutrition/Diet Outcome: Progressing Towards Goal 
Goal: Discharge Planning Outcome: Progressing Towards Goal 
Goal: Medications Outcome: Progressing Towards Goal 
Goal: Respiratory Outcome: Progressing Towards Goal 
Goal: Treatments/Interventions/Procedures Outcome: Progressing Towards Goal 
Goal: Psychosocial 
Outcome: Progressing Towards Goal 
  
Problem: Heart Failure: Discharge Outcomes Goal: *Demonstrates ability to perform prescribed activity without shortness of breath or discomfort Outcome: Progressing Towards Goal 
Goal: *Left ventricular function assessment completed prior to or during stay, or planned for post-discharge Outcome: Progressing Towards Goal 
Goal: *ACEI prescribed if LVEF less than 40% and no contraindications or ARB prescribed Outcome: Progressing Towards Goal 
Goal: *Verbalizes understanding and describes prescribed diet Outcome: Progressing Towards Goal 
Goal: *Verbalizes understanding/describes prescribed medications Outcome: Progressing Towards Goal 
Goal: *Describes available resources and support systems Description 
(eg: Home Health, Palliative Care, Advanced Medical Directive) Outcome: Progressing Towards Goal 
Goal: *Describes smoking cessation resources Outcome: Progressing Towards Goal 
Goal: *Understands and describes signs and symptoms to report to providers(Stroke Metric) Outcome: Progressing Towards Goal 
Goal: *Describes/verbalizes understanding of follow-up/return appt Description 
(eg: to physicians, diabetes treatment coordinator, and other resources Outcome: Progressing Towards Goal 
Goal: *Describes importance of continuing daily weights and changes to report to physician Outcome: Progressing Towards Goal

## 2019-08-01 NOTE — PROGRESS NOTES
[facility] INTERDISCIPLINARY ROUNDS Cardiopulmonary Care Interdisciplinary Rounds were held today to discuss patient's plan of care and outcomes. The following members were present: Pharmacy, Nursing and Case Management. Expected Length of Stay:  4d 2h 
 
PLAN OF CARE:  
Continue current treatment plan

## 2019-08-01 NOTE — PROGRESS NOTES
Progress Note 8/1/2019 8:38 PM 
NAME: Jayesh Daniels MRN:  [id number] Admit Diagnosis: CHF (congestive heart failure) ([facility] [I50.9] Assessment: 1. Acute on chronic systolic heart failure 2. Dilated NICM; EF 25%. Echo 7/18 w/ EF 20%, dil LV, nml RV, mild MR/TR 3. Remote ICD implantation 4. Recurrent ventricular tachycardia 5. Hyperlipidemia 6. Former smoker 7. DNR 
8. Usual Cardiologist:  Dr. Raul Orourke now Dr. Yanira Horner (appt 8/1) Plan:  
Last 3 Recorded Weights in this Encounter 07/30/19 0600 07/31/19 0409 08/01/19 3461 Weight: 86.8 kg (191 lb 5.8 oz) 85.3 kg (188 lb) 84.4 kg (186 lb) Did not diurese much. Borderline BP No metolazone w/ low BPs yesterday Carol Gomez Start dobutamine 5mcg Hold coreg w/ inotrope Hold aldactone May need to hold Rita Floss Continue diuresis; increased Metolazone when BPs better? ? 
May need midodrine? [x]        High complexity decision making was performed Subjective:  
 
Jayesh Daniels denies chest pain. Still w/ dyspnea on O2 Discussed with RN events overnight. Patient Active Problem List  
Diagnosis Code  Encounter for colonoscopy due to history of colonic polyp Z12.11, Z86.010  
 CHF (congestive heart failure) ([facility]) I50.9 Review of Systems: 
 
Symptom Y/N Comments  Symptom Y/N Comments Fever/Chills N   Chest Pain N Poor Appetite N   Edema N   
Cough N   Abdominal Pain N Sputum N   Joint Pain N   
SOB/MURILLO N   Pruritis/Rash N   
Nausea/vomit N   Tolerating PT/OT Y Diarrhea N   Tolerating Diet Y Constipation N   Other Could NOT obtain due to:   
 
Objective:  
  
Physical Exam: 
 
Last 24hrs VS reviewed since prior progress note. Most recent are: 
 
Visit Vitals BP 94/58 (BP 1 Location: Right arm, BP Patient Position: At rest) Pulse 68 Temp 99.4 °F (37.4 °C) Resp 16 Ht 5' 9\" (1.753 m) Wt 84.4 kg (186 lb) SpO2 96% BMI 27.47 kg/m² Intake/Output Summary (Last 24 hours) at 8/1/2019 1144 Last data filed at 8/1/2019 1050 Gross per 24 hour Intake 360 ml Output 1650 ml Net -1290 ml General Appearance: Well developed, well nourished, alert & oriented x 3,  
 no acute distress. Ears/Nose/Mouth/Throat: Hearing grossly normal. 
Neck: Supple. Chest: Lungs clear to auscultation bilaterally. Cardiovascular: Regular rate and rhythm, S1S2 normal, no murmur. Abdomen: Soft, non-tender, bowel sounds are active. Extremities: Trivial edema bilaterally. Skin: Warm and dry. PMH/SH reviewed - no change compared to H&P Data Review Telemetry: sinus rhythm Lab Data Personally Reviewed: 
 
Recent Labs 08/01/19 
0330 07/31/19 
2047 WBC 6.8 6.7 HGB 10.2* 10.2* HCT 31.3* 31.6*  
 141* LABRCNT(INR:3,PTP:3,APTT:3,) Recent Labs 08/01/19 
0330 07/31/19 
0311 07/30/19 
0600  136 137  
K 3.6 3.6 3.9  103 107 CO2 27 25 21 BUN 41* 38* 37* CREA 1.35* 1.33* 1.35* * 116* 102* CA 8.2* 8.2* 8.3* LABRCNT(CPK:3,CpKMB:3,ckndx:3,troiq:3)No results found for: CHOL, CHOLX, CHLST, CHOLV, HDL, LDL, LDLC, DLDLP, TGLX, TRIGL, TRIGP, CHHD, CHHDXLABRCNT(sgot:3,gpt:3,ap:3,tbiL:3,TP:3,ALB:3,GLOB:3,ggt:3,aml:3,amyp:3,lpse:3,hlpse:3)No results for input(s): PH, PCO2, PO2 in the last 72 hours. No results found for: CHOL, CHOLX, CHLST, CHOLV, HDL, LDL, LDLC, DLDLP, TGLX, TRIGL, TRIGP, CHHD, CHHDXMEDTABLEGeorge H Rmey III, DO No results for input(s): PH, PCO2, PO2 in the last 72 hours. Medications Personally Reviewed: 
 
Current Facility-Administered Medications Medication Dose Route Frequency  furosemide (LASIX) injection 60 mg  60 mg IntraVENous BID  loratadine (CLARITIN) tablet 10 mg  10 mg Oral DAILY  DOBUTamine (DOBUTREX) 500 mg/250 mL (2,000 mcg/mL) infusion  5 mcg/kg/min IntraVENous CONTINUOUS  
 simethicone (MYLICON) tablet 80 mg  80 mg Oral QID PRN  
  sacubitril-valsartan (ENTRESTO) 49-51 mg tablet 1 Tab  1 Tab Oral Q12H  
 guaiFENesin (ROBITUSSIN) 100 mg/5 mL oral liquid 100 mg  100 mg Oral TID PRN  
 amoxicillin-clavulanate (AUGMENTIN) 875-125 mg per tablet 1 Tab  1 Tab Oral Q12H  
 amiodarone (CORDARONE) tablet 200 mg  200 mg Oral DAILY  levothyroxine (SYNTHROID) tablet 100 mcg  100 mcg Oral QPM  
 sodium chloride (NS) flush 5-40 mL  5-40 mL IntraVENous Q8H  
 sodium chloride (NS) flush 5-40 mL  5-40 mL IntraVENous PRN  
 ondansetron (ZOFRAN) injection 4 mg  4 mg IntraVENous Q6H PRN  
 docusate sodium (COLACE) capsule 100 mg  100 mg Oral DAILY PRN  
 morphine injection 1 mg  1 mg IntraVENous Q4H PRN  
 allopurinol (ZYLOPRIM) tablet 200 mg  200 mg Oral DAILY  albuterol-ipratropium (DUO-NEB) 2.5 MG-0.5 MG/3 ML  3 mL Nebulization Q6H PRN  
 melatonin tablet 3 mg  3 mg Oral QHS PRN Sarah Tipton III, DO

## 2019-08-01 NOTE — PROGRESS NOTES
Hospitalist Progress Note NAME: Jayesh Daniels :  1940 MRN:  [id number] Assessment / Plan: 
Acute hypoxic respiratory failure due to Acute systolic congestive heart failure exacerbation Possible healthcare associated pneumonia less likely  
-CT chest shows groundglass no opacities.  BN pep is elevated at 9600 
-Per cards recent ECHO shows EF of 25 %  
-cont Lasix, incr dose.   
-dobutamine started. Coreg, aldactone and metolazone held 
-Cont  home [place] -??accurate strict I's and O's, discussed with RN. Wt down 
-stop  vancomycin and cefepime as PNA is less likely and will start 4 more days of Augmentin and stop due to borderline fever.   Blood cultures NGTD. -Continue oxygen by nasal cannula 
-cont  duo neb due to no wheezing Acute hepatitis due to congestion  
-CT shows evidence of nonspecific hepatic and gallbladder disease 
-US liver confirms congestive hepatopathy 
-hepatitis panel neg, LFT improving History of atrial fibrillation 
-cont  amiodarone and Coreg Hypertension Dyslipidemia Hypothyroidism 
-cont  home Coreg, statin and levothyroxine 
  
  
  
Code Status: DNR Surrogate Decision Maker: Wife angela  
DVT Prophylaxis: heparin GI Prophylaxis: not indicated  
Baseline: From home independent Overweight by definition Body mass index is 27.47 kg/m². Subjective: Chief Complaint / Reason for Physician Visit Pt seen at bedside. No complaint. Slightly hypotensive today but is asymptomatic Review of Systems: 
Symptom Y/N Comments  Symptom Y/N Comments Fever/Chills n   Chest Pain n   
Poor Appetite    Edema Cough    Abdominal Pain n   
Sputum    Joint Pain SOB/MURILLO n   Pruritis/Rash Nausea/vomit    Tolerating PT/OT Diarrhea    Tolerating Diet Constipation    Other Could NOT obtain due to:   
 
Objective: VITALS:  
Last 24hrs VS reviewed since prior progress note. Most recent are: 
Patient Vitals for the past 24 hrs: Temp Pulse Resp BP SpO2  
08/01/19 1323  72 22 104/57 92 % 08/01/19 1049 99.4 °F (37.4 °C) 68 16 94/58 96 % 08/01/19 0732 98.5 °F (36.9 °C) 73 18 103/62 96 % 08/01/19 0332 98.5 °F (36.9 °C) 72 20 103/66 92 % 07/31/19 2353 99.4 °F (37.4 °C) 71 21 93/55 94 % 07/31/19 2130    94/53   
07/31/19 1952 99.1 °F (37.3 °C) 75 20 91/51 92 % 07/31/19 1853  76  96/56   
07/31/19 1830  75  98/57 92 % 07/31/19 1748     95 % 07/31/19 1742  72  103/58   
07/31/19 1735  72  103/58   
07/31/19 1554 98.8 °F (37.1 °C) 71 20 96/53 91 % Intake/Output Summary (Last 24 hours) at 8/1/2019 1433 Last data filed at 8/1/2019 1050 Gross per 24 hour Intake 240 ml Output 1650 ml Net -1410 ml PHYSICAL EXAM: 
General: cooperative, no acute distress   
EENT:  EOMI. Anicteric sclerae. MMM Resp:  Mild crackles at bases, wheezing +, no crackles CV:  Regular  rhythm,  No edema GI:  Soft, Non distended, Non tender.  +Bowel sounds Neurologic:  Alert and oriented X 3, normal speech Psych:   Good insight. Not anxious nor agitated Skin:  No rashes. No jaundice Reviewed most current lab test results and cultures  YES Reviewed most current radiology test results   YES Review and summation of old records today    NO Reviewed patient's current orders and MAR    YES 
PMH/SH reviewed - no change compared to H&P Current Facility-Administered Medications:  
  furosemide (LASIX) injection 60 mg, 60 mg, IntraVENous, BID, Raisa Hodges MD, 60 mg at 08/01/19 9633   loratadine (CLARITIN) tablet 10 mg, 10 mg, Oral, DAILY, Raisa Hodges MD, 10 mg at 08/01/19 1003   DOBUTamine (DOBUTREX) 500 mg/250 mL (2,000 mcg/mL) infusion, 5 mcg/kg/min, IntraVENous, CONTINUOUS, Jose L Remy III, DO, Last Rate: 12.7 mL/hr at 08/01/19 1334, 5 mcg/kg/min at 08/01/19 1334   simethicone (MYLICON) tablet 80 mg, 80 mg, Oral, QID PRN, Raisa Hodges MD 
   sacubitril-valsartan (ENTRESTO) 49-51 mg tablet 1 Tab, 1 Tab, Oral, Q12H, Jose L Remy III, DO, Stopped at 08/01/19 0900 
  guaiFENesin (ROBITUSSIN) 100 mg/5 mL oral liquid 100 mg, 100 mg, Oral, TID PRN, Destiney Haro MD, 100 mg at 08/01/19 0844 
  amoxicillin-clavulanate (AUGMENTIN) 875-125 mg per tablet 1 Tab, 1 Tab, Oral, Q12H, Destiney Haro MD, 1 Tab at 08/01/19 0843 
  amiodarone (CORDARONE) tablet 200 mg, 200 mg, Oral, DAILY, Destiney Haro MD, 200 mg at 08/01/19 9164   levothyroxine (SYNTHROID) tablet 100 mcg, 100 mcg, Oral, QPM, Alexx Siddiqui MD, 100 mcg at 07/31/19 1737 
  sodium chloride (NS) flush 5-40 mL, 5-40 mL, IntraVENous, Q8H, Alexx Siddiqui MD, 20 mL at 08/01/19 1400 
  sodium chloride (NS) flush 5-40 mL, 5-40 mL, IntraVENous, PRN, Karina Siddiqui MD 
  ondansetron (ZOFRAN) injection 4 mg, 4 mg, IntraVENous, Q6H PRN, Karina Arreaga MD 
  docusate sodium (COLACE) capsule 100 mg, 100 mg, Oral, DAILY PRN, Destiney Haro MD, 100 mg at 07/28/19 2221   morphine injection 1 mg, 1 mg, IntraVENous, Q4H PRN, Destiney Haro MD, 1 mg at 07/28/19 1242   allopurinol (ZYLOPRIM) tablet 200 mg, 200 mg, Oral, DAILY, Destiney Haro MD, 200 mg at 08/01/19 9603   albuterol-ipratropium (DUO-NEB) 2.5 MG-0.5 MG/3 ML, 3 mL, Nebulization, Q6H PRN, Alexx Arreaga MD, 3 mL at 07/31/19 0727 
  melatonin tablet 3 mg, 3 mg, Oral, QHS PRN, Daren Pass K, DO, 3 mg at 07/28/19 2222 
________________________________________________________________________ Care Plan discussed with: 
  Comments Patient y Family  y Pt's wife RN y   
Care Manager Consultant Multidiciplinary team rounds were held today with , nursing, pharmacist and clinical coordinator. Patient's plan of care was discussed; medications were reviewed and discharge planning was addressed. ________________________________________________________________________ Total NON critical care TIME:  35   Minutes Total CRITICAL CARE TIME Spent:   Minutes non procedure based Comments >50% of visit spent in counseling and coordination of care    
________________________________________________________________________ Noreen Morelos MD  
 
Procedures: see electronic medical records for all procedures/Xrays and details which were not copied into this note but were reviewed prior to creation of Plan. LABS: 
I reviewed today's most current labs and imaging studies. Pertinent labs include: 
Recent Labs 08/01/19 
0330 07/31/19 
0235 WBC 6.8 6.7 HGB 10.2* 10.2* HCT 31.3* 31.6*  
 141* Recent Labs 08/01/19 
0330 07/31/19 
0311 07/30/19 
0600  136 137  
K 3.6 3.6 3.9  103 107 CO2 27 25 21 * 116* 102* BUN 41* 38* 37* CREA 1.35* 1.33* 1.35* CA 8.2* 8.2* 8.3* ALB 2.6* 2.7* 2.7* TBILI 0.8 1.0 1.1*  
SGOT 74* 83* 123* * 202* 218* Signed: Noreen Morelos MD

## 2019-08-02 NOTE — PROGRESS NOTES
Bedside shift change report GIVEN TO Oswaldo Burns RN. Report included the following information SBAR. SIGNIFICANT CHANGES DURING SHIFT:  Tolerating cardiac drip. Practiced incentive spirometer CONCERNS TO ADDRESS WITH MD:  patience [address] NURSING NOTE Admission Date 7/28/2019 Admission Diagnosis CHF (congestive heart failure) ([facility] [address] [I50.9] Consults IP CONSULT TO CARDIOLOGY Cardiac Monitoring [] Yes [] No  
  
Purposeful Hourly Rounding [] Yes   
Froylan Score Total Score: 3 Froylan score 3 or > [] Bed Alarm [] Avasys [] 1:1 sitter [] Patient refused (Signed refusal form in chart) Arsen Score Arsen Score: 18 Arsen score 14 or < [] PMT consult [] Wound Care consult  
 []  Specialty bed  [] Nutrition consult Influenza Vaccine Received Flu Vaccine for Current Season (usually Sept-March): Not Flu Season Oxygen needs? [] Room air Oxygen @  []1L    []2L    []3L   []4L    []5L   []6L via NC Chronic home O2 use? [] Yes [] No 
Perform O2 challenge test and document in progress note using smartphrase (.Homeoxygen) Last bowel movement Last Bowel Movement Date: 07/31/19 Urinary Catheter LDAs Peripheral IV 07/28/19 Right Antecubital (Active) Site Assessment Clean, dry, & intact 8/1/2019  4:23 PM  
Phlebitis Assessment 0 8/1/2019  4:23 PM  
Infiltration Assessment 0 8/1/2019  4:23 PM  
Dressing Status Clean, dry, & intact 8/1/2019  8:56 AM  
Dressing Type Tape;Transparent 8/1/2019  8:56 AM  
Hub Color/Line Status Pink 8/1/2019  8:56 AM  
   
Saline Lock 08/01/19 Anterior;Left;Proximal Forearm (Active) Site Assessment Clean;Dry 8/1/2019  5:35 PM  
Phlebitis Assessment 0 8/1/2019  5:35 PM  
Infiltration Assessment 0 8/1/2019  5:35 PM  
Dressing Status Clean, dry, & intact 8/1/2019  5:35 PM  
Hub Color/Line Status Black 8/1/2019  5:35 PM  
                  
  
Readmission Risk Assessment Tool Score Medium Risk 15 Total Score 3 Has Seen PCP in Last 6 Months (Yes=3, No=0) 2 . Living with Significant Other. Assisted Living. LTAC. SNF. or  
Rehab  
 5 Pt. Coverage (Medicare=5 , Medicaid, or Self-Pay=4) 4 Charlson Comorbidity Score (Age + Comorbid Conditions) Criteria that do not apply:  
 Patient Length of Stay (>5 days = 3) IP Visits Last 12 Months (1-3=4, 4=9, >4=11) Expected Length of Stay 4d 2h Actual Length of Stay 4

## 2019-08-02 NOTE — PROGRESS NOTES
Problem: Falls - Risk of 
Goal: *Absence of Falls Description Document Alex Martins Fall Risk and appropriate interventions in the flowsheet. Outcome: Progressing Towards Goal 
Note:  
Fall Risk Interventions: 
Mobility Interventions: Bed/chair exit alarm Medication Interventions: Bed/chair exit alarm, Evaluate medications/consider consulting pharmacy, Patient to call before getting OOB, Teach patient to arise slowly History of Falls Interventions: Bed/chair exit alarm, Door open when patient unattended, Investigate reason for fall, Room close to nurse's station Problem: Patient Education: Go to Patient Education Activity Goal: Patient/Family Education Outcome: Progressing Towards Goal 
  
Problem: Patient Education: Go to Patient Education Activity Goal: Patient/Family Education Outcome: Progressing Towards Goal 
  
Problem: Heart Failure: Day 1 Goal: Off Pathway (Use only if patient is Off Pathway) Outcome: Progressing Towards Goal 
Goal: Activity/Safety Outcome: Progressing Towards Goal 
Goal: Consults, if ordered Outcome: Progressing Towards Goal 
Goal: Diagnostic Test/Procedures Outcome: Progressing Towards Goal 
Goal: Nutrition/Diet Outcome: Progressing Towards Goal 
Goal: Discharge Planning Outcome: Progressing Towards Goal 
Goal: Medications Outcome: Progressing Towards Goal 
Goal: Respiratory Outcome: Progressing Towards Goal 
Goal: Treatments/Interventions/Procedures Outcome: Progressing Towards Goal 
Goal: Psychosocial 
Outcome: Progressing Towards Goal 
Goal: *Oxygen saturation within defined limits Outcome: Progressing Towards Goal 
Goal: *Hemodynamically stable Outcome: Progressing Towards Goal 
Goal: *Optimal pain control at patient's stated goal 
Outcome: Progressing Towards Goal 
Goal: *Anxiety reduced or absent Outcome: Progressing Towards Goal 
  
Problem: Heart Failure: Day 2 Goal: Off Pathway (Use only if patient is Off Pathway) Outcome: Progressing Towards Goal 
Goal: Activity/Safety Outcome: Progressing Towards Goal 
Goal: Consults, if ordered Outcome: Progressing Towards Goal 
Goal: Diagnostic Test/Procedures Outcome: Progressing Towards Goal 
Goal: Nutrition/Diet Outcome: Progressing Towards Goal 
Goal: Discharge Planning Outcome: Progressing Towards Goal 
Goal: Medications Outcome: Progressing Towards Goal 
Goal: Respiratory Outcome: Progressing Towards Goal 
Goal: Treatments/Interventions/Procedures Outcome: Progressing Towards Goal 
Goal: Psychosocial 
Outcome: Progressing Towards Goal 
Goal: *Oxygen saturation within defined limits Outcome: Progressing Towards Goal 
Goal: *Hemodynamically stable Outcome: Progressing Towards Goal 
Goal: *Optimal pain control at patient's stated goal 
Outcome: Progressing Towards Goal 
Goal: *Anxiety reduced or absent Outcome: Progressing Towards Goal 
Goal: *Demonstrates progressive activity Outcome: Progressing Towards Goal 
  
Problem: Heart Failure: Day 3 Goal: Off Pathway (Use only if patient is Off Pathway) Outcome: Progressing Towards Goal 
Goal: Activity/Safety Outcome: Progressing Towards Goal 
Goal: Diagnostic Test/Procedures Outcome: Progressing Towards Goal 
Goal: Nutrition/Diet Outcome: Progressing Towards Goal 
Goal: Discharge Planning Outcome: Progressing Towards Goal 
Goal: Medications Outcome: Progressing Towards Goal 
Goal: Respiratory Outcome: Progressing Towards Goal 
Goal: Treatments/Interventions/Procedures Outcome: Progressing Towards Goal 
Goal: Psychosocial 
Outcome: Progressing Towards Goal 
Goal: *Oxygen saturation within defined limits Outcome: Progressing Towards Goal 
Goal: *Hemodynamically stable Outcome: Progressing Towards Goal 
Goal: *Optimal pain control at patient's stated goal 
Outcome: Progressing Towards Goal 
Goal: *Anxiety reduced or absent Outcome: Progressing Towards Goal 
Goal: *Demonstrates progressive activity Outcome: Progressing Towards Goal 
  
Problem: Heart Failure: Day 4 Goal: Off Pathway (Use only if patient is Off Pathway) Outcome: Progressing Towards Goal 
Goal: Activity/Safety Outcome: Progressing Towards Goal 
Goal: Diagnostic Test/Procedures Outcome: Progressing Towards Goal 
Goal: Nutrition/Diet Outcome: Progressing Towards Goal 
Goal: Discharge Planning Outcome: Progressing Towards Goal 
Goal: Medications Outcome: Progressing Towards Goal 
Goal: Respiratory Outcome: Progressing Towards Goal 
Goal: Treatments/Interventions/Procedures Outcome: Progressing Towards Goal 
Goal: Psychosocial 
Outcome: Progressing Towards Goal 
Goal: *Oxygen saturation within defined limits Outcome: Progressing Towards Goal 
Goal: *Hemodynamically stable Outcome: Progressing Towards Goal 
Goal: *Optimal pain control at patient's stated goal 
Outcome: Progressing Towards Goal 
Goal: *Anxiety reduced or absent Outcome: Progressing Towards Goal 
Goal: *Demonstrates progressive activity Outcome: Progressing Towards Goal 
  
Problem: Heart Failure: Day 5 Goal: Off Pathway (Use only if patient is Off Pathway) Outcome: Progressing Towards Goal 
Goal: Activity/Safety Outcome: Progressing Towards Goal 
Goal: Diagnostic Test/Procedures Outcome: Progressing Towards Goal 
Goal: Nutrition/Diet Outcome: Progressing Towards Goal 
Goal: Discharge Planning Outcome: Progressing Towards Goal 
Goal: Medications Outcome: Progressing Towards Goal 
Goal: Respiratory Outcome: Progressing Towards Goal 
Goal: Treatments/Interventions/Procedures Outcome: Progressing Towards Goal 
Goal: Psychosocial 
Outcome: Progressing Towards Goal 
  
Problem: Heart Failure: Discharge Outcomes Goal: *Demonstrates ability to perform prescribed activity without shortness of breath or discomfort Outcome: Progressing Towards Goal 
Goal: *Left ventricular function assessment completed prior to or during stay, or planned for post-discharge Outcome: Progressing Towards Goal 
Goal: *ACEI prescribed if LVEF less than 40% and no contraindications or ARB prescribed Outcome: Progressing Towards Goal 
Goal: *Verbalizes understanding and describes prescribed diet Outcome: Progressing Towards Goal 
Goal: *Verbalizes understanding/describes prescribed medications Outcome: Progressing Towards Goal 
Goal: *Describes available resources and support systems Description 
(eg: Home Health, Palliative Care, Advanced Medical Directive) Outcome: Progressing Towards Goal 
Goal: *Describes smoking cessation resources Outcome: Progressing Towards Goal 
Goal: *Understands and describes signs and symptoms to report to providers(Stroke Metric) Outcome: Progressing Towards Goal 
Goal: *Describes/verbalizes understanding of follow-up/return appt Description 
(eg: to physicians, diabetes treatment coordinator, and other resources Outcome: Progressing Towards Goal 
Goal: *Describes importance of continuing daily weights and changes to report to physician Outcome: Progressing Towards Goal

## 2019-08-02 NOTE — PROGRESS NOTES
Transitional Care Team: Initial HUG Note    Date of Assessment: 08/02/19  Time of Assessment:  1:18 PM    Beauty Galeazzi is a 77 y.o. male inpatient at  [facility]. Assessment & Plan   Pt A+O. Denies dyspnea. Continues on Dobutamine gtt along with Entresto. Has diuresed large amount. Per documentation weight is down 12 pounds since admission. Pt states has had diagnosis of HF since 1997. Follows reduced sodium diet. Weighs at home. Anticipates return home, may benefit with [facility] services         Primary Diagnosis: heart failure  Advance Directive:  Has no AMD on file. Spiritual care  has been consulted for potential completion of AMD.    Current Code Status:  Hospital DNR which will become inactive upon discharge    Referral to Hospice/ Palliative Care Appropriate: observe response to medications and treatments. Awareness of Medical Conditions: (Trajectory of illness and pts expectations). Anticipates more years of good quality of life    Discharge Needs: (to include safety issues)  services    Barriers Identified: none  Patient is willing to go to SNF/Inpatient Rehab if recommended: potential need    Medication Review:  Await AVS.    Can patient afford medications:  Current meds. Patient is Compliant with Medication regimen:yes    Who manages medications at home: self    Best Patient Contact Number:  [phone]    HUG (Healthy Understanding of Goals) program introduced to patient/family. The Transitional Care Team bridges the gaps in care and education surrounding discharge from the acute care facility. The objective is to empower the patient and family in taking a proactive role in preventing readmission within the first thirty days after discharge. The team is also involved in the efforts to reduce readmission to the acute care setting after stabilization and discharge from the acute care environment either to skilled nursing facilities or community.      HUG RN/NP [place] [facility] [place] follow up appointments/ Ambulatory Nurse Navigator name and contact information when the patient is ready for discharge. No future appointments. Non-MG follow up appointment(s):none yet    [facility]: call information given to lives outside service area      Patient education focused on readmission zones as described as: The Red Zone: High risk for readmission, days 1-21  The Yellow Zone: Moderate  risk for readmission, days 22-29   The Green Zone: Lower risk for readmission, days                30 and after    The [facility] [place] will attempt to follow the patient from a distance while inpatient as well as be available for further transition/disposition needs. The [facility] team will continue to offer support during the 30- 90 day discharge from acute care setting. Will notify Ambulatory {Blank Single Select Template:[id number][de-identified] \"FRANCINE   RN.     Past Medical History:   Diagnosis Date    AICD (automatic cardioverter/defibrillator) present     Arthritis     Cancer ([address]     skin    Heart failure ([address]     Other ill-defined conditions(799.89)     high cholesterol

## 2019-08-02 NOTE — PROGRESS NOTES
Initial Nutrition Assessment: 
 
INTERVENTIONS/RECOMMENDATIONS:  
· Meals/Snacks: General/healthful diet (Cardiac regular) · Supplements: Commercial supplement (Ensure Enlive Chocolate 1/day) ASSESSMENT:  
Pt. Admitted for CHF. PMH found history of atrial fibrillation, HTN, dyslipidemia, hypothyroidism. Pt. Stated he has had no appetite because the food is bland. Even though current cardiac diet is bland, Mrs. Rajat Mckinley has helped with consumption of food. Wife was present in the room and has been ordering meals via room service. It is unknown if wife has been consuming uneaten food from review of % diet eaten. Pt. stated he would like to try Ensure Enlive chocolate 1/day to increase kcals and protein intake. Pt. Has had no intentional weight loss and the only weight gain has been from fluid due to his current condition. Pt. Will continue to utilize room service for variety of foods and will increase intake. Will continue to monitor progress and plan of care. Diet Order: Cardiac 
% Eaten:   
Patient Vitals for the past 72 hrs: 
 % Diet Eaten 08/02/19 1004 100 % 08/01/19 0859 100 % 07/31/19 1852 25 % 07/31/19 1212 50 % 07/31/19 1053 75 %  
07/30/19 1733 50 % 07/30/19 1306 75 % Pertinent Medications: [x]Reviewed []Other Pertinent Labs: [x]Reviewed []Other Cl 93, Cr 1.38, BUN 39, Na+ 133, K+ 3.2 Food Allergies: [x]None []Other Last BM: 7/31   []Active     []Hyperactive  []Hypoactive       [] Absent BS Skin:    [x] Intact   [] Incision  [] Breakdown  [] Other: Anthropometrics:  
Height: 5' 9\" (175.3 cm) Weight: 81.3 kg (179 lb 3.7 oz) IBW (%IBW):   ( ) UBW (%UBW):   (  %) Last Weight Metrics: 
Weight Loss Metrics 8/2/2019 7/27/2019 6/8/2019 8/26/2015 6/6/2012 6/2/2011 5/24/2011 Today's Wt 179 lb 3.7 oz 190 lb 14.7 oz 187 lb 13.3 oz 186 lb 8 oz 193 lb 4 oz 184 lb 5 oz 189 lb 13.1 oz  
BMI 26.47 kg/m2 27.79 kg/m2 27.74 kg/m2 27.53 kg/m2 27.73 kg/m2 26.84 kg/m2 27.64 kg/m2 BMI: Body mass index is 26.47 kg/m². This BMI is indicative of: 
 []Underweight    [x]Normal    []Overweight    [] Obesity   [] Extreme Obesity (BMI>40) Estimated Nutrition Needs (Based on):  
6610 Kcals/day(1519 x 1.2) , 81 g(1g/kg) Protein Carbohydrate: At Least 130 g/day  Fluids: 1800 mL/day (1ml/kcal) NUTRITION DIAGNOSES:  
Problem:  Inadequate energy intake Etiology: related to lack of appetite Signs/Symptoms: as evidenced by as pt. reported minimum intake NUTRITION INTERVENTIONS: 
Meals/Snacks: General/healthful diet (Cardiac diet) Supplements: Commercial supplement (Ensure Enlive Chocolate 1/day) GOAL:  
PO intake >50% within 5-7- days LEARNING NEEDS (Diet, Food/Nutrient-Drug Interaction):  
 [x] None Identified 
 [] Identified and Education Provided/Documented 
 [] Identified and Pt declined/was not appropriate Cultureal, Samaritan, OR Ethnic Dietary Needs:  
 [x] None Identified 
 [] Identified and Addressed 
 [x] Interdisciplinary Care Plan Reviewed/Documented  
 [x] Discharge Planning:  Continue cardiac Diet MONITORING /EVALUATION:  
Food/Nutrient Intake Outcomes: Total energy intake Physical Signs/Symptoms Outcomes: Weight/weight change NUTRITION RISK:  
 [x] Patient At Nutritional Risk  
 [] Patient Not At Nutritional Risk PT SEEN FOR:  
 []  MD Consult: []Calorie Count []Diabetic Diet Education []Diet Education []Electrolyte Management []General Nutrition Management and Supplements []Management of Tube Feeding []TPN Recommendations []  RN Referral:  []MST score >=2 
   []Enteral/Parenteral Nutrition PTA []Pregnant: Gestational DM or Multigestation 
   []Pressure Ulcer/Wound Care needs     
[]  Low BMI [x]  CARRIE Reese Mealy 
Pager [phone] Weekend Pager [phone]

## 2019-08-02 NOTE — CONSULTS
[facility] Pulmonary, Critical Care, and Sleep Medicine Initial Patient Consult Name: Deepak Lebron MRN: [id number] : 1940 Hospital: [facility] Date: 2019 IMPRESSION:  
· Acute hypoxic respiratory failure · Acute pulmonary edema · Acute/chronic systolic heart failure - nonischemic cardiomyopathy - LVEF 25% · Chronic pleural plaques signalling asbestos exposure - no evidence of mesothelioma or asbestos-related lung disease at this time, had a stable workup in  of this year; follows with Dr. Soha Griffin RECOMMENDATIONS:  
· There is no active asbestos-related pathology contributing to this hospitalization · Continue excellent medical care as you are · Follow up with Dr. Soha Griffin as already scheduled [facility]) · Nothing else to add at this time Subjective: This patient has been seen and evaluated at the request of Dr. Ольга Adler for possible exposure to asbestos. Patient is a 77 y.o. male with known asbestos exposure. He follows regularly with Dr. Soha Griffin. He has had pleural plaques but normal pulmonary parenchyma and normal PFTs, most recently seen in 2019 with stable lung function and imaging with plans for repeat follow up in 1 year. He was admitted here with pulmonary edema (and possible pneumonia) due to his known nonischemic cardiomyopathy. He is being treated for that currently. He was concerned that he may have been misdiagnosed and that his current illness could be due to mesothelioma. Past Medical History:  
Diagnosis Date  AICD (automatic cardioverter/defibrillator) present  Arthritis  Cancer ([address] skin  Heart failure ([address]  Other ill-defined conditions(799.89)   
 high cholesterol Past Surgical History:  
Procedure Laterality Date  ABDOMEN SURGERY PROC UNLISTED  11  
 colon resection  HX HEENT    
 repaired right eardrum  HX OTHER SURGICAL benign cyst removed from back and thyroid  HX OTHER SURGICAL    
 skin graft  HX PACEMAKER    
 aicd  OR COLONOSCOPY FLX DX W/COLLJ SPEC WHEN PFRMD  6/6/2012  OR COLONOSCOPY W/BIOPSY SINGLE/MULTIPLE  4/27/2011 Prior to Admission medications Medication Sig Start Date End Date Taking? Authorizing Provider  
sacubitril-valsartan (ENTRESTO) 24 mg/26 mg tablet Take 1 Tab by mouth two (2) times a day. Yes Other, MD Stefani  
amiodarone (CORDARONE) 200 mg tablet Take 200 mg by mouth daily. Yes Other, MD Stefani  
levothyroxine (SYNTHROID) 100 mcg tablet Take 100 mcg by mouth every evening. Yes Other, MD Stefani  
carvedilol (COREG) 6.25 mg tablet Take 6.25 mg by mouth two (2) times daily (with meals). Yes Provider, Historical  
furosemide (LASIX) 20 mg tablet Take 10 mg by mouth two (2) times a day. Yes Provider, Historical  
spironolactone (ALDACTONE) 25 mg tablet Take 12.5 mg by mouth nightly. 4/26/11  Yes Provider, Historical  
allopurinol (ZYLOPRIM) 100 mg tablet Take 200 mg by mouth daily. Indications: 2 pills daily   Yes Provider, Historical  
loratadine (CLARITIN) 10 mg tablet Take 10 mg by mouth daily. 4/26/11  Yes Provider, Historical  
MULTIVITAMIN PO Take 1 Tab by mouth daily. Indications: Centrum Silver   Yes Provider, Historical  
pravastatin (PRAVACHOL) 80 mg tablet Take 80 mg by mouth nightly. Yes Provider, Historical  
benzonatate (TESSALON PERLES) 100 mg capsule Take 1 Cap by mouth three (3) times daily as needed for Cough for up to 10 days. 7/28/19 8/7/19  Sharlene Puentes MD  
 
No Known Allergies Social History Tobacco Use  Smoking status: Former Smoker Substance Use Topics  Alcohol use: Yes Alcohol/week: 4.2 standard drinks Types: 5 Glasses of wine per week Family History Problem Relation Age of Onset  Cancer Mother   
     colon  Heart Disease Father  Heart Disease Brother Current Facility-Administered Medications Medication Dose Route Frequency  furosemide (LASIX) injection 60 mg  60 mg IntraVENous BID  loratadine (CLARITIN) tablet 10 mg  10 mg Oral DAILY  DOBUTamine (DOBUTREX) 500 mg/250 mL (2,000 mcg/mL) infusion  5 mcg/kg/min IntraVENous CONTINUOUS  
 sacubitril-valsartan (ENTRESTO) 49-51 mg tablet 1 Tab  1 Tab Oral Q12H  
 amoxicillin-clavulanate (AUGMENTIN) 875-125 mg per tablet 1 Tab  1 Tab Oral Q12H  
 amiodarone (CORDARONE) tablet 200 mg  200 mg Oral DAILY  levothyroxine (SYNTHROID) tablet 100 mcg  100 mcg Oral QPM  
 sodium chloride (NS) flush 5-40 mL  5-40 mL IntraVENous Q8H  
 allopurinol (ZYLOPRIM) tablet 200 mg  200 mg Oral DAILY Review of Systems: A comprehensive review of systems was negative except for that written in the HPI. Objective:  
Vital Signs:   
Visit Vitals BP 97/43 (BP 1 Location: Left arm, BP Patient Position: Sitting) Pulse 79 Temp 98.6 °F (37 °C) Resp 20 Ht 5' 9\" (1.753 m) Wt 81.3 kg (179 lb 3.7 oz) SpO2 94% BMI 26.47 kg/m² O2 Device: Nasal cannula O2 Flow Rate (L/min): 2 l/min Temp (24hrs), Av.2 °F (37.3 °C), Min:98.5 °F (36.9 °C), Max:100 °F (37.8 °C) Intake/Output:  
Last shift:      701 -  1900 In: 240 [P.O.:240] Out: 200 [Urine:200] Last 3 shifts: 1901 -  0700 In: 240 [P.O.:240] Out: Penny Cruz [[id number]] Intake/Output Summary (Last 24 hours) at 2019 1341 Last data filed at 2019 1004 Gross per 24 hour Intake 360 ml Output 2650 ml Net -2290 ml Physical Exam:  
General:  Alert, cooperative, no distress, appears stated age. Head:  Normocephalic, without obvious abnormality, atraumatic. Eyes:  Conjunctivae/corneas clear. Nose: Nares normal. Septum midline. Mucosa normal.    
Throat: Lips, mucosa, and tongue normal.    
Neck: Supple, symmetrical, trachea midline Back:   Symmetric, no curvature.  ROM normal.  
 Lungs: Bibasilar rales Chest wall:  No tenderness or deformity. Heart:  Regular rate and rhythm Abdomen:   Soft, non-tender. Bowel sounds normal   
Extremities: Extremities normal, atraumatic, no cyanosis or edema. Skin: Skin color, texture, turgor normal. No rashes or lesions Lymph nodes: Cervical, supraclavicular nodes normal.  
Neurologic: Grossly nonfocal  
 
Data review:  
 
Recent Results (from the past 24 hour(s)) METABOLIC PANEL, COMPREHENSIVE Collection Time: 08/02/19  3:22 AM  
Result Value Ref Range Sodium 133 (L) 136 - 145 mmol/L Potassium 3.2 (L) 3.5 - 5.1 mmol/L Chloride 93 (L) 97 - 108 mmol/L  
 CO2 31 21 - 32 mmol/L Anion gap 9 5 - 15 mmol/L Glucose 116 (H) 65 - 100 mg/dL BUN 39 (H) 6 - 20 MG/DL Creatinine 1.38 (H) 0.70 - 1.30 MG/DL  
 BUN/Creatinine ratio 28 (H) 12 - 20 GFR est AA >60 >60 ml/min/1.73m2 GFR est non-AA 50 (L) >60 ml/min/1.73m2 Calcium 8.6 8.5 - 10.1 MG/DL Bilirubin, total 1.0 0.2 - 1.0 MG/DL  
 ALT (SGPT) 181 (H) 12 - 78 U/L  
 AST (SGOT) 81 (H) 15 - 37 U/L Alk. phosphatase 79 45 - 117 U/L Protein, total 6.7 6.4 - 8.2 g/dL Albumin 2.8 (L) 3.5 - 5.0 g/dL Globulin 3.9 2.0 - 4.0 g/dL A-G Ratio 0.7 (L) 1.1 - 2.2    
CBC WITH AUTOMATED DIFF Collection Time: 08/02/19  3:22 AM  
Result Value Ref Range WBC 8.1 4.1 - 11.1 K/uL  
 RBC 3.68 (L) 4.10 - 5.70 M/uL  
 HGB 11.3 (L) 12.1 - 17.0 g/dL HCT 33.8 (L) 36.6 - 50.3 % MCV 91.8 80.0 - 99.0 FL  
 MCH 30.7 26.0 - 34.0 PG  
 MCHC 33.4 30.0 - 36.5 g/dL  
 RDW 14.0 11.5 - 14.5 % PLATELET 296 005 - 342 K/uL MPV 11.1 8.9 - 12.9 FL  
 NRBC 0.0 0  WBC ABSOLUTE NRBC 0.00 0.00 - 0.01 K/uL NEUTROPHILS 73 32 - 75 % LYMPHOCYTES 9 (L) 12 - 49 % MONOCYTES 15 (H) 5 - 13 % EOSINOPHILS 2 0 - 7 % BASOPHILS 0 0 - 1 % IMMATURE GRANULOCYTES 1 (H) 0.0 - 0.5 % ABS. NEUTROPHILS 5.9 1.8 - 8.0 K/UL  
 ABS. LYMPHOCYTES 0.7 (L) 0.8 - 3.5 K/UL ABS. MONOCYTES 1.2 (H) 0.0 - 1.0 K/UL  
 ABS. EOSINOPHILS 0.1 0.0 - 0.4 K/UL  
 ABS. BASOPHILS 0.0 0.0 - 0.1 K/UL  
 ABS. IMM. GRANS. 0.1 (H) 0.00 - 0.04 K/UL  
 DF AUTOMATED Imaging: 
I have personally reviewed the patients radiographs and have reviewed the reports: 
Pulmonary edema +/- right lower lobe infiltrate. CT from march with no parenchymal lung disease. Has chronic pleural plaques, unchanged over several CTs Jonathan Alicea MD

## 2019-08-02 NOTE — PROGRESS NOTES
Home Oxygen Test 
Date of test: 8/2 Time of test: 1000 Sa02 78% on room air AT REST. Sa02 78 % on room air DURING AMBULATION. Sa02 92% on 4 Liters DURING AMBULATION. Sa02 92 % on 2 Liters AT REST/AFTER AMBULATION.

## 2019-08-02 NOTE — PROGRESS NOTES
Progress Note 8/2/2019 8:38 PM 
NAME: [place] Emmanuel MRN:  [id number] Admit Diagnosis: CHF (congestive heart failure) ([facility] [I50.9] Assessment: 1. Acute on chronic systolic heart failure 2. Dilated NICM; EF 25%. Echo 7/18 w/ EF 20%, dil LV, nml RV, mild MR/TR 3. Remote ICD implantation 4. Recurrent ventricular tachycardia 5. Hyperlipidemia 6. Former smoker 7. DNR 
8. Usual Cardiologist:  Dr. Eckert Me now Dr. Isa Pulido (appt 8/1) Plan:  
 
 
Last 3 Recorded Weights in this Encounter 07/31/19 5354 08/01/19 1680 08/02/19 0067 Weight: 85.3 kg (188 lb) 84.4 kg (186 lb) 81.3 kg (179 lb 3.7 oz) Negative 3L UOP Borderline BP 
 
O2 sats on 2L 90%; on RA 85% Donn Duos Continue dobutamine 5mcg; look to dec/stop over the wknd Continue diuresis; lasix 60mg IV BID Hold coreg w/ inotrope; resume when off inotrope and probably at lower dose of 3.125mg BID Hold aldactone; resume if able if BPs stable Continue w/ Ricka Foil Continue diuresis; increased Metolazone when BPs better? ?  May not be needed. Needs to get UOOB walking around; may need O2 @ discharge Dr. Nell Ortez will be rounding this wknd [x]        High complexity decision making was performed Subjective:  
 
Albania Emmanuel denies chest pain. Still w/ dyspnea on O2 Discussed with RN events overnight. Patient Active Problem List  
Diagnosis Code  Encounter for colonoscopy due to history of colonic polyp Z12.11, Z86.010  
 CHF (congestive heart failure) ([facility]) I50.9 Review of Systems: 
 
Symptom Y/N Comments  Symptom Y/N Comments Fever/Chills N   Chest Pain N Poor Appetite N   Edema N   
Cough N   Abdominal Pain N Sputum N   Joint Pain N   
SOB/MURILLO N   Pruritis/Rash N   
Nausea/vomit N   Tolerating PT/OT Y Diarrhea N   Tolerating Diet Y Constipation N   Other Could NOT obtain due to:   
 
Objective:  
  
Physical Exam: Last 24hrs VS reviewed since prior progress note. Most recent are: 
 
Visit Vitals BP (!) 120/104 (BP 1 Location: Left arm, BP Patient Position: At rest) Comment: RN notified Pulse 74 Temp 99.7 °F (37.6 °C) Resp 24 Ht 5' 9\" (1.753 m) Wt 81.3 kg (179 lb 3.7 oz) SpO2 94% BMI 26.47 kg/m² Intake/Output Summary (Last 24 hours) at 8/2/2019 [facility] Last data filed at 8/2/2019 3078 Gross per 24 hour Intake 240 ml Output 3150 ml Net -2910 ml General Appearance: Well developed, well nourished, alert & oriented x 3,  
 no acute distress. Ears/Nose/Mouth/Throat: Hearing grossly normal. 
Neck: Supple. Chest: Lungs clear to auscultation bilaterally. Cardiovascular: Regular rate and rhythm, S1S2 normal, no murmur. Abdomen: Soft, non-tender, bowel sounds are active. Extremities: Trivial edema bilaterally. Skin: Warm and dry. PMH/SH reviewed - no change compared to H&P Data Review Telemetry: sinus rhythm Lab Data Personally Reviewed: 
 
Recent Labs 08/02/19 
0322 08/01/19 
0330 WBC 8.1 6.8 HGB 11.3* 10.2* HCT 33.8* 31.3*  
 165 LABRCNT(INR:3,PTP:3,APTT:3,) Recent Labs 08/02/19 
8316 08/01/19 
0330 07/31/19 
8016 * 138 136  
K 3.2* 3.6 3.6 CL 93* 102 103 CO2 31 27 25 BUN 39* 41* 38* CREA 1.38* 1.35* 1.33* * 119* 116* CA 8.6 8.2* 8.2* LABRCNT(CPK:3,CpKMB:3,ckndx:3,troiq:3)No results found for: CHOL, CHOLX, CHLST, CHOLV, HDL, LDL, LDLC, DLDLP, TGLX, TRIGL, TRIGP, CHHD, CHHDXLABRCNT(sgot:3,gpt:3,ap:3,tbiL:3,TP:3,ALB:3,GLOB:3,ggt:3,aml:3,amyp:3,lpse:3,hlpse:3)No results for input(s): PH, PCO2, PO2 in the last 72 hours. No results found for: CHOL, CHOLX, CHLST, CHOLV, HDL, LDL, LDLC, DLDLP, TGLX, TRIGL, TRIGP, CHHD, CHHDXMEDTABLEGeorge H Remy III, DO No results for input(s): PH, PCO2, PO2 in the last 72 hours. Medications Personally Reviewed: 
 
Current Facility-Administered Medications Medication Dose Route Frequency  furosemide (LASIX) injection 60 mg  60 mg IntraVENous BID  loratadine (CLARITIN) tablet 10 mg  10 mg Oral DAILY  DOBUTamine (DOBUTREX) 500 mg/250 mL (2,000 mcg/mL) infusion  5 mcg/kg/min IntraVENous CONTINUOUS  
 simethicone (MYLICON) tablet 80 mg  80 mg Oral QID PRN  
 sacubitril-valsartan (ENTRESTO) 49-51 mg tablet 1 Tab  1 Tab Oral Q12H  
 guaiFENesin (ROBITUSSIN) 100 mg/5 mL oral liquid 100 mg  100 mg Oral TID PRN  
 amoxicillin-clavulanate (AUGMENTIN) 875-125 mg per tablet 1 Tab  1 Tab Oral Q12H  
 amiodarone (CORDARONE) tablet 200 mg  200 mg Oral DAILY  levothyroxine (SYNTHROID) tablet 100 mcg  100 mcg Oral QPM  
 sodium chloride (NS) flush 5-40 mL  5-40 mL IntraVENous Q8H  
 sodium chloride (NS) flush 5-40 mL  5-40 mL IntraVENous PRN  
 ondansetron (ZOFRAN) injection 4 mg  4 mg IntraVENous Q6H PRN  
 docusate sodium (COLACE) capsule 100 mg  100 mg Oral DAILY PRN  
 morphine injection 1 mg  1 mg IntraVENous Q4H PRN  
 allopurinol (ZYLOPRIM) tablet 200 mg  200 mg Oral DAILY  albuterol-ipratropium (DUO-NEB) 2.5 MG-0.5 MG/3 ML  3 mL Nebulization Q6H PRN  
 melatonin tablet 3 mg  3 mg Oral QHS PRN Ary Blunt III, DO

## 2019-08-02 NOTE — PROGRESS NOTES
Hospitalist Progress Note NAME: Patricia Fonseca :  1940 MRN:  [id number] Assessment / Plan: 
Acute hypoxic respiratory failure due to Acute systolic congestive heart failure exacerbation Possible healthcare associated pneumonia  
-CT chest shows groundglass no opacities.  BN pep is elevated at 9600 
-Per cards recent ECHO shows EF of 25 %  
-cont IV lasix 
-cont' dobutamine per cardiology. Coreg, aldactone and metolazone held 
-Cont  home Entresto 
-strict I's and O's, discussed with RN. Wt down 
-repeat CXR 
-stop  vancomycin and cefepime as PNA is less likely and will start 4 more days of Augmentin and stop due to borderline fever.   Blood cultures NGTD. -Continue oxygen by nasal cannula 
-cont  duo neb due to no wheezing Hypokalemia 
-replete with KCl Acute hepatitis due to congestion  
-CT shows evidence of nonspecific hepatic and gallbladder disease 
-US liver confirms congestive hepatopathy 
-hepatitis panel neg, LFT improving History of atrial fibrillation 
-cont  amiodarone and Coreg Hypertension Dyslipidemia Hypothyroidism 
-cont  home Coreg, statin and levothyroxine 
  
  
  
Code Status: DNR Surrogate Decision Maker: Wife angela  
DVT Prophylaxis: heparin GI Prophylaxis: not indicated  
Baseline: From home independent Overweight by definition Body mass index is 26.47 kg/m². Subjective: Chief Complaint / Reason for Physician Visit Pt seen at bedside. No complaint. Review of Systems: 
Symptom Y/N Comments  Symptom Y/N Comments Fever/Chills n   Chest Pain n   
Poor Appetite    Edema Cough    Abdominal Pain n   
Sputum    Joint Pain SOB/MURILLO n   Pruritis/Rash Nausea/vomit    Tolerating PT/OT Diarrhea    Tolerating Diet Constipation    Other Could NOT obtain due to:   
 
Objective: VITALS:  
Last 24hrs VS reviewed since prior progress note. Most recent are: 
Patient Vitals for the past 24 hrs: Temp Pulse Resp BP SpO2  
08/02/19 1043 98.6 °F (37 °C) 79 20 97/43 94 % 08/02/19 1001    94/67   
08/02/19 0900  80  94/67   
08/02/19 0814 98.5 °F (36.9 °C) 73 22 99/43 92 % 08/02/19 0649    96/46   
08/02/19 0412 99.7 °F (37.6 °C) 74 24 (!) 120/104 94 % 08/01/19 2223 100 °F (37.8 °C) 84 21 113/57 95 % 08/01/19 1711 100 °F (37.8 °C) 79 20 102/51 97 % 08/01/19 1653 99.1 °F (37.3 °C) 77 18 105/54 92 % 08/01/19 1458 98.6 °F (37 °C) 76 20 97/63 93 % 08/01/19 1323  72 22 104/57 92 % Intake/Output Summary (Last 24 hours) at 8/2/2019 1128 Last data filed at 8/2/2019 1004 Gross per 24 hour Intake 360 ml Output 2650 ml Net -2290 ml PHYSICAL EXAM: 
General: cooperative, no acute distress   
EENT:  EOMI. Anicteric sclerae. MMM Resp:  Mild crackles at bases, wheezing +, no crackles CV:  Regular  rhythm,  No edema GI:  Soft, Non distended, Non tender.  +Bowel sounds Neurologic:  Alert and oriented X 3, normal speech Psych:   Good insight. Not anxious nor agitated Skin:  No rashes. No jaundice Reviewed most current lab test results and cultures  YES Reviewed most current radiology test results   YES Review and summation of old records today    NO Reviewed patient's current orders and MAR    YES 
PMH/SH reviewed - no change compared to H&P Current Facility-Administered Medications:  
  furosemide (LASIX) injection 60 mg, 60 mg, IntraVENous, BID, Bryan Washburn MD, 60 mg at 08/02/19 8401   loratadine (CLARITIN) tablet 10 mg, 10 mg, Oral, DAILY, LeNamita MD, 10 mg at 08/02/19 0910 
  DOBUTamine (DOBUTREX) 500 mg/250 mL (2,000 mcg/mL) infusion, 5 mcg/kg/min, IntraVENous, CONTINUOUS, Jose L Remy III, DO, Last Rate: 12.7 mL/hr at 08/02/19 0859, 5 mcg/kg/min at 08/02/19 6129   simethicone (MYLICON) tablet 80 mg, 80 mg, Oral, QID PRN, Bryan Washburn MD 
  sacubitril-valsartan (ENTRESTO) 49-51 mg tablet 1 Tab, 1 Tab, Oral, Q12H, Garrett Small III DO, Stopped at 08/02/19 0900 
  guaiFENesin (ROBITUSSIN) 100 mg/5 mL oral liquid 100 mg, 100 mg, Oral, TID PRN, Milton Sena MD, 100 mg at 08/01/19 2122   amoxicillin-clavulanate (AUGMENTIN) 875-125 mg per tablet 1 Tab, 1 Tab, Oral, Q12H, Cristiana Siddiqui MD, 1 Tab at 08/02/19 0910 
  amiodarone (CORDARONE) tablet 200 mg, 200 mg, Oral, DAILY, Milton Sena MD, 200 mg at 08/02/19 4788   levothyroxine (SYNTHROID) tablet 100 mcg, 100 mcg, Oral, QPM, Alexx Siddiqui MD, 100 mcg at 08/01/19 1711 
  sodium chloride (NS) flush 5-40 mL, 5-40 mL, IntraVENous, Q8H, Alexx Siddiqui MD, 10 mL at 08/02/19 0649 
  sodium chloride (NS) flush 5-40 mL, 5-40 mL, IntraVENous, PRN, Cristiana Siddiqui MD 
  ondansetron (ZOFRAN) injection 4 mg, 4 mg, IntraVENous, Q6H PRN, Chela Cristiana Mo MD 
  docusate sodium (COLACE) capsule 100 mg, 100 mg, Oral, DAILY PRN, Milton Sena MD, 100 mg at 07/28/19 2221   morphine injection 1 mg, 1 mg, IntraVENous, Q4H PRN, Milton Sena MD, 1 mg at 07/28/19 1242   allopurinol (ZYLOPRIM) tablet 200 mg, 200 mg, Oral, DAILY, Alexx Siddiqui MD, 200 mg at 08/02/19 0910 
  albuterol-ipratropium (DUO-NEB) 2.5 MG-0.5 MG/3 ML, 3 mL, Nebulization, Q6H PRN, Milton Sena MD, 3 mL at 07/31/19 0727 
  melatonin tablet 3 mg, 3 mg, Oral, QHS PRN, Abhi HAIRSTON DO, 3 mg at 07/28/19 2222 
________________________________________________________________________ Care Plan discussed with: 
  Comments Patient y Family  y Pt's wife RN y   
Care Manager Consultant Multidiciplinary team rounds were held today with , nursing, pharmacist and clinical coordinator. Patient's plan of care was discussed; medications were reviewed and discharge planning was addressed. ________________________________________________________________________ Total NON critical care TIME:  35   Minutes Total CRITICAL CARE TIME Spent:   Minutes non procedure based Comments >50% of visit spent in counseling and coordination of care    
________________________________________________________________________ Noreen Morelos MD  
 
Procedures: see electronic medical records for all procedures/Xrays and details which were not copied into this note but were reviewed prior to creation of Plan. LABS: 
I reviewed today's most current labs and imaging studies. Pertinent labs include: 
Recent Labs 08/02/19 
2277 08/01/19 0330 07/31/19 
7937 WBC 8.1 6.8 6.7 HGB 11.3* 10.2* 10.2* HCT 33.8* 31.3* 31.6*  
 165 141* Recent Labs 08/02/19 
2936 08/01/19 0330 07/31/19 
8451 * 138 136  
K 3.2* 3.6 3.6 CL 93* 102 103 CO2 31 27 25 * 119* 116* BUN 39* 41* 38* CREA 1.38* 1.35* 1.33* CA 8.6 8.2* 8.2* ALB 2.8* 2.6* 2.7* TBILI 1.0 0.8 1.0  
SGOT 81* 74* 83* * 186* 202* Signed: Noreen Morelos MD

## 2019-08-02 NOTE — PROGRESS NOTES
Spiritual Care Assessment/Progress Note [address] [facility] 
 
 
NAME: Temo Crabtree      MRN: [id number] AGE: 77 y.o. SEX: male Quaker Affiliation: [facility] Language: English  
 
8/2/2019     Total Time (in minutes): 15 Spiritual Assessment begun in [facility] CARDIOPULMONARY CARE through conversation with: 
  
    [x]Patient        [x] Family    [] Friend(s) Reason for Consult: Initial/Spiritual assessment, patient floor Spiritual beliefs: (Please include comment if needed) [x] Identifies with a dominique tradition:     
   [x] Supported by a dominique community:        
   [] Claims no spiritual orientation:       
   [] Seeking spiritual identity:            
   [] Adheres to an individual form of spirituality:       
   [] Not able to assess:                   
 
    
Identified resources for coping:  
   [x] Prayer                           
   [] Music                  [] Guided Imagery [x] Family/friends                 [] Pet visits [] Devotional reading                         [] Unknown 
   [] Other:                                          
 
 
Interventions offered during this visit: (See comments for more details) Patient Interventions: Affirmation of dominique, Catharsis/review of pertinent events in supportive environment, Iconic (affirming the presence of God/Higher Power), Initial/Spiritual assessment, patient floor, Prayer (assurance of) Family/Friend(s): Affirmation of dominique, Iconic (affirming the presence of God/Higher Power), Prayer (assurance of), Quaker beliefs/image of God discussed Plan of Care: 
 
 [x] Support spiritual and/or cultural needs  
 [] Support AMD and/or advance care planning process    
 [] Support grieving process 
 [] Coordinate Rites and/or Rituals  
 [] Coordination with community clergy [] No spiritual needs identified at this time 
 [] Detailed Plan of Care below (See Comments)  [] Make referral to Music Therapy [] Make referral to Pet Therapy    
[] Make referral to Addiction services 
[] Make referral to [facility] 
[] Make referral to Spiritual Care Partner 
[x] No future visits requested       
[] Follow up visits as needed Comments:  Initial visit on [facility] unit for spiritual assessment. Patient's wife was present. Patient shared briefly about current medical concerns and his hopes of going home soon. He shared he has had good support from friends and from several pastors--their new , their former  as well as their daughter's . He added that all prayers are welcome. Offered him assurance of prayer. Provided supportive listening presence and advised of  availability as needed. MAGED Hernandez, [address],  [facility]  Paging Service  [phone])

## 2019-08-02 NOTE — PROGRESS NOTES
0700: Received bedside report from [facility] Marylin, off going nurse. Assumed care of patient. 0800: Spoke w/ Dr Kayley Edgar regarding BP 99/67, okay to give amioderone, lasix, hold entresto for now and recheck BP in an hour. Give if SBP >95.  
 
0900: Dr. Rosa Rhoades aware of bloody sputum, ordered chest xray Problem: Falls - Risk of 
Goal: *Absence of Falls Description Document Piedad Mejia Fall Risk and appropriate interventions in the flowsheet. Outcome: Progressing Towards Goal 
Note:  
Fall Risk Interventions: 
Mobility Interventions: Bed/chair exit alarm, OT consult for ADLs, PT Consult for mobility concerns, PT Consult for assist device competence, Utilize walker, cane, or other assistive device Medication Interventions: Patient to call before getting OOB, Teach patient to arise slowly History of Falls Interventions: Bed/chair exit alarm, Investigate reason for fall, Room close to nurse's station Problem: Heart Failure: Day 4 Goal: Discharge Planning Outcome: Progressing Towards Goal 
Goal: Medications Outcome: Progressing Towards Goal 
Goal: Respiratory Outcome: Progressing Towards Goal 
Goal: Treatments/Interventions/Procedures Outcome: Progressing Towards Goal 
  
 
 1830: Pt now in afib on the monitor, -120. BP 91/56. Pt not symptomatic. Has a history of afin and an ICD. Paged on call cardiologist Dr. Daniel Diaz. Will get EKG. 1900: Bedside shift change report GIVEN TO Deepak Ko RN. Report included the following information SBAR, Kardex, Intake/Output, MAR, Recent Results and Cardiac Rhythm NSR, ?afib. SIGNIFICANT CHANGES DURING SHIFT:   
 
 
CONCERNS TO ADDRESS WITH MD:   
 
 
 
 
[facility] NURSING NOTE Admission Date 7/28/2019 Admission Diagnosis CHF (congestive heart failure) ([facility] [address] [I50.9] Consults IP CONSULT TO CARDIOLOGY 
IP CONSULT TO PULMONOLOGY Cardiac Monitoring [x] Yes [] No  
  
Purposeful Hourly Rounding [x] Yes   
Froylan Score Total Score: 3 Froylan score 3 or > [] Bed Alarm [] Avasys [] 1:1 sitter [] Patient refused (Signed refusal form in chart) Arsen Score Arsen Score: 21 Arsen score 14 or < [] PMT consult [] Wound Care consult  
 []  Specialty bed  [] Nutrition consult Influenza Vaccine Received Flu Vaccine for Current Season (usually Sept-March): Not Flu Season Oxygen needs? [] Room air Oxygen @  []1L    [x]2L    []3L   []4L    []5L   []6L via NC Chronic home O2 use? [] Yes [x] No 
Perform O2 challenge test and document in progress note using smartMotionDSPe (.Homeoxygen) Last bowel movement Last Bowel Movement Date: 07/31/19 Urinary Catheter LDAs Peripheral IV 07/28/19 Right Antecubital (Active) Site Assessment Clean, dry, & intact 8/2/2019  4:00 PM  
Phlebitis Assessment 0 8/2/2019  4:00 PM  
Infiltration Assessment 0 8/2/2019  4:00 PM  
Dressing Status Clean, dry, & intact 8/2/2019  4:00 PM  
Dressing Type Transparent 8/2/2019  4:00 PM  
Hub Color/Line Status Pink;Flushed;Patent 8/2/2019  4:00 PM  
   
Saline Lock 08/01/19 Anterior;Left;Proximal Forearm (Active) Site Assessment Clean, dry, & intact 8/2/2019  4:00 PM  
Phlebitis Assessment 0 8/2/2019  4:00 PM  
Infiltration Assessment 0 8/2/2019  4:00 PM  
Dressing Status Clean, dry, & intact 8/2/2019  4:00 PM  
Dressing Type Transparent 8/2/2019  4:00 PM  
Hub Color/Line Status Blue;Flushed;Patent 8/2/2019  4:00 PM  
                  
  
Readmission Risk Assessment Tool Score Medium Risk   
      
 17 Total Score 3 Has Seen PCP in Last 6 Months (Yes=3, No=0) 2 . Living with Significant Other. Assisted Living. LTAC. SNF. or  
Rehab  
 3 Patient Length of Stay (>5 days = 3)  
 5 Pt. Coverage (Medicare=5 , Medicaid, or Self-Pay=4) 4 Charlson Comorbidity Score (Age + Comorbid Conditions) Criteria that do not apply:  
 IP Visits Last 12 Months (1-3=4, 4=9, >4=11) Expected Length of Stay 4d 2h  
 Actual Length of Stay 5

## 2019-08-02 NOTE — PROGRESS NOTES
Problem: Mobility Impaired (Adult and Pediatric) Goal: *Acute Goals and Plan of Care (Insert Text) Description Physical Therapy Goals Initiated 7/30/2019 1. Patient will move from supine to sit and sit to supine  in bed with modified independence within 7 day(s). 2.  Patient will transfer from bed to chair and chair to bed with modified independence using the least restrictive device within 7 day(s). 3.  Patient will perform sit to stand with modified independence within 7 day(s). 4.  Patient will ambulate with modified independence for 250 feet with the least restrictive device within 7 day(s). 5.  Patient will ascend/descend 5 stairs with bilateral handrail(s) with modified independence within 7 day(s). Outcome: Progressing Towards Goal 
 PHYSICAL THERAPY TREATMENT Patient: Elaine Miller (23 y.o. male) Date: 8/2/2019 Diagnosis: [facility]congest[facility] [I50.9] <principal problem not specified> Precautions:   
Chart, physical therapy assessment, plan of care and goals were reviewed. ASSESSMENT: 
Pt cleared by nurse to mobilize. Pt received in recliner. Pt agreeable to therapy. Pts O2 stats stable on 2LPM at rest.  Pt transferred sit to stand to RW at [facility]. Pt ambulated 130ft and 110ft with RW at [facility]. Pt required a seated rest break due to mild SOB. Pt reported feeling lightheaded with ambulation. Instructed pt on PLB throughout session and pt performed correctly. Pts O2 stats at 78% on 1LPM on first bout with recovery time over 1min. Pt was bumped up to 2LPM and O2 stats were increased to 92%. On second bout pts O2 stats dropped to 81% on 2LPM.  Pt able to recover to 95% at rest.  Vitals were taken with BP at 95/49 HR 76.  Pt been having low BP all morning. Pt left in chair with call bell in reach. Pt will benefit from outpatient pulmonary rehab to improve endurance and safe mobility. Progression toward goals: ?    Improving appropriately and progressing toward goals ? Improving slowly and progressing toward goals ? Not making progress toward goals and plan of care will be adjusted PLAN: 
Patient continues to benefit from skilled intervention to address the above impairments. Continue treatment per established plan of care. Discharge Recommendations:   Outpatient Pulmonary Rehab Further Equipment Recommendations for Discharge:  None SUBJECTIVE:  
Patient stated I feel lightheaded.  OBJECTIVE DATA SUMMARY:  
Critical Behavior: 
Neurologic State: Alert Orientation Level: Appropriate for age, Oriented X4 Cognition: Appropriate decision making Safety/Judgement: Awareness of environment, Insight into deficits, Home safety, Fall prevention Functional Mobility Training: 
 
Transfers: 
Sit to Stand: Stand-by assistance;Contact guard assistance Stand to Sit: Stand-by assistance;Contact guard assistance Balance: 
Sitting: Intact Standing: Intact Standing - Static: Good Standing - Dynamic : Good Ambulation/Gait Training: 
Distance (ft): 240 Feet (ft)(130ft and 110ft) Assistive Device: Walker, rolling;Gait belt Ambulation - Level of Assistance: Contact guard assistance Gait Abnormalities: Decreased step clearance Base of Support: Widened Stance: Left decreased;Right decreased Speed/Silva: Pace decreased (<100 feet/min) Step Length: Left shortened;Right shortened Pain: 
Pain Scale 1: Numeric (0 - 10) Pain Intensity 1: 0 Activity Tolerance:  
Pt tolerated session fairly with feeling lightheaded due to low BP. ? Patient left in no apparent distress sitting up in chair ? Patient left in no apparent distress in bed 
? Call bell left within reach ? Nursing notified ? Caregiver present ? Bed alarm activated COMMUNICATION/COLLABORATION:  
The patients plan of care was discussed with: Registered Nurse Blas De La Cruz, PTA Student Time Calculation: 23 mins

## 2019-08-02 NOTE — PROGRESS NOTES
Problem: Self Care Deficits Care Plan (Adult) Goal: *Acute Goals and Plan of Care (Insert Text) Description Occupational Therapy Goals Initiated 7/30/2019 1. Patient will perform grooming standing at the sink with modified independence within 7 day(s). 2.  Patient will perform bathing with supervision using AE as needed within 7 day(s). 3.  Patient will perform lower body dressing with modified independence using AE as needed within 7 day(s). 4.  Patient will perform toilet transfers with modified independence within 7 day(s). 5.  Patient will perform all aspects of toileting with modified independence within 7 day(s). 6.  Patient will independently utilize energy conservation and recommended equipment during ADL within 7 day(s). Outcome: Progressing Towards Goal 
 OCCUPATIONAL THERAPY TREATMENT Patient: Kay Tucker (19 y.o. male) Date: 8/2/2019 Diagnosis: [facility] [I[facility]] <principal problem not specified> Precautions:   
Chart, occupational therapy assessment, plan of care, and goals were reviewed. ASSESSMENT: 
Progressing well, remains with need for supplemental O2 2l NC, 90-93% during session for exercises, max A remains for LB dressing, introduced energy conservation with use of AE, shower chair and tailor sitting for ADLs with family present. Recommend home with [facility] OT 
 
Progression toward goals: 
?       Improving appropriately and progressing toward goals ? Improving slowly and progressing toward goals ? Not making progress toward goals and plan of care will be adjusted PLAN: 
Patient continues to benefit from skilled intervention to address the above impairments. Continue treatment per established plan of care. Discharge Recommendations:  Home Health Further Equipment Recommendations for Discharge:  possible AE, pulse ox and thinks they own a shower chair/bench SUBJECTIVE:  
 Patient stated I'm getting better at the breathing.  OBJECTIVE DATA SUMMARY:  
Cognitive/Behavioral Status: 
 alert Functional Mobility and Transfers for ADLs: 
Bed Mobility: 
  
 
Transfers: 
  
  
  
 
Balance: ADL Intervention: 
  
 
 Educated on activity modification, building strength and endurance with ADLs and fall prevention. Patient instructed and indicated understanding energy conservation techniques to increase independence and safety during ADLs with no visual handout provided. Patient instructed and indicated understanding the benefits of maintaining activity tolerance, functional mobility, and independence with self care tasks during acute stay to ensure safe return home and to baseline. Encouraged patient to increase frequency and duration OOB, be out of bed for all meals, perform daily ADLs (as approved by RN/MD regarding bathing etc), and performing functional mobility to/from bathroom with assist.  
 
Patient educated on use of adaptive equipment (ie. Reacher, sock aid, long shoe horn, long handled sponge, and dressing stick) in order to complete LB dressing. Patient instructed on technique to complete. Patient indicated understanding/recalled strategies with verbal cues. Patient educated on breathing technique, in through the nose and out through the mouth (exhaling longer than inhale) to regulate O2 stats during and post activity. Patient with good understanding of benefits and demonstrated technique with cues for correct breathing with exercises. Lower Body Dressing Assistance Socks: Moderate assistance Leg Crossed Method Used: No 
Position Performed: Seated in chair Cues: [facility] Re-Education: 
  
  
  
Therapeutic Exercises:  
B UE forward reach with scapular retraction seated EOB 10x SPO2 91% at rest on 2L Discussed pelvic tile, erect posture in order to improve lung capacity and use breathing technique with each rep Pain: 
Pain Scale 1: Numeric (0 - 10) Pain Intensity 1: 0 Activity Tolerance:  
Fair, improving Please refer to the flowsheet for vital signs taken during this treatment. After treatment:  
? Patient left in no apparent distress sitting up in chair ? Patient left in no apparent distress in bed 
? Call bell left within reach ? Nursing notified ? Caregiver present ? Bed alarm activated COMMUNICATION/COLLABORATION:  
The patients plan of care was discussed with: Physical Therapy Assistant and Registered Nurse Donn Clifton OT Time Calculation: 22 mins
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
## 2019-08-03 NOTE — PROGRESS NOTES
Hospitalist Progress Note NAME: Joaquin Vargas :  1940 MRN:  [id number] Assessment / Plan: 
Acute hypoxic respiratory failure due to Acute systolic congestive heart failure exacerbation Healthcare associated pneumonia Afib 
-CT chest shows groundglass no opacities.  BN pep is elevated at 9600 
-per cards recent ECHO shows EF of 25 %  
   Blood cultures NGTD. -cont IV lasix 
-started on amiodarone gtt, will cont' -cont' dobutamine   Coreg, aldactone, Entresto, and metolazone held 
-strict I's and O's, discussed with RN. Wt down -CXR showed Right lower lobe consolidation, however pt has already completed 8 days of abx on this admission (last dose was on 8/3) -Continue oxygen by nasal cannula 
-cont  duo neb due to no wheezing Hypokalemia 
-replete with KCl Acute hepatitis due to congestion  
-CT shows evidence of nonspecific hepatic and gallbladder disease 
-US liver confirms congestive hepatopathy 
-hepatitis panel neg, LFT improving History of atrial fibrillation 
-cont  amiodarone and Coreg Hypertension Dyslipidemia Hypothyroidism 
-cont  home Coreg, statin and levothyroxine 
  
  
  
Code Status: DNR Surrogate Decision Maker: Wife angela  
DVT Prophylaxis: heparin GI Prophylaxis: not indicated  
Baseline: From home independent Overweight by definition Body mass index is 26.47 kg/m². Subjective: Chief Complaint / Reason for Physician Visit Pt seen at bedside. afib overnight, started on amiodarone gtt. Review of Systems: 
Symptom Y/N Comments  Symptom Y/N Comments Fever/Chills n   Chest Pain n   
Poor Appetite    Edema Cough    Abdominal Pain n   
Sputum    Joint Pain SOB/MURILLO n   Pruritis/Rash Nausea/vomit    Tolerating PT/OT Diarrhea    Tolerating Diet Constipation    Other Could NOT obtain due to:   
 
Objective: VITALS:  
Last 24hrs VS reviewed since prior progress note. Most recent are: Patient Vitals for the past 24 hrs: 
 Temp Pulse Resp BP SpO2  
08/03/19 0739 98.2 °F (36.8 °C) (!) 105 18 90/53 96 % 08/03/19 0255 98.7 °F (37.1 °C) (!) 108 18 105/61 94 % 08/02/19 2242 98.4 °F (36.9 °C) (!) 103 20 100/56 97 % 08/02/19 1947 98.1 °F (36.7 °C) (!) 105 18 93/63 94 % 08/02/19 1843  (!) 118  91/56   
08/02/19 1531 99.3 °F (37.4 °C) 82 18 104/57 92 % 08/02/19 1043 98.6 °F (37 °C) 79 20 97/43 94 % 08/02/19 263 [facility] 90/04  Intake/Output Summary (Last 24 hours) at 8/3/2019 4662 Last data filed at 8/3/2019 3873 Gross per 24 hour Intake 1741.9 ml Output 2050 ml Net -308.1 ml PHYSICAL EXAM: 
General: cooperative, no acute distress   
EENT:  EOMI. Anicteric sclerae. MMM Resp:  Mild crackles at bases, wheezing +, no crackles CV:  irregular  rhythm,  No edema GI:  Soft, Non distended, Non tender.  +Bowel sounds Neurologic:  Alert and oriented X 3, normal speech Psych:   Good insight. Not anxious nor agitated Skin:  No rashes. No jaundice Reviewed most current lab test results and cultures  YES Reviewed most current radiology test results   YES Review and summation of old records today    NO Reviewed patient's current orders and MAR    YES 
PMH/SH reviewed - no change compared to H&P Current Facility-Administered Medications:  
  potassium chloride (K-DUR, KLOR-CON) SR tablet 40 mEq, 40 mEq, Oral, NOW, Veronica Cloud MD 
  amiodarone (CORDARONE) 375 mg/250 mL D5W infusion, 0.5-1 mg/min, IntraVENous, TITRATE, Maxwell Brock MD, Last Rate: 20 mL/hr at 08/03/19 0228, 0.5 mg/min at 08/03/19 0228   furosemide (LASIX) injection 60 mg, 60 mg, IntraVENous, BID, Irving Esquivel MD, 60 mg at 08/03/19 8297   loratadine (CLARITIN) tablet 10 mg, 10 mg, Oral, DAILY, Irving Esquivel MD, 10 mg at 08/03/19 8437   DOBUTamine (DOBUTREX) 500 mg/250 mL (2,000 mcg/mL) infusion, 5 mcg/kg/min, IntraVENous, CONTINUOUS, Jose L Remy III, DO, Last Rate: 12.7 mL/hr at 08/03/19 0910, 5 mcg/kg/min at 08/03/19 0910 
  simethicone (MYLICON) tablet 80 mg, 80 mg, Oral, QID PRN, Nemo Gilliland MD 
  sacubitril-valsartan (ENTRESTO) 49-51 mg tablet 1 Tab, 1 Tab, Oral, Q12H, Jose L Remy III, DO, 1 Tab at 08/03/19 0832 
  guaiFENesin (ROBITUSSIN) 100 mg/5 mL oral liquid 100 mg, 100 mg, Oral, TID PRN, Christie Bell MD, 100 mg at 08/02/19 1705   levothyroxine (SYNTHROID) tablet 100 mcg, 100 mcg, Oral, QPM, Christie Bell MD, 100 mcg at 08/02/19 1705   sodium chloride (NS) flush 5-40 mL, 5-40 mL, IntraVENous, Q8H, Alexx Siddiqui MD, 10 mL at 08/02/19 1400 
  sodium chloride (NS) flush 5-40 mL, 5-40 mL, IntraVENous, PRN, Familia Siddiqui Cera, MD 
  ondansetron (ZOFRAN) injection 4 mg, 4 mg, IntraVENous, Q6H PRN, Familia Mclaughlin Cera, MD 
  docusate sodium (COLACE) capsule 100 mg, 100 mg, Oral, DAILY PRN, Christie Bell MD, 100 mg at 07/28/19 2221   morphine injection 1 mg, 1 mg, IntraVENous, Q4H PRN, Christie Bell MD, 1 mg at 07/28/19 1242   allopurinol (ZYLOPRIM) tablet 200 mg, 200 mg, Oral, DAILY, Christie Bell MD, 200 mg at 08/03/19 6157   albuterol-ipratropium (DUO-NEB) 2.5 MG-0.5 MG/3 ML, 3 mL, Nebulization, Q6H PRN, Alexx Mclaughlin MD, 3 mL at 07/31/19 0727 
  melatonin tablet 3 mg, 3 mg, Oral, QHS PRN, Gladys HAIRSTON DO, 3 mg at 07/28/19 2222 
________________________________________________________________________ Care Plan discussed with: 
  Comments Patient y Family  y Pt's wife RN y   
Care Manager Consultant Multidiciplinary team rounds were held today with , nursing, pharmacist and clinical coordinator. Patient's plan of care was discussed; medications were reviewed and discharge planning was addressed. ________________________________________________________________________ Total NON critical care TIME:  35   Minutes Total CRITICAL CARE TIME Spent:   Minutes non procedure based Comments >50% of visit spent in counseling and coordination of care    
________________________________________________________________________ Spring Boudreaux MD  
 
Procedures: see electronic medical records for all procedures/Xrays and details which were not copied into this note but were reviewed prior to creation of Plan. LABS: 
I reviewed today's most current labs and imaging studies. Pertinent labs include: 
Recent Labs 08/02/19 0322 08/01/19 
0330 WBC 8.1 6.8 HGB 11.3* 10.2* HCT 33.8* 31.3*  
 165 Recent Labs 08/03/19 0228 08/02/19 0322 08/01/19 
0330 * 133* 138  
K 3.4* 3.2* 3.6 CL 91* 93* 102 CO2 35* 31 27 * 116* 119* BUN 41* 39* 41* CREA 1.37* 1.38* 1.35* CA 8.6 8.6 8.2* ALB 2.8* 2.8* 2.6* TBILI 1.1* 1.0 0.8 SGOT 86* 81* 74* * 181* 186* Signed: Spring Boudreaux MD

## 2019-08-03 NOTE — PROGRESS NOTES
Bedside and Verbal shift change report received from [facility]. Report included the following information SBAR, Kardex and Recent Results. SIGNIFICANT CHANGES DURING SHIFT:     
 
2030 Started Amio gtt @ 2025 @ 1mg/min. Afib on telemetry @ this time. Will continue to monitor pt.    
 
2100 Held Entresto tonight due to BP 93/63. Dobutamine @ 5mcg/kg/min infusing without difficulty. No acute distress noted. Will continue to monitor pt.   
 
2315 Bedside and Verbal shift change report given to [facility]. Report included the following information SBAR, Kardex and Recent Results.   
 
 
CONCERNS TO ADDRESS WITH MD:

## 2019-08-03 NOTE — PROGRESS NOTES
Problem: Falls - Risk of 
Goal: *Absence of Falls Description Document [facility] Fall Risk and appropriate interventions in the flowsheet. Outcome: Progressing Towards Goal 
Note:  
Fall Risk Interventions: 
Mobility Interventions: Bed/chair exit alarm Medication Interventions: Assess postural VS orthostatic hypotension, Bed/chair exit alarm, Patient to call before getting OOB, Teach patient to arise slowly Elimination Interventions: Call light in reach, Bed/chair exit alarm History of Falls Interventions: Bed/chair exit alarm, Room close to nurse's station Problem: Patient Education: Go to Patient Education Activity Goal: Patient/Family Education Outcome: Progressing Towards Goal 
  
Problem: Patient Education: Go to Patient Education Activity Goal: Patient/Family Education Outcome: Progressing Towards Goal 
  
Problem: Heart Failure: Day 1 Goal: Off Pathway (Use only if patient is Off Pathway) Outcome: Progressing Towards Goal 
Goal: Activity/Safety Outcome: Progressing Towards Goal 
Goal: Consults, if ordered Outcome: Progressing Towards Goal 
Goal: Diagnostic Test/Procedures Outcome: Progressing Towards Goal 
Goal: Nutrition/Diet Outcome: Progressing Towards Goal 
Goal: Discharge Planning Outcome: Progressing Towards Goal 
Goal: Medications Outcome: Progressing Towards Goal 
Goal: Respiratory Outcome: Progressing Towards Goal 
Goal: Treatments/Interventions/Procedures Outcome: Progressing Towards Goal 
Goal: Psychosocial 
Outcome: Progressing Towards Goal 
Goal: *Oxygen saturation within defined limits Outcome: Progressing Towards Goal 
Goal: *Hemodynamically stable Outcome: Progressing Towards Goal 
Goal: *Optimal pain control at patient's stated goal 
Outcome: Progressing Towards Goal 
Goal: *Anxiety reduced or absent Outcome: Progressing Towards Goal 
  
Problem: Heart Failure: Day 2 Goal: Off Pathway (Use only if patient is Off Pathway) Outcome: Progressing Towards Goal 
Goal: Activity/Safety Outcome: Progressing Towards Goal 
Goal: Consults, if ordered Outcome: Progressing Towards Goal 
Goal: Diagnostic Test/Procedures Outcome: Progressing Towards Goal 
Goal: Nutrition/Diet Outcome: Progressing Towards Goal 
Goal: Discharge Planning Outcome: Progressing Towards Goal 
Goal: Medications Outcome: Progressing Towards Goal 
Goal: Respiratory Outcome: Progressing Towards Goal 
Goal: Treatments/Interventions/Procedures Outcome: Progressing Towards Goal 
Goal: Psychosocial 
Outcome: Progressing Towards Goal 
Goal: *Oxygen saturation within defined limits Outcome: Progressing Towards Goal 
Goal: *Hemodynamically stable Outcome: Progressing Towards Goal 
Goal: *Optimal pain control at patient's stated goal 
Outcome: Progressing Towards Goal 
Goal: *Anxiety reduced or absent Outcome: Progressing Towards Goal 
Goal: *Demonstrates progressive activity Outcome: Progressing Towards Goal 
  
Problem: Heart Failure: Day 3 Goal: Off Pathway (Use only if patient is Off Pathway) Outcome: Progressing Towards Goal 
Goal: Activity/Safety Outcome: Progressing Towards Goal 
Goal: Diagnostic Test/Procedures Outcome: Progressing Towards Goal 
Goal: Nutrition/Diet Outcome: Progressing Towards Goal 
Goal: Discharge Planning Outcome: Progressing Towards Goal 
Goal: Medications Outcome: Progressing Towards Goal 
Goal: Respiratory Outcome: Progressing Towards Goal 
Goal: Treatments/Interventions/Procedures Outcome: Progressing Towards Goal 
Goal: Psychosocial 
Outcome: Progressing Towards Goal 
Goal: *Oxygen saturation within defined limits Outcome: Progressing Towards Goal 
Goal: *Hemodynamically stable Outcome: Progressing Towards Goal 
Goal: *Optimal pain control at patient's stated goal 
Outcome: Progressing Towards Goal 
Goal: *Anxiety reduced or absent Outcome: Progressing Towards Goal 
Goal: *Demonstrates progressive activity Outcome: Progressing Towards Goal 
  
Problem: Heart Failure: Day 4 Goal: Off Pathway (Use only if patient is Off Pathway) Outcome: Progressing Towards Goal 
Goal: Activity/Safety Outcome: Progressing Towards Goal 
Goal: Diagnostic Test/Procedures Outcome: Progressing Towards Goal 
Goal: Nutrition/Diet Outcome: Progressing Towards Goal 
Goal: Discharge Planning Outcome: Progressing Towards Goal 
Goal: Medications Outcome: Progressing Towards Goal 
Goal: Respiratory Outcome: Progressing Towards Goal 
Goal: Treatments/Interventions/Procedures Outcome: Progressing Towards Goal 
Goal: Psychosocial 
Outcome: Progressing Towards Goal 
Goal: *Oxygen saturation within defined limits Outcome: Progressing Towards Goal 
Goal: *Hemodynamically stable Outcome: Progressing Towards Goal 
Goal: *Optimal pain control at patient's stated goal 
Outcome: Progressing Towards Goal 
Goal: *Anxiety reduced or absent Outcome: Progressing Towards Goal 
Goal: *Demonstrates progressive activity Outcome: Progressing Towards Goal 
  
Problem: Heart Failure: Day 5 Goal: Off Pathway (Use only if patient is Off Pathway) Outcome: Progressing Towards Goal 
Goal: Activity/Safety Outcome: Progressing Towards Goal 
Goal: Diagnostic Test/Procedures Outcome: Progressing Towards Goal 
Goal: Nutrition/Diet Outcome: Progressing Towards Goal 
Goal: Discharge Planning Outcome: Progressing Towards Goal 
Goal: Medications Outcome: Progressing Towards Goal 
Goal: Respiratory Outcome: Progressing Towards Goal 
Goal: Treatments/Interventions/Procedures Outcome: Progressing Towards Goal 
Goal: Psychosocial 
Outcome: Progressing Towards Goal 
  
Problem: Heart Failure: Discharge Outcomes Goal: *Demonstrates ability to perform prescribed activity without shortness of breath or discomfort Outcome: Progressing Towards Goal 
Goal: *Left ventricular function assessment completed prior to or during stay, or planned for post-discharge Outcome: Progressing Towards Goal 
Goal: *ACEI prescribed if LVEF less than 40% and no contraindications or ARB prescribed Outcome: Progressing Towards Goal 
Goal: *Verbalizes understanding and describes prescribed diet Outcome: Progressing Towards Goal 
Goal: *Verbalizes understanding/describes prescribed medications Outcome: Progressing Towards Goal 
Goal: *Describes available resources and support systems Description 
(eg: Home Health, Palliative Care, Advanced Medical Directive) Outcome: Progressing Towards Goal 
Goal: *Describes smoking cessation resources Outcome: Progressing Towards Goal 
Goal: *Understands and describes signs and symptoms to report to providers(Stroke Metric) Outcome: Progressing Towards Goal 
Goal: *Describes/verbalizes understanding of follow-up/return appt Description 
(eg: to physicians, diabetes treatment coordinator, and other resources Outcome: Progressing Towards Goal 
Goal: *Describes importance of continuing daily weights and changes to report to physician Outcome: Progressing Towards Goal

## 2019-08-03 NOTE — PROGRESS NOTES
Bedside shift change report given to Camelia (oncoming nurse) by Sean Haji (offgoing nurse).  Report included the following information SBAR.  
 
1400 - MEWS 3, cardiology aware of patient's BP and HR

## 2019-08-03 NOTE — PROGRESS NOTES
Bedside shift change report GIVEN TO Shanika Mcgee RN. Report included the following information SBAR. SIGNIFICANT CHANGES DURING SHIFT:  Patient had 1 BM today. HR still 's, cardiology aware. CONCERNS TO ADDRESS WITH MD:   
 
 
 
 
[facility] NURSING NOTE Admission Date 7/28/2019 Admission Diagnosis CHF (congestive heart failure) ([facility] [address] [I50.9] Consults IP CONSULT TO CARDIOLOGY 
IP CONSULT TO PULMONOLOGY Cardiac Monitoring [x] Yes [] No  
  
Purposeful Hourly Rounding [x] Yes   
Froylan Score Total Score: 2 Froylan score 3 or > [x] Bed Alarm [] Avasys [] 1:1 sitter [] Patient refused (Signed refusal form in chart) Arsen Score Arsen Score: 20 Arsen score 14 or < [] PMT consult [] Wound Care consult  
 []  Specialty bed  [] Nutrition consult Influenza Vaccine Received Flu Vaccine for Current Season (usually Sept-March): Not Flu Season Oxygen needs? [] Room air Oxygen @  []1L    [x]2L    []3L   []4L    []5L   []6L via NC Chronic home O2 use? [] Yes [x] No 
Perform O2 challenge test and document in progress note using smartphrase (.Homeoxygen) Last bowel movement Last Bowel Movement Date: 07/31/19 Urinary Catheter LDAs Peripheral IV 07/28/19 Right Antecubital (Active) Site Assessment Clean, dry, & intact 8/3/2019  2:24 PM  
Phlebitis Assessment 0 8/3/2019  2:24 PM  
Infiltration Assessment 0 8/3/2019  2:24 PM  
Dressing Status Clean, dry, & intact 8/3/2019  2:24 PM  
Dressing Type Transparent;Tape 8/3/2019  2:24 PM  
Hub Color/Line Status Pink; Infusing 8/3/2019  2:24 PM  
Alcohol Cap Used Yes 8/3/2019  8:00 AM  
   
Saline Lock 08/01/19 Anterior;Left;Proximal Forearm (Active) Site Assessment Clean, dry, & intact 8/3/2019  2:24 PM  
Phlebitis Assessment 0 8/3/2019  2:24 PM  
Infiltration Assessment 0 8/3/2019  2:24 PM  
Dressing Status Clean, dry, & intact 8/3/2019  2:24 PM  
Dressing Type Transparent;Tape 8/3/2019  2:24 PM  
 Hub Color/Line Status Blue; Infusing 8/3/2019  2:24 PM  
                  
  
Readmission Risk Assessment Tool Score Medium Risk   
      
 17 Total Score 3 Has Seen PCP in Last 6 Months (Yes=3, No=0) 2 . Living with Significant Other. Assisted Living. LTAC. SNF. or  
Rehab  
 3 Patient Length of Stay (>5 days = 3)  
 5 Pt. Coverage (Medicare=5 , Medicaid, or Self-Pay=4) 4 Charlson Comorbidity Score (Age + Comorbid Conditions) Criteria that do not apply:  
 IP Visits Last 12 Months (1-3=4, 4=9, >4=11) Expected Length of Stay 4d 2h Actual Length of Stay 6

## 2019-08-03 NOTE — PROGRESS NOTES
made follow up visit to patients room per spiritual care order for daily visits. There were lots of family in the room visiting with patient and patient declined visit at this time. See plan of care below.: 
 
Plan of Care B[facility] Spiritual Care Services Patient: [facility] and Room number: [facility] 2 CARDIOPULMONARY CARE Today's Date: 8/3/2019    
 
 __X_ New PC  ___ Revised PC Summary of Plan of Care: There is a spiritual care order in place for daily visits. This is the schedule that should be followed. Spiritual Care partners will visit   On Tuesday, Wednesday, Thursday and Friday. Staff Chaplains will visit on Saturday, Sunday, Monday. Action Steps for Nondenominational/Sacramental Needs:   
 
Action Steps for Spiritual Needs:   
Spiritual Care partners will visit   On Tuesday, Wednesday, Thursday and Friday. Staff Chaplains will visit on Saturday, Sunday, Monday. Action Steps for Sociological Needs:   
 
Action Steps for Psycho-emotional Needs: This Plan of Care will be in effect until an updated Plan of Care is entered. Signed by: Michele Perdomo

## 2019-08-03 NOTE — PROGRESS NOTES
Problem: Falls - Risk of 
Goal: *Absence of Falls Description Document Helene Muñiz Fall Risk and appropriate interventions in the flowsheet. Outcome: Progressing Towards Goal 
Note:  
Fall Risk Interventions: 
Mobility Interventions: Assess mobility with egress test, Bed/chair exit alarm, Communicate number of staff needed for ambulation/transfer, OT consult for ADLs, Patient to call before getting OOB, PT Consult for mobility concerns, Utilize walker, cane, or other assistive device Medication Interventions: Assess postural VS orthostatic hypotension, Evaluate medications/consider consulting pharmacy, Patient to call before getting OOB, Bed/chair exit alarm, Teach patient to arise slowly Elimination Interventions: Bed/chair exit alarm, Call light in reach, Patient to call for help with toileting needs, Stay With Me (per policy), Toilet paper/wipes in reach, Toileting schedule/hourly rounds, Urinal in reach History of Falls Interventions: Bed/chair exit alarm, Consult care management for discharge planning, Evaluate medications/consider consulting pharmacy, Room close to nurse's station, Utilize gait belt for transfer/ambulation, Assess for delayed presentation/identification of injury for 48 hrs (comment for end date), Vital signs minimum Q4HRs X 24 hrs (comment for end date)

## 2019-08-03 NOTE — PROGRESS NOTES
Cardiology Progress Note 8/3/2019     Admit Date: 7/28/2019 Admit Diagnosis: CHF (congestive heart failure) ([facility]) [I50.9]  CC: none currently Assessment (as per Dr Karen Duval):   
  
1. Acute on chronic systolic heart failure 2. Dilated NICM; EF 25%. Kristel Salmeronell 7/18 w/ EF 20%, dil LV, nml RV, mild MR/TR 3. Remote ICD implantation 4. Recurrent ventricular tachycardia 5. Hyperlipidemia 6. Former smoker 7. DNR 
8. Usual Cardiologist:  Dr. Terrell May now Dr. Bermeo Resides (appt 8/1)  
  
Plan (as per Dr Karen Duval):     
     
Last 3 Recorded Weights in this Encounter  
  07/31/19 1843 08/01/19 8410 08/02/19 6423 Weight: 85.3 kg (188 lb) 84.4 kg (186 lb) 81.3 kg (179 lb 3.7 oz)  
  
  
Negative 3L UOP Borderline BP 
  
O2 sats on 2L 90%; on RA 85% 
  
UOOB Continue dobutamine 5mcg; look to dec/stop over the wknd Continue diuresis; lasix 60mg IV BID Hold coreg w/ inotrope; resume when off inotrope and probably at lower dose of 3.125mg BID Hold aldactone; resume if able if BPs stable Continue w/ Fitzpatrick-Ewing Continue diuresis; increased Metolazone when BPs better? ?  May not be needed. Needs to get UOOB walking around; may need O2 @ discharge   
 
8/3: Afib started yesterday: on IV amio (PO PTA); rate better: if persistent, will need anticoag. Decrease dobutamine to 2.5; Cont diuretics; Cr stable (1.37). Probably change to PO diuretics after am dose tomorrow. For other plans, see orders. Hospital problem list  
Active Hospital Problems Diagnosis Date Noted  [facility] 07[facility]28[facility]2019 Subjective: Temo Planargelia reports Chest pain X none  consistent with:  Non-cardiac CP Atypical CP None now  On going  Anginal CP Dyspnea X none  at rest  with exertion    
    improved  unchanged  worse PND X none  overnight Orthopnea X none  improved  unchanged  worse Presyncope X none  improved  unchanged  worse Ambulated in hallway without symptoms   Yes Ambulated in room without symptoms  Yes Objective:  
 Physical Exam: 
Overall VSSAF;   
Visit Vitals BP 93/58 (BP 1 Location: Right arm, BP Patient Position: At rest) Pulse (!) 101 Comment: notified RN Temp 98.2 °F (36.8 °C) Resp 18 Ht 5' 9\" (1.753 m) Wt 81.3 kg (179 lb 3.7 oz) SpO2 94% BMI 26.47 kg/m² Temp (24hrs), Av.5 °F (36.9 °C), Min:98.1 °F (36.7 °C), Max:99.3 °F (37.4 °C) Patient Vitals for the past 8 hrs: 
 Pulse 19 1106 (!) 101  
19 0739 (!) 105 Patient Vitals for the past 8 hrs: 
 Resp  
19 1106 18  
19 0739 18 Patient Vitals for the past 8 hrs: 
 BP  
19 1106 93/58  
19 0739 90/53 Intake/Output Summary (Last 24 hours) at 8/3/2019 1155 Last data filed at 8/3/2019 1038 Gross per 24 hour Intake 1501.9 ml Output 2500 ml Net -998.1 ml  
 
General Appearance: Well developed, well nourished, no acute distress. Ears/Nose/Mouth/Throat:   Normal MM; anicteric. JVP: WNL Resp:   Few crackles on R>L;  Nl resp effort. Cardiovascular:  IRRR, S1, S2 normal, no new murmur. No gallop or rub. Abdomen:   Soft, non-tender, bowel sounds are present. Extremities: No edema bilaterally. Skin: 
Neuro: Warm and dry. A/O x3, grossly nonfocal  
cath site intact w/o hematoma or new bruit; distal pulse unchanged  Yes Data Review:    
Telemetry independently reviewed  sinus  chronic afib x parox afib  NSVT  
 
ECG independently reviewed  NSR  afib  no significant changes  NSST-Tw chgs  
x no new ECG provided for review Lab results reviewed as noted below. Current medications reviewed as noted below. No results for input(s): PH, PCO2, PO2 in the last 72 hours. No results for input(s): CPK, CKMB, CKNDX, TROIQ in the last 72 hours. Recent Labs 19 
0228 19 
0322 19 
0330 * 133* 138  
K 3.4* 3.2* 3.6 CL 91* 93* 102 CO2 35* 31 27 BUN 41* 39* 41* CREA 1.37* 1.38* 1.35* GFRAA >60 >60 >60  
* 116* 119* CA 8.6 8.6 8.2* ALB 2.8* 2.8* 2.6* WBC  --  8.1 6.8 HGB  --  11.3* 10.2* HCT  --  33.8* 31.3*  
PLT  --  216 165 No results found for: CHOL, CHOLX, CHLST, CHOLV, HDL, LDL, LDLC, DLDLP, TGLX, TRIGL, TRIGP, CHHD, CHHDX Recent Labs 08/03/19 
0228 08/02/19 
0322 08/01/19 
0330 SGOT 86* 81* 74* AP 89 79 76  
TP 7.0 6.7 6.1* ALB 2.8* 2.8* 2.6*  
GLOB 4.2* 3.9 3.5 No results for input(s): INR, PTP, APTT in the last 72 hours. No lab exists for component: INREXT No components found for: [place] Current Facility-Administered Medications Medication Dose Route Frequency  amiodarone (CORDARONE) 375 mg/250 mL D5W infusion  0.5-1 mg/min IntraVENous TITRATE  furosemide (LASIX) injection 60 mg  60 mg IntraVENous BID  loratadine (CLARITIN) tablet 10 mg  10 mg Oral DAILY  DOBUTamine (DOBUTREX) 500 mg/250 mL (2,000 mcg/mL) infusion  5 mcg/kg/min IntraVENous CONTINUOUS  
 simethicone (MYLICON) tablet 80 mg  80 mg Oral QID PRN  
 sacubitril-valsartan (ENTRESTO) 49-51 mg tablet 1 Tab  1 Tab Oral Q12H  
 guaiFENesin (ROBITUSSIN) 100 mg/5 mL oral liquid 100 mg  100 mg Oral TID PRN  
 levothyroxine (SYNTHROID) tablet 100 mcg  100 mcg Oral QPM  
 sodium chloride (NS) flush 5-40 mL  5-40 mL IntraVENous Q8H  
 sodium chloride (NS) flush 5-40 mL  5-40 mL IntraVENous PRN  
 ondansetron (ZOFRAN) injection 4 mg  4 mg IntraVENous Q6H PRN  
 docusate sodium (COLACE) capsule 100 mg  100 mg Oral DAILY PRN  
 morphine injection 1 mg  1 mg IntraVENous Q4H PRN  
 allopurinol (ZYLOPRIM) tablet 200 mg  200 mg Oral DAILY  albuterol-ipratropium (DUO-NEB) 2.5 MG-0.5 MG/3 ML  3 mL Nebulization Q6H PRN  
 melatonin tablet 3 mg  3 mg Oral QHS PRN Homero Zaidi MD

## 2019-08-04 NOTE — PROGRESS NOTES
Bedside and Verbal shift change report given received from [facility]. Report included the following information SBAR, Kardex and Recent Results. 0700 Bedside and Verbal shift change report given to Jayne SU[facility]. Report included the following information SBAR, Kardex and Recent Results.

## 2019-08-04 NOTE — PROGRESS NOTES
Cardiology Progress Note 8/4/2019     Admit Date: 7/28/2019 Admit Diagnosis: CHF (congestive heart failure) ([facility]) [I50.9]  CC: none currently Assessment (as per Dr Oksana Bautista):   
  
1. Acute on chronic systolic heart failure 2. Dilated NICM; EF 25%. Michael Louise 7/18 w/ EF 20%, dil LV, nml RV, mild MR/TR 3. Remote ICD implantation 4. Recurrent ventricular tachycardia 5. Hyperlipidemia 6. Former smoker 7. DNR 
8. Usual Cardiologist:  Dr. Katey Lancaster now Dr. Britney Mike (appt 8/1)  
  
Plan (as per Dr Oksana Bautista):     
     
Last 3 Recorded Weights in this Encounter  
  07/31/19 4002 08/01/19 0848 08/02/19 0537 Weight: 85.3 kg (188 lb) 84.4 kg (186 lb) 81.3 kg (179 lb 3.7 oz)  
  
  
Negative 3L UOP Borderline BP 
  
O2 sats on 2L 90%; on RA 85% 
  
UOOB Continue dobutamine 5mcg; look to dec/stop over the wknd Continue diuresis; lasix 60mg IV BID Hold coreg w/ inotrope; resume when off inotrope and probably at lower dose of 3.125mg BID Hold aldactone; resume if able if BPs stable Continue w/ Diana  Continue diuresis; increased Metolazone when BPs better? ?  May not be needed. Needs to get UOOB walking around; may need O2 @ discharge   
 
8/3: Afib started yesterday: on IV amio (PO PTA); rate better: if persistent, will need anticoag. Decrease dobutamine to 2.5; Cont diuretics; Cr stable (1.37). Probably change to PO diuretics after am dose tomorrow. 8/4: No LE edema/lungs much better. Remains in afib; Cont IV amio @ 0.5 (on po PTA); rate is fine; will need to consider CV vs AC. Decreased dobutamine to 2; Cr to 1.5; got am IV lasix: d/c further lasix today as SBPs low (88 currently). For other plans, see orders. Hospital problem list  
Active Hospital Problems Diagnosis Date Noted  CHF (congestive heart failure) ([address] 07/28/2019 Subjective: Lily Hernadez reports Chest pain X none  consistent with:  Non-cardiac CP       Atypical CP  
 None now  On going  Anginal CP Dyspnea X none  at rest  with exertion    
    improved  unchanged  worse PND X none  overnight Orthopnea X none  improved  unchanged  worse Presyncope X none  improved  unchanged  worse Ambulated in hallway without symptoms   Yes Ambulated in room without symptoms  Yes Objective:  
 Physical Exam: 
Overall VSSAF;   
Visit Vitals BP (!) 88/60 (BP 1 Location: Left arm, BP Patient Position: At rest) Comment: notifed VIKASH Godoy Pulse 98 Temp 98.3 °F (36.8 °C) Resp 18 Ht 5' 9\" (1.753 m) Wt 80.4 kg (177 lb 3.2 oz) SpO2 95% BMI 26.17 kg/m² Temp (24hrs), Av.6 °F (37 °C), Min:98.2 °F (36.8 °C), Max:99.3 °F (37.4 °C) Patient Vitals for the past 8 hrs: 
 Pulse 19 1039 98  
19 0718 99  
19 0346 95 Patient Vitals for the past 8 hrs: 
 Resp  
19 1039 18  
19 0718 18  
19 0346 18 Patient Vitals for the past 8 hrs: 
 BP  
19 1039 (!) 88/60  
19 0901 94/56  
19 0718 (!) 88/54  
19 0346 101/51 Intake/Output Summary (Last 24 hours) at 2019 1048 Last data filed at 2019 2291 Gross per 24 hour Intake 1006.92 ml Output 1650 ml Net -643.08 ml General Appearance: Well developed, well nourished, no acute distress. Ears/Nose/Mouth/Throat:   Normal MM; anicteric. JVP: WNL Resp:   Min crackles; Nl resp effort. Cardiovascular:  IRRR, S1, S2 normal, no new murmur. No gallop or rub. Abdomen:   Soft, non-tender, bowel sounds are present. Extremities: No edema bilaterally. Skin: 
Neuro: Warm and dry. A/O x3, grossly nonfocal  
cath site intact w/o hematoma or new bruit; distal pulse unchanged  Yes Data Review:    
Telemetry independently reviewed  sinus  chronic afib x cont afib  NSVT  
 
ECG independently reviewed  NSR  afib  no significant changes  NSST-Tw chgs  
x no new ECG provided for review Lab results reviewed as noted below. Current medications reviewed as noted below. No results for input(s): PH, PCO2, PO2 in the last 72 hours. No results for input(s): CPK, CKMB, CKNDX, TROIQ in the last 72 hours. Recent Labs 08/04/19 
0354 08/03/19 
0333 08/02/19 
9215 * 133* 133* K 3.7 3.4* 3.2*  
CL 89* 91* 93* CO2 33* 35* 31 BUN 48* 41* 39* CREA 1.50* 1.37* 1.38* GFRAA 55* >60 >60  
* 120* 116* CA 8.3* 8.6 8.6 ALB 2.7* 2.8* 2.8* WBC 7.5  --  8.1 HGB 12.3  --  11.3* HCT 37.8  --  33.8*  
  --  216 No results found for: CHOL, CHOLX, CHLST, CHOLV, HDL, LDL, LDLC, DLDLP, TGLX, TRIGL, TRIGP, CHHD, CHHDX Recent Labs 08/04/19 
0354 08/03/19 
9386 08/02/19 
9080 SGOT 39* 86* 81* AP 81 89 79  
TP 6.5 7.0 6.7 ALB 2.7* 2.8* 2.8*  
GLOB 3.8 4.2* 3.9 No results for input(s): INR, PTP, APTT in the last 72 hours. No lab exists for component: INREXT, INREXT No components found for: [place] Current Facility-Administered Medications Medication Dose Route Frequency  amiodarone (CORDARONE) 375 mg/250 mL D5W infusion  0.5 mg/min IntraVENous TITRATE  loratadine (CLARITIN) tablet 10 mg  10 mg Oral DAILY  DOBUTamine (DOBUTREX) 500 mg/250 mL (2,000 mcg/mL) infusion  2 mcg/kg/min IntraVENous CONTINUOUS  
 simethicone (MYLICON) tablet 80 mg  80 mg Oral QID PRN  
 guaiFENesin (ROBITUSSIN) 100 mg/5 mL oral liquid 100 mg  100 mg Oral TID PRN  
 levothyroxine (SYNTHROID) tablet 100 mcg  100 mcg Oral QPM  
 sodium chloride (NS) flush 5-40 mL  5-40 mL IntraVENous Q8H  
 sodium chloride (NS) flush 5-40 mL  5-40 mL IntraVENous PRN  
 ondansetron (ZOFRAN) injection 4 mg  4 mg IntraVENous Q6H PRN  
 docusate sodium (COLACE) capsule 100 mg  100 mg Oral DAILY PRN  
 morphine injection 1 mg  1 mg IntraVENous Q4H PRN  
 allopurinol (ZYLOPRIM) tablet 200 mg  200 mg Oral DAILY  albuterol-ipratropium (DUO-NEB) 2.5 MG-0.5 MG/3 ML  3 mL Nebulization Q6H PRN  
  melatonin tablet 3 mg  3 mg Oral QHS PRN Ev Sandoval MD

## 2019-08-04 NOTE — PROGRESS NOTES
Problem: Falls - Risk of 
Goal: *Absence of Falls Description Document Alex Fells Fall Risk and appropriate interventions in the flowsheet. Outcome: Progressing Towards Goal 
Note:  
Fall Risk Interventions: 
Mobility Interventions: Bed/chair exit alarm Medication Interventions: Bed/chair exit alarm Elimination Interventions: Bed/chair exit alarm, Call light in reach History of Falls Interventions: Bed/chair exit alarm

## 2019-08-04 NOTE — PROGRESS NOTES
The patient was visited as a part of the daily spiritual care order for visits. The patient's grandson was visiting at the time. I provided friendly conversation and a reminder of our commitment to the patient. I inquired if there were any spiritual needs that I could attend to. The patient replied yes, and asked that we pray. The three of us joined hands and prayed together. The patient expressed gratitude for the visit. I recommend continuation of the routine daily visits. Rev. Savannah Dominguez EdD MDiv  [facility] Page [phone]

## 2019-08-04 NOTE — PROGRESS NOTES
Dr. January Andres paged due to patients SBP 83 with a HR 98 A-Fib. Amiodarone placed on hold per parameters. Dr. January Andres ordered to D/C IV Amiodarone and start Amiodarone 200 mg PO BID and continue monitoring patient.

## 2019-08-04 NOTE — PROGRESS NOTES
Hospitalist Progress Note NAME: Temo Crabtree :  1940 MRN:  [id number] Assessment / Plan: 
Acute hypoxic respiratory failure due to Acute systolic congestive heart failure exacerbation Healthcare associated pneumonia Afib with RVR Hypertension 
-CT chest shows groundglass no opacities.  BN pep is elevated at 9600 
-per cards recent ECHO shows EF of 25 % -CXR showed Right lower lobe consolidation, however pt has already completed 8 days of abx on this admission (last dose was on 8/3) - Blood cultures NGTD. -will back down on IV lasix, pt drying up, bun/cr going up. Will also hold Entresto. Net neg 6L. Down 13# 
-started on amiodarone gtt, will cont' -cont' dobutamine and amiodarone gtt per card - Coreg, aldactone, and metolazone held 
-strict I's and O's, discussed with RN. Wt down 
-Continue oxygen by nasal cannula CKD3 
-monitor cr while diuresing. Cr up today 
-meds change as above Hypokalemia 
-replete with KCl.  wnl today Acute hepatitis due to congestion  
-CT shows evidence of nonspecific hepatic and gallbladder disease 
-US liver confirms congestive hepatopathy 
-hepatitis panel neg, LFT improving Dyslipidemia Hypothyroidism 
-con't statin and levothyroxine 
  
  
  
Code Status: DNR Surrogate Decision Maker: Wife angela  
DVT Prophylaxis: heparin GI Prophylaxis: not indicated  
Baseline: From home independent Overweight by definition Body mass index is 26.17 kg/m². Subjective: Chief Complaint / Reason for Physician Visit Pt seen at bedside. No complaint. Review of Systems: 
Symptom Y/N Comments  Symptom Y/N Comments Fever/Chills n   Chest Pain n   
Poor Appetite    Edema Cough    Abdominal Pain n   
Sputum    Joint Pain SOB/MURILLO n   Pruritis/Rash Nausea/vomit    Tolerating PT/OT Diarrhea    Tolerating Diet Constipation    Other Could NOT obtain due to:   
 
Objective: VITALS:  
 Last 24hrs VS reviewed since prior progress note. Most recent are: 
Patient Vitals for the past 24 hrs: 
 Temp Pulse Resp BP SpO2  
08/04/19 0901    94/56   
08/04/19 0718 98.3 °F (36.8 °C) 99 18 (!) 88/54 94 % 08/04/19 0346 99.3 °F (37.4 °C) 95 18 101/51 95 % 08/03/19 2344 98.9 °F (37.2 °C) 93 18 96/52 91 % 08/03/19 2000 98.7 °F (37.1 °C) 96 18 96/57 94 % 08/03/19 1808  99  96/54   
08/03/19 1725 98.6 °F (37 °C) (!) 102 17 90/54 95 % 08/03/19 1325 98.6 °F (37 °C) (!) 104 18 91/57 94 % 08/03/19 1106 98.2 °F (36.8 °C) (!) 101 18 93/58 94 % Intake/Output Summary (Last 24 hours) at 8/4/2019 1023 Last data filed at 8/4/2019 2854 Gross per 24 hour Intake 1006.92 ml Output 2300 ml Net -1293.08 ml PHYSICAL EXAM: 
General: cooperative, no acute distress   
EENT:  EOMI. Anicteric sclerae. MMM Resp:  Mild crackles at bases, wheezing +, no crackles CV:  irregular  rhythm,  No edema GI:  Soft, Non distended, Non tender.  +Bowel sounds Neurologic:  Alert and oriented X 3, normal speech Psych:   Good insight. Not anxious nor agitated Skin:  No rashes. No jaundice Reviewed most current lab test results and cultures  YES Reviewed most current radiology test results   YES Review and summation of old records today    NO Reviewed patient's current orders and MAR    YES 
PMH/SH reviewed - no change compared to H&P Current Facility-Administered Medications:  
  amiodarone (CORDARONE) 375 mg/250 mL D5W infusion, 0.5-1 mg/min, IntraVENous, TITRATE, Caven, Malachi Drew, MD, Last Rate: 20 mL/hr at 08/04/19 0551, 0.5 mg/min at 08/04/19 0551   furosemide (LASIX) injection 60 mg, 60 mg, IntraVENous, BID, Greg Woods MD, 60 mg at 08/04/19 0901   loratadine (CLARITIN) tablet 10 mg, 10 mg, Oral, DAILY, Namita Cloud MD, 10 mg at 08/04/19 0901 
  DOBUTamine (DOBUTREX) 500 mg/250 mL (2,000 mcg/mL) infusion, 2.5 mcg/kg/min, IntraVENous, CONTINUOUS, Caven, Malachi Drew, MD, Last Rate: 6.3 mL/hr at 08/03/19 1235, 2.5 mcg/kg/min at 08/03/19 1235   simethicone (MYLICON) tablet 80 mg, 80 mg, Oral, QID PRN, Sandro Gamino MD 
  sacubitril-valsartan (ENTRESTO) 49-51 mg tablet 1 Tab, 1 Tab, Oral, Q12H, Jose L Remy III, DO, 1 Tab at 08/04/19 0907 
  guaiFENesin (ROBITUSSIN) 100 mg/5 mL oral liquid 100 mg, 100 mg, Oral, TID PRN, Zuleika Hickey MD, 100 mg at 08/02/19 1705   levothyroxine (SYNTHROID) tablet 100 mcg, 100 mcg, Oral, QPM, Alexx Siddiqui MD, 100 mcg at 08/03/19 1821 
  sodium chloride (NS) flush 5-40 mL, 5-40 mL, IntraVENous, Q8H, Alexx Siddiqui MD, 10 mL at 08/04/19 0544   sodium chloride (NS) flush 5-40 mL, 5-40 mL, IntraVENous, PRN, Tia Siddiqui MD 
  ondansetron (ZOFRAN) injection 4 mg, 4 mg, IntraVENous, Q6H PRN, Tia Addison MD 
  docusate sodium (COLACE) capsule 100 mg, 100 mg, Oral, DAILY PRN, Zuleika Hickey MD, 100 mg at 07/28/19 2221   morphine injection 1 mg, 1 mg, IntraVENous, Q4H PRN, Zuleika Hickey MD, 1 mg at 07/28/19 1242   allopurinol (ZYLOPRIM) tablet 200 mg, 200 mg, Oral, DAILY, Alexx Siddiqui MD, 200 mg at 08/04/19 0901 
  albuterol-ipratropium (DUO-NEB) 2.5 MG-0.5 MG/3 ML, 3 mL, Nebulization, Q6H PRN, Zuleika Hickey MD, 3 mL at 07/31/19 0727 
  melatonin tablet 3 mg, 3 mg, Oral, QHS PRN, Lety HAIRSTON DO, 3 mg at 07/28/19 2222 
________________________________________________________________________ Care Plan discussed with: 
  Comments Patient y Family  y Pt's wife RN y   
Care Manager Consultant Multidiciplinary team rounds were held today with , nursing, pharmacist and clinical coordinator. Patient's plan of care was discussed; medications were reviewed and discharge planning was addressed. ________________________________________________________________________ Total NON critical care TIME:  35   Minutes Total CRITICAL CARE TIME Spent:   Minutes non procedure based Comments >50% of visit spent in counseling and coordination of care    
________________________________________________________________________ Kianna Lee MD  
 
Procedures: see electronic medical records for all procedures/Xrays and details which were not copied into this note but were reviewed prior to creation of Plan. LABS: 
I reviewed today's most current labs and imaging studies. Pertinent labs include: 
Recent Labs 08/04/19 
0354 08/02/19 
8361 WBC 7.5 8.1 HGB 12.3 11.3* HCT 37.8 33.8*  
 216 Recent Labs 08/04/19 
0354 08/03/19 
3462 08/02/19 
2177 * 133* 133* K 3.7 3.4* 3.2*  
CL 89* 91* 93* CO2 33* 35* 31 * 120* 116* BUN 48* 41* 39* CREA 1.50* 1.37* 1.38* CA 8.3* 8.6 8.6 ALB 2.7* 2.8* 2.8* TBILI 0.9 1.1* 1.0  
SGOT 39* 86* 81* * 183* 181* Signed: Kianna Lee MD

## 2019-08-05 NOTE — PROGRESS NOTES
Problem: Falls - Risk of 
Goal: *Absence of Falls Description Document Fanny Girt Fall Risk and appropriate interventions in the flowsheet. Outcome: Progressing Towards Goal 
Note:  
Fall Risk Interventions: 
Mobility Interventions: Patient to call before getting OOB Medication Interventions: Patient to call before getting OOB, Teach patient to arise slowly, Bed/chair exit alarm Elimination Interventions: Call light in reach, Urinal in reach, Patient to call for help with toileting needs History of Falls Interventions: Bed/chair exit alarm Problem: Patient Education: Go to Patient Education Activity Goal: Patient/Family Education Outcome: Progressing Towards Goal 
  
Problem: Patient Education: Go to Patient Education Activity Goal: Patient/Family Education Outcome: Progressing Towards Goal 
  
Problem: Heart Failure: Day 1 Goal: Off Pathway (Use only if patient is Off Pathway) Outcome: Progressing Towards Goal 
Goal: Activity/Safety Outcome: Progressing Towards Goal 
Goal: Consults, if ordered Outcome: Progressing Towards Goal 
Goal: Diagnostic Test/Procedures Outcome: Progressing Towards Goal 
Goal: Nutrition/Diet Outcome: Progressing Towards Goal 
Goal: Discharge Planning Outcome: Progressing Towards Goal 
Goal: Medications Outcome: Progressing Towards Goal 
Goal: Respiratory Outcome: Progressing Towards Goal 
Goal: Treatments/Interventions/Procedures Outcome: Progressing Towards Goal 
Goal: Psychosocial 
Outcome: Progressing Towards Goal 
Goal: *Oxygen saturation within defined limits Outcome: Progressing Towards Goal 
Goal: *Hemodynamically stable Outcome: Progressing Towards Goal 
Goal: *Optimal pain control at patient's stated goal 
Outcome: Progressing Towards Goal 
Goal: *Anxiety reduced or absent Outcome: Progressing Towards Goal 
  
Problem: Heart Failure: Day 2 Goal: Off Pathway (Use only if patient is Off Pathway) Outcome: Progressing Towards Goal 
 Goal: Activity/Safety Outcome: Progressing Towards Goal 
Goal: Consults, if ordered Outcome: Progressing Towards Goal 
Goal: Diagnostic Test/Procedures Outcome: Progressing Towards Goal 
Goal: Nutrition/Diet Outcome: Progressing Towards Goal 
Goal: Discharge Planning Outcome: Progressing Towards Goal 
Goal: Medications Outcome: Progressing Towards Goal 
Goal: Respiratory Outcome: Progressing Towards Goal 
Goal: Treatments/Interventions/Procedures Outcome: Progressing Towards Goal 
Goal: Psychosocial 
Outcome: Progressing Towards Goal 
Goal: *Oxygen saturation within defined limits Outcome: Progressing Towards Goal 
Goal: *Hemodynamically stable Outcome: Progressing Towards Goal 
Goal: *Optimal pain control at patient's stated goal 
Outcome: Progressing Towards Goal 
Goal: *Anxiety reduced or absent Outcome: Progressing Towards Goal 
Goal: *Demonstrates progressive activity Outcome: Progressing Towards Goal 
  
Problem: Heart Failure: Day 3 Goal: Off Pathway (Use only if patient is Off Pathway) Outcome: Progressing Towards Goal 
Goal: Activity/Safety Outcome: Progressing Towards Goal 
Goal: Diagnostic Test/Procedures Outcome: Progressing Towards Goal 
Goal: Nutrition/Diet Outcome: Progressing Towards Goal 
Goal: Discharge Planning Outcome: Progressing Towards Goal 
Goal: Medications Outcome: Progressing Towards Goal 
Goal: Respiratory Outcome: Progressing Towards Goal 
Goal: Treatments/Interventions/Procedures Outcome: Progressing Towards Goal 
Goal: Psychosocial 
Outcome: Progressing Towards Goal 
Goal: *Oxygen saturation within defined limits Outcome: Progressing Towards Goal 
Goal: *Hemodynamically stable Outcome: Progressing Towards Goal 
Goal: *Optimal pain control at patient's stated goal 
Outcome: Progressing Towards Goal 
Goal: *Anxiety reduced or absent Outcome: Progressing Towards Goal 
Goal: *Demonstrates progressive activity Outcome: Progressing Towards Goal 
  
 Problem: Heart Failure: Day 4 Goal: Off Pathway (Use only if patient is Off Pathway) Outcome: Progressing Towards Goal 
Goal: Activity/Safety Outcome: Progressing Towards Goal 
Goal: Diagnostic Test/Procedures Outcome: Progressing Towards Goal 
Goal: Nutrition/Diet Outcome: Progressing Towards Goal 
Goal: Discharge Planning Outcome: Progressing Towards Goal 
Goal: Medications Outcome: Progressing Towards Goal 
Goal: Respiratory Outcome: Progressing Towards Goal 
Goal: Treatments/Interventions/Procedures Outcome: Progressing Towards Goal 
Goal: Psychosocial 
Outcome: Progressing Towards Goal 
Goal: *Oxygen saturation within defined limits Outcome: Progressing Towards Goal 
Goal: *Hemodynamically stable Outcome: Progressing Towards Goal 
Goal: *Optimal pain control at patient's stated goal 
Outcome: Progressing Towards Goal 
Goal: *Anxiety reduced or absent Outcome: Progressing Towards Goal 
Goal: *Demonstrates progressive activity Outcome: Progressing Towards Goal 
  
Problem: Heart Failure: Day 5 Goal: Off Pathway (Use only if patient is Off Pathway) Outcome: Progressing Towards Goal 
Goal: Activity/Safety Outcome: Progressing Towards Goal 
Goal: Diagnostic Test/Procedures Outcome: Progressing Towards Goal 
Goal: Nutrition/Diet Outcome: Progressing Towards Goal 
Goal: Discharge Planning Outcome: Progressing Towards Goal 
Goal: Medications Outcome: Progressing Towards Goal 
Goal: Respiratory Outcome: Progressing Towards Goal 
Goal: Treatments/Interventions/Procedures Outcome: Progressing Towards Goal 
Goal: Psychosocial 
Outcome: Progressing Towards Goal 
  
Problem: Heart Failure: Discharge Outcomes Goal: *Demonstrates ability to perform prescribed activity without shortness of breath or discomfort Outcome: Progressing Towards Goal 
Goal: *Left ventricular function assessment completed prior to or during stay, or planned for post-discharge Outcome: Progressing Towards Goal 
 Goal: *ACEI prescribed if LVEF less than 40% and no contraindications or ARB prescribed Outcome: Progressing Towards Goal 
Goal: *Verbalizes understanding and describes prescribed diet Outcome: Progressing Towards Goal 
Goal: *Verbalizes understanding/describes prescribed medications Outcome: Progressing Towards Goal 
Goal: *Describes available resources and support systems Description 
(eg: Home Health, Palliative Care, Advanced Medical Directive) Outcome: Progressing Towards Goal 
Goal: *Describes smoking cessation resources Outcome: Progressing Towards Goal 
Goal: *Understands and describes signs and symptoms to report to providers(Stroke Metric) Outcome: Progressing Towards Goal 
Goal: *Describes/verbalizes understanding of follow-up/return appt Description 
(eg: to physicians, diabetes treatment coordinator, and other resources Outcome: Progressing Towards Goal 
Goal: *Describes importance of continuing daily weights and changes to report to physician Outcome: Progressing Towards Goal

## 2019-08-05 NOTE — PROGRESS NOTES
Brief Procedure Note Patient: Lillian Simon MRN: [id number]  SSN: [id number] YOB: 1940  Age: 77 y.o. Sex: male Date of Procedure: 8/5/2019 Pre-procedure Diagnosis: Persistent atrial fibrillation Post-procedure Diagnosis: Sev red LVEF, no sig valve disease, successful CV to SR Procedure: VIGNESH, DCCV Performed By: Amaya Padgett III, DO Anesthesia: Moderate Sedation Estimated Blood Loss: Less than 10 mL Specimens:  None Findings: as above Complications: None Implants: None Recommendations: Continue medical therapy. Signed By: Amaya Padgett III, DO August 5, 2019

## 2019-08-05 NOTE — PROGRESS NOTES
Pt sedated with 1mg Versed and 25mcg Fentanyl for VIGNESH (monitored sedation from 1302 to 1320) Pt sedated with an additional 2mg Versed, given 1 synchronized shock(s) at 200 Joules, Afib converted to NSR.(monitored sedation from 1302 to 1320)

## 2019-08-05 NOTE — PROGRESS NOTES
Responded to request for daily visits with patient. Patient's spouse was leaving as I arrived and informed me that the patient had just fallen asleep. Rev. Koki Hollingsworth EdD MDiv  [facility] Page [phone]

## 2019-08-05 NOTE — CDMP QUERY
Patient admitted with Possible healthcare associated pneumonia. Noted to have hypotension, fever, AHRF. If possible, please document in progress notes and d/c summary if you are evaluating and/or treating any of the following: 
 
? Severe sepsis  POA with AHRF, in the setting of possible HCAP, hypotension,LA 1.9, now on Dobutamine gtt, Maxipime, Vancomycin. ? Sepsis  POA with AHRF, in the setting of possible HCAP, hypotension,LA 1.9, now on Dobutamine gtt, Maxipime, Vancomycin. ? Other, please specify ? Unable to Determine The medical record reflects the following: 
  Risk Factors: presents with fever and chills, AHRF, possible pneumonia Clinical Indicators: organ dysfunction of AHRF, hypotension bp 88/50-88/54, pulse , LA 1.9 on admission Treatment: Dobutamine Gtt, Maxipime, Vancomycin, blood and urine cultures pending. Thank you Nahco Alicea RN/CCDS 
[phone]

## 2019-08-05 NOTE — PROGRESS NOTES
Progress Note 8/5/2019 8:38 PM 
NAME: Lenora Shea MRN:  [id number] Admit Diagnosis[facility] [I[facility]9] Assessment: 1. Acute on chronic systolic heart failure 2. Dilated NICM; EF 25%. Echo 7/18 w/ EF 20%, dil LV, nml RV, mild MR/TR 3. Persistent AFib (new) 4. Remote ICD implantation 5. Recurrent ventricular tachycardia 6. Hyperlipidemia 7. Former smoker 8. DNR 
9. Usual Cardiologist:  Dr. Major Staff now Dr. Ginna Dunne (appt 8/1) Plan:  
 
 
Last 3 Recorded Weights in this Encounter 08/03/19 1325 08/04/19 0401 08/05/19 9350 Weight: 83.8 kg (184 lb 11.9 oz) 80.4 kg (177 lb 3.2 oz) 80.4 kg (177 lb 4.8 oz) Borderline BP 
 
AFib now for > 48h Stop dobutamine Continue oral amio NPO 
VIGNESH/DCCV this afternoon Start eliquis 5mg BID Diuresis held yesterday; resume oral diuretic; lasix 40mg daily Holding Entresto, coreg, aldactone with marginal/low BPs; add beta blocker back first 
  
 
 [x]        High complexity decision making was performed Subjective:  
 
Lenora Shea denies chest pain. Dyspnea much better. Discussed with RN events overnight. Patient Active Problem List  
Diagnosis Code  Encounter for colonoscopy due to history of colonic polyp Z12.11, Z86.010  
 CHF (congestive heart failure) ([facility]) I50.9 Review of Systems: 
 
Symptom Y/N Comments  Symptom Y/N Comments Fever/Chills N   Chest Pain N Poor Appetite N   Edema N   
Cough N   Abdominal Pain N Sputum N   Joint Pain N   
SOB/MURILLO N   Pruritis/Rash N   
Nausea/vomit N   Tolerating PT/OT Y Diarrhea N   Tolerating Diet Y Constipation N   Other Could NOT obtain due to:   
 
Objective:  
  
Physical Exam: 
 
Last 24hrs VS reviewed since prior progress note. Most recent are: 
 
Visit Vitals BP 97/58 (BP 1 Location: Right arm, BP Patient Position: At rest) Pulse 94 Temp 98.4 °F (36.9 °C) Resp 18 Ht 5' 9\" (1.753 m) Wt 80.4 kg (177 lb 4.8 oz) SpO2 99% BMI 26.18 kg/m² Intake/Output Summary (Last 24 hours) at 8/5/2019 0757 Last data filed at 8/5/2019 8189 Gross per 24 hour Intake 480 ml Output 1000 ml Net -520 ml General Appearance: Well developed, well nourished, alert & oriented x 3,  
 no acute distress. Ears/Nose/Mouth/Throat: Hearing grossly normal. 
Neck: Supple. Chest: Lungs clear to auscultation bilaterally. Cardiovascular: Irregular rate and rhythm, S1S2 normal, no murmur. Abdomen: Soft, non-tender, bowel sounds are active. Extremities: Trivial edema bilaterally. Skin: Warm and dry. PMH/SH reviewed - no change compared to H&P Data Review Telemetry: AF Lab Data Personally Reviewed: 
 
Recent Labs 08/04/19 
0354 WBC 7.5 HGB 12.3 HCT 37.8  LABRCNT(INR:3,PTP:3,APTT:3,) Recent Labs 08/05/19 
0150 08/04/19 
0354 08/03/19 
9246 * 129* 133* K 4.1 3.7 3.4*  
CL 93* 89* 91* CO2 33* 33* 35* BUN 52* 48* 41* CREA 1.63* 1.50* 1.37* * 187* 120* CA 8.5 8.3* 8.6 LABRCNT(CPK:3,CpKMB:3,ckndx:3,troiq:3)No results found for: CHOL, CHOLX, CHLST, CHOLV, HDL, LDL, LDLC, DLDLP, TGLX, TRIGL, TRIGP, CHHD, CHHDXLABRCNT(sgot:3,gpt:3,ap:3,tbiL:3,TP:3,ALB:3,GLOB:3,ggt:3,aml:3,amyp:3,lpse:3,hlpse:3)No results for input(s): PH, PCO2, PO2 in the last 72 hours. No results found for: CHOL, CHOLX, CHLST, CHOLV, HDL, LDL, LDLC, DLDLP, TGLX, TRIGL, TRIGP, CHHD, CHHDXMEDTABLEGeorge  Remy III, DO No results for input(s): PH, PCO2, PO2 in the last 72 hours. Medications Personally Reviewed: 
 
Current Facility-Administered Medications Medication Dose Route Frequency  amiodarone (CORDARONE) tablet 200 mg  200 mg Oral BID  loratadine (CLARITIN) tablet 10 mg  10 mg Oral DAILY  DOBUTamine (DOBUTREX) 500 mg/250 mL (2,000 mcg/mL) infusion  2 mcg/kg/min IntraVENous CONTINUOUS  
 simethicone (MYLICON) tablet 80 mg  80 mg Oral QID PRN  
  guaiFENesin (ROBITUSSIN) 100 mg/5 mL oral liquid 100 mg  100 mg Oral TID PRN  
 levothyroxine (SYNTHROID) tablet 100 mcg  100 mcg Oral QPM  
 sodium chloride (NS) flush 5-40 mL  5-40 mL IntraVENous Q8H  
 sodium chloride (NS) flush 5-40 mL  5-40 mL IntraVENous PRN  
 ondansetron (ZOFRAN) injection 4 mg  4 mg IntraVENous Q6H PRN  
 docusate sodium (COLACE) capsule 100 mg  100 mg Oral DAILY PRN  
 allopurinol (ZYLOPRIM) tablet 200 mg  200 mg Oral DAILY  albuterol-ipratropium (DUO-NEB) 2.5 MG-0.5 MG/3 ML  3 mL Nebulization Q6H PRN  
 melatonin tablet 3 mg  3 mg Oral QHS PRN Oniel Hall III, DO

## 2019-08-05 NOTE — PROGRESS NOTES
Hospitalist Progress Note NAME: Beauty Galeazzi :  1940 MRN:  [id number] Assessment / Plan: 
Acute hypoxic respiratory failure due to Acute systolic congestive heart failure exacerbation Healthcare associated pneumonia Afib with RVR Hypertension 
-CT chest shows groundglass no opacities.  BN pep is elevated at 9600 
-per cards recent ECHO shows EF of 25 % -CXR showed Right lower lobe consolidation, however pt has already completed 8 days of abx (last dose was on 8/3) - Blood cultures NGTD. -started on amiodarone gtt, will cont' 
-off amiodarone and dobutamine gtt. Restarted on amiodarone PO 
-lasix discontinued on , resumed PO lasix today Adalid [place], coreg, aldactone, and metolazone held 
-Eliquis started, need pricing prior to discharge 
-strict I's and O's, discussed with RN. Wt down 
-for VIGNESH/DCCV today 
-wean O2 as tolerated CKD3 
-monitor cr while diuresing.   
-meds change as above Hypokalemia 
-replete with KCl.  wnl today Acute hepatitis due to congestion, resolved 
-CT shows evidence of nonspecific hepatic and gallbladder disease 
-US liver confirms congestive hepatopathy 
-hepatitis panel neg Dyslipidemia Hypothyroidism 
-con't statin and levothyroxine 
  
  
  
Code Status: DNR Surrogate Decision Maker: Wife angela  
DVT Prophylaxis: heparin GI Prophylaxis: not indicated  
Baseline: From home independent Overweight by definition Body mass index is 26.18 kg/m². Subjective: Chief Complaint / Reason for Physician Visit Pt seen at bedside. No complaint. Review of Systems: 
Symptom Y/N Comments  Symptom Y/N Comments Fever/Chills n   Chest Pain n   
Poor Appetite    Edema Cough    Abdominal Pain n   
Sputum    Joint Pain SOB/MURILLO n   Pruritis/Rash Nausea/vomit    Tolerating PT/OT Diarrhea    Tolerating Diet Constipation    Other Could NOT obtain due to:   
 
Objective: VITALS:  
 Last 24hrs VS reviewed since prior progress note. Most recent are: 
Patient Vitals for the past 24 hrs: 
 Temp Pulse Resp BP SpO2  
08/05/19 1340  73 21 91/50 94 % 08/05/19 1335  74 21 (!) 89/49 93 % 08/05/19 1330  77 23 92/54 94 % 08/05/19 1325  74 24 98/50 95 % 08/05/19 1320  75 21 102/62 94 % 08/05/19 1315  99 20 95/66 96 % 08/05/19 1310  94 20 100/62 96 % 08/05/19 1305  90 18 101/61 96 % 08/05/19 1300  98 12 101/55 98 % 08/05/19 1244    100/55   
08/05/19 1200  98 19 100/59 94 % 08/05/19 1129 97.5 °F (36.4 °C) 89 20 98/47 93 % 08/05/19 0913     92 % 08/05/19 0811 97.7 °F (36.5 °C) 93 20 105/60 98 % 08/05/19 0251 98.4 °F (36.9 °C) 94 18 97/58 99 % 08/04/19 2345 98.5 °F (36.9 °C) (!) 106 20 99/52 91 % 08/04/19 1948 98.4 °F (36.9 °C) 95 20 95/63 95 % 08/04/19 1756  99  94/60   
08/04/19 1434 98.3 °F (36.8 °C) 96 18 (!) 88/50 98 % Intake/Output Summary (Last 24 hours) at 8/5/2019 1345 Last data filed at 8/5/2019 2949 Gross per 24 hour Intake 639.78 ml Output 1000 ml Net -360.22 ml PHYSICAL EXAM: 
General: cooperative, no acute distress   
EENT:  EOMI. Anicteric sclerae. MMM Resp:  CTA b/l. wheezing +, no crackles CV:  irregular  rhythm,  No edema GI:  Soft, Non distended, Non tender.  +BS Neurologic:  Alert and oriented X 3, normal speech Psych:   Good insight. Not anxious nor agitated Skin:  No rashes. No jaundice Reviewed most current lab test results and cultures  YES Reviewed most current radiology test results   YES Review and summation of old records today    NO Reviewed patient's current orders and MAR    YES 
PMH/SH reviewed - no change compared to H&P Current Facility-Administered Medications:  
  apixaban (ELIQUIS) tablet 5 mg, 5 mg, Oral, Q12H, Jose L Remy III, DO, 5 mg at 08/05/19 9658   furosemide (LASIX) tablet 40 mg, 40 mg, Oral, DAILY, Jose L Remy III, DO, 40 mg at 08/05/19 5477   amiodarone (CORDARONE) tablet 200 mg, 200 mg, Oral, BID, Negar Clarke MD, 200 mg at 08/05/19 3509   loratadine (CLARITIN) tablet 10 mg, 10 mg, Oral, DAILY, Nemo Gilliland MD, 10 mg at 08/05/19 0190   simethicone (MYLICON) tablet 80 mg, 80 mg, Oral, QID PRN, Nemo Gilliland MD 
  guaiFENesin (ROBITUSSIN) 100 mg/5 mL oral liquid 100 mg, 100 mg, Oral, TID PRN, Christie Bell MD, 100 mg at 08/04/19 2228   levothyroxine (SYNTHROID) tablet 100 mcg, 100 mcg, Oral, QPM, Christie Bell MD, 100 mcg at 08/04/19 2135   sodium chloride (NS) flush 5-40 mL, 5-40 mL, IntraVENous, Q8H, Alexx Siddiqui MD, 10 mL at 08/05/19 0545   sodium chloride (NS) flush 5-40 mL, 5-40 mL, IntraVENous, PRN, Familia Siddiqui Cera, MD 
  ondansetron (ZOFRAN) injection 4 mg, 4 mg, IntraVENous, Q6H PRN, Familia Mclaughlin Cera, MD 
  docusate sodium (COLACE) capsule 100 mg, 100 mg, Oral, DAILY PRN, Christie Bell MD, 100 mg at 07/28/19 2221   allopurinol (ZYLOPRIM) tablet 200 mg, 200 mg, Oral, DAILY, Christie Bell MD, 200 mg at 08/05/19 0378   albuterol-ipratropium (DUO-NEB) 2.5 MG-0.5 MG/3 ML, 3 mL, Nebulization, Q6H PRN, Alexx Mclaughlin MD, 3 mL at 07/31/19 0727 
  melatonin tablet 3 mg, 3 mg, Oral, QHS PRN, Gladys HAIRSTON DO, 3 mg at 07/28/19 2222 
________________________________________________________________________ Care Plan discussed with: 
  Comments Patient y Family  y Pt's wife RN y   
Care Manager Consultant Multidiciplinary team rounds were held today with , nursing, pharmacist and clinical coordinator. Patient's plan of care was discussed; medications were reviewed and discharge planning was addressed. ________________________________________________________________________ Total NON critical care TIME:  35   Minutes Total CRITICAL CARE TIME Spent:   Minutes non procedure based Comments >50% of visit spent in counseling and coordination of care ________________________________________________________________________ Kianna Lee MD  
 
Procedures: see electronic medical records for all procedures/Xrays and details which were not copied into this note but were reviewed prior to creation of Plan. LABS: 
I reviewed today's most current labs and imaging studies. Pertinent labs include: 
Recent Labs 08/04/19 0354 WBC 7.5 HGB 12.3 HCT 37.8  Recent Labs 08/05/19 
0150 08/04/19 
0354 08/03/19 
1121 * 129* 133* K 4.1 3.7 3.4*  
CL 93* 89* 91* CO2 33* 33* 35* * 187* 120* BUN 52* 48* 41* CREA 1.63* 1.50* 1.37* CA 8.5 8.3* 8.6 ALB 2.9* 2.7* 2.8* TBILI 0.7 0.9 1.1*  
SGOT 28 39* 86* ALT 97* 123* 183* Signed: Kianna Lee MD

## 2019-08-05 NOTE — PROGRESS NOTES
TRANSFER - OUT REPORT: 
 
Verbal report given to Hien SUH (name) on Aldo Dias  being transferred to [address] [place] [address] (unit) for routine progression of care Report consisted of patients Situation, Background, Assessment and  
Recommendations(SBAR). Information from the following report(s) SBAR, Kardex, Procedure Summary, Intake/Output, MAR, Recent Results, Cardiac Rhythm NSR and Procedure Verification was reviewed with the receiving nurse. Lines:  
Peripheral IV 07/28/19 Right Antecubital (Active) Site Assessment Clean, dry, & intact 8/5/2019  7:45 AM  
Phlebitis Assessment 0 8/5/2019  7:45 AM  
Infiltration Assessment 0 8/5/2019  7:45 AM  
Dressing Status Clean, dry, & intact 8/5/2019  7:45 AM  
Dressing Type Transparent;Tape 8/5/2019  7:45 AM  
Hub Color/Line Status Pink; Infusing 8/5/2019  7:45 AM  
Alcohol Cap Used Yes 8/4/2019  8:00 AM  
  
 
Opportunity for questions and clarification was provided. Patient transported with: 
Telebox ]

## 2019-08-05 NOTE — PROGRESS NOTES
Jeanette TRANSFER - IN REPORT: 
 
Verbal report received from Noris(name) on Deepak Bernardino  being received from [facility](unit) for routine progression of care Report consisted of patients Situation, Background, Assessment and  
Recommendations(SBAR). Information from the following report(s) SBAR was reviewed with the receiving nurse. Opportunity for questions and clarification was provided. Assessment completed upon patients arrival to unit and care assumed. 1907 Bedside(SBAR) Shift report given to Kayode Petit

## 2019-08-05 NOTE — PROGRESS NOTES
Patient arrived to Non-Invasive Cardiology Lab for Inpatient VIGNESH/DCC Procedure. Staff introduced to patient. Patient identifiers verified with Name and Date of Birth. Procedure verified with patient. Consent forms reviewed and signed by patient or authorized representative and verified. Allergies verified. Patient informed of procedure and plan of care. Questions answered with review. Patient on cardiac monitor, non-invasive blood pressure, SPO2 monitor. On room richard. Patient is A&Ox3. Patient reports no complaints. Patient on stretcher, in low position, with side rails up. Patient instructed to call for assistance as needed. Family in room.

## 2019-08-05 NOTE — PROGRESS NOTES
Problem: Falls - Risk of 
Goal: *Absence of Falls Description Document Yosvany Flores Fall Risk and appropriate interventions in the flowsheet. 8/5/2019 1459 by Belia Blandon Outcome: Progressing Towards Goal 
Note:  
Fall Risk Interventions: 
Mobility Interventions: Bed/chair exit alarm, Communicate number of staff needed for ambulation/transfer, OT consult for ADLs, Patient to call before getting OOB, PT Consult for mobility concerns, PT Consult for assist device competence, Utilize gait belt for transfers/ambulation Medication Interventions: Bed/chair exit alarm, Evaluate medications/consider consulting pharmacy, Patient to call before getting OOB, Teach patient to arise slowly, Utilize gait belt for transfers/ambulation Elimination Interventions: Bed/chair exit alarm, Call light in reach, Elevated toilet seat, Stay With Me (per policy), Patient to call for help with toileting needs, Toilet paper/wipes in reach, Toileting schedule/hourly rounds History of Falls Interventions: Bed/chair exit alarm 8/5/2019 1458 by Belia Blandon Outcome: Progressing Towards Goal 
Note:  
Fall Risk Interventions: 
Mobility Interventions: Bed/chair exit alarm, Communicate number of staff needed for ambulation/transfer, OT consult for ADLs, Patient to call before getting OOB, PT Consult for mobility concerns, PT Consult for assist device competence, Utilize gait belt for transfers/ambulation Medication Interventions: Bed/chair exit alarm, Evaluate medications/consider consulting pharmacy, Patient to call before getting OOB, Teach patient to arise slowly, Utilize gait belt for transfers/ambulation Elimination Interventions: Bed/chair exit alarm, Call light in reach, Elevated toilet seat, Stay With Me (per policy), Patient to call for help with toileting needs, Toilet paper/wipes in reach, Toileting schedule/hourly rounds History of Falls Interventions: Bed/chair exit alarm

## 2019-08-05 NOTE — PROGRESS NOTES
[facility] INTERDISCIPLINARY ROUNDS Cardiopulmonary Care Interdisciplinary Rounds were held today to discuss patient's plan of care and outcomes. The following members were present:  Pharmacy, Nursing and Case Management. Expected Length of Stay:  4d 2h 
 
PLAN OF CARE:  
Continue current treatment plan

## 2019-08-05 NOTE — PROGRESS NOTES
Bedside shift change report given to [address] [place] [address] (oncoming nurse) by Jelena Buitrago (offgoing nurse). Report included the following information SBAR, Kardex, Intake/Output, MAR, Recent Results, Med Rec Status and Cardiac Rhythm AFIB.

## 2019-08-06 NOTE — PROGRESS NOTES
Pt d/c home. I reviewed all d/c instructions, follow up appts, and medications. Pt and wife stated understanding. Removed all IV/tele. Pt left with all personal belongings, personal portable O2 tank and Rxs.

## 2019-08-06 NOTE — PROGRESS NOTES
Pharmacist Discharge Medication Reconciliation Significant PMH:  
Past Medical History:  
Diagnosis Date AICD (automatic cardioverter/defibrillator) present Arthritis Cancer ([facility] [address] skin Heart failure ([address] Other ill-defined conditions(799.89)   
 high cholesterol Chief Complaint for this Admission: Chief Complaint Patient presents with Abdominal Pain  
  ambulatory to triage, reports abdominal pain x2 days, [facility] and was told that he had pneumonia, was seen in the ED and was diagnosed with fever of unspecified source. Was prescribed tessalon and sent home. States that his symptoms continue with SOB and abd pain Allergies: Patient has no known allergies. Discharge Medications:  
Current Discharge Medication List  
  
 
START taking these medications Details  
apixaban (ELIQUIS) 5 mg tablet Take 1 Tab by mouth every twelve (12) hours. Qty: 60 Tab, Refills: 3 CONTINUE these medications which have CHANGED Details  
amiodarone (CORDARONE) 200 mg tablet Take 1 Tab by mouth two (2) times a day. Indications: atrial fibrillation electrically shocked to normal rhythm 
Qty: 60 Tab, Refills: 2  
  
furosemide (LASIX) 40 mg tablet Take one 40mg tablet daily 
Qty: 30 Tab, Refills: 3 CONTINUE these medications which have NOT CHANGED Details  
levothyroxine (SYNTHROID) 100 mcg tablet Take 100 mcg by mouth every evening. allopurinol (ZYLOPRIM) 100 mg tablet Take 200 mg by mouth daily. Indications: 2 pills daily  
  
loratadine (CLARITIN) 10 mg tablet Take 10 mg by mouth daily. MULTIVITAMIN PO Take 1 Tab by mouth daily. Indications: Centrum Silver  
  
pravastatin (PRAVACHOL) 80 mg tablet Take 80 mg by mouth nightly. benzonatate (TESSALON PERLES) 100 mg capsule Take 1 Cap by mouth three (3) times daily as needed for Cough for up to 10 days. Qty: 30 Cap, Refills: 0 STOP taking these medications sacubitril-valsartan (ENTRESTO) 24 mg/26 mg tablet Comments:  
Reason for Stopping:   
   
 carvedilol (COREG) 6.25 mg tablet Comments:  
Reason for Stopping:   
   
 spironolactone (ALDACTONE) 25 mg tablet Comments:  
Reason for Stopping:   
   
  
 
 
The patient's chart, MAR and AVS were reviewed by Conrad Davis PHARMD. Discharging Provider: Dr. Mason Ortiz Thank You, Conrad Davis, NIMCOD

## 2019-08-06 NOTE — PROGRESS NOTES
Problem: Falls - Risk of 
Goal: *Absence of Falls Description Document Julius Mo Fall Risk and appropriate interventions in the flowsheet. Outcome: Progressing Towards Goal 
Note:  
Fall Risk Interventions: 
Mobility Interventions: Patient to call before getting OOB Medication Interventions: Patient to call before getting OOB, Teach patient to arise slowly Elimination Interventions: Call light in reach, Patient to call for help with toileting needs, Urinal in reach History of Falls Interventions: Bed/chair exit alarm Problem: Patient Education: Go to Patient Education Activity Goal: Patient/Family Education Outcome: Progressing Towards Goal 
  
Problem: Patient Education: Go to Patient Education Activity Goal: Patient/Family Education Outcome: Progressing Towards Goal 
  
Problem: Heart Failure: Day 1 Goal: Off Pathway (Use only if patient is Off Pathway) Outcome: Progressing Towards Goal 
Goal: Activity/Safety Outcome: Progressing Towards Goal 
Goal: Consults, if ordered Outcome: Progressing Towards Goal 
Goal: Diagnostic Test/Procedures Outcome: Progressing Towards Goal 
Goal: Nutrition/Diet Outcome: Progressing Towards Goal 
Goal: Discharge Planning Outcome: Progressing Towards Goal 
Goal: Medications Outcome: Progressing Towards Goal 
Goal: Respiratory Outcome: Progressing Towards Goal 
Goal: Treatments/Interventions/Procedures Outcome: Progressing Towards Goal 
Goal: Psychosocial 
Outcome: Progressing Towards Goal 
Goal: *Oxygen saturation within defined limits Outcome: Progressing Towards Goal 
Goal: *Hemodynamically stable Outcome: Progressing Towards Goal 
Goal: *Optimal pain control at patient's stated goal 
Outcome: Progressing Towards Goal 
Goal: *Anxiety reduced or absent Outcome: Progressing Towards Goal 
  
Problem: Heart Failure: Day 2 Goal: Off Pathway (Use only if patient is Off Pathway) Outcome: Progressing Towards Goal 
Goal: Activity/Safety Outcome: Progressing Towards Goal 
Goal: Consults, if ordered Outcome: Progressing Towards Goal 
Goal: Diagnostic Test/Procedures Outcome: Progressing Towards Goal 
Goal: Nutrition/Diet Outcome: Progressing Towards Goal 
Goal: Discharge Planning Outcome: Progressing Towards Goal 
Goal: Medications Outcome: Progressing Towards Goal 
Goal: Respiratory Outcome: Progressing Towards Goal 
Goal: Treatments/Interventions/Procedures Outcome: Progressing Towards Goal 
Goal: Psychosocial 
Outcome: Progressing Towards Goal 
Goal: *Oxygen saturation within defined limits Outcome: Progressing Towards Goal 
Goal: *Hemodynamically stable Outcome: Progressing Towards Goal 
Goal: *Optimal pain control at patient's stated goal 
Outcome: Progressing Towards Goal 
Goal: *Anxiety reduced or absent Outcome: Progressing Towards Goal 
Goal: *Demonstrates progressive activity Outcome: Progressing Towards Goal 
  
Problem: Heart Failure: Day 3 Goal: Off Pathway (Use only if patient is Off Pathway) Outcome: Progressing Towards Goal 
Goal: Activity/Safety Outcome: Progressing Towards Goal 
Goal: Diagnostic Test/Procedures Outcome: Progressing Towards Goal 
Goal: Nutrition/Diet Outcome: Progressing Towards Goal 
Goal: Discharge Planning Outcome: Progressing Towards Goal 
Goal: Medications Outcome: Progressing Towards Goal 
Goal: Respiratory Outcome: Progressing Towards Goal 
Goal: Treatments/Interventions/Procedures Outcome: Progressing Towards Goal 
Goal: Psychosocial 
Outcome: Progressing Towards Goal 
Goal: *Oxygen saturation within defined limits Outcome: Progressing Towards Goal 
Goal: *Hemodynamically stable Outcome: Progressing Towards Goal 
Goal: *Optimal pain control at patient's stated goal 
Outcome: Progressing Towards Goal 
Goal: *Anxiety reduced or absent Outcome: Progressing Towards Goal 
Goal: *Demonstrates progressive activity Outcome: Progressing Towards Goal 
  
Problem: Heart Failure: Day 4 
 Goal: Off Pathway (Use only if patient is Off Pathway) Outcome: Progressing Towards Goal 
Goal: Activity/Safety Outcome: Progressing Towards Goal 
Goal: Diagnostic Test/Procedures Outcome: Progressing Towards Goal 
Goal: Nutrition/Diet Outcome: Progressing Towards Goal 
Goal: Discharge Planning Outcome: Progressing Towards Goal 
Goal: Medications Outcome: Progressing Towards Goal 
Goal: Respiratory Outcome: Progressing Towards Goal 
Goal: Treatments/Interventions/Procedures Outcome: Progressing Towards Goal 
Goal: Psychosocial 
Outcome: Progressing Towards Goal 
Goal: *Oxygen saturation within defined limits Outcome: Progressing Towards Goal 
Goal: *Hemodynamically stable Outcome: Progressing Towards Goal 
Goal: *Optimal pain control at patient's stated goal 
Outcome: Progressing Towards Goal 
Goal: *Anxiety reduced or absent Outcome: Progressing Towards Goal 
Goal: *Demonstrates progressive activity Outcome: Progressing Towards Goal 
  
Problem: Heart Failure: Day 5 Goal: Off Pathway (Use only if patient is Off Pathway) Outcome: Progressing Towards Goal 
Goal: Activity/Safety Outcome: Progressing Towards Goal 
Goal: Diagnostic Test/Procedures Outcome: Progressing Towards Goal 
Goal: Nutrition/Diet Outcome: Progressing Towards Goal 
Goal: Discharge Planning Outcome: Progressing Towards Goal 
Goal: Medications Outcome: Progressing Towards Goal 
Goal: Respiratory Outcome: Progressing Towards Goal 
Goal: Treatments/Interventions/Procedures Outcome: Progressing Towards Goal 
Goal: Psychosocial 
Outcome: Progressing Towards Goal 
  
Problem: Heart Failure: Discharge Outcomes Goal: *Demonstrates ability to perform prescribed activity without shortness of breath or discomfort Outcome: Progressing Towards Goal 
Goal: *Left ventricular function assessment completed prior to or during stay, or planned for post-discharge Outcome: Progressing Towards Goal 
 Goal: *ACEI prescribed if LVEF less than 40% and no contraindications or ARB prescribed Outcome: Progressing Towards Goal 
Goal: *Verbalizes understanding and describes prescribed diet Outcome: Progressing Towards Goal 
Goal: *Verbalizes understanding/describes prescribed medications Outcome: Progressing Towards Goal 
Goal: *Describes available resources and support systems Description 
(eg: Home Health, Palliative Care, Advanced Medical Directive) Outcome: Progressing Towards Goal 
Goal: *Describes smoking cessation resources Outcome: Progressing Towards Goal 
Goal: *Understands and describes signs and symptoms to report to providers(Stroke Metric) Outcome: Progressing Towards Goal 
Goal: *Describes/verbalizes understanding of follow-up/return appt Description 
(eg: to physicians, diabetes treatment coordinator, and other resources Outcome: Progressing Towards Goal 
Goal: *Describes importance of continuing daily weights and changes to report to physician Outcome: Progressing Towards Goal 
  
Problem: Pressure Injury - Risk of 
Goal: *Prevention of pressure injury Description Document Arsen Scale and appropriate interventions in the flowsheet. Outcome: Progressing Towards Goal 
Note:  
Pressure Injury Interventions: 
Sensory Interventions: Assess changes in LOC, Float heels, Keep linens dry and wrinkle-free, Maintain/enhance activity level, Minimize linen layers Moisture Interventions: Absorbent underpads Activity Interventions: Increase time out of bed, PT/OT evaluation Mobility Interventions: HOB 30 degrees or less, Pressure redistribution bed/mattress (bed type), PT/OT evaluation, Turn and reposition approx. every two hours(pillow and wedges) Nutrition Interventions: Document food/fluid/supplement intake, Offer support with meals,snacks and hydration Friction and Shear Interventions: Lift sheet, Lift team/patient mobility team, Minimize layers Problem: Patient Education: Go to Patient Education Activity Goal: Patient/Family Education Outcome: Progressing Towards Goal 
  
Problem: Patient Education: Go to Patient Education Activity Goal: Patient/Family Education Outcome: Progressing Towards Goal 
  
Problem: Patient Education: Go to Patient Education Activity Goal: Patient/Family Education Outcome: Progressing Towards Goal

## 2019-08-06 NOTE — PROGRESS NOTES
Problem: Mobility Impaired (Adult and Pediatric) Goal: *Acute Goals and Plan of Care (Insert Text) Description Physical Therapy Goals Initiated 7/30/2019 1. Patient will move from supine to sit and sit to supine  in bed with modified independence within 7 day(s). 2.  Patient will transfer from bed to chair and chair to bed with modified independence using the least restrictive device within 7 day(s). 3.  Patient will perform sit to stand with modified independence within 7 day(s). 4.  Patient will ambulate with modified independence for 250 feet with the least restrictive device within 7 day(s). 5.  Patient will ascend/descend 5 stairs with bilateral handrail(s) with modified independence within 7 day(s). Revised 8/6/19- goals remain appropriate Outcome: Progressing Towards Goal 
PHYSICAL THERAPY TREATMENT: WEEKLY REASSESSMENT Patient: Patricia Fonseca (89 y.o. male) Date: 8/6/2019 Primary Diagnosis: CH[facility] (congestive heart failure) ([facility] [I50.9] Precautions: fall ASSESSMENT Based on the objective data described below, the patient presents with impaired balance, altered gait and hypoxia. Patient's progression toward goals since last assessment: progress towards all goals Current Level of Function Impacting Discharge (mobility/balance): CGA Other factors to consider for discharge: need for supplemental oxygen Pulse Oximetry Assessment 92% at rest on room air 83% while ambulating on room air 96% at rest on 2LPM 
90% while ambulating on 2LPM 
    
 
PLAN : 
Goals have been updated based on progression since last assessment. Patient continues to benefit from skilled intervention to address the above impairments. Recommendations and Planned Interventions: gait training Frequency/Duration: Patient will be followed by physical therapy:  3 times a week to address goals.  
 
Recommendation for discharge: (in order for the patient to meet his/her long term goals) HHPT and then transition to OP pulmonary rehab if still requires supplemental oxygen This discharge recommendation: 
Has been made in collaboration with case management Equipment recommendations for successful discharge (if) home: portable oxygen SUBJECTIVE:  
Patient stated so do I kneed this forever.  OBJECTIVE DATA SUMMARY:  
HISTORY:   
Past Medical History:  
Diagnosis Date AICD (automatic cardioverter/defibrillator) present Arthritis Cancer ([address] skin Heart failure ([address] Other ill-defined conditions(799.89)   
 high cholesterol Past Surgical History:  
Procedure Laterality Date ABDOMEN SURGERY PROC UNLISTED  6/2/11  
 colon resection HX HEENT    
 repaired right eardrum HX OTHER SURGICAL    
 benign cyst removed from back and thyroid HX OTHER SURGICAL    
 skin graft HX PACEMAKER    
 aicd UT COLONOSCOPY FLX DX W/COLLJ SPEC WHEN PFRMD  6/6/2012 UT COLONOSCOPY W/BIOPSY SINGLE/MULTIPLE  4/27/2011 Personal factors and/or comorbidities impacting plan of care:  
 
Home Situation Home Environment: Private residence # Steps to Enter: 5 Rails to Enter: Yes Wheelchair Ramp: Yes One/Two Story Residence: One story(with basement) Living Alone: No 
Support Systems: Spouse/Significant Other/Partner, Friends \ neighbors Patient Expects to be Discharged to[de-identified] Private residence Current DME Used/Available at Home: Walker, rolling, Wheelchair, [place], straight, Grab bars Tub or Shower Type: Shower EXAMINATION/PRESENTATION/DECISION MAKING:  
Critical Behavior: 
Neurologic State: Alert Orientation Level: Appropriate for age Cognition: Appropriate decision making Safety/Judgement: Awareness of environment Hearing: Auditory Auditory Impairment: Hard of hearing, bilateral 
Skin:  intact Edema: none Range Of Motion: WDL Strength:   
  
  
 GW 
  
  
  
  
  
Functional Mobility: 
Bed Mobility: Session began and ended in sitting Transfers: 
Sit to Stand: Stand-by assistance Stand to Sit: Stand-by assistance Balance:  
Sitting: Intact Standing: Impaired; Without support Standing - Static: Good;Constant support Standing - Dynamic : Fair Ambulation/Gait Training: 
Distance (ft): 400 Feet (ft)(2x) Assistive Device: Gait belt Ambulation - Level of Assistance: Contact guard assistance Gait Abnormalities: Decreased step clearance;Scissoring;Path deviations Base of Support: Widened Speed/Silva: Shuffled; Slow Step Length: Left shortened;Right shortened Activity Tolerance:  
desaturates with exertion and requires oxygen Please refer to the flowsheet for vital signs taken during this treatment. After treatment patient left in no apparent distress:  
Sitting in chair on 2L 
 
COMMUNICATION/EDUCATION:  
The patients plan of care was discussed with: Occupational Therapist and . Fall prevention education was provided and the patient/caregiver indicated understanding. and Patient/family agree to work toward stated goals and plan of care. Thank you for this referral. 
Zev Beard, PT, DPT Time Calculation: 29 mins

## 2019-08-06 NOTE — DISCHARGE SUMMARY
Hospitalist Discharge Summary Patient ID: 
Alejandro Bolanos 
[id number] 
12 y.o. 
1940 
7/28/2019 PCP on record: Shane Hayes MD 
 
Admit date: 7/28/2019 Discharge date and time: 8/6/2019 DISCHARGE DIAGNOSIS: 
 
See below CONSULTATIONS: 
IP CONSULT TO CARDIOLOGY 
IP CONSULT TO PULMONOLOGY Excerpted HPI from H&P of Dhiraj Sellers MD: This is a 65-year-old male with past medical history of congestive heart failure status post AICD, atrial fibrillation, dyslipidemia is coming to the hospital with chief complaints of shortness of breath and also cough.  Patient reports being in his usual state of health until about a week ago when he started not feeling well. Mala Mead reports shortness of breath is worse with even minimal exertion along with cough but not much phlegm.  He also reports having fever and chills at home. Mala Mead also reports mild swelling of the legs as well.  He also reports feeling weak and in general. Mala Mead initially presented to the emergency department yesterday and was given prescription for cough and also antibiotics and sent home but in spite of that he continued to get worse with more shortness of breath and he presented to the emergency department again today. On arrival to the hospital today he was noted to have saturations of 88% on room air.  On lab work he was noted to have a creatinine of 1.32.  First troponin was 0.05.  BN pep is elevated at [address] had a CT angiogram of the chest which showed evidence of nonspecific hepatic and gallbladder findings along with patchy groundglass opacities.  He also had a CT abdomen which was noted as above.  He was given Lasix in the emergency department. 
______________________________________________________________________ DISCHARGE SUMMARY/HOSPITAL COURSE:  for full details see H&P, daily progress notes, labs, consult notes. Acute hypoxic respiratory failure Acute on chronic sCHF -holding entresto, coreg, aldactone d/t borderline bp, restart bb when able 
-OP follow up with PCP in the next week and office of Dr Samina Stephens in 2 weeks 
-increase lasix to 40mg PO daily 
-monitor daily weights 
-off amiodarone and dobutamine gtt 
-remote Hx of implanted ICD 
-stable for DC, will require home O2 with CHF causing hypoxic resp failure New Afib (persistent Afib) 
-cont amio 200mg PO daily 
-cont eliquis 5mg bid 
-Afib rvr during admission 
-s/p DCCV cardioversion 8/5 ? RLL PNA 
-completed empiric antibiotic therapy in hospital 
 
HTN 
-normotensive 
-holding antihypertensives w borderline bp as above CKD3 
-monitor cr while diuresing.   
-meds change as above 
  
Hypokalemia 
-repleted with kcl 
-currently resolved Acute hepatitis due to congestion, resolved 
-CT shows evidence of nonspecific hepatic and gallbladder disease 
-US liver confirms congestive hepatopathy 
-hepatitis panel neg Dyslipidemia Hypothyroidism 
-con't statin and levothyroxine 
_______________________________________________________________________ Patient seen and examined by me on discharge day. Pertinent Findings: 
Gen:    Not in distress Chest: Clear lungs CVS:   Regular rhythm. No edema Abd:  Soft, not distended, not tender Neuro:  Alert, oriented x 3 
_______________________________________________________________________ DISCHARGE MEDICATIONS:  
Current Discharge Medication List  
  
START taking these medications Details  
apixaban (ELIQUIS) 5 mg tablet Take 1 Tab by mouth every twelve (12) hours. Qty: 60 Tab, Refills: 3 CONTINUE these medications which have CHANGED Details  
amiodarone (CORDARONE) 200 mg tablet Take 1 Tab by mouth two (2) times a day. Indications: atrial fibrillation electrically shocked to normal rhythm 
Qty: 60 Tab, Refills: 2  
  
furosemide (LASIX) 40 mg tablet Take one 40mg tablet daily 
Qty: 30 Tab, Refills: 3 CONTINUE these medications which have NOT CHANGED Details  
levothyroxine (SYNTHROID) 100 mcg tablet Take 100 mcg by mouth every evening. allopurinol (ZYLOPRIM) 100 mg tablet Take 200 mg by mouth daily. Indications: 2 pills daily  
  
loratadine (CLARITIN) 10 mg tablet Take 10 mg by mouth daily. MULTIVITAMIN PO Take 1 Tab by mouth daily. Indications: Centrum Silver  
  
pravastatin (PRAVACHOL) 80 mg tablet Take 80 mg by mouth nightly. benzonatate (TESSALON PERLES) 100 mg capsule Take 1 Cap by mouth three (3) times daily as needed for Cough for up to 10 days. Qty: 30 Cap, Refills: 0 STOP taking these medications  
  
 sacubitril-valsartan (ENTRESTO) 24 mg/26 mg tablet Comments:  
Reason for Stopping:   
   
 carvedilol (COREG) 6.25 mg tablet Comments:  
Reason for Stopping:   
   
 spironolactone (ALDACTONE) 25 mg tablet Comments:  
Reason for Stopping:   
   
  
 
 
 
Patient Follow Up Instructions: Activity: PT/OT per Home Health Diet: Cardiac Diet Follow-up Information Follow up With Specialties Details Why Contact Info Shell Cabrera MD Family Practice In 3 days  [address] [place] [address] 83. [phone] Phillip Hall NP Nurse Practitioner In 10 days  [address] 83. [phone] [place] [address]  for physical theraypy, occupational therapy, and skilled nursing [address] [place] [address] Luis Felipe [place] [address] 
[phone]  
  
 
________________________________________________________________ Risk of deterioration: Moderate Condition at Discharge:  Stable 
__________________________________________________________________ Disposition Home with family and home health services 
 
____________________________________________________________________ Code Status: DNR/DNI 
___________________________________________________________________ Total time in minutes spent coordinating this discharge (includes going over instructions, follow-up, prescriptions, and preparing report for sign off to her PCP) :  > 30 minutes Signed: 
Janay Quezada DO

## 2019-08-06 NOTE — DISCHARGE INSTRUCTIONS
AFTER YOUR TRANSESOPHAGEAL ECHOCARDIOGRAM    Be sure someone else drives you home; do not drive today. You may feel drowsy for several hours. Do not eat or drink for at least two hours after your procedure. Your throat will be numb and there is a risk you might have difficulty swallowing for a while. Be careful when you do eat or drink for the first time especially with hot fluids since you could easily burn your throat. Call your doctor if:    · You are bleeding from your throat or mouth. · You have trouble breathing all of a sudden. · You have chest pain or any pain that spreads to your neck, jaw, or arms. · You have questions or concerns. · You have a fever greater than 101°F.    Doctor: Rehan Lemon    Special Instructions:    No driving for 24 hours. Discharge Medications:   {Medication reconciliation information is now added to the patient's AVS automatically when it is printed. There is no need to use this SmartLink in discharge instructions.   Highlight this text and delete it to clear this message}

## 2019-08-06 NOTE — PROGRESS NOTES
FRANCINE plan: Pt will discharge home today, transported by his wife in a personal vehicle. Pt will receive  PT/OT/RN through [facility]. Pt is scheduled to follow-up with his PCP and Cardiologist - MELISSA maynard. Alyson delivered a portable tank to pt's hospital room and will deliver a concentrator to the home later today. Pt and wife verbalized understanding of the plan. No further concerns indicated at this time. Medicare pt has received, reviewed, and signed 2nd IM letter informing them of their right to appeal the discharge. Signed copy has been placed on pt bedside chart. Care Management Interventions PCP Verified by CM: Yes Last Visit to PCP: 01/01/19 Mode of Transport at Discharge: Other (see comment)(wife) Transition of Care Consult (CM Consult): Home Health([facility] PT/OT/RN) [facility] - INPATIENT: Yes MyChart Signup: No 
Discharge Durable Medical Equipment: Yes(O2 through [address]) Physical Therapy Consult: Yes Occupational Therapy Consult: Yes Speech Therapy Consult: No 
Current Support Network: Lives with Spouse, Family Lives Nearby(lives with wife in single story home with 5 JUSTEN) Confirm Follow Up Transport: Family Plan discussed with Pt/Family/Caregiver: Yes Freedom of Choice Offered: Yes Discharge Location Discharge Placement: Home with home health([facility]) SUSU Thornton Care Manager [phone]

## 2019-08-06 NOTE — PROGRESS NOTES
[address] INTERDISCIPLINARY ROUNDS Cardiopulmonary Care Interdisciplinary Rounds were held today to discuss patient's plan of care and outcomes. The following members were present: NP/Physician, Pharmacy, Nursing and Case Management. Expected Length of Stay:  4d 2h 
 
PLAN OF CARE:  
Continue current treatment plan

## 2019-08-06 NOTE — PROGRESS NOTES
Pulse Oximetry Assessment 92% at rest on room air 83% while ambulating on room air 96% at rest on 2LPM 
90% while ambulating on 2LPM

## 2019-08-06 NOTE — PROGRESS NOTES
Follow up with PCP SANDIE Mauro 8/7/19 @ 1:15 and cardiology NP Kelsey Saleh 8/21/19 @ 2:30  On AVS and Hug team notified.

## 2019-08-06 NOTE — PROGRESS NOTES
Problem: Self Care Deficits Care Plan (Adult) Goal: *Acute Goals and Plan of Care (Insert Text) Description Occupational Therapy Goals Initiated 7/30/2019 1. Patient will perform grooming standing at the sink with modified independence within 7 day(s). 2.  Patient will perform bathing with supervision using AE as needed within 7 day(s). 3.  Patient will perform lower body dressing with modified independence using AE as needed within 7 day(s). 4.  Patient will perform toilet transfers with modified independence within 7 day(s). 5.  Patient will perform all aspects of toileting with modified independence within 7 day(s). 6.  Patient will independently utilize energy conservation and recommended equipment during ADL within 7 day(s). FUNCTIONAL STATUS PRIOR TO ADMISSION: Patient was independent and active without use of DME. 
 
HOME SUPPORT PRIOR TO ADMISSION: The patient lived with [facility]. Occupational Therapy Goals Revised 8/6/2019 1. Patient will perform grooming with independence and no LOB within 7 day(s). 2.  Patient will perform bathing at the sink with independence within 7 day(s). 3.  Patient will perform lower body dressing and no LOB with independence within 7 day(s). 4.  Patient will perform toilet transfers with independence within 7 day(s). 5.  Patient will perform all aspects of toileting with independence within 7 day(s). Outcome: Progressing Towards Goal 
 OCCUPATIONAL THERAPY TREATMENT/WEEKLY RE-EVALUATION Patient: Samara Zarate (53 y.o. male) Date: 8/6/2019 Diagnosis: CH[facility]congestive heart failure[facility] ([facility] [I50.9] <principal problem not specified> Precautions:   
Chart, occupational therapy assessment, plan of care, and goals were reviewed. ASSESSMENT Based on the objective data described below, generalized weakness, decreased endurance, strength, functional mobility and balance and ADLs. Pt was on RA and with walking in room and elizabeth his O2% dropped to 83% on RA. He was put on  2 liters and his O2% dropped to 91% but did not drop below 90. Pt also had OB with walking with out AD. He was able to marely and doff his shoes with no assist sitting on EOb. Current Level of Function Impacting Discharge (ADLs): pt is SBA level with transfers and ADLs. Other factors to consider for discharge: pt may be discharge home with home O2 and safety walking around home with O2 lines PLAN : 
Goals have been updated based on progression since last assessment. Patient continues to benefit from skilled intervention to address the above impairments. Continue to follow patient 3 times a week to address goals. Recommend with staff: use of AD would benefit pt Recommend next OT session: perform ADLs at the sink standing. Recommendation for discharge: (in order for the patient to meet his/her long term goals) No skilled occupational therapy/ follow up rehabilitation needs identified at this time. This discharge recommendation: 
Has been made in collaboration with the attending provider and/or case management Equipment recommendations for successful discharge (if) home: tbd SUBJECTIVE:  
Patient stated will I have to use this oxygen all the time forever? Jayashree Marquez OBJECTIVE DATA SUMMARY:  
Cognitive/Behavioral Status: 
Neurologic State: Alert Orientation Level: Appropriate for age Cognition: Appropriate decision making Perception: Appears intact Perseveration: No perseveration noted Safety/Judgement: Awareness of environment Functional Mobility and Transfers for ADLs: 
 
  
 
Transfers: 
Sit to Stand: Stand-by assistance Functional Transfers Bathroom Mobility: Contact guard assistance;Stand-by assistance Toilet Transfer : Contact guard assistance;Stand-by assistance Balance: 
Sitting: Intact Standing: Impaired; Without support Standing - Static: Good;Constant support Standing - Dynamic : Fair ADL Intervention: 
Feeding Feeding Assistance: Independent Pt is setup for UB ADLs Lower Body Dressing Assistance Dressing Assistance: Set-up Underpants: Set-up Socks: Independent Slip on Shoes with Back: Independent Toileting Toileting Assistance: Stand-by assistance;Supervision Bladder Hygiene: Stand-by assistance;Supervision Bowel Hygiene: Stand-by assistance;Supervision Cognitive Retraining Safety/Judgement: Awareness of environment Activity Tolerance:  
Good Please refer to the flowsheet for vital signs taken during this treatment. After treatment patient left in no apparent distress:  
Sitting in chair COMMUNICATION/COLLABORATION:  
The patients plan of care was discussed with: Physical Therapist, Registered Nurse,  and family Ayanna Turner OT Time Calculation: 33 mins

## 2019-08-06 NOTE — PROGRESS NOTES
Spiritual Care Partner Volunteer visited patient in [place] on 8.6. 19. Documented by: 
 Rev. Abilio Quintanilla EdD MDiv  For Hollysathya Page [phone])

## 2019-08-06 NOTE — PROGRESS NOTES
Progress Note 8/6/2019 8:38 PM 
NAME: Pham Reddy MRN:  [id number] Admit Diagnosis: CHF (congestive heart failure) ([facility] [I50.9] Assessment: 1. Acute on chronic systolic heart failure 2. Dilated NICM; EF 25%. Echo 7/18 w/ EF 20%, dil LV, nml RV, mild MR/TR 3. Persistent AFib (new) 4. Remote ICD implantation 5. Recurrent ventricular tachycardia 6. Hyperlipidemia 7. Former smoker 8. DNR 
9. Usual Cardiologist:  Dr. Светлана Xiao now Dr. Malu Haji (appt 8/1) Plan:  
 
 
Last 3 Recorded Weights in this Encounter 08/04/19 0401 08/05/19 2789 08/06/19 0407 Weight: 80.4 kg (177 lb 3.2 oz) 80.4 kg (177 lb 4.8 oz) 80.4 kg (177 lb 4.8 oz) Status post VIGNESH/DCCV to SR 8/5 Off O2 Off dobutamine Continue oral amio; 200mg BID @ discharge Continue eliquis 5mg BID Continue lasix 40mg daily Holding Entresto, coreg, aldactone with marginal/low BPs; add beta blocker back first as able PT/OT to see this AM 
[id number] Angela Womack w/ discharge today Can see Pepe Drake NP in the office in ~ 10 days as Im out for two weeks [x]        High complexity decision making was performed Subjective:  
 
Pham Reddy denies chest pain. Dyspnea much better. Discussed with RN events overnight. Patient Active Problem List  
Diagnosis Code  Encounter for colonoscopy due to history of colonic polyp Z12.11, Z86.010  
 CHF (congestive heart failure) ([facility] I50.9 Review of Systems: 
 
Symptom Y/N Comments  Symptom Y/N Comments Fever/Chills N   Chest Pain N Poor Appetite N   Edema N   
Cough N   Abdominal Pain N Sputum N   Joint Pain N   
SOB/MURILLO N   Pruritis/Rash N   
Nausea/vomit N   Tolerating PT/OT Y Diarrhea N   Tolerating Diet Y Constipation N   Other Could NOT obtain due to:   
 
Objective:  
  
Physical Exam: 
 
Last 24hrs VS reviewed since prior progress note. Most recent are: 
 
Visit Vitals BP 97/54 (BP 1 Location: Right arm, BP Patient Position: At rest) Pulse 71 Temp 98.1 °F (36.7 °C) Resp 18 Ht 5' 9\" (1.753 m) Wt 80.4 kg (177 lb 4.8 oz) SpO2 92% BMI 26.18 kg/m² Intake/Output Summary (Last 24 hours) at 8/6/2019 5571 Last data filed at 8/6/2019 6841 Gross per 24 hour Intake 519.78 ml Output 775 ml Net -255.22 ml General Appearance: Well developed, well nourished, alert & oriented x 3,  
 no acute distress. Ears/Nose/Mouth/Throat: Hearing grossly normal. 
Neck: Supple. Chest: Lungs clear to auscultation bilaterally. Cardiovascular: Irregular rate and rhythm, S1S2 normal, no murmur. Abdomen: Soft, non-tender, bowel sounds are active. Extremities: Trivial edema bilaterally. Skin: Warm and dry. PMH/SH reviewed - no change compared to H&P Data Review Telemetry: SR 
 
Lab Data Personally Reviewed: 
 
Recent Labs 08/06/19 
0405 08/04/19 
0354 WBC 5.9 7.5 HGB 12.7 12.3 HCT 39.1 37.8  251 LABRCNT(INR:3,PTP:3,APTT:3,) Recent Labs 08/06/19 
0405 08/05/19 
0150 08/04/19 
0354 * 131* 129*  
K 4.1 4.1 3.7 CL 96* 93* 89* CO2 33* 33* 33* BUN 48* 52* 48* CREA 1.50* 1.63* 1.50* * 109* 187* CA 8.0* 8.5 8.3* LABRCNT(CPK:3,CpKMB:3,ckndx:3,troiq:3)No results found for: CHOL, CHOLX, CHLST, CHOLV, HDL, LDL, LDLC, DLDLP, TGLX, TRIGL, TRIGP, CHHD, CHHDXLABRCNT(sgot:3,gpt:3,ap:3,tbiL:3,TP:3,ALB:3,GLOB:3,ggt:3,aml:3,amyp:3,lpse:3,hlpse:3)No results for input(s): PH, PCO2, PO2 in the last 72 hours. No results found for: CHOL, CHOLX, CHLST, CHOLV, HDL, LDL, LDLC, DLDLP, TGLX, TRIGL, TRIGP, CHHD, CHHDXMEDTABLEGeorge H Remy III, DO No results for input(s): PH, PCO2, PO2 in the last 72 hours. Medications Personally Reviewed: 
 
Current Facility-Administered Medications Medication Dose Route Frequency  apixaban (ELIQUIS) tablet 5 mg  5 mg Oral Q12H  furosemide (LASIX) tablet 40 mg  40 mg Oral DAILY  amiodarone (CORDARONE) tablet 200 mg  200 mg Oral BID  
  loratadine (CLARITIN) tablet 10 mg  10 mg Oral DAILY  simethicone (MYLICON) tablet 80 mg  80 mg Oral QID PRN  
 guaiFENesin (ROBITUSSIN) 100 mg/5 mL oral liquid 100 mg  100 mg Oral TID PRN  
 levothyroxine (SYNTHROID) tablet 100 mcg  100 mcg Oral QPM  
 sodium chloride (NS) flush 5-40 mL  5-40 mL IntraVENous Q8H  
 sodium chloride (NS) flush 5-40 mL  5-40 mL IntraVENous PRN  
 ondansetron (ZOFRAN) injection 4 mg  4 mg IntraVENous Q6H PRN  
 docusate sodium (COLACE) capsule 100 mg  100 mg Oral DAILY PRN  
 allopurinol (ZYLOPRIM) tablet 200 mg  200 mg Oral DAILY  albuterol-ipratropium (DUO-NEB) 2.5 MG-0.5 MG/3 ML  3 mL Nebulization Q6H PRN  
 melatonin tablet 3 mg  3 mg Oral QHS PRN Erasto Mohamud III, DO

## 2019-08-06 NOTE — PROGRESS NOTES
Transitional Care Team: [facility] Discharge Note    Date of Assessment: 08/06/19  Time of Assessment:  4:11 PM      Sabrina Angel is a 77 y.o. male inpatient at [facility] with heart failure on  7/28. Assessment & Plan   Pt A+O. Denies dyspnea. Md has written discharge orders. Family has accepted [facility] services            Current Code Status:  Hospital DNR which becomes inactive upon discharge    Medication Reconciliation:  was performed. Can patient afford medications:  yes    Who manages medications at home self    Emergency Contact spouse [phone]    Chart reviewed by me and HUG (Healthy Understanding of Goals) program introduced to patient/family. The Transitional Care Team bridges the gaps in care and education surrounding discharge from the acute care facility. The objective is to empower the patient and family in taking a proactive role in the task of preventing readmission within the first thirty days after discharge from the acute care setting, The team is also involved in the efforts to reduce readmission to the acute care setting after stabilization and discharge from the acute care environment either to skilled nursing facilities or community. Discharge With The Following Arrangements in place:[facility]      Non[place] [place](s): on AVS  [facility] call information given to outside service area    Patient / Family was instructed on specific signs/symptoms to look for with regards to worsening of their medical conditions. Learning was assessed using teach back. The patient / family have added upcoming appointment dates to their [facility] Calendar prior to discharge. Patient education focused on readmission zones as described as: The Red Zone: High risk for readmission, days 1-21   The Yellow Zone:  Moderate  risk for readmission, days 22-29   The Green Zone: Lower risk for readmission, days 30 and after    The [facility] Team will follow patient from a distance while inpatient as well as be available for further transition disposition as needed. The FRANCINE TEAM will continue to offer support during the 30- 90 day discharge from acute care setting. Notified Ambulatory {Blank Single Select Template:[id number][de-identified] ,FRANCINE RN.       Signed By: Colt Pressley RN     August 6, 2019

## 2019-08-06 NOTE — PROGRESS NOTES
Bedside shift change report given to Thor Liu (oncoming nurse) by Melissa Allen (offgoing nurse). Report included the following information SBAR, Kardex, Procedure Summary, Intake/Output, MAR, Recent Results and Med Rec Status. Pt in bed, family present. Pt denies SOB or any pain at this time

## 2019-08-06 NOTE — PROGRESS NOTES
Pharmacy Progress Note - Transitions of Care    S/O: Mr. Wade Lehman 77 y.o., referred by [facility] team, to pharmacy for transitions of care. Patient was recently hospitalized at [facility] from 7/28/19 to 8/6/19. Patient's PCP is Avtar Adorno MD .      Reason for hospitalization: Acute on chronic CHF; EF: 21-35%    Bundle patient?: YES  Bundle: Heart Failure    Pertinent Medications change(s)/finding(s):   · Entresto, carvedilol and spironolactone currently held due to low BP  · Patient nearing criteria for dose adjustment with apixaban (currently meets 1/3 [SCr: 1.5 mg/dL] criteria but will turn 79 in October). Please monitor for signs/symptoms of bleeding and follow-up regarding dose adjustment. Wt Readings from Last 3 Encounters:   08/06/19 80.4 kg (177 lb 4.8 oz)   07/27/19 86.6 kg (190 lb 14.7 oz)   06/08/19 85.2 kg (187 lb 13.3 oz)     BP Readings from Last 3 Encounters:   08/06/19 93/48   07/28/19 99/55   06/08/19 103/60     Pulse Readings from Last 3 Encounters:   08/06/19 65   07/28/19 78   06/08/19 67       Past Medical History:   Diagnosis Date    AICD (automatic cardioverter/defibrillator) present     Arthritis     Cancer ([facility] [address]     skin    Heart failure (HCC)     Other ill-defined conditions(799.89)     high cholesterol      No Known Allergies    Current Outpatient Medications   Medication Sig    amiodarone (CORDARONE) 200 mg tablet Take 1 Tab by mouth two (2) times a day. Indications: atrial fibrillation electrically shocked to normal rhythm    [START ON 8/7/2019] furosemide (LASIX) 40 mg tablet Take one 40mg tablet daily    apixaban (ELIQUIS) 5 mg tablet Take 1 Tab by mouth every twelve (12) hours.  benzonatate (TESSALON PERLES) 100 mg capsule Take 1 Cap by mouth three (3) times daily as needed for Cough for up to 10 days.  levothyroxine (SYNTHROID) 100 mcg tablet Take 100 mcg by mouth every evening.  allopurinol (ZYLOPRIM) 100 mg tablet Take 200 mg by mouth daily.  Indications: 2 pills daily    loratadine (CLARITIN) 10 mg tablet Take 10 mg by mouth daily.  MULTIVITAMIN PO Take 1 Tab by mouth daily. Indications: Centrum Silver    pravastatin (PRAVACHOL) 80 mg tablet Take 80 mg by mouth nightly. No current facility-administered medications for this visit. Facility-Administered Medications Ordered in Other Visits   Medication Dose Route Frequency    apixaban (ELIQUIS) tablet 5 mg  5 mg Oral Q12H    furosemide (LASIX) tablet 40 mg  40 mg Oral DAILY    amiodarone (CORDARONE) tablet 200 mg  200 mg Oral BID    loratadine (CLARITIN) tablet 10 mg  10 mg Oral DAILY    simethicone (MYLICON) tablet 80 mg  80 mg Oral QID PRN    guaiFENesin (ROBITUSSIN) 100 mg/5 mL oral liquid 100 mg  100 mg Oral TID PRN    levothyroxine (SYNTHROID) tablet 100 mcg  100 mcg Oral QPM    sodium chloride (NS) flush 5-40 mL  5-40 mL IntraVENous Q8H    sodium chloride (NS) flush 5-40 mL  5-40 mL IntraVENous PRN    ondansetron (ZOFRAN) injection 4 mg  4 mg IntraVENous Q6H PRN    docusate sodium (COLACE) capsule 100 mg  100 mg Oral DAILY PRN    allopurinol (ZYLOPRIM) tablet 200 mg  200 mg Oral DAILY    albuterol-ipratropium (DUO-NEB) 2.5 MG-0.5 MG/3 ML  3 mL Nebulization Q6H PRN    melatonin tablet 3 mg  3 mg Oral QHS PRN       Estimated Creatinine Clearance: 39.9 mL/min (A) (based on SCr of 1.5 mg/dL (H)). - [facility] team notified for review.      Thank you,  Roverto Small, PHARMD

## 2019-08-06 NOTE — PROGRESS NOTES
FRANCINE: Home with HH and f/u appts Pt will require oxygen to go home. Order and referral sent to Marianna Buck via [employer]. Mraianna Buck will need to verify insurance and eligibility prior to delivering portable tank to hospital.   
 
SUSU Alcantara Care Manager [phone]

## 2019-09-12 NOTE — ED PROVIDER NOTES
EMERGENCY DEPARTMENT HISTORY AND PHYSICAL EXAM      Date: 9/12/2019  Patient Name: Francis Johnson    History of Presenting Illness     Chief Complaint   Patient presents with    Shortness of Breath     pt reports shortness of breath that is worse at night, feels as though his abdomen is swelling at night, feeling fatigued, has dry cough x1 week, difficult to breath when lying down, discomfort in chest at night    Fatigue       History Provided By: Patient    HPI: Francis Johnson, 77 y.o. male presents with his family to the ED with cc of \"quite a while\" but worsening over the past couple days not painful but moderately severe shortness of breath at night that is worse with lying down and is associated with the slight dry cough. He does have a history of CHF for which he takes 40 mg of furosemide daily. He tells me is been passing urine just fine, however he does admit to \"not drinking enough water\". There has been no chest pain, per se. He tells me his wedding band feels as though it fits okay and is not tight. He does have supplemental oxygen at the house (and his wife tells me she has some in the car) however he has not needed to wear it recently. They tell me they have been doing continuous pulse oxygen saturation testing at home and while he was trying to sleep last night it did dip into the high 80s on room air a few times but returned to the low to mid 90s. The patient tells me he just recently restarted his 40 mg furosemide in the morning. There are no other complaints, changes, or physical findings at this time. PCP: Dawn Weaver MD    Current Outpatient Medications   Medication Sig Dispense Refill    folic acid/multivit-min/lutein (CENTRUM SILVER PO) Take  by mouth.  furosemide (LASIX) 40 mg tablet Take 1 Tab by mouth two (2) times a day for 5 days. 10 Tab 0    amiodarone (CORDARONE) 200 mg tablet Take 1 Tab by mouth two (2) times a day.  Indications: atrial fibrillation electrically shocked to normal rhythm 60 Tab 2    furosemide (LASIX) 40 mg tablet Take one 40mg tablet daily 30 Tab 3    apixaban (ELIQUIS) 5 mg tablet Take 1 Tab by mouth every twelve (12) hours. 60 Tab 3    levothyroxine (SYNTHROID) 100 mcg tablet Take 100 mcg by mouth every evening.  allopurinol (ZYLOPRIM) 100 mg tablet Take 200 mg by mouth daily. Indications: 2 pills daily      loratadine (CLARITIN) 10 mg tablet Take 10 mg by mouth daily.  pravastatin (PRAVACHOL) 80 mg tablet Take 80 mg by mouth nightly.  OXYGEN-AIR DELIVERY SYSTEMS 2 L/min by Nasal route as needed (shortness of breath).  MULTIVITAMIN PO Take 1 Tab by mouth daily. Indications: Centrum Silver       Past History     Past Medical History:  Past Medical History:   Diagnosis Date    AICD (automatic cardioverter/defibrillator) present     Arthritis     Cancer ([facility] [address]     skin    Heart failure ([address]     Other ill-defined conditions(799.89)     high cholesterol       Past Surgical History:  Past Surgical History:   Procedure Laterality Date    ABDOMEN SURGERY PROC UNLISTED  6/2/11    colon resection    HX CATARACT REMOVAL Left     HX HEENT      repaired right eardrum    HX OTHER SURGICAL      benign cyst removed from back and thyroid    HX OTHER SURGICAL      skin graft    HX PACEMAKER      aicd    [place] COLONOSCOPY FLX DX W/COLLJ SPEC WHEN PFRMD  6/6/2012         [place] COLONOSCOPY W/BIOPSY SINGLE/MULTIPLE  4/27/2011            Family History:  Family History   Problem Relation Age of Onset    Cancer Mother         colon    Heart Disease Father     Heart Disease Brother        Social History:  Social History     Tobacco Use    Smoking status: Former Smoker    Smokeless tobacco: Never Used   Substance Use Topics    Alcohol use:  Yes     Alcohol/week: 4.2 standard drinks     Types: 5 Glasses of wine per week    Drug use: No       Allergies:  No Known Allergies  Review of Systems   Review of Systems   Constitutional: Negative for fatigue and fever. HENT: Negative for congestion, ear pain and rhinorrhea. Eyes: Negative for pain and redness. Respiratory: Negative for cough and wheezing. Cardiovascular: Negative for chest pain and palpitations. Gastrointestinal: Negative for abdominal pain, nausea and vomiting. Genitourinary: Negative for dysuria, frequency and urgency. Musculoskeletal: Negative for back pain, neck pain and neck stiffness. Skin: Negative for rash and wound. Neurological: Negative for weakness, light-headedness, numbness and headaches. Physical Exam   Physical Exam   Constitutional: He is oriented to person, place, and time. He appears well-developed and well-nourished. Non-toxic appearance. No distress. Pleasant; smiling; good historian; no distress   HENT:   Head: Normocephalic and atraumatic. Head is without right periorbital erythema and without left periorbital erythema. Right Ear: External ear normal.   Left Ear: External ear normal.   Nose: Nose normal.   Mouth/Throat: Uvula is midline. No trismus in the jaw. Eyes: Pupils are equal, round, and reactive to light. Conjunctivae and EOM are normal. No scleral icterus. Neck: Normal range of motion and full passive range of motion without pain. Cardiovascular: Normal rate, regular rhythm and normal heart sounds. There is no lower extremity edema  Wedding band on the left ring finger spends freely and is not tight   Pulmonary/Chest: Effort normal and breath sounds normal. No accessory muscle usage. No tachypnea. No respiratory distress. He has no decreased breath sounds. He has no wheezes. Talking in full sentences without having to take a break  Lungs are clear to auscultation bilaterally   Abdominal: Soft. There is no tenderness. There is no rigidity and no guarding. Musculoskeletal: Normal range of motion. Neurological: He is alert and oriented to person, place, and time. He is not disoriented.  No cranial nerve deficit or sensory deficit. GCS eye subscore is 4. GCS verbal subscore is 5. GCS motor subscore is 6. Skin: Skin is intact. No rash noted. Psychiatric: He has a normal mood and affect. His speech is normal.   Nursing note and vitals reviewed. Diagnostic Study Results     Labs -     Recent Results (from the past 12 hour(s))   EKG, 12 LEAD, INITIAL    Collection Time: 09/12/19  1:22 PM   Result Value Ref Range    Ventricular Rate 84 BPM    Atrial Rate 84 BPM    P-R Interval 72 ms    QRS Duration 102 ms    Q-T Interval 492 ms    QTC Calculation (Bezet) 581 ms    Calculated P Axis 41 degrees    Calculated R Axis -104 degrees    Calculated T Axis 131 degrees    Diagnosis       Sinus rhythm with short GA  Possible Left atrial enlargement  Incomplete right bundle branch block  Right ventricular hypertrophy  Inferior infarct , possibly acute  Anterolateral infarct (cited on or before 28-JUL-2019)  Prolonged QT  ** ** ACUTE MI / STEMI ** **  Consider right ventricular involvement in acute inferior infarct  When compared with ECG of 05-AUG-2019 13:39,  Significant changes have occurred     CBC WITH AUTOMATED DIFF    Collection Time: 09/12/19  1:48 PM   Result Value Ref Range    WBC 4.6 4.1 - 11.1 K/uL    RBC 3.72 (L) 4.10 - 5.70 M/uL    HGB 11.5 (L) 12.1 - 17.0 g/dL    HCT 34.5 (L) 36.6 - 50.3 %    MCV 92.7 80.0 - 99.0 FL    MCH 30.9 26.0 - 34.0 PG    MCHC 33.3 30.0 - 36.5 g/dL    RDW 15.9 (H) 11.5 - 14.5 %    PLATELET 940 954 - 803 K/uL    MPV 10.8 8.9 - 12.9 FL    NRBC 0.0 0  WBC    ABSOLUTE NRBC 0.00 0.00 - 0.01 K/uL    NEUTROPHILS 61 32 - 75 %    LYMPHOCYTES 27 12 - 49 %    MONOCYTES 12 5 - 13 %    EOSINOPHILS 0 0 - 7 %    BASOPHILS 0 0 - 1 %    IMMATURE GRANULOCYTES 0 0.0 - 0.5 %    ABS. NEUTROPHILS 2.8 1.8 - 8.0 K/UL    ABS. LYMPHOCYTES 1.2 0.8 - 3.5 K/UL    ABS. MONOCYTES 0.6 0.0 - 1.0 K/UL    ABS. EOSINOPHILS 0.0 0.0 - 0.4 K/UL    ABS. BASOPHILS 0.0 0.0 - 0.1 K/UL    ABS. IMM.  GRANS. 0.0 0.00 - 0.04 K/UL    DF AUTOMATED     METABOLIC PANEL, COMPREHENSIVE    Collection Time: 09/12/19  1:48 PM   Result Value Ref Range    Sodium 137 136 - 145 mmol/L    Potassium 4.1 3.5 - 5.1 mmol/L    Chloride 102 97 - 108 mmol/L    CO2 26 21 - 32 mmol/L    Anion gap 9 5 - 15 mmol/L    Glucose 109 (H) 65 - 100 mg/dL    BUN 33 (H) 6 - 20 MG/DL    Creatinine 1.51 (H) 0.70 - 1.30 MG/DL    BUN/Creatinine ratio 22 (H) 12 - 20      GFR est AA 54 (L) >60 ml/min/1.73m2    GFR est non-AA 45 (L) >60 ml/min/1.73m2    Calcium 8.6 8.5 - 10.1 MG/DL    Bilirubin, total 1.0 0.2 - 1.0 MG/DL    ALT (SGPT) 19 12 - 78 U/L    AST (SGOT) 16 15 - 37 U/L    Alk. phosphatase 74 45 - 117 U/L    Protein, total 7.1 6.4 - 8.2 g/dL    Albumin 3.2 (L) 3.5 - 5.0 g/dL    Globulin 3.9 2.0 - 4.0 g/dL    A-G Ratio 0.8 (L) 1.1 - 2.2     CK W/ REFLX CKMB    Collection Time: 09/12/19  1:48 PM   Result Value Ref Range    CK 18 (L) 39 - 308 U/L   TROPONIN I    Collection Time: 09/12/19  1:48 PM   Result Value Ref Range    Troponin-I, Qt. <0.05 <0.05 ng/mL   NT-PRO BNP    Collection Time: 09/12/19  1:48 PM   Result Value Ref Range    NT pro-BNP 10,249 (H) <450 PG/ML       Radiologic Studies -   XR CHEST PA LAT   Final Result   IMPRESSION: Interstitial edema and right greater than left pleural effusions   likely reflecting congestive failure with areas of patchy airspace infiltrate   which may reflect alveolar edema or superimposed infectious infiltrate. Chronic   changes as above. CT Results  (Last 48 hours)    None        CXR Results  (Last 48 hours)               09/12/19 1431  XR CHEST PA LAT Final result    Impression:  IMPRESSION: Interstitial edema and right greater than left pleural effusions   likely reflecting congestive failure with areas of patchy airspace infiltrate   which may reflect alveolar edema or superimposed infectious infiltrate. Chronic   changes as above.        Narrative:  EXAM: XR CHEST PA LAT       INDICATION: Shortness of breath which is worse at night. Fatigue. Chronic cough   x1 week. Difficulty breathing when lying down and chest discomfort and 9. COMPARISON: Chest x-ray 8/2/2019. CT 7/20/2019. FINDINGS: PA and lateral radiographs of the chest demonstrate small moderate   right pleural effusion and trace left pleural effusion. There is interstitial   edema and areas of patchy airspace opacification in both lower lobes. There is   no pneumothorax with calcific pleural plaque redemonstrated bilaterally. .   Cardiac and mediastinal silhouettes are unremarkable. Atherosclerotic   calcifications affect the aortic arch. Pacemaker/ICD projects over left chest   wall with intact appearing lead traversing in expected course. The chest wall   structures and visualized upper abdomen show no acute findings with incidental   note of degenerative spine and shoulder changes as well as diffuse osteopenia. Medical Decision Making   I am the first provider for this patient. I reviewed the vital signs, available nursing notes, past medical history, past surgical history, family history and social history. Vital Signs-Reviewed the patient's vital signs. Patient Vitals for the past 12 hrs:   Temp Pulse Resp BP SpO2   09/12/19 1808  80 23 108/68 95 %   09/12/19 1715     96 %   09/12/19 1700 98.1 °F (36.7 °C) 79 21 112/67 94 %   09/12/19 1652  79  102/58    09/12/19 1640  81 22 102/58 94 %   09/12/19 1400  79 18 103/64 96 %   09/12/19 1332  85 16  96 %   09/12/19 1331   22 115/71 96 %   09/12/19 1310 97.8 °F (36.6 °C) 83 18 110/63 98 %       Pulse Oximetry Analysis - 98% on RA    Records Reviewed: Nursing Notes, Old Medical Records, Previous Radiology Studies and Previous Laboratory Studies    Provider Notes (Medical Decision Making):   DDx: CHF, pleural effusions, MARCELL, CKD, electrolyte abnormality, ACS    4:16 PM  Discussed case with Dr. Magui Gross. Renal function is stable over time. There is no leukocytosis.   He has not needed oxygen here and his oxygen saturation on room air is 96%. His proBNP is elevated at greater than 10,000. We will give 80 mg of IV Lasix and perform a spot check pulse ox during ambulation. He does have oxygen at home. Discussed all this with the family. We will continue to monitor but anticipate discharge. ED Course:   Initial assessment performed. The patients presenting problems have been discussed, and they are in agreement with the care plan formulated and outlined with them. I have encouraged them to ask questions as they arise throughout their visit. Disposition:  Discharge    PLAN:  1. Discharge Medication List as of 9/12/2019  5:38 PM      START taking these medications    Details   !! furosemide (LASIX) 40 mg tablet Take 1 Tab by mouth two (2) times a day for 5 days. , Print, Disp-10 Tab, R-0       !! - Potential duplicate medications found. Please discuss with provider. CONTINUE these medications which have NOT CHANGED    Details   folic acid/multivit-min/lutein (CENTRUM SILVER PO) Take  by mouth., Historical Med      amiodarone (CORDARONE) 200 mg tablet Take 1 Tab by mouth two (2) times a day. Indications: atrial fibrillation electrically shocked to normal rhythm, Normal, Disp-60 Tab, R-2      !! furosemide (LASIX) 40 mg tablet Take one 40mg tablet daily, Normal, Disp-30 Tab, R-3      apixaban (ELIQUIS) 5 mg tablet Take 1 Tab by mouth every twelve (12) hours. , Normal, Disp-60 Tab, R-3      levothyroxine (SYNTHROID) 100 mcg tablet Take 100 mcg by mouth every evening., Historical Med      allopurinol (ZYLOPRIM) 100 mg tablet Take 200 mg by mouth daily. Indications: 2 pills daily, Historical Med      loratadine (CLARITIN) 10 mg tablet Take 10 mg by mouth daily. , Historical Med      pravastatin (PRAVACHOL) 80 mg tablet Take 80 mg by mouth nightly., Historical Med      OXYGEN-AIR DELIVERY SYSTEMS 2 L/min by Nasal route as needed (shortness of breath). , Historical Med      MULTIVITAMIN PO Take 1 Tab by mouth daily. Indications: Centrum Silver, Historical Med       !! - Potential duplicate medications found. Please discuss with provider. 2.   Follow-up Information     Follow up With Specialties Details Why Contact Info    Miesha Caldera III, DO Cardiology Schedule an appointment as soon as possible for a visit CARDIOLOGY: call to schedule follow up [address]  [address]  [address] [phone]          Return to ED if worse     Diagnosis     Clinical Impression:   1. Acute on chronic congestive heart failure, unspecified heart failure type ([address]    2. Orthopnea    3.  Bilateral pleural effusion

## 2019-09-12 NOTE — ED NOTES
TONYA Vanegas has reviewed discharge instructions with the patient. The patient verbalized understanding. Pt ambulatory out of ED with family.

## 2019-09-12 NOTE — ED NOTES
Pt arrives to ED with complaints of worsening SOB. Pt was recently admitted for the same issue but states that his symptoms have worsened. Pt denies pain at this time.  Wife at bedside

## 2019-09-21 PROBLEM — I42.0 CARDIOMYOPATHY, DILATED, NONISCHEMIC (HCC): Chronic | Status: ACTIVE | Noted: 2019-01-01

## 2019-09-21 PROBLEM — N28.9 ACUTE ON CHRONIC RENAL INSUFFICIENCY: Status: ACTIVE | Noted: 2019-01-01

## 2019-09-21 PROBLEM — E03.9 ACQUIRED HYPOTHYROIDISM: Chronic | Status: ACTIVE | Noted: 2019-01-01

## 2019-09-21 PROBLEM — E78.49 OTHER HYPERLIPIDEMIA: Status: ACTIVE | Noted: 2019-01-01

## 2019-09-21 PROBLEM — Z95.810 AICD (AUTOMATIC CARDIOVERTER/DEFIBRILLATOR) PRESENT: Chronic | Status: ACTIVE | Noted: 2019-01-01

## 2019-09-21 PROBLEM — Z86.79 HISTORY OF ATRIAL FIBRILLATION: Status: ACTIVE | Noted: 2019-01-01

## 2019-09-21 PROBLEM — I50.9 ACUTE ON CHRONIC HEART FAILURE (HCC): Status: ACTIVE | Noted: 2019-01-01

## 2019-09-21 PROBLEM — N18.30 CHRONIC RENAL INSUFFICIENCY, STAGE 3 (MODERATE) (HCC): Chronic | Status: ACTIVE | Noted: 2019-01-01

## 2019-09-21 PROBLEM — J92.9 PLEURAL PLAQUE: Status: ACTIVE | Noted: 2019-01-01

## 2019-09-21 PROBLEM — M10.9 GOUT: Status: ACTIVE | Noted: 2019-01-01

## 2019-09-21 PROBLEM — N18.9 ACUTE ON CHRONIC RENAL INSUFFICIENCY: Status: ACTIVE | Noted: 2019-01-01

## 2019-09-21 NOTE — PROGRESS NOTES
TRANSFER - IN REPORT: 
 
Verbal report received from Meredith(name) on Ny Romo  being received from ED(unit) for routine progression of care Report consisted of patients Situation, Background, Assessment and  
Recommendations(SBAR). Information from the following report(s) SBAR was reviewed with the receiving nurse. Opportunity for questions and clarification was provided. Assessment completed upon patients arrival to unit and care assumed.

## 2019-09-21 NOTE — ROUTINE PROCESS
TRANSFER - OUT REPORT: 
 
Verbal report given to Jasmeet RN(name) on Reg Grad  being transferred to [facility](unit) for routine progression of care Report consisted of patients Situation, Background, Assessment and  
Recommendations(SBAR). Information from the following report(s) SBAR, ED Summary, [facility] and Recent Results was reviewed with the receiving nurse. Lines:  
Peripheral IV 09/21/19 Forearm (Active) Site Assessment Clean, dry, & intact 9/21/2019  8:37 AM  
Phlebitis Assessment 0 9/21/2019  8:37 AM  
Infiltration Assessment 0 9/21/2019  8:37 AM  
Dressing Status Clean, dry, & intact 9/21/2019  8:37 AM  
Dressing Type Tape;Transparent 9/21/2019  8:37 AM  
Hub Color/Line Status Pink;Flushed;Patent 9/21/2019  8:37 AM  
  
 
Opportunity for questions and clarification was provided.

## 2019-09-21 NOTE — Clinical Note
TRANSFER - OUT REPORT:  
 
Verbal report given to: VIKASH SHRESTHA. Report consisted of patient's Situation, Background, Assessment and  
Recommendations(SBAR). Opportunity for questions and clarification was provided. Patient transported with a Registered Nurse and [address] [place] [address] / [employer] [facility] [employer]. Patient transported to: [facility].

## 2019-09-21 NOTE — Clinical Note
PRESSURES OFF DOBUT:    RA 25, RV 59/25/30,  PA 58/34/38  PCW  40  PA SAT 43.5%,   AO SAT 90%  HGB 11.0.,  HR 75,   CO 3.24 PRESSURES ON DOBUT 7.5 MCG/KG//MIN :    RA  26,  RV 65/25,  PA  70/40/50,  PCW  40,  PA SAT 57.2%,  AO SAT 90%  HR 85, VERONICA CO 4.73, THERMAL CO 3.77

## 2019-09-21 NOTE — H&P
History and Physical   
 
   
Pt Name  Albin Motley Date of Birth 1940 Medical Record Number  [id number] Age  77 y.o. PCP Sarah Abad MD  
Admit date:  9/21/2019 Room Number  VW04/73  @ [facility] Date of Service  9/21/2019 Admission Diagnoses:  Acute on chronic heart failure ([facility] [address] Certification: We are admitting Albin Motley 77 y.o. male with a principle diagnosis of Acute on chronic heart failure ([facility] [address] This patient also suffers from other comorbidities listed below. I have a high level of concern for progression of heart failure and end organ damage  leading to life threatening complications Assessment and plan: MARCELL on CRI -pt's baseline creatinine runs around 1.3 - 1.5 but today he presents with creatinine near 2  
· Admit to remote tele · Due to heart failure, we will not push any IVF at this time, instead help stabilize hemodynamics · Check renal US  
· Nephrologist consultation. Acute on chronic systolic heart failure due to Nonischemic dilated cardiomyopathy AICD in situ  -complaints of increased weight, shortness of breath possibly contributed by MARCELL · IV lasix and hold PO lasix · Due to low BP we will not give any beta blocker, Entresto (I am not sure if those were restarted since discharge last month). · Cardiologist to evaluate -  
· Pt might be ready for milrinone drip if his symptoms don't improve Hypothyroidism - 
· Check TSH, Free T4 History of Afib Chronic anticoagulation with Eliquis  -now in NSR  
· Continue Amiodarone · Telemetry monitoring . Hyperlipidemia -chronic · I wonder, if he needs to be on high dose statin, since he suffers from dilated non ischemic cardiomyopathy - I will defer that decision to his PCP. Gout -since renal function is slightly worse than baseline · We will continue home dose of statin as was PTA Body mass index is 25.33 kg/m². - 
 
 
Present on Admission:  Acute on chronic heart failure ([facility] [address]  CHF (congestive heart failure) ([facility] [address]  AICD (automatic cardioverter/defibrillator) present  Other hyperlipidemia  Chronic renal insufficiency, stage 3 (moderate) (HCC)  Acute on chronic renal insufficiency  Acquired hypothyroidism  Cardiomyopathy, dilated, nonischemic (HCC)  Gout  History of atrial fibrillation  Pleural plaque CODE STATUS  DNR Carrying forward ER doctor's order Functional Status  Pt is  and lives with his wife in [address] He does have a walker, that he reports he does not need to use He does not use oxygen at home Surrogate decision maker: Pt's wife Prophylaxis  Eliquis Discharge Plan: [facility] PT, OT, RN, There are currently no Active Isolations [place]or: [place] MED[place] / Plan: [place] MEDICARE PART A & B / Product Type: Medicare /   
Social issues  Date Comment   
09/21/19  12:58 PM - met with pt's wife and son in the ER room. We all talked about above issues in simple language and I answered all questions. Prognosis  Fair Subjective Data History of Present Illness : The pt reports he is quite compliant with his medications and CHF management, including monitoring his intake and output , checking his weight daily. Recently his lasix dose was increased for five days. He just completed that and last two nights he had PND sx. This morning he was very short of breath and came to ER. He also reports about four pounds weight gain in the last few days. He does report that his blood pressure had been low, and we see from recent hospital care that his entresto and coreg were held due to same reason at the time of that previous discharge. Review of Systems - History obtained from spouse and child and the patient General ROS: positive for  - fatigue 
negative for - chills or fever Psychological ROS: negative Respiratory ROS: positive for - shortness of breath negative for - cough or hemoptysis Cardiovascular ROS: positive for - dyspnea on exertion and paroxysmal nocturnal dyspnea 
negative for - chest pain or irregular heartbeat Gastrointestinal ROS: no abdominal pain, change in bowel habits, or black or bloody stools Genito-Urinary ROS: no dysuria, trouble voiding, or hematuria Musculoskeletal ROS: negative for - gait disturbance or joint pain Neurological ROS: no TIA or stroke symptoms Dermatological ROS: negative ROS Past Medical History:  
Diagnosis Date  AICD (automatic cardioverter/defibrillator) present  Arthritis  Cancer ([facility] [address] skin  Heart failure ([facility] [address]  Other ill-defined conditions(799.89)   
 high cholesterol Past Surgical History:  
Procedure Laterality Date  ABDOMEN SURGERY PROC UNLISTED  6/2/11  
 colon resection  HX CATARACT REMOVAL Left  HX HEENT    
 repaired right eardrum  HX OTHER SURGICAL    
 benign cyst removed from back and thyroid  HX OTHER SURGICAL    
 skin graft  HX PACEMAKER    
 aicd  IN COLONOSCOPY FLX DX W/COLLJ SPEC WHEN PFRMD  6/6/2012  IN COLONOSCOPY W/BIOPSY SINGLE/MULTIPLE  4/27/2011 Social History Tobacco Use  Smoking status: Former Smoker  Smokeless tobacco: Never Used Substance Use Topics  Alcohol use: Not Currently Alcohol/week: 4.2 standard drinks Types: 5 Glasses of wine per week Family History Problem Relation Age of Onset  Cancer Mother   
     colon  Heart Disease Father  Heart Disease Brother Objective data Comment:  Pleasant gentleman lying in bed in no distress 
 his wife and son are at his side. Patient Vitals for the past 24 hrs: 
 Temp  
09/21/19 0747 97.7 °F (36.5 °C) Patient Vitals for the past 24 hrs: 
 Pulse  
09/21/19 1218 97  
09/21/19 1215 98  
09/21/19 1200 97  
09/21/19 1145 92  
09/21/19 1115 95  
09/21/19 1109 96  
09/21/19 1045 97  
09/21/19 1015 96 09/21/19 1000 98  
09/21/19 0845 97  
09/21/19 0821 (!) 101  
09/21/19 0747 96 Patient Vitals for the past 24 hrs: 
 Resp  
09/21/19 1218 19  
09/21/19 1215 24  
09/21/19 1200 17  
09/21/19 1145 23  
09/21/19 1115 16  
09/21/19 1045 22  
09/21/19 1015 19  
09/21/19 1000 19  
09/21/19 0845 15  
09/21/19 0821 19  
09/21/19 0747 18 Patient Vitals for the past 24 hrs: 
 BP  
09/21/19 1215 96/64  
09/21/19 1200 93/74  
09/21/19 1145 95/73  
09/21/19 1115 98/64  
09/21/19 1109 96/69  
09/21/19 1045 97/76  
09/21/19 1015 100/67  
09/21/19 1000 96/73  
09/21/19 0845 102/74  
09/21/19 0821 105/75  
09/21/19 0749 98/50  
09/21/19 0747 (!) 89/74 SpO2 Readings from Last 6 Encounters:  
09/21/19 98% 09/12/19 95% 08/27/19 97% 08/26/19 97% 08/26/19 97% 08/23/19 96% O2 Device: Room air Body mass index is 25.33 kg/m². - Wt Readings from Last 10 Encounters:  
09/21/19 77.8 kg (171 lb 8.3 oz) 09/12/19 79 kg (174 lb 2.6 oz) 08/26/19 75.3 kg (166 lb) 08/16/19 77.1 kg (170 lb) 08/14/19 77.1 kg (170 lb) 08/08/19 80.3 kg (177 lb) 08/06/19 80.4 kg (177 lb 4.8 oz)  
07/27/19 86.6 kg (190 lb 14.7 oz) 06/08/19 85.2 kg (187 lb 13.3 oz) 08/26/15 84.6 kg (186 lb 8 oz) Physical Exam:            
General:  Alert, cooperative,  
well noursished,  
well developed,  
appears stated age Ears/Eyes:  Hearing intact Sclera anicteric. Pupils equal  
Mouth/Throat:  Mucous membranes normal pink and moist 
Oral pharynx clear Neck:  No JVD noted . Lungs:  Trachea midline Chest excursion symmetrical  
Auscultation B/L Symmetrical with Vesicular breath sounds No Crepitations noted Percussion note resonant on mid Clavicular line; no sign of pneumothorax CVS:  Regular rate and rhythm No  murmur, No click, rub or gallop S1 normal  
S2 normal  
Pedal pulses  b/l symmetrical  
Abdomen:   
Soft, non-tender Bowel sounds normal 
No distension Percussion note tympanitic Extremities: No cyanosis, jaundice No edema noted No sign of DVT/cord like lesion on palpation No sign of acute trauma Age appropriate OA changes noted. Skin:   
Skin color, texture, turgor normal. no acute rash or lesions Lymph nodes: Musculoskeletal Muscle bulk B/L symmetrical  
Neuro Cranial nerves are intact,  
motor movement b/l symmetrical, Sensory evaluation b/l symmetrical   
Psych:  Alert and oriented, normal mood & affect given the clinical scenario Medications reviewed Current Facility-Administered Medications Medication Dose Route Frequency  acetaminophen (TYLENOL) tablet 650 mg  650 mg Oral Q6H PRN  
 ondansetron (ZOFRAN) injection 4 mg  4 mg IntraVENous Q4H PRN Current Outpatient Medications Medication Sig  
 folic acid/multivit-min/lutein (CENTRUM SILVER PO) Take  by mouth.  OXYGEN-AIR DELIVERY SYSTEMS 2 L/min by Nasal route as needed (shortness of breath).  amiodarone (CORDARONE) 200 mg tablet Take 1 Tab by mouth two (2) times a day. Indications: atrial fibrillation electrically shocked to normal rhythm  furosemide (LASIX) 40 mg tablet Take one 40mg tablet daily  apixaban (ELIQUIS) 5 mg tablet Take 1 Tab by mouth every twelve (12) hours.  levothyroxine (SYNTHROID) 100 mcg tablet Take 100 mcg by mouth every evening.  allopurinol (ZYLOPRIM) 100 mg tablet Take 200 mg by mouth daily. Indications: 2 pills daily  loratadine (CLARITIN) 10 mg tablet Take 10 mg by mouth daily.  MULTIVITAMIN PO Take 1 Tab by mouth daily. Indications: Centrum Silver  pravastatin (PRAVACHOL) 80 mg tablet Take 80 mg by mouth nightly. Relevant other informations: Other medical conditions listed in this following active hospital problem list section; all of these and other pertinent data were taken into consideration when treatment plan is developed and customized to this patient's unique overall circumstances and needs.  We have reviewed available old medical records within the constraints of this admission process. Present on Admission:  Acute on chronic heart failure ([facility] [address]  CHF (congestive heart failure) ([facility] [address]  AICD (automatic cardioverter/defibrillator) present  Other hyperlipidemia  Chronic renal insufficiency, stage 3 (moderate) (HCC)  Acute on chronic renal insufficiency  Acquired hypothyroidism  Cardiomyopathy, dilated, nonischemic (HCC)  Gout  History of atrial fibrillation  Pleural plaque Data Review:  
Recent Days: 
All Micro Results None Recent Labs  
  09/21/19 
8821 WBC 6.6 HGB 12.7 HCT 39.7  Recent Labs  
  09/21/19 
2172 * K 4.6  CO2 23 * BUN 44* CREA 1.98* CA 8.9 ALB 3.7 TBILI 1.1*  
SGOT 33 ALT 33 No results found for: TSH, TSHEXT Care Plan discussed with: Patient/Family and Nurse Other medical conditions are listed in the active hospital problem list section; these and other pertinent data were taken into consideration when the treatment plan was developed and customized to this patient's unique overall circumstances and needs. High complexity decision making was performed for this patient who is at high risk for decompensation with multiple organ involvement. Today total floor/unit time was 65 minutes while caring for this patient and greater than 50% of that time was spent with patient (and/or family) coordinating patients clinical issues. Merry Garcia MD MPH  FACP                              
9/21/2019  
 
 
__________________________________________________________ FOR SOUND PHYSICIAN ADMINISTRATIVE USE ONLY  
MDM 
 
  
INPT 223 Emmett Bernard MD MPH FACP  
12:21 PM 
9/21/2019

## 2019-09-21 NOTE — ED NOTES
Pt presents from triage with cc of non-productive cough x 1 week and shortness of breath x 2 days. States shortness of breath is worse at night when lying flat. He states hx of CHF with AICD in place. States he is followed by cardiologist Dr. Amara Grimes and has scheduled f/u for November. States he has been taking previously prescribed tessalon pearles with minimal relief. Patient denies fever or chills. Placed on cardiac monitor x3. VSS.

## 2019-09-21 NOTE — ED PROVIDER NOTES
EMERGENCY DEPARTMENT HISTORY AND PHYSICAL EXAM 
 
 
Date: 9/21/2019 Patient Name: Chanel Laws History of Presenting Illness Chief Complaint Patient presents with  Cough  
  times one week  Shortness of Breath  
  times two days History Provided By: Patient and Patient's Wife HPI: Chanel Laws, 77 y.o. male  presents to the ED with cc of shortness of breath at night. Patient states he has trouble breathing when he lays down flat. This is been going on for about 2 days. His wife states that he has gained 4 pounds overnight. He has had distention of his abdomen. No swelling in his legs or hands. He states he has a mild amount of chest discomfort. No fevers or chills. He has had cough of clear sputum for 1 week. He is currently taking 12.5 mg of spironolactone and 40 mg of furosemide daily. He was in the emergency department about 2 weeks ago for a similar problem and was diuresed and sent home. He is currently not hypoxic or requiring oxygen. He does have oxygen at home. Cardiologist is Dr. Renetta Contreras. There are no other complaints, changes, or physical findings at this time. PCP: Shiv Lee MD 
 
No current facility-administered medications on file prior to encounter. Current Outpatient Medications on File Prior to Encounter Medication Sig Dispense Refill  folic acid/multivit-min/lutein (CENTRUM SILVER PO) Take  by mouth.  OXYGEN-AIR DELIVERY SYSTEMS 2 L/min by Nasal route as needed (shortness of breath).  amiodarone (CORDARONE) 200 mg tablet Take 1 Tab by mouth two (2) times a day. Indications: atrial fibrillation electrically shocked to normal rhythm 60 Tab 2  
 furosemide (LASIX) 40 mg tablet Take one 40mg tablet daily 30 Tab 3  
 apixaban (ELIQUIS) 5 mg tablet Take 1 Tab by mouth every twelve (12) hours. 60 Tab 3  
 levothyroxine (SYNTHROID) 100 mcg tablet Take 100 mcg by mouth every evening.  allopurinol (ZYLOPRIM) 100 mg tablet Take 200 mg by mouth daily. Indications: 2 pills daily  loratadine (CLARITIN) 10 mg tablet Take 10 mg by mouth daily.  MULTIVITAMIN PO Take 1 Tab by mouth daily. Indications: Centrum Silver  pravastatin (PRAVACHOL) 80 mg tablet Take 80 mg by mouth nightly. Past History Past Medical History: 
Past Medical History:  
Diagnosis Date  AICD (automatic cardioverter/defibrillator) present  Arthritis  Cancer ([facility] [address] skin  Heart failure ([address]  Other ill-defined conditions(799.89)   
 high cholesterol Past Surgical History: 
Past Surgical History:  
Procedure Laterality Date  ABDOMEN SURGERY PROC UNLISTED  6/2/11  
 colon resection  HX CATARACT REMOVAL Left  HX HEENT    
 repaired right eardrum  HX OTHER SURGICAL    
 benign cyst removed from back and thyroid  HX OTHER SURGICAL    
 skin graft  HX PACEMAKER    
 aicd  [facility] COLONOSCOPY FLX DX W/COLLJ SPEC WHEN PFRMD  6/6/2012  [place] COLONOSCOPY W/BIOPSY SINGLE/MULTIPLE  4/27/2011 Family History: 
Family History Problem Relation Age of Onset  Cancer Mother   
     colon  Heart Disease Father  Heart Disease Brother Social History: 
Social History Tobacco Use  Smoking status: Former Smoker  Smokeless tobacco: Never Used Substance Use Topics  Alcohol use: Not Currently Alcohol/week: 4.2 standard drinks Types: 5 Glasses of wine per week  Drug use: No  
 
 
Allergies: 
No Known Allergies Review of Systems Review of Systems Constitutional: Negative for chills and fever. HENT: Negative for congestion, ear pain, rhinorrhea, sore throat and trouble swallowing. Eyes: Negative for visual disturbance. Respiratory: Positive for cough and shortness of breath. Negative for chest tightness. Cardiovascular: Negative for chest pain and palpitations. Gastrointestinal: Negative for abdominal pain, blood in stool, constipation, diarrhea, nausea and vomiting. Genitourinary: Negative for decreased urine volume, difficulty urinating, dysuria and frequency. Musculoskeletal: Negative for back pain and neck pain. Skin: Negative for color change and rash. Neurological: Negative for dizziness, weakness, light-headedness and headaches. Physical Exam  
Physical Exam  
Constitutional: He is oriented to person, place, and time. He appears well-developed and well-nourished. He does not appear ill. No distress. HENT:  
Mouth/Throat: Oropharynx is clear and moist.  
Eyes: Conjunctivae are normal.  
Neck: Neck supple. Cardiovascular: Normal rate and regular rhythm. Pulmonary/Chest: Effort normal. No accessory muscle usage. No respiratory distress. He has rales in the right lower field and the left lower field. Abdominal: Soft. He exhibits no distension. There is no tenderness. Lymphadenopathy:  
  He has no cervical adenopathy. Neurological: He is alert and oriented to person, place, and time. He has normal strength. No cranial nerve deficit or sensory deficit. Skin: Skin is warm and dry. Nursing note and vitals reviewed. Diagnostic Study Results Labs - Recent Results (from the past 24 hour(s)) EKG, 12 LEAD, INITIAL Collection Time: 09/21/19  7:59 AM  
Result Value Ref Range Ventricular Rate 100 BPM  
 Atrial Rate 100 BPM  
 P-R Interval 72 ms QRS Duration 114 ms Q-T Interval 396 ms QTC Calculation (Bezet) 510 ms Calculated R Axis -111 degrees Calculated T Axis 128 degrees Diagnosis Sinus rhythm with short NJ Incomplete right bundle branch block Possible Right ventricular hypertrophy Inferior infarct (cited on or before 28-JUL-2019) Anterolateral infarct (cited on or before 28-JUL-2019) ** ** ACUTE MI / STEMI ** ** 
Consider right ventricular involvement in acute inferior infarct When compared with ECG of 12-SEP-2019 13:22, Nonspecific T wave abnormality has replaced inverted T waves in Lateral leads QT has shortened CBC WITH AUTOMATED DIFF Collection Time: 09/21/19  8:26 AM  
Result Value Ref Range WBC 6.6 4.1 - 11.1 K/uL  
 RBC 4.15 4.10 - 5.70 M/uL  
 HGB 12.7 12.1 - 17.0 g/dL HCT 39.7 36.6 - 50.3 % MCV 95.7 80.0 - 99.0 FL  
 MCH 30.6 26.0 - 34.0 PG  
 MCHC 32.0 30.0 - 36.5 g/dL  
 RDW 16.7 (H) 11.5 - 14.5 % PLATELET 193 360 - 981 K/uL MPV 11.0 8.9 - 12.9 FL  
 NRBC 0.0 0  WBC ABSOLUTE NRBC 0.00 0.00 - 0.01 K/uL NEUTROPHILS 69 32 - 75 % LYMPHOCYTES 22 12 - 49 % MONOCYTES 9 5 - 13 % EOSINOPHILS 0 0 - 7 % BASOPHILS 0 0 - 1 % IMMATURE GRANULOCYTES 0 0.0 - 0.5 % ABS. NEUTROPHILS 4.5 1.8 - 8.0 K/UL  
 ABS. LYMPHOCYTES 1.4 0.8 - 3.5 K/UL  
 ABS. MONOCYTES 0.6 0.0 - 1.0 K/UL  
 ABS. EOSINOPHILS 0.0 0.0 - 0.4 K/UL  
 ABS. BASOPHILS 0.0 0.0 - 0.1 K/UL  
 ABS. IMM. GRANS. 0.0 0.00 - 0.04 K/UL  
 DF AUTOMATED METABOLIC PANEL, COMPREHENSIVE Collection Time: 09/21/19  8:26 AM  
Result Value Ref Range Sodium 135 (L) 136 - 145 mmol/L Potassium 4.6 3.5 - 5.1 mmol/L Chloride 101 97 - 108 mmol/L  
 CO2 23 21 - 32 mmol/L Anion gap 11 5 - 15 mmol/L Glucose 133 (H) 65 - 100 mg/dL BUN 44 (H) 6 - 20 MG/DL Creatinine 1.98 (H) 0.70 - 1.30 MG/DL  
 BUN/Creatinine ratio 22 (H) 12 - 20 GFR est AA 40 (L) >60 ml/min/1.73m2 GFR est non-AA 33 (L) >60 ml/min/1.73m2 Calcium 8.9 8.5 - 10.1 MG/DL Bilirubin, total 1.1 (H) 0.2 - 1.0 MG/DL  
 ALT (SGPT) 33 12 - 78 U/L  
 AST (SGOT) 33 15 - 37 U/L Alk. phosphatase 85 45 - 117 U/L Protein, total 7.7 6.4 - 8.2 g/dL Albumin 3.7 3.5 - 5.0 g/dL Globulin 4.0 2.0 - 4.0 g/dL A-G Ratio 0.9 (L) 1.1 - 2.2 NT-PRO BNP Collection Time: 09/21/19  8:26 AM  
Result Value Ref Range NT pro-BNP 9,875 (H) <450 PG/ML  
TROPONIN I  Collection Time: 09/21/19  8:26 AM  
 Result Value Ref Range Troponin-I, Qt. <0.05 <0.05 ng/mL Radiologic Studies -  
XR CHEST PORT Final Result IMPRESSION: No acute findings. CT Results  (Last 48 hours) None CXR Results  (Last 48 hours) 09/21/19 0823  XR CHEST PORT Final result Impression:  IMPRESSION: No acute findings. Narrative:  EXAM: XR CHEST PORT INDICATION: dyspnea, orthopnea COMPARISON: September 12 FINDINGS: A portable AP radiograph of the chest was obtained at 0804 hours the  
defibrillator is seen in place. . The patient is on a cardiac monitor. A focal  
airspace process is not seen. There are pleural calcifications. The cardiac and  
mediastinal contours and pulmonary vascularity are normal.  The bones and soft  
tissues are grossly within normal limits. Medical Decision Making I am the first provider for this patient. I reviewed the vital signs, available nursing notes, past medical history, past surgical history, family history and social history. Vital Signs-Reviewed the patient's vital signs. Patient Vitals for the past 24 hrs: 
 Temp Pulse Resp BP SpO2  
09/21/19 1000  98 19 96/73 98 %  
09/21/19 0845  97 15 102/74 96 %  
09/21/19 0821  (!) 101 19 105/75 98 %  
09/21/19 0749    98/50   
09/21/19 0747 97.7 °F (36.5 °C) 96 18 (!) 89/74 97 % Pulse Oximetry Analysis - 97% on RA Cardiac Monitor:  
Rate: 96 bpm 
Rhythm: Normal Sinus Rhythm EKG interpretation: (Preliminary) Rhythm: normal sinus rhythm and RBBB; and regular . Rate (approx.): 100; Axis: normal; WI interval: normal; QRS interval: prolonged; ST/T wave: non-specific changes; unchanged from previous EKGs. Records Reviewed: Nursing Notes, Old Medical Records and Previous electrocardiograms Provider Notes (Medical Decision Making): This patient with a history of CHF presents with orthopnea.   He does have rales on exam.  He is not in distress nor hypoxic. He has no swelling in his legs. His chest x-ray is clear. He does have an elevated BNP. His blood pressure has been a little soft with systolics in the 32Y. Since he is not hypoxic or in respiratory distress normally I would give this patient a bolus of diuretics here in the emergency department have him diurese and go home. Unfortunately because of his soft blood pressures and his MARCELL I do not believe that I can do that today. I spoke with cardiology on call and they do recommend giving him some diuretic but may be of benefit to bring him into the hospital.  Will admit to the hospitalist with cardiology to consult. ED Course:  
Initial assessment performed. The patients presenting problems have been discussed, and they are in agreement with the care plan formulated and outlined with them. I have encouraged them to ask questions as they arise throughout their visit. Orders Placed This Encounter  XR CHEST PORT  CBC WITH AUTOMATED DIFF  
 COMPREHENSIVE METABOLIC PANEL  
 PRO-BNP  TROPONIN I  
 DIET CARDIAC Regular  NOTIFY PROVIDER: SPECIFY Notify provider on pt's arrival to floor ONE TIME STAT  
 OUT OF BED WITH ASSISTANCE  
[id number] [address]  DO NOT RESUSCITATE  EKG, 12 LEAD, INITIAL  
 SALINE LOCK IV ONE TIME STAT  bumetanide (BUMEX) injection 1 mg  acetaminophen (TYLENOL) tablet 650 mg  
 ondansetron (ZOFRAN) injection 4 mg  IP CONSULT TO HOSPITALIST  IP CONSULT TO CARDIOLOGY Critical Care Time:  
0 Disposition: 
Admit Diagnosis Clinical Impression: 1. Acute on chronic congestive heart failure, unspecified heart failure type ([address] 2. Orthopnea 3. MARCELL (acute kidney injury) ([address] This note will not be viewable in [address]

## 2019-09-22 NOTE — PROGRESS NOTES
Patient tolerating ambulation/activity with HR in 90's and oxygen 95% on room air. Has no concerns from OT standpoint. Note to follow.

## 2019-09-22 NOTE — PROGRESS NOTES
OCCUPATIONAL THERAPY EVALUATION/DISCHARGE Patient: Zohreh Nash (42 y.o. male) Date: 9/22/2019 Primary Diagnosis: Acute on chronic heart failure ([address] [I50.9] Precautions: fall ASSESSMENT Based on the objective data described below, the patient presents with independently ADL, and ability to tolerate walking I room and into hallway with O2 95% on room air and HR up to 98 from 90. Reviewed general energy conservation principles. Patient and family had no OT related concerns at this time. Current Level of Function (ADLs/self-care): independent Functional Outcome Measure: The patient scored 90/100 on the Barthel Index outcome measure which is indicative of 10% deficit in ADLS and mobility. Other factors to consider for discharge: none PLAN : 
Recommend with staff: up in room to bathroom Recommendation for discharge: (in order for the patient to meet his/her long term goals) No skilled occupational therapy/ follow up rehabilitation needs identified at this time. This discharge recommendation: A follow-up discussion with the attending provider and/or case management is planned Equipment recommendations for successful discharge: none SUBJECTIVE:  
Patient stated I'd like to stretch my legs.  OBJECTIVE DATA SUMMARY:  
HISTORY:  
Past Medical History:  
Diagnosis Date  AICD (automatic cardioverter/defibrillator) present  Arthritis  Cancer ([address] skin  Heart failure ([address]  Other ill-defined conditions(799.89)   
 high cholesterol Past Surgical History:  
Procedure Laterality Date  ABDOMEN SURGERY PROC UNLISTED  6/2/11  
 colon resection  HX CATARACT REMOVAL Left  HX HEENT    
 repaired right eardrum  HX OTHER SURGICAL    
 benign cyst removed from back and thyroid  HX OTHER SURGICAL    
 skin graft  HX PACEMAKER    
 aicd  [place] COLONOSCOPY FLX DX W/COLLJ SPEC WHEN PFRMD  6/6/2012  [place] COLONOSCOPY W/BIOPSY SINGLE/MULTIPLE  2011 Prior Level of Function/Environment/Context: independent Expanded or extensive additional review of patient history:  
Home Situation Home Environment: Private residence # Steps to Enter: 5 Wheelchair Ramp: Yes One/Two Story Residence: One story Living Alone: No 
Support Systems: Child(niki), Family member(s) Patient Expects to be Discharged to[de-identified] Private residence Current DME Used/Available at Home: Nicholette Peeling, Wheelchair Hand dominance: Right EXAMINATION OF PERFORMANCE DEFICITS: 
Cognitive/Behavioral Status: 
  
  
 kitty and cooperative Skin: intact Edema: none Hearing: Auditory Auditory Impairment: Hard of hearing, bilateral, Hearing aid(s) Hearing Aids/Status: At home Vision/Perceptual:   
    
    
    
  
    
Acuity: Impaired near vision; Impaired far vision(had cataract surgery) Range of Motion: 
 
AROM: Generally decreased, functional 
PROM: Within functional limits Strength: 
 
Strength: Generally decreased, functional 
  
  
  
  
 
Coordination: 
Coordination: Within functional limits Fine Motor Skills-Upper: Left Intact; Right Intact Gross Motor Skills-Upper: Left Intact; Right Intact Tone & Sensation: 
 
Tone: Normal 
Sensation: Intact Balance: 
Sitting: Intact Standing: Intact Functional Mobility and Transfers for ADLs: 
Bed Mobility: 
Rolling: Independent Supine to Sit: Independent Sit to Supine: Independent Scooting: Independent Transfers: 
Sit to Stand: Independent Stand to Sit: Independent Bed to Chair: Independent Bathroom Mobility: Independent ADL Assessment: 
Feeding: Independent Oral Facial Hygiene/Grooming: Independent Bathing: Supervision Upper Body Dressing: Independent Lower Body Dressing: Independent Toileting: Independent Functional Measure: 
Barthel Index: 
 
Bathin Bladder: 10 Bowels: 10 
 Groomin Dressing: 10 Feeding: 10 Mobility: 10 Stairs: 5 Toilet Use: 10 Transfer (Bed to Chair and Back): 15 Total: 90/100 The Barthel ADL Index: Guidelines 1. The index should be used as a record of what a patient does, not as a record of what a patient could do. 2. The main aim is to establish degree of independence from any help, physical or verbal, however minor and for whatever reason. 3. The need for supervision renders the patient not independent. 4. A patient's performance should be established using the best available evidence. Asking the patient, friends/relatives and nurses are the usual sources, but direct observation and common sense are also important. However direct testing is not needed. 5. Usually the patient's performance over the preceding 24-48 hours is important, but occasionally longer periods will be relevant. 6. Middle categories imply that the patient supplies over 50 per cent of the effort. 7. Use of aids to be independent is allowed. Bright Andrews., Barthel, D.W. (6363). Functional evaluation: the Barthel Index. [facility] (14)2. CARO Fierro, Antonietta Zarco., Elizabeth Myles., [place], [address] [place] [address] (). Measuring the change indisability after inpatient rehabilitation; comparison of the responsiveness of the Barthel Index and Functional [place] Measure. Journal of Neurology, Neurosurgery, and Psychiatry, 66(4), 875-821. Leanna Martínez, N.J.A, FLOYD Tamez.LAURA, & Dennis Ray MSIERRA. (2004.) Assessment of post-stroke quality of life in cost-effectiveness studies: The usefulness of the Barthel Index and the EuroQoL-5D. [facility], 13, 612-11 Occupational Therapy Evaluation Charge Determination History Examination Decision-Making LOW Complexity : Brief history review  LOW Complexity : 1-3 performance deficits relating to physical, cognitive , or psychosocial skils that result in activity limitations and / or participation restrictions  LOW Complexity : No comorbidities that affect functional and no verbal or physical assistance needed to complete eval tasks Based on the above components, the patient evaluation is determined to be of the following complexity level: LOW Pain Ratin/10 Activity Tolerance:  
Good Please refer to the flowsheet for vital signs taken during this treatment. After treatment patient left in no apparent distress:   
Supine in bed COMMUNICATION/EDUCATION:  
The patients plan of care was discussed with: Registered Nurse. Thank you for this referral. 
Bharath Sabillon Time Calculation: 14 mins

## 2019-09-22 NOTE — PROGRESS NOTES
Bedside shift change report GIVEN TO Kaamr Olea RN. Report included the following information SBAR. SIGNIFICANT CHANGES DURING SHIFT:   
 
 
CONCERNS TO ADDRESS WITH MD:   
 
 
 
 
[facility] NURSING NOTE Admission Date 9/21/2019 Admission Diagnosis Acute on chronic heart failure ([address] [I50.9] Consults IP CONSULT TO CARDIOLOGY 
IP CONSULT TO NEPHROLOGY Cardiac Monitoring [x] Yes [] No  
  
Purposeful Hourly Rounding [x] Yes   
Froylan Score Total Score: 1 Froylan score 3 or > [] Bed Alarm [] Avasys [] 1:1 sitter [] Patient refused (Signed refusal form in chart) Arsen Score Arsen Score: 21 Arsen score 14 or < [] PMT consult [] Wound Care consult  
 []  Specialty bed  [] Nutrition consult Influenza Vaccine Received Flu Vaccine for Current Season (usually Sept-March): Yes Oxygen needs? [x] Room air Oxygen @  []1L    []2L    []3L   []4L    []5L   []6L via NC Chronic home O2 use? [] Yes [] No 
Perform O2 challenge test and document in progress note using smartphrase (.Homeoxygen) Last bowel movement Last Bowel Movement Date: 09/21/19 Urinary Catheter LDAs Peripheral IV 09/21/19 Right Forearm (Active) Site Assessment Clean, dry, & intact 9/21/2019 11:30 PM  
Phlebitis Assessment 0 9/21/2019 11:30 PM  
Infiltration Assessment 0 9/21/2019 11:30 PM  
Dressing Status Clean, dry, & intact 9/21/2019 11:30 PM  
Dressing Type Tape;Transparent 9/21/2019 11:30 PM  
Hub Color/Line Status Blue;Flushed 9/21/2019 11:30 PM  
Alcohol Cap Used Yes 9/21/2019  7:44 PM  
                  
  
Readmission Risk Assessment Tool Score Medium Risk 25 Total Score 4 IP Visits Last 12 Months (1-3=4, 4=9, >4=11) 5 Pt. Coverage (Medicare=5 , Medicaid, or Self-Pay=4) 9 Charlson Comorbidity Score (Age + Comorbid Conditions) Criteria that do not apply:  
 Has Seen PCP in Last 6 Months (Yes=3, No=0) . Living with Significant Other. Assisted Living. LTAC. SNF. or  
Rehab Patient Length of Stay (>5 days = 3) Expected Length of Stay - - - Actual Length of Stay 1

## 2019-09-22 NOTE — CONSULTS
Consult/Admission NAME: Zoe Laws :  1940 MRN:  [id number] Date/Time:  2019 8:14 PM 
 
Patient PCP: Joselo Squires MD 
________________________________________________________________________ Assessment:  
 
Non ischemic dilated Cardiomyopathy LV EF 20% ICD in place. Acute on chronic systolic heart failure. DNR Cardioloy : Porfirio Cordia Plan:  
 
Continue meds. Diurese. [x]           High complexity decision making was performed Subjective: CHIEF COMPLAINT:  SOB HISTORY OF PRESENT ILLNESS:    
Sandro Toth is a 77 y.o.  male who has above Diagnoses. Past Medical History:  
Diagnosis Date  AICD (automatic cardioverter/defibrillator) present  Arthritis  Cancer ([facility] skin  Heart failure ([facility]  Other ill-defined conditions(799.89)   
 high cholesterol Past Surgical History:  
Procedure Laterality Date  ABDOMEN SURGERY PROC UNLISTED  11  
 colon resection  HX CATARACT REMOVAL Left  HX HEENT    
 repaired right eardrum  HX OTHER SURGICAL    
 benign cyst removed from back and thyroid  HX OTHER SURGICAL    
 skin graft  HX PACEMAKER    
 aicd  DC COLONOSCOPY FLX DX W/COLLJ SPEC WHEN PFRMD  2012  DC COLONOSCOPY W/BIOPSY SINGLE/MULTIPLE  2011 No Known Allergies Meds:  See below Social History Tobacco Use  Smoking status: Former Smoker  Smokeless tobacco: Never Used Substance Use Topics  Alcohol use: Not Currently Alcohol/week: 4.2 standard drinks Types: 5 Glasses of wine per week Family History Problem Relation Age of Onset  Cancer Mother   
     colon  Heart Disease Father  Heart Disease Brother REVIEW OF SYSTEMS:   
 []            Unable to obtain  ROS due to --- 
 [x]            Total of 12 systems reviewed as follows: 
 
Constitutional: negative fever, negative chills, negative weight loss Eyes:   negative visual changes ENT:   negative sore throat, tongue or lip swelling Respiratory:  negative cough, negative dyspnea Cards:  negative for chest pain, palpitations, lower extremity edema GI:   negative for nausea, vomiting, diarrhea, and abdominal pain Genitourinary: negative for frequency, dysuria Integument:  negative for rash Hematologic:  negative for easy bruising and gum/nose bleeding Musculoskel: negative for myalgias,  back pain Neurological:  negative for headaches, dizziness, vertigo, weakness Behavl/Psych: negative for feelings of anxiety, depression Pertinent Positives include : 
 
Objective:  
  
Physical Exam: 
 
Last 24hrs VS reviewed since prior progress note. Most recent are: 
 
Visit Vitals BP 94/62 (BP 1 Location: Left arm, BP Patient Position: At rest) Pulse 98 Temp 97.8 °F (36.6 °C) Resp 18 Ht 5' 9\" (1.753 m) Wt 77.8 kg (171 lb 8.3 oz) SpO2 96% BMI 25.33 kg/m² Intake/Output Summary (Last 24 hours) at 9/21/2019 2014 Last data filed at 9/21/2019 1800 Gross per 24 hour Intake 480 ml Output 475 ml Net 5 ml General Appearance: Well developed, well nourished, alert & oriented x 3,  
 no acute distress. Ears/Nose/Mouth/Throat: Pupils equal and round, Hearing grossly normal. 
Neck: Supple. JVP within normal limits. Carotids good upstrokes, with no bruit. Chest: Lungs clear to auscultation bilaterally. Cardiovascular: Regular rate and rhythm, S1S2 normal, no murmur, rubs, gallops. Abdomen: Soft, non-tender, bowel sounds are active. No organomegaly. Extremities: No edema bilaterally. Femoral pulses +2, Distal Pulses +1. Skin: Warm and dry. Neuro: CN II-XII grossly intact, Strength and sensation grossly intact. Data:  
  
Prior to Admission medications Medication Sig Start Date End Date Taking?  Authorizing Provider  
benzonatate (TESSALON) 100 mg capsule Take 100 mg by mouth three (3) times daily as needed for Cough. Yes Provider, Historical  
prednisoLONE acetate (PRED FORTE) 1 % ophthalmic suspension Administer 1 Drop to right eye four (4) times daily. pateint is supposed to take 1 drop four times a day for this week and taper it every week so nest week he will get 1 drop 3 times a day and so forth   Yes Provider, Historical  
moxifloxacin (VIGAMOX) 0.5 % ophthalmic solution Administer 1 Drop to left eye two (2) times a day. pateint is supposed to take 1 drop two times a day for this week and taper it to 1 drop daily next week. Yes Provider, Historical  
prednisoLONE acetate (PRED FORTE) 1 % ophthalmic suspension Administer 1 Drop to left eye two (2) times a day. pateint is supposed to have 1 drop twice a day this week and after that 1 drop daily. Yes Provider, Historical  
moxifloxacin (VIGAMOX) 0.5 % ophthalmic solution Administer 1 Drop to right eye four (4) times daily. pateint is supposed to take 1 drop four times a day for this week and taper it every week so nest week he will get 1 drop 3 times a day and so forth   Yes Provider, Historical  
folic acid/multivit-min/lutein (CENTRUM SILVER PO) Take  by mouth. Yes Other, MD Stefani  
amiodarone (CORDARONE) 200 mg tablet Take 1 Tab by mouth two (2) times a day. Indications: atrial fibrillation electrically shocked to normal rhythm 8/6/19  Yes Chao Gray, DO  
furosemide (LASIX) 40 mg tablet Take one 40mg tablet daily 8/7/19  Yes Chao Gray, DO  
apixaban (ELIQUIS) 5 mg tablet Take 1 Tab by mouth every twelve (12) hours. 8/6/19  Yes Chao Gray, DO  
levothyroxine (SYNTHROID) 100 mcg tablet Take 100 mcg by mouth every evening. Yes Other, MD Stefani  
allopurinol (ZYLOPRIM) 100 mg tablet Take 200 mg by mouth daily. Indications: 2 pills daily   Yes Provider, Historical  
loratadine (CLARITIN) 10 mg tablet Take 10 mg by mouth daily.  4/26/11  Yes Provider, Historical  
 pravastatin (PRAVACHOL) 80 mg tablet Take 80 mg by mouth nightly. Yes Provider, Historical  
OXYGEN-AIR DELIVERY SYSTEMS 2 L/min by Nasal route as needed (shortness of breath). Provider, Historical  
MULTIVITAMIN PO Take 1 Tab by mouth daily. Indications: Centrum Silver    Provider, Historical  
 
 
Recent Results (from the past 24 hour(s)) EKG, 12 LEAD, INITIAL Collection Time: 09/21/19  7:59 AM  
Result Value Ref Range Ventricular Rate 100 BPM  
 Atrial Rate 100 BPM  
 P-R Interval 72 ms QRS Duration 114 ms Q-T Interval 396 ms QTC Calculation (Bezet) 510 ms Calculated R Axis -111 degrees Calculated T Axis 128 degrees Diagnosis Sinus rhythm with short MT Incomplete right bundle branch block Possible Right ventricular hypertrophy Inferior infarct (cited on or before 28-JUL-2019) Anterolateral infarct (cited on or before 28-JUL-2019) ** ** ACUTE MI / STEMI ** ** 
Consider right ventricular involvement in acute inferior infarct When compared with ECG of 12-SEP-2019 13:22, Nonspecific T wave abnormality has replaced inverted T waves in Lateral leads QT has shortened CBC WITH AUTOMATED DIFF Collection Time: 09/21/19  8:26 AM  
Result Value Ref Range WBC 6.6 4.1 - 11.1 K/uL  
 RBC 4.15 4.10 - 5.70 M/uL  
 HGB 12.7 12.1 - 17.0 g/dL HCT 39.7 36.6 - 50.3 % MCV 95.7 80.0 - 99.0 FL  
 MCH 30.6 26.0 - 34.0 PG  
 MCHC 32.0 30.0 - 36.5 g/dL  
 RDW 16.7 (H) 11.5 - 14.5 % PLATELET 310 941 - 737 K/uL MPV 11.0 8.9 - 12.9 FL  
 NRBC 0.0 0  WBC ABSOLUTE NRBC 0.00 0.00 - 0.01 K/uL NEUTROPHILS 69 32 - 75 % LYMPHOCYTES 22 12 - 49 % MONOCYTES 9 5 - 13 % EOSINOPHILS 0 0 - 7 % BASOPHILS 0 0 - 1 % IMMATURE GRANULOCYTES 0 0.0 - 0.5 % ABS. NEUTROPHILS 4.5 1.8 - 8.0 K/UL  
 ABS. LYMPHOCYTES 1.4 0.8 - 3.5 K/UL  
 ABS. MONOCYTES 0.6 0.0 - 1.0 K/UL  
 ABS. EOSINOPHILS 0.0 0.0 - 0.4 K/UL  
 ABS. BASOPHILS 0.0 0.0 - 0.1 K/UL ABS. IMM. GRANS. 0.0 0.00 - 0.04 K/UL  
 DF AUTOMATED METABOLIC PANEL, COMPREHENSIVE Collection Time: 09/21/19  8:26 AM  
Result Value Ref Range Sodium 135 (L) 136 - 145 mmol/L Potassium 4.6 3.5 - 5.1 mmol/L Chloride 101 97 - 108 mmol/L  
 CO2 23 21 - 32 mmol/L Anion gap 11 5 - 15 mmol/L Glucose 133 (H) 65 - 100 mg/dL BUN 44 (H) 6 - 20 MG/DL Creatinine 1.98 (H) 0.70 - 1.30 MG/DL  
 BUN/Creatinine ratio 22 (H) 12 - 20 GFR est AA 40 (L) >60 ml/min/1.73m2 GFR est non-AA 33 (L) >60 ml/min/1.73m2 Calcium 8.9 8.5 - 10.1 MG/DL Bilirubin, total 1.1 (H) 0.2 - 1.0 MG/DL  
 ALT (SGPT) 33 12 - 78 U/L  
 AST (SGOT) 33 15 - 37 U/L Alk. phosphatase 85 45 - 117 U/L Protein, total 7.7 6.4 - 8.2 g/dL Albumin 3.7 3.5 - 5.0 g/dL Globulin 4.0 2.0 - 4.0 g/dL A-G Ratio 0.9 (L) 1.1 - 2.2 NT-PRO BNP Collection Time: 09/21/19  8:26 AM  
Result Value Ref Range NT pro-BNP 9,875 (H) <450 PG/ML  
TROPONIN I Collection Time: 09/21/19  8:26 AM  
Result Value Ref Range Troponin-I, Qt. <0.05 <0.05 ng/mL

## 2019-09-22 NOTE — PROGRESS NOTES
Problem: Falls - Risk of 
Goal: *Absence of Falls Description Document Juan Harrison Fall Risk and appropriate interventions in the flowsheet. Outcome: Progressing Towards Goal 
Note:  
Fall Risk Interventions: 
  
 
  
 
Medication Interventions: Bed/chair exit alarm, Patient to call before getting OOB, Teach patient to arise slowly Problem: Heart Failure: Day 1 Goal: Activity/Safety Outcome: Progressing Towards Goal 
  
Problem: Heart Failure: Day 1 Goal: *Oxygen saturation within defined limits Outcome: Progressing Towards Goal

## 2019-09-22 NOTE — PROGRESS NOTES
Reason for Admission:   Acute on Chronic HF 
               
RRAT Score:   18 Do you (patient/family) have any concerns for transition/discharge? Pt and spouse reported that many of pt's outpatient providers are in the process of retiring, thus starting to look at finding new ones - specifically PCP. Currently sees Dr. Matias Razo, but likely will look at  Dr. Manda Melara at [facility]. Plan for utilizing home health:   PT/OT consults pending at this time - at minimum will qualify for [facility] CHF visit. Current Advanced Directive/Advance Care Plan:  DNR - ACP not on file at this time Transition of Care Plan:  Follow-up Care Appointments, PT/OT consults pending - reported that their pulmonologist recommended Outpatient Cardiopulmonary Rehab & might referring them soon. Possible H2H CHF. Referral sent to [facility]. 76 yo  male admitted on 9/21/19 for Acute on Chronic HF. CHF bundle pt. Lives in a [facility] ([address]) with spouse In [facility]. Hx of HH [facility] & [facility] at [address] [place] [address]. No reported hx of SNF or acute inpatient rehab. During last admission, setup for home oxygen through [address] (2LPM) however reports he hasn't worn it for several weeks and just uses it PRN at this time. Also has WC, RW, and cane at home. Independent in ADLs to include driving, but spouses assists with some tranpsortation. Preferred Rx is [employer] [phone]). CM met with pt to verify demographic info and complete initial assessment, dc planning. Pt is alert & oriented x 4. Spouse is sitting at bedside. Pt sees Dr. Matias Razo (PCP), Dr. Debbie Padgett (Cardiology), and Dr. Gerry Guerra (Pulmonology). Reports that Dr. Matias Razo is planning to retire soon so they may soon switch over to Dr. Manda Melara @ [facility].  Scheduled to see Dr. Gerry Guerra on 11/25, who had recommended OP Cardiopulmonary Rehab in the future. PT/OT consults placed, pending at this time. Eligible for [facility] CHF visit at discharge pending recommendations. [email or web address] CM will continue to follow-up to ensure additional CM needs are met. Care Management Interventions PCP Verified by CM: Yes 
Palliative Care Criteria Met (RRAT>21 & CHF Dx)?: No 
Mode of Transport at Discharge: Other (see comment)(by private vehicle with spouse) Transition of Care Consult (CM Consult): Discharge Planning Discharge Durable Medical Equipment: No([employer] PRN 2LPM O2, WC, RW, Cane at home) Health Maintenance Reviewed: Yes Physical Therapy Consult: Yes Occupational Therapy Consult: Yes Speech Therapy Consult: No 
Current Support Network: Lives with Spouse(One-story house ([address]) with spouse in [facility]) Confirm Follow Up Transport: Family Plan discussed with Pt/Family/Caregiver: Yes Discharge Location Discharge Placement: (TBD) [facility], [place] [address] [place] [address] Road [phone]

## 2019-09-22 NOTE — PROGRESS NOTES
Report received from [facility]. Introduced myself as primary RN. Assumed care of patient. Instructed patient to call for assistance prior to getting up. Pt verbalized understanding. Call bell within reach. 7:50 AM Patient hypotensive, BP 90/61. HR. 82. Patient scheduled to receive IV Lasix and PO Amiodarone. Cardiologist paged, awaiting telephone call back.  on call. 
 
9:04 AM Cardiologist paged re: patients BP, awaiting telephone call back. Dr. Ellyn Hill returned page. Per MD flowers to administer IV Lasix and PO Amiodarone. 2:00 PM Patient with complaints of abd cramping. No complaints of pain when abd is palpated. Attending at bedside. Informed of patients complaints. Orders received for PO Simethicone. 3:39 PM UA obtained and sent to lab.  
 
4:52 PM Patient hypotensive. BP 89/59. HR. 93. Patient scheduled to receive PO Amiodarone. Cardiologist paged, awaiting telephone call back. 5:15 Attending paged re: patients BP as cardiologist has not returned page at this time. Awaiting telephone call back. 6:23 PM Attending paged, awaiting telephone call back. 6:34 PM Attending returned page. Pietro to administer Amiodarone. MD believes he can tolerate medication given the ordered dose is what he takes at home. Will continue to monitor. 6:50 PM Attending paged. Patient requesting for sleep aid, currently has Melatonin ordered. Patient states Melatonin did not help. Patient requesting for another sleep aid, awaiting telephone call back. 6:58 PM Attending paged. Informed of patients request. Orders received for Trazodone 25mg at bedtime. Bedside and Verbal shift change report given to Meena Gruber (oncoming nurse) by Yoko Shaikh (offgoing nurse). Report included the following information SBAR, Kardex, MAR, Recent Results and Med Rec Status.

## 2019-09-22 NOTE — PROGRESS NOTES
Hospitalist Progress Note NAME: Tristan Cheema :  1940 MRN:  [id number] Interim Hospital Summary: 77 y.o. male whom presented on 2019 with Assessment / Plan: 
 
Acute on chronic systolic HF Non ischemic CM S/P AICD P. Atrial fibrillation 
-Recent Echo EF 21-25%, No AS, trace MR 
-weight down. Continue IV lasix. BP soft today  
-continue amiodarone and eliquis 
-taken off entresto, coreg and aldactone last admission in August 
 
MARCELL 
-renal US no hydronephrosis; no prior protenuria 
-likely cardiorenal syndrome. Got lasix this AM.   Will sent urine lytes this afternoon / FeNa 
-explained to wife that this is related to his low cardiac output and he likely does not have actual kidney damage. If not better tomorrow, can ask renal to see him. Hypothyroidism 
-synthroid Gout - allopurinol 18.5 - 24.9 Normal weight / Body mass index is 24.68 kg/m². Code status: DNR Prophylaxis: ELiquis Recommended Disposition: Home w/Family Subjective: Chief Complaint / Reason for Physician Visit Follow up of CHF, weight gain, MARCELL Chart reviewed in detail. Discussed with RN events overnight. Review of Systems: 
Symptom Y/N Comments  Symptom Y/N Comments Fever/Chills    Chest Pain n   
Poor Appetite    Edema n   
Cough    Abdominal Pain Sputum    Joint Pain SOB/MURILLO   better  Pruritis/Rash Nausea/vomit    Tolerating PT/OT Diarrhea    Tolerating Diet Constipation    Other Could NOT obtain due to:   
 
PO intake:  
Patient Vitals for the past 72 hrs: 
 % Diet Eaten  
19 1800 75 % Objective: VITALS:  
Last 24hrs VS reviewed since prior progress note. Most recent are: 
Patient Vitals for the past 24 hrs: 
 Temp Pulse Resp BP SpO2  
19 1442 97.4 °F (36.3 °C) 92 16 93/64 97 % 19 1100  98   95 % 19 1041 97.7 °F (36.5 °C) 90 16 94/65 96 % 09/22/19 0954  90  95/64   
09/22/19 0708 97.5 °F (36.4 °C) 82 18 90/61 97 % 09/22/19 0330 97.5 °F (36.4 °C) 88 18 98/58 100 % 09/21/19 2221 97.6 °F (36.4 °C) 93 18 96/65 97 % 09/21/19 1925 97.8 °F (36.6 °C) 98 18 94/62 96 % Intake/Output Summary (Last 24 hours) at 9/22/2019 1606 Last data filed at 9/22/2019 1400 Gross per 24 hour Intake 840 ml Output 540 ml Net 300 ml PHYSICAL EXAM: 
General: WD, WN. Alert, cooperative, no acute distress   
EENT:  EOMI. Anicteric sclerae. MMM Resp:  Basal rales L>R  No accessory muscle use CV:  Regular  rhythm,  No significant edema GI:  Soft, Non distended, Non tender.  +Bowel sounds Neurologic:  Alert and oriented X 3, normal speech, Psych:   Good insight. Not anxious nor agitated Skin:  No rashes. No jaundice Reviewed most current lab test results and cultures  YES Reviewed most current radiology test results   YES Review and summation of old records today    NO Reviewed patient's current orders and MAR    YES 
PMH/SH reviewed - no change compared to H&P 
________________________________________________________________________ Care Plan discussed with: 
  Comments Patient x Family  x   
RN x Care Manager Consultant Multidiciplinary team rounds were held today with , nursing, pharmacist and clinical coordinator. Patient's plan of care was discussed; medications were reviewed and discharge planning was addressed. ________________________________________________________________________ Total NON critical care TIME:   24  Minutes Total CRITICAL CARE TIME Spent:   Minutes non procedure based Comments >50% of visit spent in counseling and coordination of care x This includes time during multidisciplinary rounds if indicated above  
________________________________________________________________________ Freddy Matta MD  
 
 Procedures: see electronic medical records for all procedures/Xrays and details which were not copied into this note but were reviewed prior to creation of Plan. LABS: 
I reviewed today's most current labs and imaging studies. Pertinent labs include: 
Recent Labs  
  09/22/19 0347 09/21/19 
9196 WBC 6.0 6.6 HGB 11.6* 12.7 HCT 36.0* 39.7  218 Recent Labs  
  09/22/19 0347 09/21/19 
2050  135* K 4.3 4.6  101 CO2 24 23 * 133* BUN 47* 44* CREA 1.93* 1.98* CA 8.7 8.9 ALB  --  3.7 TBILI  --  1.1*  
SGOT  --  33 ALT  --  33

## 2019-09-22 NOTE — PROGRESS NOTES
Bedside shift change report GIVEN TO Zoe Man RN. Report included the following information SBAR. SIGNIFICANT CHANGES DURING SHIFT:   
1400 Admitted pt to [facility] floor Dual skin: 
 
Right cheek- redness/irritation Sacrum-pink/slow to katherin Left arm- minor scattered abrasions CONCERNS TO ADDRESS WITH MD:   
 
 
 
 
[facility] NURSING NOTE Admission Date 9/21/2019 Admission Diagnosis Acute on chronic heart failure ([address] [I50.9] Consults IP CONSULT TO CARDIOLOGY 
IP CONSULT TO NEPHROLOGY Cardiac Monitoring [x] Yes [] No  
  
Purposeful Hourly Rounding [x] Yes   
Froylan Score Total Score: 0 Froylan score 3 or > [x] Bed Alarm [] Avasys [] 1:1 sitter [] Patient refused (Signed refusal form in chart) Arsen Score Arsen Score: 19 Arsen score 14 or < [] PMT consult [] Wound Care consult  
 []  Specialty bed  [] Nutrition consult Influenza Vaccine Received Flu Vaccine for Current Season (usually Sept-March): Yes Oxygen needs? [x] Room air Oxygen @  []1L    []2L    []3L   []4L    []5L   []6L via NC Chronic home O2 use? [] Yes [] No 
Perform O2 challenge test and document in progress note using [employer]) Last bowel movement Last Bowel Movement Date: 09/21/19 Urinary Catheter LDAs Peripheral IV 09/21/19 Right Forearm (Active) Site Assessment Clean, dry, & intact 9/21/2019  2:00 PM  
Phlebitis Assessment 0 9/21/2019  2:00 PM  
Infiltration Assessment 0 9/21/2019  2:00 PM  
Dressing Status Clean, dry, & intact 9/21/2019  2:00 PM  
Dressing Type Transparent 9/21/2019  2:00 PM  
Hub Color/Line Status Pink;Flushed 9/21/2019  2:00 PM  
Alcohol Cap Used No 9/21/2019  2:00 PM  
                  
  
Readmission Risk Assessment Tool Score Medium Risk 25 Total Score 4 IP Visits Last 12 Months (1-3=4, 4=9, >4=11) 5 Pt. Coverage (Medicare=5 , Medicaid, or Self-Pay=4) 9 Charlson Comorbidity Score (Age + Comorbid Conditions) Criteria that do not apply:  
 Has Seen PCP in Last 6 Months (Yes=3, No=0) . Living with Significant Other. Assisted Living. LTAC. SNF. or  
Rehab Patient Length of Stay (>5 days = 3) Expected Length of Stay - - - Actual Length of Stay 0

## 2019-09-22 NOTE — PROGRESS NOTES
Problem: Falls - Risk of 
Goal: *Absence of Falls Description Document Gisele Patches Fall Risk and appropriate interventions in the flowsheet. Outcome: Progressing Towards Goal 
Note:  
Fall Risk Interventions: 
  
 
  
 
Medication Interventions: Bed/chair exit alarm, Patient to call before getting OOB, Teach patient to arise slowly

## 2019-09-22 NOTE — PROGRESS NOTES
PHYSICAL THERAPY EVALUATION/DISCHARGE Patient: Rich Silva (30 y.o. male) Date: 9/22/2019 Primary Diagnosis: Acute on chronic heart failure ([facility] [address] [I50.9] Precautions: none ASSESSMENT Based on the objective data described below, the patient presents with close to baseline for mobility efforts with slight deviation, lateral sway during gait with ability to self-correct and gain balance stability. Pt with stable O2 sats. 95-99% on RA with gait efforts today, no SOB noted. Pt is safe to return home from mobility stand-point. Current Level of Function Impacting Discharge (mobility/balance): S/SBA with mobility (balance/gait) Functional Outcome Measure: The patient scored on the Carrera 47/56 which is indicative of low fall risk Other factors to consider for discharge: pt has supportive spouse/children Patient will benefit from skilled therapy intervention to address the above noted impairments. PLAN : 
 
 
Recommendation for discharge: (in order for the patient to meet his/her long term goals) No skilled physical therapy/ follow up rehabilitation needs identified at this time. This discharge recommendation: 
Has been made in collaboration with the attending provider and/or case management Equipment recommendations for successful discharge (if) home: none SUBJECTIVE:  
Patient stated I just had cataracts surgery both eyes, so my vision makes my balance a little off.  OBJECTIVE DATA SUMMARY:  
HISTORY:   
Past Medical History:  
Diagnosis Date  AICD (automatic cardioverter/defibrillator) present  Arthritis  Cancer ([facility] [address] skin  Heart failure ([address]  Other ill-defined conditions(799.89)   
 high cholesterol Past Surgical History:  
Procedure Laterality Date  ABDOMEN SURGERY PROC UNLISTED  6/2/11  
 colon resection  HX CATARACT REMOVAL Left  HX HEENT    
 repaired right eardrum  HX OTHER SURGICAL benign cyst removed from back and thyroid  HX OTHER SURGICAL    
 skin graft  HX PACEMAKER    
 aicd  AL COLONOSCOPY FLX DX W/COLLJ SPEC WHEN PFRMD  6/6/2012  AL COLONOSCOPY W/BIOPSY SINGLE/MULTIPLE  4/27/2011 Personal factors and/or comorbidities impacting plan of care:  
 
Home Situation Home Environment: Private residence # Steps to Enter: 5 Wheelchair Ramp: Yes One/Two Story Residence: One story Living Alone: No 
Support Systems: Child(niki), Family member(s) Patient Expects to be Discharged to[de-identified] Private residence Current DME Used/Available at Home: Calli Lazo Wheelchair EXAMINATION/PRESENTATION/DECISION MAKING:  
Critical Behavior: A & O x 4 Hearing: Auditory Auditory Impairment: Hard of hearing, bilateral, Hearing aid(s) Hearing Aids/Status: At home Skin:  Intact Edema: none Range Of Motion: 
AROM: Within functional limits(BLE) PROM: Within functional limits(BLE) Strength:   
Strength: Within functional limits(BLE) Tone & Sensation:  
Tone: Normal 
  
  
  
  
Sensation: Intact Coordination: 
Coordination: Within functional limits Vision:  
Acuity: Impaired near vision; Impaired far vision(had cataract surgery) Functional Mobility: 
Bed Mobility: 
Rolling: Independent Supine to Sit: Independent Sit to Supine: Independent Scooting: Independent Transfers: 
Sit to Stand: Modified independent Stand to Sit: Modified independent Bed to Chair: Independent Balance:  
Sitting: Intact Standing: Intact Ambulation/Gait Training: 
Distance (ft): 200 Feet (ft) Ambulation - Level of Assistance: Stand-by assistance Gait Description (WDL): Within defined limits Gait Abnormalities: Path deviations;Trunk sway increased(slight/mild, pt able to self-correct) Interventions: Verbal cues Stairs: 
 pt declines need to practice, pt has ramp available at home Functional Measure: 
Pham Amel Balance Test: 
 
Sitting to Standing: 3 Standing Unsupported: 4 Sitting with Back Unsupported: 4 Standing to Sittin Transfers: 4 Standing Unsupported with Eyes Closed: 4 Standing Unsupported with Feet Together: 3 Reach Forward with Outstretched Arm: 3  Object: 3 Turn to Look Over Shoulders: 4 Turn 360 Degrees: 3 Alternate Foot on Step/Stool: 3 Standing Unsupported One Foot in Front: 3 Stand on One Le Total: 47/56 56=Maximum possible score;  
0-20=High fall risk 21-40=Moderate fall risk 41-56=Low fall risk Physical Therapy Evaluation Charge Determination History Examination Presentation Decision-Making MEDIUM  Complexity : 1-2 comorbidities / personal factors will impact the outcome/ POC  LOW Complexity : 1-2 Standardized tests and measures addressing body structure, function, activity limitation and / or participation in recreation  LOW Complexity : Stable, uncomplicated  Other outcome measures Carrera  LOW Based on the above components, the patient evaluation is determined to be of the following complexity level: LOW Pain Ratin/10 Activity Tolerance:  
Good and SpO2 stable on RA Please refer to the flowsheet for vital signs taken during this treatment. After treatment patient left in no apparent distress:  
Call bell within reach and Caregiver / family present COMMUNICATION/EDUCATION:  
The patients plan of care was discussed with: Registered Nurse and Rehabilitation Attendant. Fall prevention education was provided and the patient/caregiver indicated understanding. Thank you for this referral. 
Tri Richardson, PT, DPT Time Calculation: 25 mins

## 2019-09-23 NOTE — CONSULTS
[address] [facility] Name:  Celio Steiner 
MR#:  [id number] :  1940 ACCOUNT #:  [de-identified] DATE OF SERVICE:  2019 REFERRING PHYSICIAN:  Gmema French MD 
 
REASON FOR CONSULTATION:  Acute kidney injury. HISTORY OF PRESENT ILLNESS:  The patient is a 65-year-old male with past medical history of CHF, CKD, EF 25%, AFib, who presented to ER with complaint of weakness. The patient is found to have a renal failure with creatinine level of 1.93. Creatinine has increased to 2.7 today. The patient has been on Lasix, Entresto and Aldactone at home. He has been hypotensive since admission. The lowest systolic blood pressure was 88. He denies any NSAID use or any antibiotic use. He denies any difficulty voiding. He does report decreased urine output. He was admitted to [facility] in 2019 for CHF, and his Lasix dose was increased. His hemoglobin level is 12.2 today which is much higher than hemoglobin level of 11.3 in 2019. His hemoglobin was 10.2 on 2019, which shows that his blood is concentrated, most likely from dehydration. The patient reports that he does not like to drink water and he does not drink much water at home. He has shortness of breath with activity which is not new for him. He denies any fever or chills. No dysuria or hematuria. He denies taking any NSAIDs. PAST MEDICAL HISTORY: 1.  CKD. 2.  CHF. 3.  AFib. 4.  History of ICD placement. 5.  History of ventricular tachycardia. 6.  Hyperlipidemia. PAST SURGICAL HISTORY: 
1. Colonoscopy. 2.  Colon resection. 3.  Pacemaker placement. 4.  AICD placement. 5.  Benign cyst removal. 
6.  Eardrum repair. 7.  Cataract surgery. SOCIAL HISTORY:  Lives with family. No smoking or alcohol abuse. He is an ex-smoker. FAMILY HISTORY:  Noncontributory. REVIEW OF SYSTEMS:  As per HPI. Total 11 systems reviewed, they are essentially negative. MEDICATIONS PRIOR TO ADMISSION: 
1. Entresto. 2.  Lasix. 3.  Aldactone. 4.  Folic acid. 5.  Synthroid. 6.  Allopurinol. 7.  Pravachol. 8.  Eliquis. 9.  Claritin. 10.  Multivitamin. PHYSICAL EXAMINATION: 
VITAL SIGNS:  Temperature 97.6, pulse 84, blood pressure 92/57, respiratory rate 18 per minute. GENERAL:  The patient is lying on the bed, comfortable, not in apparent distress. HEENT:  Oral mucosa is dry. Normal nose. Normal hearing. NECK:  No palpable neck mass. LUNGS:  Clear to auscultation bilaterally. No wheezing or crackles. HEART:  Normal S1, S2.  Regular rate and rhythm. ABDOMEN:  Soft, nontender. No palpable mass. EXTREMITIES:  No edema. NEUROLOGIC:  Nonfocal.  Normal speech. MUSCULOSKELETAL:  No joint effusion or tenderness. BACK:  No back spine tenderness. GENITOURINARY:  No Duncan catheter. LABORATORY AND DIAGNOSTIC DATA:  Sodium 135, potassium 5.0, chloride 99, bicarb 26, BUN 63, creatinine 2.7, calcium 8.2, magnesium 2.5. Hemoglobin 12.2, platelets 479. Kidney ultrasound:  No hydronephrosis. Right kidney 10.4 cm, left kidney 10.2 cm. EKG:  Normal sinus rhythm, right bundle-branch block, left anterior hemiblock. IMPRESSION: 
1. Acute kidney injury likely due to dehydration, loss of renal hypoperfusion with hypotension. 2.  Hypotension. 3.  Chronic kidney disease stage III, baseline creatinine 1.5. 
4.  History of congestive heart failure, ejection fraction 25%. 5.  History of atrial fibrillation and ventricular tachycardia. PLAN: 
1. Start IV fluids normal saline at 50 mL per hour. 2.  Hold Lasix. 3.  Hold Aldactone and Entresto. 4.  Check BMP in the morning. 5.  Avoid hypotension. 6.  Avoid ACE inhibitors and ARB. 7.  Check urine creatinine and urine BUN to calculate FEUrea. 8.  Renal ultrasound reviewed. No hydronephrosis seen. Thanks for consultation. We will follow.  
 
 
Larry Logan MD 
 
 
[id number] 
D:  09/23/2019 9:47 
 T:  09/23/2019 15:19 
JOB #:  [id number] CC:  Arnie Suarez MD

## 2019-09-23 NOTE — PROGRESS NOTES
Bedside shift change report GIVEN TO VIKASH Hodges. Report included the following information SBAR, Kardex, MAR and Cardiac Rhythm paced. SIGNIFICANT CHANGES DURING SHIFT:   
 
 
CONCERNS TO ADDRESS WITH MD:   
 
Patient urinating small amounts frequently, with the urge to continue to void. 2000 Patient voided 100ml with PVR of 12.   
0600 Patient continued throughout the night voiding small amounts of urine. Wife is expressing concerns of patient being depressed and would like this addressed. [address] [facility] NURSING NOTE Admission Date 9/21/2019 Admission Diagnosis Acute on chronic heart failure ([facility] [address] [I50.9] Consults IP CONSULT TO CARDIOLOGY 
IP CONSULT TO NEPHROLOGY Cardiac Monitoring [x] Yes [] No  
  
Purposeful Hourly Rounding [x] Yes   
Froylan Score Total Score: 1 Froylan score 3 or > [x] Bed Alarm [] Avasys [] 1:1 sitter [] Patient refused (Signed refusal form in chart) Arsen Score Arsen Score: 21 Arsen score 14 or < [] PMT consult [] Wound Care consult  
 []  Specialty bed  [] Nutrition consult Influenza Vaccine Received Flu Vaccine for Current Season (usually Sept-March): Yes Oxygen needs? [x] Room air Oxygen @  []1L    []2L    []3L   []4L    []5L   []6L via NC Chronic home O2 use? [] Yes [x] No 
Perform O2 challenge test and document in progress note using [employer] (.[employer]) Last bowel movement Last Bowel Movement Date: 09/22/19 Urinary Catheter LDAs Peripheral IV 09/21/19 Right Forearm (Active) Site Assessment Clean, dry, & intact 9/22/2019  8:16 PM  
Phlebitis Assessment 0 9/22/2019  8:16 PM  
Infiltration Assessment 0 9/22/2019  8:16 PM  
Dressing Status Clean, dry, & intact 9/22/2019  8:16 PM  
Dressing Type Transparent 9/22/2019  8:16 PM  
Hub Color/Line Status Blue;Capped 9/22/2019  8:16 PM  
Alcohol Cap Used Yes 9/21/2019  7:44 PM  
                  
  
 Readmission Risk Assessment Tool Score Medium Risk 25 Total Score 4 IP Visits Last 12 Months (1-3=4, 4=9, >4=11) 5 Pt. Coverage (Medicare=5 , Medicaid, or Self-Pay=4) 9 Charlson Comorbidity Score (Age + Comorbid Conditions) Criteria that do not apply:  
 Has Seen PCP in Last 6 Months (Yes=3, No=0) . Living with Significant Other. Assisted Living. LTAC. SNF. or  
Rehab Patient Length of Stay (>5 days = 3) Expected Length of Stay - - - Actual Length of Stay 1 Problem: Falls - Risk of 
Goal: *Absence of Falls Description Document Yoon Maldonado Fall Risk and appropriate interventions in the flowsheet. Outcome: Progressing Towards Goal 
Note:  
Fall Risk Interventions: 
  
 
  
 
Medication Interventions: Assess postural VS orthostatic hypotension, Bed/chair exit alarm, Evaluate medications/consider consulting pharmacy, Patient to call before getting OOB, Teach patient to arise slowly, Utilize gait belt for transfers/ambulation History of Falls Interventions: Bed/chair exit alarm, Consult care management for discharge planning, Door open when patient unattended, Evaluate medications/consider consulting pharmacy, Investigate reason for fall, Room close to nurse's station, Utilize gait belt for transfer/ambulation, Assess for delayed presentation/identification of injury for 48 hrs (comment for end date) Problem: Patient Education: Go to Patient Education Activity Goal: Patient/Family Education Outcome: Progressing Towards Goal 
  

## 2019-09-23 NOTE — PROGRESS NOTES
Transitional Care Team: Initial G Note Date of Assessment: 09/23/19 Time of Assessment:  11:41 AM 
 
Ned Mcarthur is a 77 y.o. male inpatient at  [facility]. Assessment & Plan Pt is alert and oriented x4. Wife present in room with him. Pt resides in a private residence with 5 steps to enter. He was independent with ADLs and ambulating without any aides. He does have a wheel chair, rolling walker and cane but does not need to use this equipment. He also has home O2 through [address] that is for PRN use. Pt states that he has good support through his family and friends. Pt sees Dr. Conrad Culver for primary care services but states that he will be leaving and patient will be moved to the care of Dr. Suleman Parikh. Pt and wife has good understanding of patient's care and plans for discharge at this time. Primary Diagnosis: Acute on chronic heart failure Advance Directive: Wife reports that patient just had all his financial and medical paperwork completed. She states that she will bring in a copy of the ACP to be scanned into patients chart. Current Code Status:  DNR Referral to Hospice/ Palliative Care Appropriate: No. 
 
Awareness of Medical Conditions: (Trajectory of illness and pts expectations). Pt very aware of his medical condition as he has been managing his HF for over 11 years now. Discharge Needs: (to include safety issues) No needs identified at this time. Will qualify for [facility]. Barriers Identified: Pt denies any current barriers. Patient is willing to go to SNF/Inpatient Rehab if recommended: Not currently recommended. Pt willing to do [facility] at time of discharge if needed. Medication Review:  Review completed by pharmacist. 
 
Can patient afford medications:  Yes, patient reports no issues with affording medications. Patient is Compliant with Medication regimen: Yes Who manages medications at home: Pt and wife.   Patient used pill boxes to manage \"breakfast\" medications and \"supper\" medications Best Patient Contact Number: [phone] (Patient)  [phone] (Patient's wife, Brenda Boyle) HUG (Healthy Understanding of Goals) program introduced to patient/family. The Transitional Care Team bridges the gaps in care and education surrounding discharge from the acute care facility. The objective is to empower the patient and family in taking a proactive role in preventing readmission within the first thirty days after discharge. The team is also involved in the efforts to reduce readmission to the acute care setting after stabilization and discharge from the acute care environment either to skilled nursing facilities or community. G RN/NP will return with [facility] Calendar/ follow up appointments/ Ambulatory Nurse Navigator name and contact information when the patient is ready for discharge. Dr. Alphonse Gregory (PCP) Dr. Charmayne Dun (Cardiologist) Dr. Leah Harper (Pulmonologist) Non-[facility] follow up appointment(s): None currently scheduled [facility]: call information given to both patient and his wife. Patient education focused on readmission zones as described as: The Red Zone: High risk for readmission, days 1-21 The Yellow Zone: Moderate  risk for readmission, days 22-29 The Green Zone: Lower risk for readmission, days 30 and after [facility] Team will attempt to follow the patient from a distance while inpatient as well as be available for further transition/disposition needs. The [facility]inkley team will continue to offer support during the 30- 90 day discharge from acute care setting. Will notify AmbulatoryTOC RN. Past Medical History:  
Diagnosis Date  A-fib ([address]  AICD (automatic cardioverter/defibrillator) present  Arthritis  Cancer ([address] skin  CKD (chronic kidney disease)  Heart failure ([address]  Other ill-defined conditions(629.89)   
 high cholesterol  V tach ([address]

## 2019-09-23 NOTE — PROGRESS NOTES
Pharmacy Medication Reconciliation The patient was interviewed regarding current PTA medication list, use and drug allergies; Patient and wife present in room and obtained permission from patient to discuss drug regimen with visitor(s) present. The patient was questioned regarding use of any other inhalers, topical products, over the counter medications, herbal medications, vitamin products or ophthalmic/nasal/otic medication use. Allergy Update: Patient has no known allergies. Recommendations/Findings: The following amendments were made to the patient's active medication list on file at [facility]:  
1) Additions: None 2) Deletions: MVI Po, Moxifloxacin 0.5% ophthalmic solution for LEFT eye 3) Changes: Clarified taper for Prednisolone (Pred Forte) 1% ophthalmic suspension for both right and left eye. 
 
 
-Clarified PTA med list with Patient's wife. PTA medication list was corrected to the following:  
 
Prior to Admission Medications Prescriptions Last Dose Informant Patient Reported? Taking? OXYGEN-AIR DELIVERY SYSTEMS   Yes No  
Si L/min by Nasal route as needed (shortness of breath). allopurinol (ZYLOPRIM) 100 mg tablet 2019 at am Self Yes Yes Sig: Take 200 mg by mouth daily. Indications: 2 pills daily  
amiodarone (CORDARONE) 200 mg tablet 2019 at am  No Yes Sig: Take 1 Tab by mouth two (2) times a day. Indications: atrial fibrillation electrically shocked to normal rhythm  
apixaban (ELIQUIS) 5 mg tablet 2019 at am  No Yes Sig: Take 1 Tab by mouth every twelve (12) hours. benzonatate (TESSALON) 100 mg capsule 2019 at Unknown time  Yes Yes Sig: Take 100 mg by mouth three (3) times daily as needed for Cough. folic acid/multivit-min/lutein (CENTRUM SILVER PO) 2019 at AM  Yes Yes Sig: Take  by mouth. furosemide (LASIX) 40 mg tablet 2019 at am  No Yes Sig: Take one 40mg tablet daily levothyroxine (SYNTHROID) 100 mcg tablet 9/20/2019 at pm Self Yes Yes Sig: Take 100 mcg by mouth every evening.  
loratadine (CLARITIN) 10 mg tablet 9/21/2019 at am Self Yes Yes Sig: Take 10 mg by mouth daily. moxifloxacin (VIGAMOX) 0.5 % ophthalmic solution 9/21/2019 at am  Yes Yes Sig: Administer 1 Drop to right eye two (2) times a day. Tapering down - 1 drop every day starts 9/26 for 1 week. pravastatin (PRAVACHOL) 80 mg tablet 9/20/2019 at pm Self Yes Yes Sig: Take 80 mg by mouth nightly. prednisoLONE acetate (PRED FORTE) 1 % ophthalmic suspension 9/21/2019 at am  Yes Yes Sig: Administer 1 Drop to right eye four (4) times daily. 1 drop to right eye 4 times daily x 1 week then 1 drop in right eye 3 times daily x 1 week then 1 drop in right eye 2 times daily x 1 week then 1 drop daily in right eye once daily x 1 week  
prednisoLONE acetate (PRED FORTE) 1 % ophthalmic suspension 9/21/2019 at am  Yes Yes Sig: Administer 1 Drop to left eye two (2) times a day. Tapering down - 1 drop every day starts 9/27 for 1 week Facility-Administered Medications: None Thank you, Pennie Samson

## 2019-09-23 NOTE — PROGRESS NOTES
Problem: Falls - Risk of 
Goal: *Absence of Falls Description Document Davian Saji Fall Risk and appropriate interventions in the flowsheet. Outcome: Progressing Towards Goal 
Note:  
Fall Risk Interventions: 
  
 
  
 
Medication Interventions: Bed/chair exit alarm, Patient to call before getting OOB, Teach patient to arise slowly History of Falls Interventions: Bed/chair exit alarm, Door open when patient unattended, Room close to nurse's station, Vital signs minimum Q4HRs X 24 hrs (comment for end date) Problem: Patient Education: Go to Patient Education Activity Goal: Patient/Family Education Outcome: Progressing Towards Goal 
  
Problem: Heart Failure: Day 2 Goal: Off Pathway (Use only if patient is Off Pathway) Outcome: Progressing Towards Goal 
Goal: Activity/Safety Outcome: Progressing Towards Goal 
Goal: Consults, if ordered Outcome: Progressing Towards Goal 
Goal: Diagnostic Test/Procedures Outcome: Progressing Towards Goal 
Goal: Nutrition/Diet Outcome: Progressing Towards Goal 
Goal: Discharge Planning Outcome: Progressing Towards Goal 
Goal: Medications Outcome: Progressing Towards Goal 
Goal: Respiratory Outcome: Progressing Towards Goal 
Goal: Treatments/Interventions/Procedures Outcome: Progressing Towards Goal 
Goal: Psychosocial 
Outcome: Progressing Towards Goal 
Goal: *Oxygen saturation within defined limits Outcome: Progressing Towards Goal 
Goal: *Hemodynamically stable Outcome: Progressing Towards Goal 
Goal: *Optimal pain control at patient's stated goal 
Outcome: Progressing Towards Goal 
Goal: *Anxiety reduced or absent Outcome: Progressing Towards Goal 
Goal: *Demonstrates progressive activity Outcome: Progressing Towards Goal 
  
Problem: Patient Education: Go to Patient Education Activity Goal: Patient/Family Education Outcome: Progressing Towards Goal 
  
Problem: Acute Renal Failure: Day 2 Goal: Off Pathway (Use only if patient is Off Pathway) Outcome: Progressing Towards Goal 
Goal: Activity/Safety Outcome: Progressing Towards Goal 
Goal: Consults, if ordered Outcome: Progressing Towards Goal 
Goal: Diagnostic Test/Procedures Outcome: Progressing Towards Goal 
Goal: Nutrition/Diet Outcome: Progressing Towards Goal 
Goal: Discharge Planning Outcome: Progressing Towards Goal 
Goal: Medications Outcome: Progressing Towards Goal 
Goal: Respiratory Outcome: Progressing Towards Goal 
Goal: Treatments/Interventions/Procedures Outcome: Progressing Towards Goal 
Goal: Psychosocial 
Outcome: Progressing Towards Goal 
Goal: *Optimal pain control at patient's stated goal 
Outcome: Progressing Towards Goal 
Goal: *Urinary output within identified parameters Outcome: Progressing Towards Goal 
Goal: *Hemodynamically stable Outcome: Progressing Towards Goal 
Goal: *Tolerating diet Outcome: Progressing Towards Goal 
Goal: *Lab values improving Outcome: Progressing Towards Goal

## 2019-09-23 NOTE — PROGRESS NOTES
Hospitalist Progress Note NAME: Tristan Cheema :  1940 MRN:  [id number] Interim Hospital Summary: 77 y.o. male whom presented on 2019 with Assessment / Plan: 
Acute on chronic systolic HF Non ischemic CM S/P AICD P. Atrial fibrillation 
-Recent Echo EF 21-25%, No AS, trace MR 
-weight down. IV lasix held 
-continue amiodarone and eliquis 
-taken off entresto, coreg and aldactone last admission in August 
 
MARCELL 
-renal US no hydronephrosis; no prior protenuria 
-likely due to over diuresing 
-hold lasix 
-discussed with nephrology, will start him on IVF 
-avoid nephrotoxic agents 
-repeat bmp Hypothyroidism 
-synthroid Gout - allopurinol 18.5 - 24.9 Normal weight / Body mass index is 24.81 kg/m². Code status: DNR Prophylaxis: ELiquis Recommended Disposition: Home w/Family Subjective: Chief Complaint / Reason for Physician Visit Follow up of CHF, weight gain, MARCELL Pt seen, no complaint Chart reviewed in detail. Discussed with RN events overnight. Review of Systems: 
Symptom Y/N Comments  Symptom Y/N Comments Fever/Chills n   Chest Pain nn   
Poor Appetite    Edema n   
Cough    Abdominal Pain Sputum    Joint Pain SOB/MURILLO n    Pruritis/Rash Nausea/vomit    Tolerating PT/OT Diarrhea    Tolerating Diet Constipation    Other Could NOT obtain due to:   
 
PO intake:  
Patient Vitals for the past 72 hrs: 
 % Diet Eaten  
19 1047 25 % 19 1800 75 % Objective: VITALS:  
Last 24hrs VS reviewed since prior progress note. Most recent are: 
Patient Vitals for the past 24 hrs: 
 Temp Pulse Resp BP SpO2  
19 1045 97.6 °F (36.4 °C) 83 16 (!) 86/61 95 % 19 0723 97.6 °F (36.4 °C) 84 18 92/57 96 %  
19 0205 97.9 °F (36.6 °C) 89 18 90/53 96 %  
19 2232 97.3 °F (36.3 °C) 89 18 (!) 88/43 96 %  
19 1928 97.3 °F (36.3 °C) 90 16 92/70 96 % 09/22/19 1830  99  (!) 89/61   
09/22/19 1644  93  (!) 89/59   
09/22/19 1442 97.4 °F (36.3 °C) 92 16 93/64 97 % Intake/Output Summary (Last 24 hours) at 9/23/2019 1106 Last data filed at 9/23/2019 1047 Gross per 24 hour Intake 380 ml Output 450 ml Net -70 ml PHYSICAL EXAM: 
General: WD, WN. Alert, cooperative, no acute distress   
EENT:  EOMI. Anicteric sclerae. MMM Resp:  Basal rales L>R  No accessory muscle use CV:  Regular  rhythm,  No significant edema GI:  Soft, Non distended, Non tender.  +Bowel sounds Neurologic:  Alert and oriented X 3, normal speech Psych:   Good insight. Not anxious nor agitated Skin:  No rashes. No jaundice Reviewed most current lab test results and cultures  YES Reviewed most current radiology test results   YES Review and summation of old records today    NO Reviewed patient's current orders and MAR    YES 
PMH/SH reviewed - no change compared to H&P 
________________________________________________________________________ Care Plan discussed with: 
  Comments Patient x Family  x wife RN x Care Manager Consultant Multidiciplinary team rounds were held today with , nursing, pharmacist and clinical coordinator. Patient's plan of care was discussed; medications were reviewed and discharge planning was addressed. ________________________________________________________________________ Total NON critical care TIME:   35  Minutes Total CRITICAL CARE TIME Spent:   Minutes non procedure based Comments >50% of visit spent in counseling and coordination of care x This includes time during multidisciplinary rounds if indicated above  
________________________________________________________________________ Viral Kay MD  
 
Procedures: see electronic medical records for all procedures/Xrays and details which were not copied into this note but were reviewed prior to creation of Plan. LABS: 
I reviewed today's most current labs and imaging studies. Pertinent labs include: 
Recent Labs  
  09/23/19 0204 09/22/19 0347 09/21/19 
9052 WBC 8.8 6.0 6.6 HGB 12.2 11.6* 12.7 HCT 38.6 36.0* 39.7  187 218 Recent Labs  
  09/23/19 0204 09/22/19 0347 09/21/19 
1717 * 136 135*  
K 5.0 4.3 4.6 CL 99 103 101 CO2 26 24 23 * 121* 133* BUN 63* 47* 44* CREA 2.70* 1.93* 1.98* CA 8.2* 8.7 8.9 MG 2.5*  --   --   
ALB  --   --  3.7 TBILI  --   --  1.1*  
SGOT  --   --  33 ALT  --   --  33

## 2019-09-23 NOTE — PROGRESS NOTES
0700: VIKASH Godfrey (oncoming nurse) received bedside report from Susan Burgos, [facility] (offgoing nurse). 1520: Pt still having soft BP: 80's/50's. Pt very unsteady and feeling lightheaded when getting up. Paged Dr. Jos Hanson.  
 
1600: Still haven't heard from Dr. Jos Hanson. Will page Dr. Nam Xiao. 1650: Dr. Nam Xiao would like a 250 mL NS bolus. 1700: Dr. Jos Hanson agrees with Dr. Nam Xiao but would like to monitor for fluid overload. 1730: Paged Dr. Jos Hanson regarding abdominal pain and beginning signs of third-spacing. 1740: No answer from Dr. Jos Hanson. Paged Dr. Royer Cannon regarding decreased output throughout entire shift: only 75 mL. Starting to show signs of third-spacing with increasing abdominal pain/pressure. BP reflects dehydration despite IV fluids. 1810: Received orders for Albumin 25% once now and another order for Albumin 25% at 2200. 
 
1900: 
Bedside shift change report GIVEN TO VIKASH Yip. Report included the following information SBAR, Kardex, Intake/Output, MAR, Recent Results and Cardiac Rhythm Paced. SIGNIFICANT CHANGES DURING SHIFT:  Soft BP's, very little output: < 75 ml throughout shift, added albumin, AM labs, NS @ 50 mL/hr, fluid bolus of 250 mL, pt getting more evident of third-spacing in abdomen CONCERNS TO ADDRESS WITH MD:  n/a 
 
 
 
 
[facility] NURSING NOTE Admission Date 9/21/2019 Admission Diagnosis Acute on chronic heart failure ([facility] [address] [I50.9] Consults IP CONSULT TO CARDIOLOGY 
IP CONSULT TO NEPHROLOGY Cardiac Monitoring [x] Yes [] No  
  
Purposeful Hourly Rounding [x] Yes   
Froylan Score Total Score: 1 Froylan score 3 or > [] Bed Alarm [] Avasys [] 1:1 sitter [] Patient refused (Signed refusal form in chart) Arsen Score Arsen Score: 21 Arsen score 14 or < [] PMT consult [] Wound Care consult  
 []  Specialty bed  [] Nutrition consult Influenza Vaccine Received Flu Vaccine for Current Season (usually Sept-March):  Yes  
    
  
 Oxygen needs? [x] Room air Oxygen @  []1L    []2L    []3L   []4L    []5L   []6L via NC Chronic home O2 use? [] Yes [] No 
Perform O2 challenge test and document in progress note using smartphrase (.Homeoxygen) Last bowel movement Last Bowel Movement Date: 09/22/19 Urinary Catheter LDAs Peripheral IV 09/21/19 Right Forearm (Active) Site Assessment Clean, dry, & intact 9/23/2019  3:20 PM  
Phlebitis Assessment 0 9/23/2019  3:20 PM  
Infiltration Assessment 0 9/23/2019  3:20 PM  
Dressing Status Clean, dry, & intact 9/23/2019  3:20 PM  
Dressing Type Tape;Transparent 9/23/2019  3:20 PM  
Hub Color/Line Status Blue; Infusing 9/23/2019  3:20 PM  
Alcohol Cap Used Yes 9/21/2019  7:44 PM  
                  
  
Readmission Risk Assessment Tool Score Medium Risk [facility] 9 Total Score 4 IP Visits Last 12 Months (1-3=4, 4=9, >4=11) 5 Pt. Coverage (Medicare=5 , Medicaid, or Self-Pay=4) 9 Charlson Comorbidity Score (Age + Comorbid Conditions) Criteria that do not apply:  
 Has Seen PCP in Last 6 Months (Yes=3, No=0) . Living with Significant Other. Assisted Living. LTAC. SNF. or  
Rehab Patient Length of Stay (>5 days = 3) Expected Length of Stay - - - Actual Length of Stay 2

## 2019-09-23 NOTE — PROGRESS NOTES
Progress Note 9/23/2019 7:48 AM 
NAME: Birgit Yuen MRN:  [id number] Admit Diagnosis: Acute on chronic heart failure ([facility] [address] [I50.9] Problem List: 1. Acute on chronic systolic heart failure 2. Acute on chronic renal insufficiency; Stg 3 
3. Dilated NICM; EF 25%. Ann Baker 7/18 w/ EF 20%, dil LV, nml RV, mild MR/TR 4. Persistent AFib s/p VIGNESH/DCCV 8/5/19 5. Remote ICD implantation 6. Recurrent ventricular tachycardia 7. Hyperlipidemia 8. Former smoker 9. DNR Assessment/Plan:  
Last 3 Recorded Weights in this Encounter  
 09/21/19 0747 09/22/19 0330 09/23/19 0205 Weight: 77.8 kg (171 lb 8.3 oz) 75.8 kg (167 lb 1.7 oz) 76.2 kg (167 lb 15.9 oz) I/Os inaccurate Weight is 10# less than @ last discharge 
 
sCr 2.7; lungs clear, no LIZA, belly soft, off O2 Continue oral amio; 200mg BID @ discharge Continue eliquis 5mg BID Hold lasix for now; too dry? ?IVF. Renal consult Can consider dobutamine Continue statin Holding prior Entresto, coreg, aldactone with marginal/low BPs [x]       High complexity decision making was performed in this patient at high risk for decompensation with multiple organ involvement. Subjective:  
 
Birgit Yuen denies chest pain. Still SOB but overall improved. Discussed with RN events overnight. Review of Systems: 
 
Symptom Y/N Comments  Symptom Y/N Comments Fever/Chills N   Chest Pain N Poor Appetite N   Edema N   
Cough N   Abdominal Pain N Sputum N   Joint Pain N   
SOB/MURILLO N   Pruritis/Rash N   
Nausea/vomit N   Tolerating PT/OT Y Diarrhea N   Tolerating Diet Y Constipation N   Other Could NOT obtain due to:   
 
Objective:  
  
Physical Exam: 
 
Last 24hrs VS reviewed since prior progress note. Most recent are: 
 
Visit Vitals BP 92/57 (BP 1 Location: Left arm, BP Patient Position: At rest) Pulse 84 Temp 97.6 °F (36.4 °C) Resp 18 Ht 5' 9\" (1.753 m) Wt 76.2 kg (167 lb 15.9 oz) SpO2 96% BMI 24.81 kg/m² Intake/Output Summary (Last 24 hours) at 9/23/2019 8013 Last data filed at 9/23/2019 7306 Gross per 24 hour Intake 260 ml Output 400 ml Net -140 ml General Appearance: Well developed, well nourished, alert & oriented x 3,  
 no acute distress. Ears/Nose/Mouth/Throat: Hearing grossly normal. 
Neck: Supple. Chest: Lungs clear to auscultation bilaterally. Cardiovascular: Regular rate and rhythm, S1S2 normal, no murmur. Abdomen: Soft, non-tender, bowel sounds are active. Extremities: No edema bilaterally. Skin: Warm and dry. []         Post-cath site without hematoma, bruit, tenderness, or thrill. Distal pulses intact. PMH/SH reviewed - no change compared to H&P Data Review Telemetry: sinus rhythm EKG:  
[x]  No new EKG for review Lab Data Personally Reviewed: 
 
Recent Labs  
  09/23/19 
0576 09/22/19 
6711 WBC 8.8 6.0 HGB 12.2 11.6* HCT 38.6 36.0*  
 187 No results for input(s): INR, PTP, APTT in the last 72 hours. No lab exists for component: INREXT Recent Labs  
  09/23/19 
0204 09/22/19 
0347 09/21/19 
6723 * 136 135*  
K 5.0 4.3 4.6 CL 99 103 101 CO2 26 24 23 BUN 63* 47* 44* CREA 2.70* 1.93* 1.98* * 121* 133* CA 8.2* 8.7 8.9 MG 2.5*  --   --   
 
Recent Labs  
  09/21/19 
6736 TROIQ <0.05 No results found for: CHOL, CHOLX, CHLST, CHOLV, HDL, HDLP, LDL, LDLC, DLDLP, TGLX, TRIGL, TRIGP, CHHD, CHHDX Recent Labs  
  09/21/19 
2654 SGOT 33 AP 85  
TP 7.7 ALB 3.7 GLOB 4.0 No results for input(s): PH, PCO2, PO2 in the last 72 hours. Medications Personally Reviewed: 
 
Current Facility-Administered Medications Medication Dose Route Frequency  simethicone (MYLICON) tablet 80 mg  80 mg Oral Q6H PRN  
 traZODone (DESYREL) tablet 25 mg  25 mg Oral QHS  allopurinol (ZYLOPRIM) tablet 200 mg  200 mg Oral DAILY  amiodarone (CORDARONE) tablet 200 mg  200 mg Oral BID  
  apixaban (ELIQUIS) tablet 5 mg  5 mg Oral Q12H  therapeutic multivitamin (THERAGRAN) tablet 1 Tab  1 Tab Oral DAILY  levothyroxine (SYNTHROID) tablet 100 mcg  100 mcg Oral QPM  
 loratadine (CLARITIN) tablet 10 mg  10 mg Oral DAILY  pravastatin (PRAVACHOL) tablet 80 mg  80 mg Oral QHS  sodium chloride (NS) flush 5-40 mL  5-40 mL IntraVENous Q8H  
 sodium chloride (NS) flush 5-40 mL  5-40 mL IntraVENous PRN  
 acetaminophen (TYLENOL) tablet 500 mg  500 mg Oral Q4H PRN  
 HYDROcodone-acetaminophen (NORCO) 5-325 mg per tablet 1 Tab  1 Tab Oral Q6H PRN  
 naloxone (NARCAN) injection 0.4 mg  0.4 mg IntraVENous PRN  
 ondansetron (ZOFRAN) injection 2 mg  2 mg IntraVENous Q6H PRN  
 bisacodyl (DULCOLAX) tablet 5 mg  5 mg Oral DAILY PRN  
 furosemide (LASIX) injection 20 mg  20 mg IntraVENous DAILY  melatonin tablet 3 mg  3 mg Oral QHS PRN Lexy Bianchi III, DO

## 2019-09-23 NOTE — CONSULTS
Tung Steven  
 
 
 
NAME:[address]   NOV:[birth year] Pt seen [id number] 
[facility] Marva Parks, [address] [facility] [employer] [facility] [address] [place], [address] [facility] Phone - [phone] Fax - [phone] [address], [address] [facility] Phone - [phone] Fax - [phone]    
www. [facility] [employer]

## 2019-09-24 NOTE — PROGRESS NOTES
Nephrology Progress Note Tung Steven  
 
[email or web address]                  Phone - [phone] Patient: Doristine Dandy Date- 9/24/2019 Admit Date: 9/21/2019 YOB: 1940 CC: [facility] Subjective: Interval History:  
-  Cr. Worse 
 k high Poor urine out put 
bp low He required fluid bolus and albumin yesterday No sob at rest 
No chest pain C/o constipation ROS:- as above Assessment & Plan: Geofm Mayito Acute kidney injury likely CARDIORENAL SYNDROME + ATN due to RENAL hypoperfusion with hypotension. No hydro on renal usg Agree with starting dobutamin gtt Add albumin No more ivf Check bmp in am 
No RRT indicated today He may need dialysis soon if cr. Continue to get worse Hypotension.- likely due to cardiogenic shock HYPERKALEMIA Kayexalate 45 gram po now Iv bicarb 1 amp Repeat labs this afternoon Hyponatremia likely due to chf 
 
 Chronic kidney disease stage III, baseline creatinine 1.5. History of congestive heart failure, ejection fraction 25%. history of atrial fibrillation and ventricular tachycardia. Physical Exam:  
GEN: NAD NECK- Supple, no mass RESP: Clear b/l, no wheezing, CVS: RRR,S1,S2 ABDO: soft , non tender, No mass EXT: trace Edema NEURO: normal speech, non focal 
 
Care Plan discussed with: patient, wife,  
Objective:  
Visit Vitals BP 97/57 Pulse 86 Temp 97.3 °F (36.3 °C) Resp 20 Ht 5' 9\" (1.753 m) Wt 78.3 kg (172 lb 9.9 oz) SpO2 96% BMI 25.49 kg/m² Last 3 Recorded Weights in this Encounter  
 09/22/19 0330 09/23/19 0205 09/24/19 8079 Weight: 75.8 kg (167 lb 1.7 oz) 76.2 kg (167 lb 15.9 oz) 78.3 kg (172 lb 9.9 oz) 09/22 1901 - 09/24 0700 In: 1950 [P.O.:1220; I.V.:730] Out: 360 [Urine:360] Intake/Output Summary (Last 24 hours) at 9/24/2019 1021 Last data filed at 9/24/2019 0345 Gross per 24 hour Intake 1690 ml Output 110 ml Net 1580 ml Chart reviewed. Pertinent Notes reviewed. Medication list  reviewed Current Facility-Administered Medications Medication  DOBUTamine (DOBUTREX) 1,000 mg/250 mL (4,000 mcg/mL) infusion  sodium bicarbonate 8.4 % (1 mEq/mL) injection 50 mEq  sodium polystyrene (KAYEXALATE) 15 gram/60 mL oral suspension 45 g  
 lactulose (CHRONULAC) 10 gram/15 mL solution 45 mL  albumin human 25% (BUMINATE) solution 25 g  levothyroxine (SYNTHROID) tablet 100 mcg  simethicone (MYLICON) tablet 80 mg  
 traZODone (DESYREL) tablet 25 mg  
 allopurinol (ZYLOPRIM) tablet 200 mg  
 amiodarone (CORDARONE) tablet 200 mg  
 apixaban (ELIQUIS) tablet 5 mg  therapeutic multivitamin (THERAGRAN) tablet 1 Tab  loratadine (CLARITIN) tablet 10 mg  
 pravastatin (PRAVACHOL) tablet 80 mg  
 sodium chloride (NS) flush 5-40 mL  sodium chloride (NS) flush 5-40 mL  acetaminophen (TYLENOL) tablet 500 mg  
 HYDROcodone-acetaminophen (NORCO) 5-325 mg per tablet 1 Tab  naloxone (NARCAN) injection 0.4 mg  
 ondansetron (ZOFRAN) injection 2 mg  bisacodyl (DULCOLAX) tablet 5 mg  melatonin tablet 3 mg Data Review : 
Recent Labs  
  09/24/19 0152 09/23/19 0204 09/22/19 0347 * 135* 136  
K 5.2* 5.0 4.3 CL 97 99 103 CO2 18* 26 24 BUN 72* 63* 47* CREA 3.72* 2.70* 1.93* * 131* 121* CA 8.2* 8.2* 8.7 MG  --  2.5*  --   
PHOS 7.1*  --   --   
 
Recent Labs  
  09/24/19 0152 09/23/19 0204 09/22/19 0347 WBC 8.4 8.8 6.0 HGB 11.2* 12.2 11.6* HCT 34.6* 38.6 36.0*  
 188 187 No results for input(s): FE, TIBC, PSAT, FERR in the last 72 hours. No results for input(s): CPK, CKNDX, TROIQ in the last 72 hours. No lab exists for component: CPKMB Lab Results Component Value Date/Time  Color YELLOW/STRAW 07/28/2019 11:44 PM  
 Appearance CLEAR 07/28/2019 11:44 PM  
 Specific gravity 1.021 07/28/2019 11:44 PM  
 pH (UA) 5.0 07/28/2019 11:44 PM  
 Protein NEGATIVE  07/28/2019 11:44 PM  
 Glucose NEGATIVE  07/28/2019 11:44 PM  
 Ketone NEGATIVE  07/28/2019 11:44 PM  
 Bilirubin NEGATIVE  07/28/2019 11:44 PM  
 Urobilinogen 0.2 07/28/2019 11:44 PM  
 Nitrites NEGATIVE  07/28/2019 11:44 PM  
 Leukocyte Esterase NEGATIVE  07/28/2019 11:44 PM  
 Epithelial cells FEW 07/28/2019 11:44 PM  
 Bacteria 1+ (A) 07/28/2019 11:44 PM  
 WBC 0-4 07/28/2019 11:44 PM  
 RBC 0-5 07/28/2019 11:44 PM  
 
Lab Results Component Value Date/Time Culture result: NO GROWTH 1 DAY 07/28/2019 11:44 PM  
 Culture result: NO GROWTH 5 DAYS 07/28/2019 12:20 PM  
 Culture result: NO GROWTH 6 DAYS 07/27/2019 09:27 PM  
 
No results found for: SDES Lab Results Component Value Date/Time Sodium,urine random 14 09/22/2019 03:37 PM  
 Creatinine, urine 166.00 09/22/2019 03:37 PM  
 
 
Results from Hospital Encounter encounter on 09/21/19 XR CHEST PORT Narrative EXAM:  XR CHEST PORT. INDICATION: ? vol overload. COMPARISON: 9/21/2019. FINDINGS:  
A portable AP radiograph of the chest was obtained at 0854 hours. There is a 
pacemaker in the left chest, unchanged in position. Lines and tubes: The patient is on a cardiac monitor. Lungs: There is mild interstitial edema throughout the lungs and atelectasis at 
the lung bases. Pleura: There is a new small right pleural effusion. There are calcified pleural 
plaques. Mediastinum: The cardiac and mediastinal contours and pulmonary vascularity are 
normal. 
Bones and soft tissues: The bones and soft tissues are grossly within normal 
limits. Impression IMPRESSION: Cardiac pacemaker with increased interstitial edema and new small 
right pleural effusion and basilar atelectasis. Marva Parks MD 
[facility] 
 www. [email or web address] / [employer] [address], [address] [place], [address] Phone - [phone] Fax - [phone]

## 2019-09-24 NOTE — PROGRESS NOTES
[address] INTERDISCIPLINARY ROUNDS Cardiopulmonary Care Interdisciplinary Rounds were held today to discuss patient's plan of care and outcomes. The following members were present: NP/Physician, Pharmacy, Nursing and Case Management. Expected Length of Stay:  - - - PLAN OF CARE:  
Continue current treatment plan

## 2019-09-24 NOTE — PROGRESS NOTES
Problem: Falls - Risk of 
Goal: *Absence of Falls Description Document Derrick Greene Fall Risk and appropriate interventions in the flowsheet. Outcome: Progressing Towards Goal 
Note:  
Fall Risk Interventions: 
Mobility Interventions: Bed/chair exit alarm, OT consult for ADLs, Patient to call before getting OOB, PT Consult for mobility concerns, PT Consult for assist device competence, Strengthening exercises (ROM-active/passive), Utilize walker, cane, or other assistive device Medication Interventions: Bed/chair exit alarm, Patient to call before getting OOB, Teach patient to arise slowly History of Falls Interventions: Bed/chair exit alarm, Door open when patient unattended, Room close to nurse's station, Vital signs minimum Q4HRs X 24 hrs (comment for end date) Problem: Patient Education: Go to Patient Education Activity Goal: Patient/Family Education Outcome: Progressing Towards Goal 
  
Problem: Patient Education: Go to Patient Education Activity Goal: Patient/Family Education Outcome: Progressing Towards Goal 
  
Problem: Heart Failure: Day 3 Goal: Off Pathway (Use only if patient is Off Pathway) Outcome: Progressing Towards Goal 
Goal: Activity/Safety Outcome: Progressing Towards Goal 
Goal: Diagnostic Test/Procedures Outcome: Progressing Towards Goal 
Goal: Nutrition/Diet Outcome: Progressing Towards Goal 
Goal: Discharge Planning Outcome: Progressing Towards Goal 
Goal: Medications Outcome: Progressing Towards Goal 
Goal: Respiratory Outcome: Progressing Towards Goal 
Goal: Treatments/Interventions/Procedures Outcome: Progressing Towards Goal 
Goal: Psychosocial 
Outcome: Progressing Towards Goal 
Goal: *Oxygen saturation within defined limits Outcome: Progressing Towards Goal 
Goal: *Hemodynamically stable Outcome: Progressing Towards Goal 
Goal: *Optimal pain control at patient's stated goal 
Outcome: Progressing Towards Goal 
Goal: *Anxiety reduced or absent Outcome: Progressing Towards Goal 
Goal: *Demonstrates progressive activity Outcome: Progressing Towards Goal 
  
Problem: Acute Renal Failure: Day 3 Goal: Off Pathway (Use only if patient is Off Pathway) Outcome: Progressing Towards Goal 
Goal: Activity/Safety Outcome: Progressing Towards Goal 
Goal: Consults, if ordered Outcome: Progressing Towards Goal 
Goal: Diagnostic Test/Procedures Outcome: Progressing Towards Goal 
Goal: Nutrition/Diet Outcome: Progressing Towards Goal 
Goal: Discharge Planning Outcome: Progressing Towards Goal 
Goal: Medications Outcome: Progressing Towards Goal 
Goal: Respiratory Outcome: Progressing Towards Goal 
Goal: Treatments/Interventions/Procedures Outcome: Progressing Towards Goal 
Goal: Psychosocial 
Outcome: Progressing Towards Goal 
Goal: *Optimal pain control at patient's stated goal 
Outcome: Progressing Towards Goal 
Goal: *Urinary output within identified parameters Outcome: Progressing Towards Goal 
Goal: *Hemodynamically stable Outcome: Progressing Towards Goal 
Goal: *Tolerating diet Outcome: Progressing Towards Goal 
Goal: *Lab values improving Outcome: Progressing Towards Goal

## 2019-09-24 NOTE — PROGRESS NOTES
Problem: Falls - Risk of 
Goal: *Absence of Falls Description Document Pedro Pablo Mccurdy Fall Risk and appropriate interventions in the flowsheet. Outcome: Progressing Towards Goal 
Note:  
Fall Risk Interventions: 
  
 
  
 
Medication Interventions: Bed/chair exit alarm, Evaluate medications/consider consulting pharmacy, Patient to call before getting OOB History of Falls Interventions: Bed/chair exit alarm, Door open when patient unattended Problem: Patient Education: Go to Patient Education Activity Goal: Patient/Family Education Outcome: Progressing Towards Goal 
  
Problem: Heart Failure: Day 3 Goal: Activity/Safety Outcome: Progressing Towards Goal 
Goal: Nutrition/Diet Outcome: Progressing Towards Goal

## 2019-09-24 NOTE — CARDIO/PULMONARY
Cardiac Rehab Note: chart review Pt with acute on chronic systolic HF, s/p AICD. CHF BUNDLE   
 
EF 25%  on 8/5/19 per echo. Smoking history assessed. Patient is a former smoker. Smoking Cessation Program link has not been added to the AVS. Current inpatient diet: DIET CARDIAC \"Living with Heart Failure\" book with daily weight calendar and s/s of heart failure magnet provided to Veronika Huber on admission 7/30/19. Educated using teach back method. Instruction given on s/s of CHF, checking weight every am and calling MD if weight is up 2-3 lbs in a day or 5 lbs in a week (or as directed by the physician), fluid/Na restrictions, s/s of worsening CHF and when to call MD. Reviewed activity as tolerated with frequent rest periods as needed, taking medications as prescribed, and the importance of follow up visits with physician. Pt and wife are well versed in home care recommendations. Pt weighs in daily, has a pillbox for medication compliance, uses a walker for mobility assist.  
 
Veronika Huber verbalized understanding with questions answered.   Venice Rojo RN

## 2019-09-24 NOTE — PROGRESS NOTES
Progress Note 9/24/2019 7:48 AM 
NAME: Lary Price MRN:  [id number] Admit Diagnosis: Acute on chronic heart failure ([facility] [address] [I50.9] Problem List: 1. Acute on chronic systolic heart failure 2. Acute on chronic renal insufficiency; Stg 3 
3. Dilated NICM; EF 25%. Alex Cruz 7/18 w/ EF 20%, dil LV, nml RV, mild MR/TR 4. Persistent AFib s/p VIGNESH/DCCV 8/5/19 5. Remote ICD implantation 6. Recurrent ventricular tachycardia 7. Hyperlipidemia 8. Former smoker 9. DNR Assessment/Plan:  
Last 3 Recorded Weights in this Encounter  
 09/22/19 0330 09/23/19 0205 09/24/19 5070 Weight: 75.8 kg (167 lb 1.7 oz) 76.2 kg (167 lb 15.9 oz) 78.3 kg (172 lb 9.9 oz) I/Os inaccurate Weight is 10# less than @ last discharge 
 
sCr 3.7 CXR wet Continue oral amio 200mg daily HOLD eliquis 5mg BID today in case of HD need Diuresis per renal; minimal UOP; add 120mg IV lasix BID now Started dobutamine; inc to 7.5, albumin Renal consult appreciated Continue statin Holding prior Entresto, coreg, aldactone with marginal/low BPs [x]       High complexity decision making was performed in this patient at high risk for decompensation with multiple organ involvement. Subjective:  
 
Lary Cea denies chest pain. Still SOB. Discussed with RN events overnight. Review of Systems: 
 
Symptom Y/N Comments  Symptom Y/N Comments Fever/Chills N   Chest Pain N Poor Appetite N   Edema N   
Cough N   Abdominal Pain N Sputum N   Joint Pain N   
SOB/MURILLO N   Pruritis/Rash N   
Nausea/vomit N   Tolerating PT/OT Y Diarrhea N   Tolerating Diet Y Constipation N   Other Could NOT obtain due to:   
 
Objective:  
  
Physical Exam: 
 
Last 24hrs VS reviewed since prior progress note. Most recent are: 
 
Visit Vitals /59 (BP 1 Location: Right arm, BP Patient Position: At rest) Pulse 86 Temp 97.5 °F (36.4 °C) Resp 18 Ht 5' 9\" (1.753 m) Wt 78.3 kg (172 lb 9.9 oz) SpO2 97% BMI 25.49 kg/m² Intake/Output Summary (Last 24 hours) at 9/24/2019 1047 Last data filed at 9/24/2019 0345 Gross per 24 hour Intake 1570 ml Output 110 ml Net 1460 ml General Appearance: Well developed, well nourished, alert & oriented x 3,  
 no acute distress. Ears/Nose/Mouth/Throat: Hearing grossly normal. 
Neck: Supple. Chest: Lungs clear to auscultation bilaterally. Cardiovascular: Regular rate and rhythm, S1S2 normal, no murmur. Abdomen: Soft, non-tender, bowel sounds are active. Extremities: No edema bilaterally. Skin: Warm and dry. []         Post-cath site without hematoma, bruit, tenderness, or thrill. Distal pulses intact. PMH/SH reviewed - no change compared to H&P Data Review Telemetry: sinus rhythm EKG:  
[x]  No new EKG for review Lab Data Personally Reviewed: 
 
Recent Labs  
  09/24/19 
0152 09/23/19 
7502 WBC 8.4 8.8 HGB 11.2* 12.2 HCT 34.6* 38.6  188 No results for input(s): INR, PTP, APTT in the last 72 hours. No lab exists for component: INREXT, INREXT Recent Labs  
  09/24/19 
0152 09/23/19 
0204 09/22/19 
0347 * 135* 136  
K 5.2* 5.0 4.3 CL 97 99 103 CO2 18* 26 24 BUN 72* 63* 47* CREA 3.72* 2.70* 1.93* * 131* 121* CA 8.2* 8.2* 8.7 MG  --  2.5*  -- No results for input(s): CPK, CKNDX, TROIQ in the last 72 hours. No lab exists for component: CPKMB No results found for: CHOL, CHOLX, CHLST, CHOLV, HDL, HDLP, LDL, LDLC, DLDLP, TGLX, TRIGL, TRIGP, CHHD, CHHDX Recent Labs  
  09/24/19 
0152 ALB 3.4* No results for input(s): PH, PCO2, PO2 in the last 72 hours. Medications Personally Reviewed: 
 
Current Facility-Administered Medications Medication Dose Route Frequency  sodium bicarbonate (8.4%) injection 50 mEq  50 mEq IntraVENous ONCE  
 sodium polystyrene (KAYEXALATE) 15-20 gram/60 mL oral suspension 45 g  45 g Oral NOW  
  lactulose (CHRONULAC) 10 gram/15 mL solution 45 mL  30 g Oral PRN  
 albumin human 25% (BUMINATE) solution 25 g  25 g IntraVENous TID  DOBUTamine (DOBUTREX) 1,000 mg/250 mL (4,000 mcg/mL) infusion  5 mcg/kg/min IntraVENous CONTINUOUS  
 levothyroxine (SYNTHROID) tablet 100 mcg  100 mcg Oral 6am  
 simethicone (MYLICON) tablet 80 mg  80 mg Oral Q6H PRN  
 traZODone (DESYREL) tablet 25 mg  25 mg Oral QHS  allopurinol (ZYLOPRIM) tablet 200 mg  200 mg Oral DAILY  amiodarone (CORDARONE) tablet 200 mg  200 mg Oral BID  
 apixaban (ELIQUIS) tablet 5 mg  5 mg Oral Q12H  therapeutic multivitamin (THERAGRAN) tablet 1 Tab  1 Tab Oral DAILY  loratadine (CLARITIN) tablet 10 mg  10 mg Oral DAILY  pravastatin (PRAVACHOL) tablet 80 mg  80 mg Oral QHS  sodium chloride (NS) flush 5-40 mL  5-40 mL IntraVENous Q8H  
 sodium chloride (NS) flush 5-40 mL  5-40 mL IntraVENous PRN  
 acetaminophen (TYLENOL) tablet 500 mg  500 mg Oral Q4H PRN  
 HYDROcodone-acetaminophen (NORCO) 5-325 mg per tablet 1 Tab  1 Tab Oral Q6H PRN  
 naloxone (NARCAN) injection 0.4 mg  0.4 mg IntraVENous PRN  
 ondansetron (ZOFRAN) injection 2 mg  2 mg IntraVENous Q6H PRN  
 bisacodyl (DULCOLAX) tablet 5 mg  5 mg Oral DAILY PRN  
 melatonin tablet 3 mg  3 mg Oral QHS PRN [employer] III, DO

## 2019-09-24 NOTE — PROGRESS NOTES
0700: VIKASH Godfrey (oncoming nurse) received bedside report from VIKASH Yip (offgoing nurse). 0845: RRT at bedside rounding and got orders from Dr. Dipti Heart for Dobutamine. Paged Dr. Dipti Heart to see pt.  
 
0900: Dr. Dipti Heart and Dr. Lefty Ruth at bedside. Would like to transfer pt to stepdown for Dobutamine drip. Pt still hypotensive overnight and this AM. 6727: Started Dobutamine at initial infusion rate. Dr. Dipti Heart aware that this elizabeth is unable to titrate infusion at this time until pt is on stepdown. Will keep at initial infusion rate while in this room. 1000: BP responding well to initial infusion rate. Trending in the correct direction. 1047: Dr. Dre Joshua discontinued orders for titration. Wants to keep Dobutamine at continuous rate. Will make Dr. Dipti Heart aware. 1050: Paged Dr. Dipti Heart about BP response and no longer needing titration per Dr. Dre Joshua. Dr. Dipti Heart would like to discontinue the order to transfer pt. Pt is able to stay in current room rather than step-down.  
 
1900:  
 
Bedside shift change report GIVEN TO VIKASH Yip. Report included the following information SBAR, Kardex, Intake/Output, MAR, Recent Results and Cardiac Rhythm Paced. SIGNIFICANT CHANGES DURING SHIFT:  Dobutamine, albumin, sodium bicarb, new IV, CXR, increased output CONCERNS TO ADDRESS WITH MD:  Cardiac monitor, multivitamins, and eye drops for cataracts [address] Em[address] Anderson NURSING NOTE Admission Date 9/21/2019 Admission Diagnosis Acute on chronic heart failure ([facility] [address] [I50.9] Consults IP CONSULT TO CARDIOLOGY 
IP CONSULT TO NEPHROLOGY Cardiac Monitoring [x] Yes [] No  
  
Purposeful Hourly Rounding [x] Yes   
Froylan Score Total Score: 2 Froylan score 3 or > [x] Bed Alarm [] Avasys [] 1:1 sitter [] Patient refused (Signed refusal form in chart) Arsen Score Arsen Score: 21 Arsen score 14 or < [] PMT consult [] Wound Care consult  
 []  Specialty bed  [] Nutrition consult Influenza Vaccine Received Flu Vaccine for Current Season (usually Sept-March): Yes Oxygen needs? [x] Room air Oxygen @  []1L    []2L    []3L   []4L    []5L   []6L via NC Chronic home O2 use? [] Yes [] No 
Perform O2 challenge test and document in progress note using smartphrase (.Homeoxygen) Last bowel movement Last Bowel Movement Date: 09/24/19 Urinary Catheter LDAs Peripheral IV 09/21/19 Right Forearm (Active) Site Assessment Clean, dry, & intact 9/24/2019  3:22 PM  
Phlebitis Assessment 0 9/24/2019  3:22 PM  
Infiltration Assessment 0 9/24/2019  3:22 PM  
Dressing Status Clean, dry, & intact 9/24/2019  3:22 PM  
Dressing Type Tape;Transparent 9/24/2019  3:22 PM  
Hub Color/Line Status Flushed;Pink 9/24/2019  3:22 PM  
Alcohol Cap Used Yes 9/21/2019  7:44 PM  
   
Peripheral IV 09/24/19 Left Antecubital (Active) Site Assessment Clean, dry, & intact 9/24/2019  3:22 PM  
Phlebitis Assessment 0 9/24/2019  3:22 PM  
Infiltration Assessment 0 9/24/2019  3:22 PM  
Dressing Status Clean, dry, & intact 9/24/2019  3:22 PM  
Dressing Type Tape;Transparent 9/24/2019  3:22 PM  
Hub Color/Line Status Infusing;Pink 9/24/2019  3:22 PM  
                  
  
Readmission Risk Assessment Tool Score Medium Risk [facility] 9 Total Score 4 IP Visits Last 12 Months (1-3=4, 4=9, >4=11) 5 Pt. Coverage (Medicare=5 , Medicaid, or Self-Pay=4) 9 Charlson Comorbidity Score (Age + Comorbid Conditions) Criteria that do not apply:  
 Has Seen PCP in Last 6 Months (Yes=3, No=0) . Living with Significant Other. Assisted Living. LTAC. SNF. or  
Rehab Patient Length of Stay (>5 days = 3) Expected Length of Stay 4d 2h Actual Length of Stay 3

## 2019-09-24 NOTE — PROGRESS NOTES
Hospitalist Progress Note NAME: Thomas Rosas :  1940 MRN:  [id number] Interim Hospital Summary: 77 y.o. male whom presented on 2019 with Assessment / Plan: 
Acute on chronic systolic HF Non ischemic CM S/P AICD P. Atrial fibrillation Hypotension 
-Recent Echo EF 21-25%, No AS, trace MR 
- IV lasix held due to renal failure 
-start dobutamine gtt, albumin 
-continue amiodarone and eliquis 
-taken off entresto, coreg and aldactone last admission in August 
 
MARCELL Hyponatremia Hyperkalemia Francis Saenz -renal US no hydronephrosis; no prior protenuria 
-cr worst today despite IVF. Pt hypotensive overnight. IVF and lasix discontinued  
-diurese per nephro -IV bicarb, kayexalate 
-possible HD discussed with pt and his wife today 
-avoid nephrotoxic agents 
-repeat bmp   
-nephro consultation appreciate Hypothyroidism 
-synthroid Gout - allopurinol 18.5 - 24.9 Normal weight / Body mass index is 25.49 kg/m². Code status: DNR Prophylaxis: ELiquis Recommended Disposition: Home w/Family Subjective: Chief Complaint / Reason for Physician Visit Follow up of CHF, weight gain, MARCELL Pt seen, no complaint. Hypotensive overnight. Denies SOB, CP. Chart reviewed in detail. Discussed with RN events overnight. Review of Systems: 
Symptom Y/N Comments  Symptom Y/N Comments Fever/Chills n   Chest Pain n   
Poor Appetite    Edema n   
Cough    Abdominal Pain Sputum    Joint Pain SOB/MURILLO n    Pruritis/Rash Nausea/vomit    Tolerating PT/OT Diarrhea    Tolerating Diet Constipation    Other Could NOT obtain due to:   
 
PO intake:  
Patient Vitals for the past 72 hrs: 
 % Diet Eaten  
19 1840 50 % 19 1300 10 % 19 1047 25 % 19 1800 75 % Objective: VITALS:  
Last 24hrs VS reviewed since prior progress note. Most recent are: 
Patient Vitals for the past 24 hrs: Temp Pulse Resp BP SpO2  
09/24/19 1037 97.5 °F (36.4 °C) 86 18 102/59 97 % 09/24/19 1033  98  95/61   
09/24/19 1006  86  97/57   
09/24/19 0949    94/64   
09/24/19 0922  82  (!) 89/63   
09/24/19 0913 97.3 °F (36.3 °C) 78 20 (!) 86/63 96 %  
09/24/19 0740 97.6 °F (36.4 °C) 82 16 90/60 93 % 09/24/19 0345 98.5 °F (36.9 °C) 86 18 91/53 94 % 09/24/19 0148 97.5 °F (36.4 °C) 81 18 (!) 85/50 93 % 09/23/19 2231 97.3 °F (36.3 °C) 79 16 (!) 83/57 94 % 09/23/19 1744 97.8 °F (36.6 °C) 87 18 (!) 86/62 97 % 09/23/19 1520 97.4 °F (36.3 °C) 86 15 (!) 85/57 99 % Intake/Output Summary (Last 24 hours) at 9/24/2019 1101 Last data filed at 9/24/2019 0345 Gross per 24 hour Intake 1570 ml Output 110 ml Net 1460 ml PHYSICAL EXAM: 
General: WD, WN. Alert, cooperative, no acute distress   
EENT:  EOMI. Anicteric sclerae. MMM Resp:  Basal rales L>R  No accessory muscle use CV:  Regular  rhythm,  No significant edema GI:  Soft, Non distended, Non tender.  +Bowel sounds Neurologic:  Alert and oriented X 3, normal speech Psych:   Good insight. Not anxious nor agitated Skin:  No rashes. No jaundice Reviewed most current lab test results and cultures  YES Reviewed most current radiology test results   YES Review and summation of old records today    NO Reviewed patient's current orders and MAR    YES 
PMH/SH reviewed - no change compared to H&P 
________________________________________________________________________ Care Plan discussed with: 
  Comments Patient x Family  x wife RN x Care Manager Consultant  x nephro Multidiciplinary team rounds were held today with , nursing, pharmacist and clinical coordinator. Patient's plan of care was discussed; medications were reviewed and discharge planning was addressed. ________________________________________________________________________ Total NON critical care TIME:   35  Minutes Total CRITICAL CARE TIME Spent:   Minutes non procedure based Comments >50% of visit spent in counseling and coordination of care x This includes time during multidisciplinary rounds if indicated above  
________________________________________________________________________ Meño Bentley MD  
 
Procedures: see electronic medical records for all procedures/Xrays and details which were not copied into this note but were reviewed prior to creation of Plan. LABS: 
I reviewed today's most current labs and imaging studies. Pertinent labs include: 
Recent Labs  
  09/24/19 0152 09/23/19 0204 09/22/19 0347 WBC 8.4 8.8 6.0 HGB 11.2* 12.2 11.6* HCT 34.6* 38.6 36.0*  
 188 187 Recent Labs  
  09/24/19 0152 09/23/19 0204 09/22/19 0347 * 135* 136  
K 5.2* 5.0 4.3 CL 97 99 103 CO2 18* 26 24 * 131* 121* BUN 72* 63* 47* CREA 3.72* 2.70* 1.93* CA 8.2* 8.2* 8.7 MG  --  2.5*  --   
PHOS 7.1*  --   --   
ALB 3.4*  --   --

## 2019-09-25 NOTE — PROGRESS NOTES
Spiritual Care Assessment/Progress Note [address] 
 
 
NAME: Jovanna Baeza      MRN: [id number] AGE: 77 y.o. SEX: male Religion Affiliation: [facility] Language: English  
 
9/25/2019     Total Time (in minutes): 19 Spiritual Assessment begun in [facility] CARDIOPULMONARY CARE through conversation with: 
  
    [x]Patient        [x] Family    [] Friend(s) Reason for Consult: Palliative Care, Initial/Spiritual Assessment Spiritual beliefs: (Please include comment if needed) [x] Identifies with a dominique tradition:    Affiliated [place] 
   [x] Supported by a dominique community:        
   [] Claims no spiritual orientation:       
   [] Seeking spiritual identity:            
   [] Adheres to an individual form of spirituality:       
   [] Not able to assess:                   
 
    
Identified resources for coping:  
   [x] Prayer                           
   [] Music                  [] Guided Imagery [x] Family/friends                 [] Pet visits [] Devotional reading                         [] Unknown 
   [] Other:                                         
 
 
Interventions offered during this visit: (See comments for more details) Patient Interventions: Affirmation of dominique, Affirmation of emotions/emotional suffering, Catharsis/review of pertinent events in supportive environment, Iconic (affirming the presence of God/Higher Power), Initial/Spiritual assessment, patient floor, Prayer (assurance of), Religion beliefs/image of God discussed Family/Friend(s): Affirmation of dominique, Affirmation of emotions/emotional suffering, Catharsis/review of pertinent events in supportive environment, Iconic (affirming the presence of God/Higher Power), Normalization of emotional/spiritual concerns, Prayer (assurance of), Religion beliefs/image of God discussed Plan of Care: 
 
 [] Support spiritual and/or cultural needs [] Support AMD and/or advance care planning process    
 [] Support grieving process 
 [] Coordinate Rites and/or Rituals  
 [] Coordination with community clergy 
 [x] No spiritual needs identified at this time 
 [] Detailed Plan of Care below (See Comments)  [] Make referral to Music Therapy 
[] Make referral to Pet Therapy    
[] Make referral to Addiction services 
[] Make referral to [facility] 
[] Make referral to Spiritual Care Partner 
[] No future visits requested       
[x] Follow up visits as needed Comments:   Initial visit on [facility] unit for new palliative care consult. Patient's wife was present; patient appeared to be sleeping. Spoke with Mrs. Griggs at the door of the room. Provided pastoral presence and supportive listening as she spoke about patient's current mediical concerns. She expressed appreciation for the many people who have been praying for patient. Patient awoke during visit. He shared that his  and his son's  have been to visit. He expressed that he is beginning to fell better, but did not get much sleep last night. He expressed no spiritual needs or concerns at this time. Offered assurance of prayer and advised of  availability. Ana Rosa Montejo, MPS, [address],  [facility]  Paging Service  [phone])

## 2019-09-25 NOTE — PROGRESS NOTES
Progress Note 9/25/2019 7:48 AM 
NAME: Zohreh Nash MRN:  [id number] Admit Diagnosis: Acute on chronic heart failure ([facility] [address] [I50.9] Problem List: 1. Acute on chronic systolic heart failure 2. Acute on chronic renal insufficiency; Stg 3 
3. Dilated NICM; EF 25%. Colby Lent 7/18 w/ EF 20%, dil LV, nml RV, mild MR/TR 4. Persistent AFib s/p VIGNESH/DCCV 8/5/19 5. Remote ICD implantation 6. Recurrent ventricular tachycardia 7. Hyperlipidemia 8. Former smoker 9. DNR Assessment/Plan:  
Last 3 Recorded Weights in this Encounter  
 09/23/19 0205 09/24/19 0146 09/25/19 1200 Weight: 76.2 kg (167 lb 15.9 oz) 78.3 kg (172 lb 9.9 oz) 77.6 kg (171 lb 1.6 oz) Weight is 10# less than @ last discharge 
 
sCr down to 3.4 
K 2.8 EKG this AM 
He may have gone back into AFib; which wont help matters Need to consider restoring sinus Continue oral amio 200mg daily; increase if back in AF 
HOLD eliquis 5mg BID in case of HD need Diuresis per renal; UOP better; 120mg IV lasix BID for now Replete K Recheck BMP/Mg this afternoon Continue dobutamine 7.5mcg, albumin Renal consult appreciated Continue statin Cardiac rehab at discharge Will need to consider home inotropes? May need to consider pall care consult pending course Holding prior Entresto, coreg, aldactone with marginal/low BPs [x]       High complexity decision making was performed in this patient at high risk for decompensation with multiple organ involvement. Subjective:  
 
Zohreh Nash denies chest pain. Breathing better. Discussed with RN events overnight. Review of Systems: 
 
Symptom Y/N Comments  Symptom Y/N Comments Fever/Chills N   Chest Pain N Poor Appetite N   Edema N   
Cough N   Abdominal Pain N Sputum N   Joint Pain N   
SOB/MURILLO N   Pruritis/Rash N   
Nausea/vomit N   Tolerating PT/OT Y Diarrhea N   Tolerating Diet Y Constipation N   Other Could NOT obtain due to: Objective:  
  
Physical Exam: 
 
Last 24hrs VS reviewed since prior progress note. Most recent are: 
 
Visit Vitals /59 (BP 1 Location: Right arm, BP Patient Position: At rest) Pulse 98 Temp 98.2 °F (36.8 °C) Resp 20 Ht 5' 9\" (1.753 m) Wt 77.6 kg (171 lb 1.6 oz) SpO2 94% BMI 25.27 kg/m² Intake/Output Summary (Last 24 hours) at 9/25/2019 0757 Last data filed at 9/25/2019 5622 Gross per 24 hour Intake 1338.4 ml Output 1150 ml Net 188.4 ml General Appearance: Well developed, well nourished, alert & oriented x 3,  
 no acute distress. Ears/Nose/Mouth/Throat: Hearing grossly normal. 
Neck: Supple. Chest: Lungs clear to auscultation bilaterally. Cardiovascular: Regular rate and rhythm, S1S2 normal, no murmur. Abdomen: Soft, non-tender, bowel sounds are active. Extremities: No edema bilaterally. Skin: Warm and dry. []         Post-cath site without hematoma, bruit, tenderness, or thrill. Distal pulses intact. PMH/SH reviewed - no change compared to H&P Data Review Telemetry: ? Afib  
 
EKG:  
[x]  No new EKG for review Lab Data Personally Reviewed: 
 
Recent Labs  
  09/24/19 
0152 09/23/19 
7524 WBC 8.4 8.8 HGB 11.2* 12.2 HCT 34.6* 38.6  188 Recent Labs  
  09/25/19 
0545 INR 2.0*  
PTP 19.8* Recent Labs  
  09/25/19 
0545 09/24/19 
1532 09/24/19 
0152 09/23/19 
0687 * 134* 129* 135*  
K 2.8* 3.7 5.2* 5.0  
CL 97 97 97 99 CO2 28 25 18* 26 BUN 74* 81* 72* 63* CREA 3.35* 4.05* 3.72* 2.70* GLU 84 121* 115* 131* CA 8.2* 7.8* 8.2* 8.2* MG  --   --   --  2.5* No results for input(s): CPK, CKNDX, TROIQ in the last 72 hours. No lab exists for component: CPKMB No results found for: CHOL, CHOLX, CHLST, CHOLV, HDL, HDLP, LDL, LDLC, DLDLP, TGLX, TRIGL, TRIGP, CHHD, CHHDX Recent Labs  
  09/25/19 
0545 09/24/19 
0152 ALB 3.9 3.4* No results for input(s): PH, PCO2, PO2 in the last 72 hours. Medications Personally Reviewed: 
 
Current Facility-Administered Medications Medication Dose Route Frequency  potassium chloride SR (KLOR-CON 10) tablet 40 mEq  40 mEq Oral NOW  lactulose (CHRONULAC) 10 gram/15 mL solution 45 mL  30 g Oral PRN  
 albumin human 25% (BUMINATE) solution 25 g  25 g IntraVENous TID  DOBUTamine (DOBUTREX) 1,000 mg/250 mL (4,000 mcg/mL) infusion  7.5 mcg/kg/min IntraVENous CONTINUOUS  
 amiodarone (CORDARONE) tablet 200 mg  200 mg Oral DAILY  furosemide (LASIX) injection 120 mg  120 mg IntraVENous BID  levothyroxine (SYNTHROID) tablet 100 mcg  100 mcg Oral 6am  
 simethicone (MYLICON) tablet 80 mg  80 mg Oral Q6H PRN  
 traZODone (DESYREL) tablet 25 mg  25 mg Oral QHS  allopurinol (ZYLOPRIM) tablet 200 mg  200 mg Oral DAILY  therapeutic multivitamin (THERAGRAN) tablet 1 Tab  1 Tab Oral DAILY  loratadine (CLARITIN) tablet 10 mg  10 mg Oral DAILY  pravastatin (PRAVACHOL) tablet 80 mg  80 mg Oral QHS  sodium chloride (NS) flush 5-40 mL  5-40 mL IntraVENous Q8H  
 sodium chloride (NS) flush 5-40 mL  5-40 mL IntraVENous PRN  
 acetaminophen (TYLENOL) tablet 500 mg  500 mg Oral Q4H PRN  
 HYDROcodone-acetaminophen (NORCO) 5-325 mg per tablet 1 Tab  1 Tab Oral Q6H PRN  
 naloxone (NARCAN) injection 0.4 mg  0.4 mg IntraVENous PRN  
 ondansetron (ZOFRAN) injection 2 mg  2 mg IntraVENous Q6H PRN  
 bisacodyl (DULCOLAX) tablet 5 mg  5 mg Oral DAILY PRN  
 melatonin tablet 3 mg  3 mg Oral QHS PRN Vishal Quirk III, DO

## 2019-09-25 NOTE — PROGRESS NOTES
Nephrology Progress Note [place] [place]  
 
[facility]. [facility]                  Phone - [phone] Patient: Zain Mina Date- 9/25/2019 Admit Date: 9/21/2019 YOB: 1940 CC: Follow up for acute kidney injury Subjective: Interval History:  
- cr. Improved with dobutamin gtt 
bp still on lowside Good urine out with iv lasix 
k low with kayxalate and lasix No c/o sob at rest 
No c/o chest pain, No c/o nausea or vomiting No c/o  fever. ROS:- as above Assessment & Plan:  
Acute kidney injury likely CARDIORENAL SYNDROME + ATN due to RENAL hypoperfusion with hypotension. No hydro on renal usg Continue dobutamin gtt 
give albumin 
check bmp in am 
No RRT indicated today 
  
 chf 
ON dobutamine gtt Hypotension.- likely due to cardiogenic shock Hypokalemia Po kcl HYPERKALEMIA Improved Hyponatremia likely due to chf 
 
 Chronic kidney disease stage III, baseline creatinine 1.5. 
 
  history of atrial fibrillation and ventricular tachycardia. Physical Exam:  
GEN: nad NECK- Supple, no mass RESP: decreased BS  b/l, no wheezing, CVS: s1,s2,rrr ABDO: soft ,  Non tender EXT: trace Edema NEURO: normal speech, non focal 
 
Care Plan discussed with: patient, wife, nurse Objective:  
Visit Vitals BP 94/53 Pulse 98 Temp 98.2 °F (36.8 °C) Resp 20 Ht 5' 9\" (1.753 m) Wt 77.6 kg (171 lb 1.6 oz) SpO2 94% BMI 25.27 kg/m² Last 3 Recorded Weights in this Encounter  
 09/23/19 0205 09/24/19 0146 09/25/19 2434 Weight: 76.2 kg (167 lb 15.9 oz) 78.3 kg (172 lb 9.9 oz) 77.6 kg (171 lb 1.6 oz)  
 
09/23 1901 - 09/25 0700 In: 1438.4 [P.O.:1300; I.V.:138.4] Out: 9586 [[id number]] Intake/Output Summary (Last 24 hours) at 9/25/2019 0257 Last data filed at 9/25/2019 1549 Gross per 24 hour Intake 1098.4 ml Output 1150 ml Net -51.6 ml Chart reviewed. Pertinent Notes reviewed. Medication list  reviewed Current Facility-Administered Medications Medication  potassium chloride SR (KLOR-CON 10) tablet 40 mEq  potassium chloride SR (KLOR-CON 10) tablet 40 mEq  lactulose (CHRONULAC) 10 gram/15 mL solution 45 mL  DOBUTamine (DOBUTREX) 1,000 mg/250 mL (4,000 mcg/mL) infusion  amiodarone (CORDARONE) tablet 200 mg  furosemide (LASIX) injection 120 mg  
 levothyroxine (SYNTHROID) tablet 100 mcg  simethicone (MYLICON) tablet 80 mg  
 traZODone (DESYREL) tablet 25 mg  
 allopurinol (ZYLOPRIM) tablet 200 mg  therapeutic multivitamin (THERAGRAN) tablet 1 Tab  loratadine (CLARITIN) tablet 10 mg  
 pravastatin (PRAVACHOL) tablet 80 mg  
 sodium chloride (NS) flush 5-40 mL  sodium chloride (NS) flush 5-40 mL  acetaminophen (TYLENOL) tablet 500 mg  
 HYDROcodone-acetaminophen (NORCO) 5-325 mg per tablet 1 Tab  naloxone (NARCAN) injection 0.4 mg  
 ondansetron (ZOFRAN) injection 2 mg  bisacodyl (DULCOLAX) tablet 5 mg  melatonin tablet 3 mg Data Review : 
Recent Labs  
  09/25/19 
0545 09/24/19 
1532 09/24/19 
0152 09/23/19 
2199 * 134* 129* 135*  
K 2.8* 3.7 5.2* 5.0  
CL 97 97 97 99 CO2 28 25 18* 26 BUN 74* 81* 72* 63* CREA 3.35* 4.05* 3.72* 2.70* GLU 84 121* 115* 131* CA 8.2* 7.8* 8.2* 8.2* MG  --   --   --  2.5* PHOS 5.1*  --  7.1*  --   
 
Recent Labs  
  09/24/19 
0152 09/23/19 
3987 WBC 8.4 8.8 HGB 11.2* 12.2 HCT 34.6* 38.6  188 No results for input(s): FE, TIBC, PSAT, FERR in the last 72 hours. No results for input(s): CPK, CKNDX, TROIQ in the last 72 hours. No lab exists for component: CPKMB Lab Results Component Value Date/Time  Color YELLOW/STRAW 07/28/2019 11:44 PM  
 Appearance CLEAR 07/28/2019 11:44 PM  
 Specific gravity 1.021 07/28/2019 11:44 PM  
 pH (UA) 5.0 07/28/2019 11:44 PM  
 Protein NEGATIVE  07/28/2019 11:44 PM  
 Glucose NEGATIVE  07/28/2019 11:44 PM  
 Ketone NEGATIVE  07/28/2019 11:44 PM  
 Bilirubin NEGATIVE  07/28/2019 11:44 PM  
 Urobilinogen 0.2 07/28/2019 11:44 PM  
 Nitrites NEGATIVE  07/28/2019 11:44 PM  
 Leukocyte Esterase NEGATIVE  07/28/2019 11:44 PM  
 Epithelial cells FEW 07/28/2019 11:44 PM  
 Bacteria 1+ (A) 07/28/2019 11:44 PM  
 WBC 0-4 07/28/2019 11:44 PM  
 RBC 0-5 07/28/2019 11:44 PM  
 
Lab Results Component Value Date/Time Culture result: NO GROWTH 1 DAY 07/28/2019 11:44 PM  
 Culture result: NO GROWTH 5 DAYS 07/28/2019 12:20 PM  
 Culture result: NO GROWTH 6 DAYS 07/27/2019 09:27 PM  
 
No results found for: SDES Lab Results Component Value Date/Time Sodium,urine random 14 09/22/2019 03:37 PM  
 Creatinine, urine 166.00 09/22/2019 03:37 PM  
 
 
Results from Hospital Encounter encounter on 09/21/19 XR CHEST PORT Narrative EXAM:  XR CHEST PORT. INDICATION: ? vol overload. COMPARISON: 9/21/2019. FINDINGS:  
A portable AP radiograph of the chest was obtained at 0854 hours. There is a 
pacemaker in the left chest, unchanged in position. Lines and tubes: The patient is on a cardiac monitor. Lungs: There is mild interstitial edema throughout the lungs and atelectasis at 
the lung bases. Pleura: There is a new small right pleural effusion. There are calcified pleural 
plaques. Mediastinum: The cardiac and mediastinal contours and pulmonary vascularity are 
normal. 
Bones and soft tissues: The bones and soft tissues are grossly within normal 
limits. Impression IMPRESSION: Cardiac pacemaker with increased interstitial edema and new small 
right pleural effusion and basilar atelectasis. Marva Parks MD 
[facility] 
 www. [facility] [employer] [address], [address] [place], [address] Phone - [phone] Fax - [phone]

## 2019-09-25 NOTE — PROGRESS NOTES
0700: Bedside shift change report given to Franco Mcdaniel RN (oncoming nurse) by Kaykay Mederos RN (offgoing nurse). Report included the following information SBAR and Kardex. 0800: D/w nephrologist Dr. Bekah Monroe patient's low sbp in the 90s. Clarified administration of lasix, per Dr. Bekah Monroe if patient's SBP>85 okay to administer lasix.

## 2019-09-25 NOTE — PROGRESS NOTES
Bedside shift change report GIVEN TO Selena Davenport RN. Report included the following information SBAR, Kardex and MAR. SIGNIFICANT CHANGES DURING SHIFT:   
 
 
CONCERNS TO ADDRESS WITH MD:   
 
 
 
 
[address] NURSING NOTE Admission Date 9/21/2019 Admission Diagnosis Acute on chronic heart failure ([facility] [address] [I50.9] Consults IP CONSULT TO CARDIOLOGY 
IP CONSULT TO NEPHROLOGY 
IP CONSULT TO PALLIATIVE CARE - PROVIDER Cardiac Monitoring [x] Yes [] No  
  
Purposeful Hourly Rounding [x] Yes   
Froylan Score Total Score: 2 Froylan score 3 or > [x] Bed Alarm [] Avasys [] 1:1 sitter [] Patient refused (Signed refusal form in chart) Arsen Score Arsen Score: 21 Arsen score 14 or < [] PMT consult [] Wound Care consult  
 []  Specialty bed  [] Nutrition consult Influenza Vaccine Received Flu Vaccine for Current Season (usually Sept-March): Yes Oxygen needs? [x] Room air Oxygen @  []1L    []2L    []3L   []4L    []5L   []6L via NC Chronic home O2 use? [] Yes [] No 
Perform O2 challenge test and document in progress note using [employer]) Last bowel movement Last Bowel Movement Date: 09/24/19 Urinary Catheter LDAs Peripheral IV 09/21/19 Right Forearm (Active) Site Assessment Clean, dry, & intact 9/25/2019  4:47 AM  
Phlebitis Assessment 0 9/25/2019  4:47 AM  
Infiltration Assessment 0 9/25/2019  4:47 AM  
Dressing Status Clean, dry, & intact 9/25/2019  4:47 AM  
Dressing Type Transparent 9/25/2019  4:47 AM  
Hub Color/Line Status Pink 9/25/2019  4:47 AM  
Alcohol Cap Used Yes 9/21/2019  7:44 PM  
   
Peripheral IV 09/24/19 Left Antecubital (Active) Site Assessment Clean, dry, & intact 9/25/2019  4:47 AM  
Phlebitis Assessment 0 9/25/2019  4:47 AM  
Infiltration Assessment 0 9/25/2019  4:47 AM  
Dressing Status Clean, dry, & intact 9/25/2019  4:47 AM  
Dressing Type Transparent 9/25/2019  4:47 AM  
Hub Color/Line Status Pink 9/25/2019  4:47 AM  
 Readmission Risk Assessment Tool Score Medium Risk 25 Total Score 4 IP Visits Last 12 Months (1-3=4, 4=9, >4=11) 5 Pt. Coverage (Medicare=5 , Medicaid, or Self-Pay=4) 9 Charlson Comorbidity Score (Age + Comorbid Conditions) Criteria that do not apply:  
 Has Seen PCP in Last 6 Months (Yes=3, No=0) . Living with Significant Other. Assisted Living. LTAC. SNF. or  
Rehab Patient Length of Stay (>5 days = 3) Expected Length of Stay 4d 2h Actual Length of Stay 4

## 2019-09-25 NOTE — PROGRESS NOTES
Problem: Falls - Risk of 
Goal: *Absence of Falls Description Document Juan Harrison Fall Risk and appropriate interventions in the flowsheet. Outcome: Progressing Towards Goal 
Note:  
Fall Risk Interventions: 
Mobility Interventions: Assess mobility with egress test, Bed/chair exit alarm Medication Interventions: Assess postural VS orthostatic hypotension, Bed/chair exit alarm History of Falls Interventions: Bed/chair exit alarm, Consult care management for discharge planning Problem: Heart Failure: Day 3 Goal: Activity/Safety Outcome: Progressing Towards Goal

## 2019-09-25 NOTE — PROGRESS NOTES
Hospitalist Progress Note NAME: Tristan Cheema :  1940 MRN:  [id number] Interim Hospital Summary: 77 y.o. male whom presented on 2019 with Assessment / Plan: 
Acute on chronic systolic HF Non ischemic CM S/P AICD P. Atrial fibrillation Hypotension 
-Recent Echo EF 21-25%, No AS, trace MR 
- IV lasix held due to renal failure 
-cont' dobutamine gtt, albumin 
-continue amiodarone and eliquis 
-taken off entresto, coreg and aldactone last admission in August 
 
MARCELL Hyponatremia Hyperkalemia NAGMA, resolved Hypokalemia, replete with KCl, repeat bmp 
-renal US no hydronephrosis; no prior protenuria 
-cr improving today  
-diurese per nephro 
-s/p IV bicarb, kayexalate 
-no urgent need for HD 
-avoid nephrotoxic agents 
-monitor bmp   
-nephro consultation appreciate Hypothyroidism 
-synthroid Gout - allopurinol 18.5 - 24.9 Normal weight / Body mass index is 25.27 kg/m². Code status: DNR Prophylaxis: ELiquis Recommended Disposition: Home w/Family Subjective: Chief Complaint / Reason for Physician Visit Follow up of CHF, weight gain, MARCELL Pt seen, in better mood today. He denies complaint. Discussed with RN events overnight. Review of Systems: 
Symptom Y/N Comments  Symptom Y/N Comments Fever/Chills n   Chest Pain n   
Poor Appetite    Edema n   
Cough    Abdominal Pain Sputum    Joint Pain SOB/MURILLO n    Pruritis/Rash Nausea/vomit    Tolerating PT/OT Diarrhea    Tolerating Diet Constipation    Other Could NOT obtain due to:   
 
PO intake:  
Patient Vitals for the past 72 hrs: 
 % Diet Eaten  
19 1037 75 %  
19 1835 25 % 19 1528 50 % 19 0900 25 % 19 1840 50 % 19 1300 10 % 19 1047 25 % Objective: VITALS:  
Last 24hrs VS reviewed since prior progress note. Most recent are: 
Patient Vitals for the past 24 hrs: Temp Pulse Resp BP SpO2  
09/25/19 1036 97.2 °F (36.2 °C) (!) 102 23 99/56 91 %  
09/25/19 0913  98  94/53   
09/25/19 0725 98.2 °F (36.8 °C) 98 20 103/59 94 % 09/25/19 0257 98.2 °F (36.8 °C) 97 18 97/55 96 %  
09/24/19 2340 97.8 °F (36.6 °C) 97 18 107/61 96 %  
09/24/19 1947 97.7 °F (36.5 °C) (!) 102 18 98/69 96 %  
09/24/19 1746  100  103/63   
09/24/19 1522 97.4 °F (36.3 °C) 100 20 99/66 97 % 09/24/19 1200  87  105/63  Intake/Output Summary (Last 24 hours) at 9/25/2019 1115 Last data filed at 9/25/2019 1046 Gross per 24 hour Intake 1218.4 ml Output 1600 ml Net -381.6 ml  
  
 
PHYSICAL EXAM: 
General: WD, WN. Alert, cooperative, no acute distress   
EENT:  EOMI. Anicteric sclerae. MMM Resp:  Basal rales L>R  No accessory muscle use CV:  Regular  rhythm,  No leg edema GI:  Soft, Non distended, Non tender.  +Bowel sounds Neurologic:  Alert and oriented X 3, normal speech Psych:   Good insight. Not anxious nor agitated Skin:  No rashes. No jaundice Reviewed most current lab test results and cultures  YES Reviewed most current radiology test results   YES Review and summation of old records today    NO Reviewed patient's current orders and MAR    YES 
PMH/SH reviewed - no change compared to H&P 
________________________________________________________________________ Care Plan discussed with: 
  Comments Patient x Family  x wife RN x Care Manager Consultant Multidiciplinary team rounds were held today with , nursing, pharmacist and clinical coordinator. Patient's plan of care was discussed; medications were reviewed and discharge planning was addressed. ________________________________________________________________________ Total NON critical care TIME:   35  Minutes Total CRITICAL CARE TIME Spent:   Minutes non procedure based Comments >50% of visit spent in counseling and coordination of care x This includes time during multidisciplinary rounds if indicated above  
________________________________________________________________________ Vianey Wolff MD  
 
Procedures: see electronic medical records for all procedures/Xrays and details which were not copied into this note but were reviewed prior to creation of Plan. LABS: 
I reviewed today's most current labs and imaging studies. Pertinent labs include: 
Recent Labs  
  09/24/19 
0152 09/23/19 
0204 WBC 8.4 8.8 HGB 11.2* 12.2 HCT 34.6* 38.6  188 Recent Labs  
  09/25/19 
0545 09/24/19 
1532 09/24/19 
0152 09/23/19 
2644 * 134* 129* 135*  
K 2.8* 3.7 5.2* 5.0  
CL 97 97 97 99 CO2 28 25 18* 26  
GLU 84 121* 115* 131* BUN 74* 81* 72* 63* CREA 3.35* 4.05* 3.72* 2.70* CA 8.2* 7.8* 8.2* 8.2* MG  --   --   --  2.5* PHOS 5.1*  --  7.1*  --   
ALB 3.9  --  3.4*  --   
INR 2.0*  --   --   --

## 2019-09-26 NOTE — PROGRESS NOTES
Problem: Falls - Risk of 
Goal: *Absence of Falls Description Document Krzysztof Mclaughlin Fall Risk and appropriate interventions in the flowsheet. Outcome: Progressing Towards Goal 
Note:  
Fall Risk Interventions: 
Mobility Interventions: Bed/chair exit alarm, Patient to call before getting OOB Medication Interventions: Assess postural VS orthostatic hypotension, Bed/chair exit alarm, Patient to call before getting OOB History of Falls Interventions: Bed/chair exit alarm, Door open when patient unattended Problem: Heart Failure: Day 3 Goal: Activity/Safety Outcome: Progressing Towards Goal 
Goal: Nutrition/Diet Outcome: Progressing Towards Goal

## 2019-09-26 NOTE — PROGRESS NOTES
Nephrology Progress Note Tung Steven  
 
[facility]. [email or web address]                  Phone - [phone] Patient: Marcus Pearson Date- 9/26/2019 Admit Date: 9/21/2019 YOB: 1940 CC: Follow up for ACUTE  kidney injury Subjective: Interval History:  
-MARCELL/CR. Continue to improved 
k low despite 80 meq kcl yesterday No c/o sob at rest 
No c/o chest pain, No c/o nausea or vomiting No c/o  fever. ROS:- as above Assessment & Plan: MARCELL  likely CARDIORENAL SYNDROME + ATN due to RENAL hypoperfusion with hypotension. No hydro on renal usg Continue dobutamine gtt Continue current dose of lasix Albumin prn 
check bmp in am 
 
 CHF 
ON dobutamine gtt Hypotension.- likely due to cardiogenic shock Hypokalemia 
kcl 80 meq po and iv kcl Start aldactone which will prevent renal loss of kcl HYPERKALEMIA Improved Hyponatremia likely due to chf 
Improved Chronic kidney disease stage III, baseline creatinine 1.5. 
 
  history of atrial fibrillation and ventricular tachycardia. Physical Exam:  
GEN:  NAD NECK:  Supple, no thyromegaly RESP: CTA  b/l, no  wheezing, CVS: RRR,S1,S2 ABDO:  soft , non tender, No mass NEURO: non focal, normal speech EXT: Edema trace Care Plan discussed with: patient, wife Objective:  
Visit Vitals BP 91/65 Pulse (!) 104 Temp 97.4 °F (36.3 °C) Resp 18 Ht 5' 9\" (1.753 m) Wt 77.7 kg (171 lb 4.8 oz) SpO2 95% BMI 25.30 kg/m² Last 3 Recorded Weights in this Encounter  
 09/24/19 0146 09/25/19 0257 09/26/19 0255 Weight: 78.3 kg (172 lb 9.9 oz) 77.6 kg (171 lb 1.6 oz) 77.7 kg (171 lb 4.8 oz) 09/24 1901 - 09/26 0700 In: 1560 [P.O.:1560] Out: 2850 [Urine:2850] Intake/Output Summary (Last 24 hours) at 9/26/2019 1013 Last data filed at 9/26/2019 4426 Gross per 24 hour Intake 1320 ml Output 2125 ml Net -805 ml Chart reviewed. Pertinent Notes reviewed. Medication list  reviewed Current Facility-Administered Medications Medication  lactulose (CHRONULAC) 10 gram/15 mL solution 45 mL  DOBUTamine (DOBUTREX) 1,000 mg/250 mL (4,000 mcg/mL) infusion  amiodarone (CORDARONE) tablet 200 mg  furosemide (LASIX) injection 120 mg  
 levothyroxine (SYNTHROID) tablet 100 mcg  simethicone (MYLICON) tablet 80 mg  
 traZODone (DESYREL) tablet 25 mg  
 allopurinol (ZYLOPRIM) tablet 200 mg  therapeutic multivitamin (THERAGRAN) tablet 1 Tab  loratadine (CLARITIN) tablet 10 mg  
 pravastatin (PRAVACHOL) tablet 80 mg  
 sodium chloride (NS) flush 5-40 mL  sodium chloride (NS) flush 5-40 mL  acetaminophen (TYLENOL) tablet 500 mg  
 HYDROcodone-acetaminophen (NORCO) 5-325 mg per tablet 1 Tab  naloxone (NARCAN) injection 0.4 mg  
 ondansetron (ZOFRAN) injection 2 mg  bisacodyl (DULCOLAX) tablet 5 mg  melatonin tablet 3 mg Data Review : 
Recent Labs  
  09/26/19 
0316 09/25/19 
1526 09/25/19 
0545  09/24/19 
0152 * 133* 135*   < > 129*  
K 2.9* 3.2* 2.8*   < > 5.2*  
CL 98 97 97   < > 97  
CO2 28 26 28   < > 18* BUN 60* 69* 74*   < > 72* CREA 2.75* 3.20* 3.35*   < > 3.72* GLU 86 149* 84   < > 115* CA 8.5 8.1* 8.2*   < > 8.2* MG 2.3 2.1  --   --   --   
PHOS 3.0  --  5.1*  --  7.1*  
 < > = values in this interval not displayed. Recent Labs  
  09/24/19 
0152 WBC 8.4 HGB 11.2* HCT 34.6*  
 No results for input(s): FE, TIBC, PSAT, FERR in the last 72 hours. No results for input(s): CPK, CKNDX, TROIQ in the last 72 hours. No lab exists for component: CPKMB Lab Results Component Value Date/Time  Color YELLOW/STRAW 07/28/2019 11:44 PM  
 Appearance CLEAR 07/28/2019 11:44 PM  
 Specific gravity 1.021 07/28/2019 11:44 PM  
 pH (UA) 5.0 07/28/2019 11:44 PM  
 Protein NEGATIVE  07/28/2019 11:44 PM  
 Glucose NEGATIVE  07/28/2019 11:44 PM  
 Ketone NEGATIVE  07/28/2019 11:44 PM  
 Bilirubin NEGATIVE  07/28/2019 11:44 PM  
 Urobilinogen 0.2 07/28/2019 11:44 PM  
 Nitrites NEGATIVE  07/28/2019 11:44 PM  
 Leukocyte Esterase NEGATIVE  07/28/2019 11:44 PM  
 Epithelial cells FEW 07/28/2019 11:44 PM  
 Bacteria 1+ (A) 07/28/2019 11:44 PM  
 WBC 0-4 07/28/2019 11:44 PM  
 RBC 0-5 07/28/2019 11:44 PM  
 
Lab Results Component Value Date/Time Culture result: NO GROWTH 1 DAY 07/28/2019 11:44 PM  
 Culture result: NO GROWTH 5 DAYS 07/28/2019 12:20 PM  
 Culture result: NO GROWTH 6 DAYS 07/27/2019 09:27 PM  
 
No results found for: SDES Lab Results Component Value Date/Time Sodium,urine random 14 09/22/2019 03:37 PM  
 Creatinine, urine 166.00 09/22/2019 03:37 PM  
 
 
Results from Hospital Encounter encounter on 09/21/19 XR CHEST PORT Narrative EXAM:  XR CHEST PORT. INDICATION: ? vol overload. COMPARISON: 9/21/2019. FINDINGS:  
A portable AP radiograph of the chest was obtained at 0854 hours. There is a 
pacemaker in the left chest, unchanged in position. Lines and tubes: The patient is on a cardiac monitor. Lungs: There is mild interstitial edema throughout the lungs and atelectasis at 
the lung bases. Pleura: There is a new small right pleural effusion. There are calcified pleural 
plaques. Mediastinum: The cardiac and mediastinal contours and pulmonary vascularity are 
normal. 
Bones and soft tissues: The bones and soft tissues are grossly within normal 
limits. Impression IMPRESSION: Cardiac pacemaker with increased interstitial edema and new small 
right pleural effusion and basilar atelectasis. Belia Larsen MD 
[address] [facility] 
 [email or web address] [place] [facility] [employer] [address], [address], [address] Phone - [phone] Fax - [phone]

## 2019-09-26 NOTE — PROGRESS NOTES
Hospitalist Progress Note NAME: Chanel Laws :  1940 MRN:  [id number] Interim Hospital Summary: 77 y.o. male whom presented on 2019 with Assessment / Plan: 
Acute on chronic systolic HF Cardiogenic shock Non ischemic CM S/P AICD P. Atrial fibrillation Hypotension 
-recent Echo EF 21-25%, No AS, trace MR 
-IV lasix per nephro 
-cont' dobutamine gtt, completed scheduled albumin 
-continue amiodarone and eliquis 
-taken off entresto, coreg and aldactone last admission in August 
 
MARCELL, due to cardiorenal syndrome Hyponatremia Hyperkalemia, resolved NAGMA, resolved Hypokalemia, replete with KCl 
-renal US no hydronephrosis; no prior protenuria 
-cr continues to improve  
-diurese per nephro 
-s/p IV bicarb, kayexalate 
-no urgent need for HD 
-avoid nephrotoxic agents 
-monitor bmp   
-nephro consultation appreciate Hypothyroidism 
-synthroid Gout - allopurinol 18.5 - 24.9 Normal weight / Body mass index is 25.3 kg/m². Code status: DNR Prophylaxis: ELiquis Recommended Disposition: Home w/Family Subjective: Chief Complaint / Reason for Physician Visit Follow up of CHF, weight gain, MARCELL Pt seen, no complaint. Discussed with RN events overnight. Review of Systems: 
Symptom Y/N Comments  Symptom Y/N Comments Fever/Chills n   Chest Pain n   
Poor Appetite    Edema n   
Cough    Abdominal Pain Sputum    Joint Pain SOB/MURILLO n    Pruritis/Rash Nausea/vomit    Tolerating PT/OT Diarrhea    Tolerating Diet Constipation    Other Could NOT obtain due to:   
 
PO intake:  
Patient Vitals for the past 72 hrs: 
 % Diet Eaten  
19 1805 85 %  
19 1333 75 %  
19 1037 75 %  
19 1835 25 % 19 1528 50 % 19 0900 25 % 19 1840 50 % 19 1300 10 % 19 1047 25 % Objective: VITALS:  
 Last 24hrs VS reviewed since prior progress note. Most recent are: 
Patient Vitals for the past 24 hrs: 
 Temp Pulse Resp BP SpO2  
09/26/19 0752 97.4 °F (36.3 °C) (!) 106 18 100/63 95 % 09/26/19 0255 97.7 °F (36.5 °C) (!) 101 16 96/50 92 %  
09/25/19 2341 98 °F (36.7 °C) (!) 105 18 101/56 94 % 09/25/19 1953 98.4 °F (36.9 °C) (!) 101 16 102/73 99 % 09/25/19 1723  (!) 105  98/64   
09/25/19 1447 97.5 °F (36.4 °C) 96 16 93/61 96 %  
09/25/19 1036 97.2 °F (36.2 °C) (!) 102 20 99/56 91 %  
09/25/19 0913  98  94/53  Intake/Output Summary (Last 24 hours) at 9/26/2019 9866 Last data filed at 9/26/2019 2886 Gross per 24 hour Intake 1320 ml Output 2125 ml Net -805 ml PHYSICAL EXAM: 
General: WD, WN. Alert, cooperative, no acute distress   
EENT:  EOMI. Anicteric sclerae. MMM Resp:  Basal rales L>R  No accessory muscle use CV:  Regular  rhythm,  No leg edema GI:  Soft, Non distended, Non tender.  +BS Neurologic:  Alert and oriented X 3, normal speech Psych:   Good insight. Not anxious nor agitated Skin:  No rashes. No jaundice Reviewed most current lab test results and cultures  YES Reviewed most current radiology test results   YES Review and summation of old records today    NO Reviewed patient's current orders and MAR    YES 
PMH/SH reviewed - no change compared to H&P 
________________________________________________________________________ Care Plan discussed with: 
  Comments Patient x Family  x wife RN x Care Manager Consultant Multidiciplinary team rounds were held today with , nursing, pharmacist and clinical coordinator. Patient's plan of care was discussed; medications were reviewed and discharge planning was addressed. ________________________________________________________________________ Total NON critical care TIME:   35  Minutes Total CRITICAL CARE TIME Spent:   Minutes non procedure based Comments >50% of visit spent in counseling and coordination of care x This includes time during multidisciplinary rounds if indicated above  
________________________________________________________________________ Nadia Sacks, MD  
 
Procedures: see electronic medical records for all procedures/Xrays and details which were not copied into this note but were reviewed prior to creation of Plan. LABS: 
I reviewed today's most current labs and imaging studies. Pertinent labs include: 
Recent Labs  
  09/24/19 
0152 WBC 8.4 HGB 11.2* HCT 34.6*  
 Recent Labs  
  09/26/19 
0316 09/25/19 
1526 09/25/19 
0545  09/24/19 
0152 * 133* 135*   < > 129*  
K 2.9* 3.2* 2.8*   < > 5.2*  
CL 98 97 97   < > 97  
CO2 28 26 28   < > 18* GLU 86 149* 84   < > 115* BUN 60* 69* 74*   < > 72* CREA 2.75* 3.20* 3.35*   < > 3.72* CA 8.5 8.1* 8.2*   < > 8.2* MG 2.3 2.1  --   --   --   
PHOS 3.0  --  5.1*  --  7.1* ALB 3.8 4.0 3.9  --  3.4* TBILI  --  1.5*  --   --   --   
SGOT  --  505*  --   --   --   
ALT  --  1,476*  --   --   --   
INR  --   --  2.0*  --   --   
 < > = values in this interval not displayed.

## 2019-09-26 NOTE — PROGRESS NOTES
Problem: Heart Failure: Day 3 Goal: Off Pathway (Use only if patient is Off Pathway) Outcome: Progressing Towards Goal 
Goal: Activity/Safety Outcome: Progressing Towards Goal 
Goal: Diagnostic Test/Procedures Outcome: Progressing Towards Goal 
Goal: Nutrition/Diet Outcome: Progressing Towards Goal 
Goal: Discharge Planning Outcome: Progressing Towards Goal 
Goal: Medications Outcome: Progressing Towards Goal 
Goal: Respiratory Outcome: Progressing Towards Goal 
Goal: Treatments/Interventions/Procedures Outcome: Progressing Towards Goal 
Goal: Psychosocial 
Outcome: Progressing Towards Goal 
Goal: *Oxygen saturation within defined limits Outcome: Progressing Towards Goal 
Goal: *Hemodynamically stable Outcome: Progressing Towards Goal 
Goal: *Optimal pain control at patient's stated goal 
Outcome: Progressing Towards Goal 
Goal: *Anxiety reduced or absent Outcome: Progressing Towards Goal 
Goal: *Demonstrates progressive activity Outcome: Progressing Towards Goal 
  
Problem: Heart Failure: Day 4 Goal: Off Pathway (Use only if patient is Off Pathway) Outcome: Progressing Towards Goal 
Goal: Activity/Safety Outcome: Progressing Towards Goal 
Goal: Diagnostic Test/Procedures Outcome: Progressing Towards Goal 
Goal: Nutrition/Diet Outcome: Progressing Towards Goal 
Goal: Discharge Planning Outcome: Progressing Towards Goal 
Goal: Medications Outcome: Progressing Towards Goal 
Goal: Treatments/Interventions/Procedures Outcome: Progressing Towards Goal

## 2019-09-26 NOTE — PROGRESS NOTES
Bedside shift change report GIVEN TO Elisa Montejo RN. Report included the following information SBAR, Kardex and MAR. SIGNIFICANT CHANGES DURING SHIFT:   
 
 
CONCERNS TO ADDRESS WITH MD:   
 
 
 
 
[facility] NURSING NOTE Admission Date 9/21/2019 Admission Diagnosis Acute on chronic heart failure ([address] [I50.9] Consults IP CONSULT TO CARDIOLOGY 
IP CONSULT TO NEPHROLOGY 
IP CONSULT TO PALLIATIVE CARE - PROVIDER Cardiac Monitoring [x] Yes [] No  
  
Purposeful Hourly Rounding [] Yes   
Froylan Score Total Score: 2 Froylan score 3 or > [x] Bed Alarm [] Avasys [] 1:1 sitter [] Patient refused (Signed refusal form in chart) Arsen Score Arsen Score: 21 Arsen score 14 or < [] PMT consult [] Wound Care consult  
 []  Specialty bed  [] Nutrition consult Influenza Vaccine Received Flu Vaccine for Current Season (usually Sept-March): Yes Oxygen needs? [x] Room air Oxygen @  []1L    []2L    []3L   []4L    []5L   []6L via NC Chronic home O2 use? [] Yes [] No 
Perform O2 challenge test and document in progress note using [employer] (.Home[employer]) Last bowel movement Last Bowel Movement Date: 09/24/19 Urinary Catheter LDAs Peripheral IV 09/21/19 Right Forearm (Active) Site Assessment Clean, dry, & intact 9/26/2019  5:57 AM  
Phlebitis Assessment 0 9/26/2019  5:57 AM  
Infiltration Assessment 0 9/26/2019  5:57 AM  
Dressing Status Clean, dry, & intact 9/26/2019  5:57 AM  
Dressing Type Transparent 9/26/2019  5:57 AM  
Hub Color/Line Status Pink 9/26/2019  5:57 AM  
Alcohol Cap Used Yes 9/21/2019  7:44 PM  
   
Peripheral IV 09/24/19 Left Antecubital (Active) Site Assessment Clean, dry, & intact 9/26/2019  5:57 AM  
Phlebitis Assessment 0 9/26/2019  5:57 AM  
Infiltration Assessment 0 9/26/2019  5:57 AM  
Dressing Status Clean, dry, & intact 9/26/2019  5:57 AM  
Dressing Type Transparent 9/26/2019  5:57 AM  
Hub Color/Line Status Pink 9/26/2019  5:57 AM  
 Readmission Risk Assessment Tool Score Medium Risk [facility] 9 Total Score 4 IP Visits Last 12 Months (1-3=4, 4=9, >4=11) 5 Pt. Coverage (Medicare=5 , Medicaid, or Self-Pay=4) 9 Charlson Comorbidity Score (Age + Comorbid Conditions) Criteria that do not apply:  
 Has Seen PCP in Last 6 Months (Yes=3, No=0) . Living with Significant Other. Assisted Living. LTAC. SNF. or  
Rehab Patient Length of Stay (>5 days = 3) Expected Length of Stay 4d 2h Actual Length of Stay 5

## 2019-09-26 NOTE — PROGRESS NOTES
0700: Bedside shift change report given to Trav Tijerina RN (oncoming nurse) by Becka Douglass RN (offgoing nurse). Report included the following information SBAR and Kardex.

## 2019-09-26 NOTE — CONSULTS
Palliative Medicine Consult Jun: [phone] ([phone]) Patient Name: Doristine Dandy YOB: 1940 Date of Initial Consult: 9/26/19 Reason for Consult: care decisions, CHF bundle Requesting Provider: Abril Matta 
Primary Care Physician: Bhumika Tello MD 
 
 SUMMARY:  
Doristine Dandy is a 77 y.o. with a past history of CHF (EF 21-25% 2019)- follows w/ [facility], Dr Alex Young, ICD in place, a fib, CKD who was admitted on 9/21/2019 from home with worsening SOB, weight gain. Was hospitalized 7/28-8/4/19 w/ CHF exacerbation and a fib w/ RVR. Was medically managed, was on dobutamine ggt while hospitalized. This admission also started on dobutamine ggt as well as IV diuretics. Renal function improving, likely cardiorenal syndrome. No indication for RRT at this time. Current medical issues leading to Palliative Medicine involvement include: care decisions. Social: Pt  to Catherine x 31 years. They both have children from previous relationships who are supportive. PALLIATIVE DIAGNOSES:  
1. Shortness of breath/MURILLO improving 2. LE swelling improving 3. Fatigue 4. Goals of care PLAN:  
1. Meet w/ pt who is alert, oriented and engaging. At baseline able to do all ADLs around the house where he lives w/ wife Catherine, had been getting more SOB recently- improved since admission. Shares that wife Catherine also has some health issues w/ LE amputation, prosthesis - but they can both function okay. 2. Pt dx w/ CHF in the late 1990s per pt, had ICD placed in past. Has been doing okay up until this summer when had hospitalization for CHF exacerbation requiring dobutamine. 3. He understands the inter-relatedness of all organ systems, that his kidneys are likely not functioning as well as they should due to his CHF. This morning he was told that he doesn't need dialysis.  
4. On his own, and w/ his wife who he assigned primary mPOA this summer- has been already thinking about \"what ifs\" in the future- would he even consider short term dialysis. He might, but is uncertain if he would continue if it was thought to be more long term. 5. Thinking about quality of life- important to him. States his mother and 2 of his sons (one who ) all had CHF issues. He fed his mother her last bite of food before she . He does not seem scared or upset when talking about end of life, open to conversation. 6. Confirm DNR status, which he also had in place during the summer hospital stay. Leave copy of DDNR- he thinks that his wife may have it in a folder, otherwise he prefers to sign it w/ her present. She currently has a doctors appt at [facility]. I also leave my card. 7. Following along w/ you. Pt seems at risk for readmission and further decline, tell him so- even if he follows all medical advice completely. He welcomes further visits as we know more about how he will do. Realizes that sometimes pts have to go home on inotropes. 8. Goals clear for recovery from acute issues. Quality of life important to pt. Will have DDNR signed prior to d/c when wife present. 9. Dr Kandi Solis is pt's primary cardiologist. To touch base w/ [facility] about ICD deactivation as CHF continues to progress. 10. Initial consult note routed to primary continuity provider and/or primary health care team members 11. Communicated plan of care with: Palliative [facility] Team 
 
 
ADDENDUM 130pm~ Wife left message for us, states she has a DDNR already. GOALS OF CARE / TREATMENT PREFERENCES:  
 
GOALS OF CARE: 
Patient/Health Care Proxy Stated Goals: Prolong life TREATMENT PREFERENCES:  
Code Status: DNR- to complete Advance Care Planning: 
[] The embraase [facility] Interdisciplinary Team has updated the ACP Navigator with [place] and Patient Capacity Primary Decision Maker: Jessika Griggs ([facility]) - Spouse - [phone] Advance Care Planning 9/21/2019 Confirm Advance Directive Yes, on file Medical Interventions: Limited additional interventions Other: As far as possible, the palliative care team has discussed with patient / health care proxy about goals of care / treatment preferences for patient. HISTORY:  
 
History obtained from: Pt, chart CHIEF COMPLAINT: SOB 
 
HPI/SUBJECTIVE: The patient is:  
[x] Verbal and participatory [] Non-participatory due to:  
 
Pt w/ shortness of breath when walking, improved significantly since admission, as has swelling. No pain. Appetite okay. Clinical Pain Assessment (nonverbal scale for severity on nonverbal patients):  
Clinical Pain Assessment Severity: 0 Duration: for how long has pt been experiencing pain (e.g., 2 days, 1 month, years) Frequency: how often pain is an issue (e.g., several times per day, once every few days, constant) FUNCTIONAL ASSESSMENT:  
 
Palliative Performance Scale (PPS): PPS: 50 PSYCHOSOCIAL/SPIRITUAL SCREENING:  
 
Palliative IDT has assessed this patient for cultural preferences / practices and a referral made as appropriate to needs (Cultural Services, Patient Advocacy, Ethics, etc.) Any spiritual / Yazdanism concerns: 
[] Yes /  [x] No 
 
Caregiver Burnout: 
[] Yes /  [x] No /  [] No Caregiver Present Anticipatory grief assessment:  
[x] Normal  / [] Maladaptive ESAS Anxiety: Anxiety: 0 
 
ESAS Depression: Depression: 0 REVIEW OF SYSTEMS:  
 
Positive and pertinent negative findings in ROS are noted above in HPI. The following systems were [x] reviewed / [] unable to be reviewed as noted in HPI Other findings are noted below. Systems: constitutional, ears/nose/mouth/throat, respiratory, gastrointestinal, genitourinary, musculoskeletal, integumentary, neurologic, psychiatric, endocrine. Positive findings noted below. Modified ESAS Completed by: provider Fatigue: 4 Drowsiness: 0 Depression: 0 Pain: 0 Anxiety: 0 Nausea: 0 Anorexia: 0 Dyspnea: 2 Stool Occurrence(s): 1 PHYSICAL EXAM:  
 
From RN flowsheet: 
Wt Readings from Last 3 Encounters:  
09/26/19 171 lb 4.8 oz (77.7 kg) 09/12/19 174 lb 2.6 oz (79 kg) 08/26/19 166 lb (75.3 kg) Blood pressure 91/65, pulse (!) 104, temperature 97.4 °F (36.3 °C), resp. rate 18, height 5' 9\" (1.753 m), weight 171 lb 4.8 oz (77.7 kg), SpO2 95 %. Pain Scale 1: Numeric (0 - 10) Pain Intensity 1: 0 Pain Location 1: Rib cage Pain Orientation 1: Lower, Right Pain Description 1: Aching Pain Intervention(s) 1: Medication (see MAR) Last bowel movement, if known:  
 
Constitutional: awake, alert, oriented, NAD Eyes: pupils equal, anicteric ENMT: no nasal discharge, moist mucous membranes Cardiovascular: trace LE edema Respiratory: breathing not labored Musculoskeletal: no deformity, no tenderness to palpation Skin: warm, dry Neurologic: following commands, moving all extremities HISTORY:  
 
Principal Problem: 
  Acute on chronic heart failure ([address] (9/21/2019) Active Problems: 
  CHF (congestive heart failure) ([address] (7/28/2019) AICD (automatic cardioverter/defibrillator) present (9/21/2019) Other hyperlipidemia (9/21/2019) Chronic renal insufficiency, stage 3 (moderate) (HCC) (9/21/2019) Acute on chronic renal insufficiency (9/21/2019) Acquired hypothyroidism (9/21/2019) Cardiomyopathy, dilated, nonischemic ([address] (9/21/2019) Gout (9/21/2019) History of atrial fibrillation (9/21/2019) Pleural plaque (9/21/2019) Past Medical History:  
Diagnosis Date  A-fib ([address]  AICD (automatic cardioverter/defibrillator) present  Arthritis  Cancer ([address] skin  CKD (chronic kidney disease)  Heart failure ([address]  Other ill-defined conditions(799.89)   
 high cholesterol  V tach ([address] Past Surgical History:  
Procedure Laterality Date  ABDOMEN SURGERY PROC UNLISTED  6/2/11  
 colon resection  HX CATARACT REMOVAL Left  HX HEENT    
 repaired right eardrum  HX OTHER SURGICAL    
 benign cyst removed from back and thyroid  HX OTHER SURGICAL    
 skin graft  HX PACEMAKER    
 aicd  MS COLONOSCOPY FLX DX W/COLLJ SPEC WHEN PFRMD  6/6/2012  MS COLONOSCOPY W/BIOPSY SINGLE/MULTIPLE  4/27/2011 Family History Problem Relation Age of Onset  Cancer Mother   
     colon  Heart Disease Father  Heart Disease Brother History reviewed, no pertinent family history. Social History Tobacco Use  Smoking status: Former Smoker  Smokeless tobacco: Never Used Substance Use Topics  Alcohol use: Not Currently Alcohol/week: 4.2 standard drinks Types: 5 Glasses of wine per week No Known Allergies Current Facility-Administered Medications Medication Dose Route Frequency  potassium chloride SR (KLOR-CON 10) tablet 40 mEq  40 mEq Oral NOW  spironolactone (ALDACTONE) tablet 25 mg  25 mg Oral DAILY  lactulose (CHRONULAC) 10 gram/15 mL solution 45 mL  30 g Oral PRN  
 DOBUTamine (DOBUTREX) 1,000 mg/250 mL (4,000 mcg/mL) infusion  7.5 mcg/kg/min IntraVENous CONTINUOUS  
 amiodarone (CORDARONE) tablet 200 mg  200 mg Oral DAILY  furosemide (LASIX) injection 120 mg  120 mg IntraVENous BID  levothyroxine (SYNTHROID) tablet 100 mcg  100 mcg Oral 6am  
 simethicone (MYLICON) tablet 80 mg  80 mg Oral Q6H PRN  
 traZODone (DESYREL) tablet 25 mg  25 mg Oral QHS  allopurinol (ZYLOPRIM) tablet 200 mg  200 mg Oral DAILY  therapeutic multivitamin (THERAGRAN) tablet 1 Tab  1 Tab Oral DAILY  loratadine (CLARITIN) tablet 10 mg  10 mg Oral DAILY  pravastatin (PRAVACHOL) tablet 80 mg  80 mg Oral QHS  sodium chloride (NS) flush 5-40 mL  5-40 mL IntraVENous Q8H  
 sodium chloride (NS) flush 5-40 mL  5-40 mL IntraVENous PRN  
  acetaminophen (TYLENOL) tablet 500 mg  500 mg Oral Q4H PRN  
 HYDROcodone-acetaminophen (NORCO) 5-325 mg per tablet 1 Tab  1 Tab Oral Q6H PRN  
 naloxone (NARCAN) injection 0.4 mg  0.4 mg IntraVENous PRN  
 ondansetron (ZOFRAN) injection 2 mg  2 mg IntraVENous Q6H PRN  
 bisacodyl (DULCOLAX) tablet 5 mg  5 mg Oral DAILY PRN  
 melatonin tablet 3 mg  3 mg Oral QHS PRN  
 
 
 
 LAB AND IMAGING FINDINGS:  
 
Lab Results Component Value Date/Time WBC 8.4 09/24/2019 01:52 AM  
 HGB 11.2 (L) 09/24/2019 01:52 AM  
 PLATELET 718 89/12/0434 01:52 AM  
 
Lab Results Component Value Date/Time Sodium 135 (L) 09/26/2019 03:16 AM  
 Potassium 2.9 (L) 09/26/2019 03:16 AM  
 Chloride 98 09/26/2019 03:16 AM  
 CO2 28 09/26/2019 03:16 AM  
 BUN 60 (H) 09/26/2019 03:16 AM  
 Creatinine 2.75 (H) 09/26/2019 03:16 AM  
 Calcium 8.5 09/26/2019 03:16 AM  
 Magnesium 2.3 09/26/2019 03:16 AM  
 Phosphorus 3.0 09/26/2019 03:16 AM  
  
Lab Results Component Value Date/Time AST (SGOT) 505 (H) 09/25/2019 03:26 PM  
 Alk. phosphatase 196 (H) 09/25/2019 03:26 PM  
 Protein, total 6.7 09/25/2019 03:26 PM  
 Albumin 3.8 09/26/2019 03:16 AM  
 Globulin 2.7 09/25/2019 03:26 PM  
 
Lab Results Component Value Date/Time INR 2.0 (H) 09/25/2019 05:45 AM  
 Prothrombin time 19.8 (H) 09/25/2019 05:45 AM  
 aPTT 31.4 05/24/2011 12:32 PM  
  
No results found for: IRON, FE, TIBC, IBCT, PSAT, FERR No results found for: PH, PCO2, PO2 No components found for: [place] No results found for: CPK, CKMB Total time: 70 min Counseling / coordination time, spent as noted above: 50 min  
> 50% counseling / coordination?: yes Prolonged service was provided for  []30 min   []75 min in face to face time in the presence of the patient, spent as noted above. Time Start:  
Time End:  
Note: this can only be billed with 96906 (initial) or 84399 (follow up). If multiple start / stop times, list each separately.

## 2019-09-26 NOTE — PROGRESS NOTES
FRANCINE: Home with [facility] and f/u appts Pt as been approved for a [facility] visit through [facility]. Added to AVS. SUSU Correa Care Manager [phone]

## 2019-09-26 NOTE — PROGRESS NOTES
Progress Note 9/26/2019 7:48 AM 
NAME: Clair Armando MRN:  [id number] Admit Diagnosis: Acute on chronic heart failure ([facility] [I50.9] Problem List: 1. Acute on chronic systolic heart failure 2. Acute on chronic renal insufficiency; Stg 3 
3. Dilated NICM; EF 25%. Gearl Muse 7/18 w/ EF 20%, dil LV, nml RV, mild MR/TR 4. Persistent AFib s/p VIGNESH/DCCV 8/5/19 5. Remote ICD implantation 6. Recurrent ventricular tachycardia 7. Hyperlipidemia 8. Former smoker 9. DNR Assessment/Plan:  
Last 3 Recorded Weights in this Encounter  
 09/24/19 0146 09/25/19 0257 09/26/19 0255 Weight: 78.3 kg (172 lb 9.9 oz) 77.6 kg (171 lb 1.6 oz) 77.7 kg (171 lb 4.8 oz) Weight is 10# less than @ last discharge 
 
sCr down to 2.8 
K 2.8 Not convinced he's not in AFib Need to consider restoring sinus Continue oral amio 200mg daily; increase if back in AF 
HOLD eliquis 5mg BID in case of HD need Diuresis per renal; UOP better; 120mg IV lasix BID for now Replete K Continue dobutamine 7.5mcg, albumin Renal consult appreciated Continue statin Cardiac rehab at discharge Will need to consider home inotropes? May need to consider pall care consult pending course Holding prior Entresto, coreg, aldactone with marginal/low BPs [x]       High complexity decision making was performed in this patient at high risk for decompensation with multiple organ involvement. Subjective:  
 
Clair Armando denies chest pain. Breathing better. Discussed with RN events overnight. Review of Systems: 
 
Symptom Y/N Comments  Symptom Y/N Comments Fever/Chills N   Chest Pain N Poor Appetite N   Edema N   
Cough N   Abdominal Pain N Sputum N   Joint Pain N   
SOB/MURILLO N   Pruritis/Rash N   
Nausea/vomit N   Tolerating PT/OT Y Diarrhea N   Tolerating Diet Y Constipation N   Other Could NOT obtain due to:   
 
Objective:  
  
Physical Exam: Last 24hrs VS reviewed since prior progress note. Most recent are: 
 
Visit Vitals BP 96/50 Pulse (!) 101 Temp 97.7 °F (36.5 °C) Resp 16 Ht 5' 9\" (1.753 m) Wt 77.7 kg (171 lb 4.8 oz) SpO2 92% BMI 25.30 kg/m² Intake/Output Summary (Last 24 hours) at 9/26/2019 4334 Last data filed at 9/26/2019 2037 Gross per 24 hour Intake 1320 ml Output 2125 ml Net -805 ml General Appearance: Well developed, well nourished, alert & oriented x 3,  
 no acute distress. Ears/Nose/Mouth/Throat: Hearing grossly normal. 
Neck: Supple. Chest: Lungs clear to auscultation bilaterally. Cardiovascular: Regular rate and rhythm, S1S2 normal, no murmur. Abdomen: Soft, non-tender, bowel sounds are active. Extremities: No edema bilaterally. Skin: Warm and dry. []         Post-cath site without hematoma, bruit, tenderness, or thrill. Distal pulses intact. PMH/SH reviewed - no change compared to H&P Data Review Telemetry: ? Afib  
 
EKG:  
[x]  No new EKG for review Lab Data Personally Reviewed: 
 
Recent Labs  
  09/24/19 
5750 WBC 8.4 HGB 11.2* HCT 34.6*  
 Recent Labs  
  09/25/19 
0545 INR 2.0*  
PTP 19.8* Recent Labs  
  09/26/19 
0316 09/25/19 
1526 09/25/19 
0545 * 133* 135*  
K 2.9* 3.2* 2.8*  
CL 98 97 97 CO2 28 26 28 BUN 60* 69* 74* CREA 2.75* 3.20* 3.35* GLU 86 149* 84  
CA 8.5 8.1* 8.2* MG 2.3 2.1  -- No results for input(s): CPK, CKNDX, TROIQ in the last 72 hours. No lab exists for component: CPKMB No results found for: CHOL, CHOLX, CHLST, CHOLV, HDL, HDLP, LDL, LDLC, DLDLP, TGLX, TRIGL, TRIGP, CHHD, CHHDX Recent Labs  
  09/26/19 
0316 09/25/19 
1526 09/25/19 
0545 SGOT  --  505*  --   
AP  --  196*  --   
TP  --  6.7  --   
ALB 3.8 4.0 3.9 GLOB  --  2.7  -- No results for input(s): PH, PCO2, PO2 in the last 72 hours. Medications Personally Reviewed: 
 
Current Facility-Administered Medications Medication Dose Route Frequency  potassium chloride 10 mEq in 100 ml IVPB  10 mEq IntraVENous Q2H  
 lactulose (CHRONULAC) 10 gram/15 mL solution 45 mL  30 g Oral PRN  
 DOBUTamine (DOBUTREX) 1,000 mg/250 mL (4,000 mcg/mL) infusion  7.5 mcg/kg/min IntraVENous CONTINUOUS  
 amiodarone (CORDARONE) tablet 200 mg  200 mg Oral DAILY  furosemide (LASIX) injection 120 mg  120 mg IntraVENous BID  levothyroxine (SYNTHROID) tablet 100 mcg  100 mcg Oral 6am  
 simethicone (MYLICON) tablet 80 mg  80 mg Oral Q6H PRN  
 traZODone (DESYREL) tablet 25 mg  25 mg Oral QHS  allopurinol (ZYLOPRIM) tablet 200 mg  200 mg Oral DAILY  therapeutic multivitamin (THERAGRAN) tablet 1 Tab  1 Tab Oral DAILY  loratadine (CLARITIN) tablet 10 mg  10 mg Oral DAILY  pravastatin (PRAVACHOL) tablet 80 mg  80 mg Oral QHS  sodium chloride (NS) flush 5-40 mL  5-40 mL IntraVENous Q8H  
 sodium chloride (NS) flush 5-40 mL  5-40 mL IntraVENous PRN  
 acetaminophen (TYLENOL) tablet 500 mg  500 mg Oral Q4H PRN  
 HYDROcodone-acetaminophen (NORCO) 5-325 mg per tablet 1 Tab  1 Tab Oral Q6H PRN  
 naloxone (NARCAN) injection 0.4 mg  0.4 mg IntraVENous PRN  
 ondansetron (ZOFRAN) injection 2 mg  2 mg IntraVENous Q6H PRN  
 bisacodyl (DULCOLAX) tablet 5 mg  5 mg Oral DAILY PRN  
 melatonin tablet 3 mg  3 mg Oral QHS PRN Irven Cost III, DO

## 2019-09-27 NOTE — PROGRESS NOTES
0700: Bedside shift change report given to Tung Nguyen RN (oncoming nurse) by Russel Morillo RN (offgoing nurse). Report included the following information SBAR and ED Summary.

## 2019-09-27 NOTE — PROGRESS NOTES
[facility] INTERDISCIPLINARY ROUNDS Cardiopulmonary Care Interdisciplinary Rounds were held today to discuss patient's plan of care and outcomes. The following members were present: NP/Physician, Pharmacy, Nursing and Case Management. Expected Length of Stay:  4d 2h 
 
PLAN OF CARE:  
Continue current treatment plan

## 2019-09-27 NOTE — PROGRESS NOTES
Nephrology Progress Note Tung Steven  
 
[facility]. [facility]                  Phone - [phone] Patient: Pedrito Party Date- 9/27/2019 Admit Date: 9/21/2019 YOB: 1940 CC: Follow up for ACUTE  kidney injury Subjective: Interval History:  
- Pt says he is feeling much better. Breathing is almost \"back to normal\". He does c/o abd \"gas\" but o/w feels well. He has required very high doses of KCl, but K finally up to 3.7 today He remains on a dobutamine drip. Renal function improving. Input 1140 cc         Output 1825 cc ROS:- as above, plus: no fever/chills. No HEENT complaints. No chest pain. No N/V. No joint pain. No skin rashes. Assessment & Plan: MARCELL  likely CARDIORENAL SYNDROME + ATN due to RENAL hypoperfusion with hypotension. No hydro on renal usg Renal function improving. Bun/creat down to 48/2.4 on 9/27. Continue dobutamine gtt Continue current dose of lasix. Aldactone just added. Albumin prn 
check bmp in am 
 
 CHF 
ON dobutamine gtt Hypotension.- likely due to cardiogenic shock Hypokalemia Aldactone just started. More KCL ordered. Daily labs Hyperkalemia, resolved. Hyponatremia likely due to chf 
Improved Chronic kidney disease stage III, baseline creatinine 1.5. 
 
  history of atrial fibrillation and ventricular tachycardia. Physical Exam:  
GEN:  Elderly man in no distress NECK:  Supple, no thyromegaly RESP: diminished breath sounds with a few scattered wheezes CVS: RRR,S1,S2 ABDO:  soft , non tender, No mass NEURO: non focal, normal speech EXT: no edema Psych: normal affect and mood. Skin: no rashes or jaundice Care Plan discussed with: patient, wife Objective:  
Visit Vitals /69 (BP 1 Location: Right arm, BP Patient Position: At rest) Pulse 97 Temp 98.4 °F (36.9 °C) Resp 18 Ht 5' 9\" (1.753 m) Wt 77.5 kg (170 lb 14.4 oz) SpO2 99% BMI 25.24 kg/m² Last 3 Recorded Weights in this Encounter  
 09/25/19 0257 09/26/19 0255 09/27/19 0692 Weight: 77.6 kg (171 lb 1.6 oz) 77.7 kg (171 lb 4.8 oz) 77.5 kg (170 lb 14.4 oz) 09/25 1901 - 09/27 0700 In: 1377.1 [P.O.:980; I.V.:397.1] Out: 3125 [[id number]:[id number]] Intake/Output Summary (Last 24 hours) at 9/27/2019 1524 Last data filed at 9/27/2019 1439 Gross per 24 hour Intake 500 ml Output 1850 ml Net -1350 ml Chart reviewed. Pertinent Notes reviewed. Medication list  reviewed Current Facility-Administered Medications Medication  apixaban (ELIQUIS) tablet 5 mg  potassium chloride SR (KLOR-CON 10) tablet 40 mEq  spironolactone (ALDACTONE) tablet 25 mg  
 lactulose (CHRONULAC) 10 gram/15 mL solution 45 mL  DOBUTamine (DOBUTREX) 1,000 mg/250 mL (4,000 mcg/mL) infusion  amiodarone (CORDARONE) tablet 200 mg  furosemide (LASIX) injection 120 mg  
 levothyroxine (SYNTHROID) tablet 100 mcg  simethicone (MYLICON) tablet 80 mg  
 traZODone (DESYREL) tablet 25 mg  
 allopurinol (ZYLOPRIM) tablet 200 mg  therapeutic multivitamin (THERAGRAN) tablet 1 Tab  loratadine (CLARITIN) tablet 10 mg  
 pravastatin (PRAVACHOL) tablet 80 mg  
 sodium chloride (NS) flush 5-40 mL  sodium chloride (NS) flush 5-40 mL  acetaminophen (TYLENOL) tablet 500 mg  
 HYDROcodone-acetaminophen (NORCO) 5-325 mg per tablet 1 Tab  naloxone (NARCAN) injection 0.4 mg  
 ondansetron (ZOFRAN) injection 2 mg  bisacodyl (DULCOLAX) tablet 5 mg  melatonin tablet 3 mg Data Review : 
Recent Labs  
  09/27/19 
0302 09/26/19 
0316 09/25/19 
1526 09/25/19 
0545 * 135* 133* 135* K 3.7 2.9* 3.2* 2.8*  
CL 98 98 97 97 CO2 29 28 26 28 BUN 48* 60* 69* 74* CREA 2.44* 2.75* 3.20* 3.35* * 86 149* 84  
CA 8.4* 8.5 8.1* 8.2* MG  --  2.3 2.1  --   
PHOS 2.9 3.0  --  5.1* Recent Labs  
  09/27/19 
0302 WBC 6.3 HGB 10.9* HCT 33.9*  
 * No results for input(s): FE, TIBC, PSAT, FERR in the last 72 hours. No results for input(s): CPK, CKNDX, TROIQ in the last 72 hours. No lab exists for component: CPKMB Lab Results Component Value Date/Time Color YELLOW/STRAW 07/28/2019 11:44 PM  
 Appearance CLEAR 07/28/2019 11:44 PM  
 Specific gravity 1.021 07/28/2019 11:44 PM  
 pH (UA) 5.0 07/28/2019 11:44 PM  
 Protein NEGATIVE  07/28/2019 11:44 PM  
 Glucose NEGATIVE  07/28/2019 11:44 PM  
 Ketone NEGATIVE  07/28/2019 11:44 PM  
 Bilirubin NEGATIVE  07/28/2019 11:44 PM  
 Urobilinogen 0.2 07/28/2019 11:44 PM  
 Nitrites NEGATIVE  07/28/2019 11:44 PM  
 Leukocyte Esterase NEGATIVE  07/28/2019 11:44 PM  
 Epithelial cells FEW 07/28/2019 11:44 PM  
 Bacteria 1+ (A) 07/28/2019 11:44 PM  
 WBC 0-4 07/28/2019 11:44 PM  
 RBC 0-5 07/28/2019 11:44 PM  
 
Lab Results Component Value Date/Time Culture result: NO GROWTH 1 DAY 07/28/2019 11:44 PM  
 Culture result: NO GROWTH 5 DAYS 07/28/2019 12:20 PM  
 Culture result: NO GROWTH 6 DAYS 07/27/2019 09:27 PM  
 
No results found for: SDES Lab Results Component Value Date/Time Sodium,urine random 14 09/22/2019 03:37 PM  
 Creatinine, urine 166.00 09/22/2019 03:37 PM  
 
 
Results from Hospital Encounter encounter on 09/21/19 XR CHEST PORT Narrative EXAM:  XR CHEST PORT. INDICATION: ? vol overload. COMPARISON: 9/21/2019. FINDINGS:  
A portable AP radiograph of the chest was obtained at 0854 hours. There is a 
pacemaker in the left chest, unchanged in position. Lines and tubes: The patient is on a cardiac monitor. Lungs: There is mild interstitial edema throughout the lungs and atelectasis at 
the lung bases. Pleura: There is a new small right pleural effusion. There are calcified pleural 
plaques. Mediastinum: The cardiac and mediastinal contours and pulmonary vascularity are 
normal. 
Bones and soft tissues: The bones and soft tissues are grossly within normal 
limits. Impression IMPRESSION: Cardiac pacemaker with increased interstitial edema and new small 
right pleural effusion and basilar atelectasis. Alvino Huston MD 
[facility] 
 www. [facility] [employer] [place] [address], [address] [place], [address] Phone - [phone] Fax - [phone]

## 2019-09-27 NOTE — PROGRESS NOTES
Hospitalist Progress Note NAME: Marcus Pearson :  1940 MRN:  [id number] Interim Hospital Summary: 77 y.o. male whom presented on 2019 with Assessment / Plan: 
Acute on chronic systolic HF Cardiogenic shock Non ischemic CM S/P AICD P. Atrial fibrillation Hypotension 
-recent Echo EF 21-25%, No AS, trace MR 
-IV lasix per nephro 
-cont' dobutamine gtt 
-cont' aldactone 
-continue amiodarone and eliquis 
- entresto, coreg MARCELL, due to cardiorenal syndrome, cr improving Hyponatremia, resolved Hyperkalemia, resolved NAGMA, resolved Hypokalemia, replete prn 
-renal US no hydronephrosis; no prior protenuria 
-diurese per nephro 
-s/p IV bicarb, kayexalate 
-no urgent need for HD 
-avoid nephrotoxic agents, monitor bmp   
-nephro consultation appreciate Hypothyroidism 
-synthroid Gout - allopurinol 18.5 - 24.9 Normal weight / Body mass index is 25.24 kg/m². Code status: DNR Prophylaxis: ELiquis Recommended Disposition: Home w/Family Subjective: Chief Complaint / Reason for Physician Visit Follow up of CHF, weight gain, MARCELL Pt seen, NAD. Discussed with RN events overnight. Review of Systems: 
Symptom Y/N Comments  Symptom Y/N Comments Fever/Chills n   Chest Pain n   
Poor Appetite    Edema n   
Cough    Abdominal Pain Sputum    Joint Pain SOB/MURILLO n    Pruritis/Rash Nausea/vomit    Tolerating PT/OT Diarrhea    Tolerating Diet Constipation    Other Could NOT obtain due to:   
 
PO intake:  
Patient Vitals for the past 72 hrs: 
 % Diet Eaten  
19 1324 25 % 19 0912 75 %  
19 1805 85 %  
19 1333 75 %  
19 1037 75 %  
19 1835 25 % 19 1528 50 % 19 0900 25 % Objective: VITALS:  
Last 24hrs VS reviewed since prior progress note. Most recent are: 
Patient Vitals for the past 24 hrs: 
 Temp Pulse Resp BP SpO2 09/27/19 0256 97.5 °F (36.4 °C) (!) 105 18 94/66 97 % 09/26/19 2311 98.7 °F (37.1 °C) (!) 103 18 91/54 97 % 09/26/19 2030 97.3 °F (36.3 °C) (!) 107 18 99/66 99 % 09/26/19 1728    94/62   
09/26/19 1533 98 °F (36.7 °C) 100 18 100/62 94 % 09/26/19 1229  97  96/65   
09/26/19 1050 98.5 °F (36.9 °C) (!) 109 20 90/63 98 %  
09/26/19 0912  (!) 104  91/65  Intake/Output Summary (Last 24 hours) at 9/27/2019 0655 Last data filed at 9/27/2019 0805 Gross per 24 hour Intake 1137.13 ml Output 1825 ml Net -687.87 ml PHYSICAL EXAM: 
General: WD, WN. Alert, cooperative, no acute distress   
EENT:  EOMI. Anicteric sclerae. MMM Resp:  Basal rales L>R  No accessory muscle use CV:  Regular  rhythm,  No leg edema GI:  Soft, Non distended, Non tender.  +BS Neurologic:  Alert and oriented X 3, normal speech Psych:   Good insight. Not anxious nor agitated Skin:  No rashes. No jaundice Reviewed most current lab test results and cultures  YES Reviewed most current radiology test results   YES Review and summation of old records today    NO Reviewed patient's current orders and MAR    YES 
PMH/SH reviewed - no change compared to H&P 
________________________________________________________________________ Care Plan discussed with: 
  Comments Patient x Family  x wife RN x Care Manager Consultant Multidiciplinary team rounds were held today with , nursing, pharmacist and clinical coordinator. Patient's plan of care was discussed; medications were reviewed and discharge planning was addressed. ________________________________________________________________________ Total NON critical care TIME:   35  Minutes Total CRITICAL CARE TIME Spent:   Minutes non procedure based Comments >50% of visit spent in counseling and coordination of care x This includes time during multidisciplinary rounds if indicated above ________________________________________________________________________ Danielle Head MD  
 
Procedures: see electronic medical records for all procedures/Xrays and details which were not copied into this note but were reviewed prior to creation of Plan. LABS: 
I reviewed today's most current labs and imaging studies. Pertinent labs include: 
Recent Labs  
  09/27/19 0302 WBC 6.3 HGB 10.9* HCT 33.9*  
* Recent Labs  
  09/27/19 
0302 09/26/19 
0316 09/25/19 
1526 09/25/19 
0545 * 135* 133* 135* K 3.7 2.9* 3.2* 2.8*  
CL 98 98 97 97 CO2 29 28 26 28 * 86 149* 84 BUN 48* 60* 69* 74* CREA 2.44* 2.75* 3.20* 3.35* CA 8.4* 8.5 8.1* 8.2* MG  --  2.3 2.1  --   
PHOS 2.9 3.0  --  5.1* ALB 3.7 3.8 4.0 3.9 TBILI  --   --  1.5*  --   
SGOT  --   --  505*  --   
ALT  --   --  1,476*  --   
INR  --   --   --  2.0*

## 2019-09-27 NOTE — PROGRESS NOTES
Problem: Falls - Risk of 
Goal: *Absence of Falls Description Document Gisele Patches Fall Risk and appropriate interventions in the flowsheet. 9/26/2019 2220 by Nevaeh Brooks RN Outcome: Progressing Towards Goal 
Note:  
Fall Risk Interventions: 
Mobility Interventions: Bed/chair exit alarm, Communicate number of staff needed for ambulation/transfer, Mechanical lift, OT consult for ADLs, Patient to call before getting OOB, PT Consult for mobility concerns, PT Consult for assist device competence Medication Interventions: Assess postural VS orthostatic hypotension, Bed/chair exit alarm, Evaluate medications/consider consulting pharmacy, Patient to call before getting OOB, Teach patient to arise slowly, Utilize gait belt for transfers/ambulation History of Falls Interventions: Bed/chair exit alarm, Consult care management for discharge planning, Door open when patient unattended, Evaluate medications/consider consulting pharmacy, Investigate reason for fall, Room close to nurse's station, Utilize gait belt for transfer/ambulation, Assess for delayed presentation/identification of injury for 48 hrs (comment for end date) 9/26/2019 2218 by Nevaeh Brooks RN Outcome: Progressing Towards Goal 
Note:  
Fall Risk Interventions: 
Mobility Interventions: Bed/chair exit alarm, Communicate number of staff needed for ambulation/transfer, Mechanical lift, OT consult for ADLs, Patient to call before getting OOB, PT Consult for mobility concerns, PT Consult for assist device competence Medication Interventions: Assess postural VS orthostatic hypotension, Bed/chair exit alarm, Evaluate medications/consider consulting pharmacy, Patient to call before getting OOB, Teach patient to arise slowly, Utilize gait belt for transfers/ambulation History of Falls Interventions: Bed/chair exit alarm, Consult care management for discharge planning, Door open when patient unattended, Evaluate medications/consider consulting pharmacy, Investigate reason for fall, Room close to nurse's station, Utilize gait belt for transfer/ambulation, Assess for delayed presentation/identification of injury for 48 hrs (comment for end date) Problem: Patient Education: Go to Patient Education Activity Goal: Patient/Family Education 9/26/2019 2220 by Vida Fernandez RN Outcome: Progressing Towards Goal 
9/26/2019 2218 by Vida Fernandez RN Outcome: Progressing Towards Goal 
  
Problem: Heart Failure: Day 5 Goal: Off Pathway (Use only if patient is Off Pathway) Outcome: Progressing Towards Goal 
Goal: Activity/Safety Outcome: Progressing Towards Goal 
Goal: Diagnostic Test/Procedures Outcome: Progressing Towards Goal 
Goal: Nutrition/Diet Outcome: Progressing Towards Goal 
Goal: Discharge Planning Outcome: Progressing Towards Goal 
Goal: Medications Outcome: Progressing Towards Goal 
Goal: Respiratory Outcome: Progressing Towards Goal 
Goal: Treatments/Interventions/Procedures Outcome: Progressing Towards Goal 
Goal: Psychosocial 
Outcome: Progressing Towards Goal 
  
Problem: Acute Renal Failure: Day 5 Goal: Off Pathway (Use only if patient is Off Pathway) Outcome: Progressing Towards Goal 
Goal: Activity/Safety Outcome: Progressing Towards Goal 
Goal: Diagnostic Test/Procedures Outcome: Progressing Towards Goal 
Goal: Nutrition/Diet Outcome: Progressing Towards Goal 
Goal: Discharge Planning Outcome: Progressing Towards Goal 
Goal: Medications Outcome: Progressing Towards Goal 
Goal: Respiratory Outcome: Progressing Towards Goal 
Goal: Treatments/Interventions/Procedures Outcome: Progressing Towards Goal 
Goal: Psychosocial 
Outcome: Progressing Towards Goal 
Goal: *Optimal pain control at patient's stated goal 
Outcome: Progressing Towards Goal 
Goal: *Urinary output within identified parameters Outcome: Progressing Towards Goal 
Goal: *Hemodynamically stable Outcome: Progressing Towards Goal 
Goal: *Tolerating diet Outcome: Progressing Towards Goal 
Goal: *Lab values improving Outcome: Progressing Towards Goal

## 2019-09-27 NOTE — PROGRESS NOTES
Problem: Falls - Risk of 
Goal: *Absence of Falls Description Document Lalitha Garcia Fall Risk and appropriate interventions in the flowsheet. Outcome: Progressing Towards Goal 
Note:  
Fall Risk Interventions: 
Mobility Interventions: Bed/chair exit alarm, Communicate number of staff needed for ambulation/transfer, Mechanical lift, OT consult for ADLs, Patient to call before getting OOB, PT Consult for mobility concerns, PT Consult for assist device competence Medication Interventions: Assess postural VS orthostatic hypotension, Bed/chair exit alarm, Evaluate medications/consider consulting pharmacy, Patient to call before getting OOB, Teach patient to arise slowly, Utilize gait belt for transfers/ambulation History of Falls Interventions: Bed/chair exit alarm, Consult care management for discharge planning, Door open when patient unattended, Evaluate medications/consider consulting pharmacy, Investigate reason for fall, Room close to nurse's station, Utilize gait belt for transfer/ambulation, Assess for delayed presentation/identification of injury for 48 hrs (comment for end date) Problem: Patient Education: Go to Patient Education Activity Goal: Patient/Family Education Outcome: Progressing Towards Goal 
  
Problem: Acute Renal Failure: Day 5 Goal: Off Pathway (Use only if patient is Off Pathway) Outcome: Progressing Towards Goal 
Goal: Activity/Safety Outcome: Progressing Towards Goal 
Goal: Diagnostic Test/Procedures Outcome: Progressing Towards Goal 
Goal: Nutrition/Diet Outcome: Progressing Towards Goal 
Goal: Discharge Planning Outcome: Progressing Towards Goal 
Goal: Medications Outcome: Progressing Towards Goal 
Goal: Respiratory Outcome: Progressing Towards Goal 
Goal: Treatments/Interventions/Procedures Outcome: Progressing Towards Goal 
Goal: Psychosocial 
Outcome: Progressing Towards Goal 
Goal: *Optimal pain control at patient's stated goal 
 Outcome: Progressing Towards Goal 
Goal: *Urinary output within identified parameters Outcome: Progressing Towards Goal 
Goal: *Hemodynamically stable Outcome: Progressing Towards Goal 
Goal: *Tolerating diet Outcome: Progressing Towards Goal 
Goal: *Lab values improving Outcome: Progressing Towards Goal

## 2019-09-27 NOTE — CARDIO/PULMONARY
Cardiac Rehab Note: chart review Per Dr. Debbie Padgett progress note entry recommended Cardiac Rehab on discharge. CHF BUNDLE 
 
EF 25%  on 8/5/19 per echo. Current inpatient diet: DIET CARDIAC This was a follow-up visit to answer questions and reinforce prior teaching re: CHF, S&Ss, medication management, Low NA diet, daily weights and when to call the doctor and to enroll pt in out-patient Cardiac Rehab. Pt intake appt scheduled for 11/19/19 @1:00pm waiting the Medicare required length of time to enroll in the program.  Pt provided with orientation packet in ad verbal instruction on the program.  
 
Loyd Buerger verbalized understanding with questions answered.   Damon Driscoll RN

## 2019-09-27 NOTE — PROGRESS NOTES
Progress Note 9/27/2019 7:48 AM 
NAME: Tami Onofre MRN:  [id number] Admit Diagnosis: Acute on chronic heart failure ([facility] [address] [I50.9] Problem List: 1. Acute on chronic systolic heart failure 2. Acute on chronic renal insufficiency; Stg 3 
3. Cardiogenic shock 4. Dilated NICM; EF 25%. Bearl Lover 7/18 w/ EF 20%, dil LV, nml RV, mild MR/TR 5. Persistent AFib s/p VIGNESH/DCCV 8/5/19 6. Remote ICD implantation 7. Recurrent ventricular tachycardia 8. Hyperlipidemia 9. Former smoker 10. DNR Assessment/Plan:  
Last 3 Recorded Weights in this Encounter  
 09/25/19 9214 09/26/19 0255 09/27/19 9493 Weight: 77.6 kg (171 lb 1.6 oz) 77.7 kg (171 lb 4.8 oz) 77.5 kg (170 lb 14.4 oz) sCr down to 2.4 K 3.7 If back in AFib will need to consider restoration of SR Continue oral amio 200mg daily; increase if back in AF. Currently appears to be in a sinus tach; possibly 2/2 dobutamine Resume eliquis 5mg BID Diuresis per renal; UOP better; 120mg IV lasix BID for now Replete K as needed Continue dobutamine 7.5mcg; look to wean by 2.5mcg daily over the next few days Ideally he gets back on a beta blocker Continue aldactone 25mg Renal consult appreciated Continue statin Cardiac rehab at discharge Will need to consider home inotropes if inability to wean or palliative May need to consider pall care consult pending course Holding prior Entresto, coreg, with marginal/low BPs [x]       High complexity decision making was performed in this patient at high risk for decompensation with multiple organ involvement. Subjective:  
 
Tami Onofre denies chest pain. Breathing better. Feels much better. Discussed with RN events overnight. Review of Systems: 
 
Symptom Y/N Comments  Symptom Y/N Comments Fever/Chills N   Chest Pain N Poor Appetite N   Edema N   
Cough N   Abdominal Pain N Sputum N   Joint Pain N   
SOB/MURILLO N   Pruritis/Rash N   
 Nausea/vomit N   Tolerating PT/OT Y Diarrhea N   Tolerating Diet Y Constipation N   Other Could NOT obtain due to:   
 
Objective:  
  
Physical Exam: 
 
Last 24hrs VS reviewed since prior progress note. Most recent are: 
 
Visit Vitals BP 94/66 Pulse (!) 105 Temp 97.5 °F (36.4 °C) Resp 18 Ht 5' 9\" (1.753 m) Wt 77.5 kg (170 lb 14.4 oz) SpO2 97% BMI 25.24 kg/m² Intake/Output Summary (Last 24 hours) at 9/27/2019 6950 Last data filed at 9/27/2019 4131 Gross per 24 hour Intake 1137.13 ml Output 1825 ml Net -687.87 ml General Appearance: Well developed, well nourished, alert & oriented x 3,  
 no acute distress. Ears/Nose/Mouth/Throat: Hearing grossly normal. 
Neck: Supple. Chest: Lungs clear to auscultation bilaterally. Cardiovascular: Regular rate and rhythm, S1S2 normal, no murmur. Abdomen: Soft, non-tender, bowel sounds are active. Extremities: No edema bilaterally. Skin: Warm and dry. []         Post-cath site without hematoma, bruit, tenderness, or thrill. Distal pulses intact. PMH/SH reviewed - no change compared to H&P Data Review Telemetry: ST 
 
EKG:  
[x]  No new EKG for review Lab Data Personally Reviewed: 
 
Recent Labs  
  09/27/19 
0302 WBC 6.3 HGB 10.9* HCT 33.9*  
* Recent Labs  
  09/25/19 
0545 INR 2.0*  
PTP 19.8* Recent Labs  
  09/27/19 
0302 09/26/19 
0316 09/25/19 
1526 * 135* 133* K 3.7 2.9* 3.2*  
CL 98 98 97 CO2 29 28 26 BUN 48* 60* 69* CREA 2.44* 2.75* 3.20* * 86 149* CA 8.4* 8.5 8.1*  
MG  --  2.3 2.1 No results for input(s): CPK, CKNDX, TROIQ in the last 72 hours. No lab exists for component: CPKMB No results found for: CHOL, CHOLX, CHLST, CHOLV, HDL, HDLP, LDL, LDLC, DLDLP, TGLX, TRIGL, TRIGP, CHHD, CHHDX Recent Labs  
  09/27/19 
0302 09/26/19 
0316 09/25/19 
1526 SGOT  --   --  505* AP  --   --  196* TP  --   --  6.7 ALB 3.7 3.8 4.0  
 GLOB  --   --  2.7 No results for input(s): PH, PCO2, PO2 in the last 72 hours. Medications Personally Reviewed: 
 
Current Facility-Administered Medications Medication Dose Route Frequency  spironolactone (ALDACTONE) tablet 25 mg  25 mg Oral DAILY  lactulose (CHRONULAC) 10 gram/15 mL solution 45 mL  30 g Oral PRN  
 DOBUTamine (DOBUTREX) 1,000 mg/250 mL (4,000 mcg/mL) infusion  7.5 mcg/kg/min IntraVENous CONTINUOUS  
 amiodarone (CORDARONE) tablet 200 mg  200 mg Oral DAILY  furosemide (LASIX) injection 120 mg  120 mg IntraVENous BID  levothyroxine (SYNTHROID) tablet 100 mcg  100 mcg Oral 6am  
 simethicone (MYLICON) tablet 80 mg  80 mg Oral Q6H PRN  
 traZODone (DESYREL) tablet 25 mg  25 mg Oral QHS  allopurinol (ZYLOPRIM) tablet 200 mg  200 mg Oral DAILY  therapeutic multivitamin (THERAGRAN) tablet 1 Tab  1 Tab Oral DAILY  loratadine (CLARITIN) tablet 10 mg  10 mg Oral DAILY  pravastatin (PRAVACHOL) tablet 80 mg  80 mg Oral QHS  sodium chloride (NS) flush 5-40 mL  5-40 mL IntraVENous Q8H  
 sodium chloride (NS) flush 5-40 mL  5-40 mL IntraVENous PRN  
 acetaminophen (TYLENOL) tablet 500 mg  500 mg Oral Q4H PRN  
 HYDROcodone-acetaminophen (NORCO) 5-325 mg per tablet 1 Tab  1 Tab Oral Q6H PRN  
 naloxone (NARCAN) injection 0.4 mg  0.4 mg IntraVENous PRN  
 ondansetron (ZOFRAN) injection 2 mg  2 mg IntraVENous Q6H PRN  
 bisacodyl (DULCOLAX) tablet 5 mg  5 mg Oral DAILY PRN  
 melatonin tablet 3 mg  3 mg Oral QHS PRN Thiago Osman III, DO

## 2019-09-27 NOTE — PROGRESS NOTES
Pharmacy Monitoring for Apixaban Pharmacy review for this 77 y.o. male ordered Apixaban for hx of a fib Major Interacting Medications: none Platelet Inhibitors: none Recent Labs  
  09/27/19 
0302 09/26/19 
0316 09/25/19 
1526 09/25/19 
0545 INR  --   --   --  2.0*  
HGB 10.9*  --   --   --   
*  --   --   --   
ALB 3.7 3.8 4.0 3.9 SGOT  --   --  505*  --   
TBILI  --   --  1.5*  --   
CREA 2.44* 2.75* 3.20* 3.35* BUN 48* 60* 69* 74* Child Varghese Score, if applicable (Avoid if level C): n/a Impression/Plan: 78 yo, 77.5 kg, Scr 2.44 Thanks Ibis Dove

## 2019-09-27 NOTE — PROGRESS NOTES
Initial Nutrition Assessment: 
 
INTERVENTIONS/RECOMMENDATIONS:  
· Continue cardiac diet, fluid restriction per MD 
 
ASSESSMENT:  
Chart reviewed, medically noted for acute on chronic systolic heart failure, CKD3, non ischemic cardiomyopathy and PMH shown below. Assessing pt due to LOS. Flowsheet documents pt is consistently consuming ~75% of meals. Cardiac rehab RN has already provided heart healthy, low Na diet education. Past Medical History:  
Diagnosis Date  [facility]  AICD (automatic cardioverter/defibrillator) present  Arthritis  Cancer ([facility] [address] skin  CKD (chronic kidney disease)  Heart failure ([facility] [address]  Other ill-defined conditions(799.89)   
 high cholesterol  V tach ([facility] [address] Diet Order: Cardiac 
% Eaten:   
Patient Vitals for the past 72 hrs: 
 % Diet Eaten  
09/27/19 0941 85 %  
09/26/19 1324 25 % 09/26/19 0912 75 %  
09/25/19 1805 85 %  
09/25/19 1333 75 %  
09/25/19 1037 75 %  
09/24/19 1835 25 % 09/24/19 1528 50 % Pertinent Medications: [x]Reviewed: dulcolax, lasix, aldactone, MVI Pertinent Labs: [x]Reviewed:  
Food Allergies: [x]NKFA  []Other Last BM: 9/24 Edema:   n/a     []RUE   []LUE   []RLE   []LLE Pressure Injury:      [] Stage I   [] Stage II   [] Stage III   [] Stage IV Wt Readings from Last 30 Encounters:  
09/27/19 77.5 kg (170 lb 14.4 oz) 09/12/19 79 kg (174 lb 2.6 oz) 08/26/19 75.3 kg (166 lb) 08/16/19 77.1 kg (170 lb) 08/14/19 77.1 kg (170 lb) 08/08/19 80.3 kg (177 lb) 08/06/19 80.4 kg (177 lb 4.8 oz)  
07/27/19 86.6 kg (190 lb 14.7 oz) 06/08/19 85.2 kg (187 lb 13.3 oz) 08/26/15 84.6 kg (186 lb 8 oz) 06/06/12 87.7 kg (193 lb 4 oz) 06/02/11 83.6 kg (184 lb 5 oz) 05/24/11 86.1 kg (189 lb 13.1 oz) 04/27/11 85.8 kg (189 lb 4 oz) Anthropometrics:  
Height: 5' 9\" (175.3 cm) Weight: 77.5 kg (170 lb 14.4 oz) IBW (%IBW):   ( ) UBW (%UBW):   (  %) Last Weight Metrics: Weight Loss Metrics 9/27/2019 9/12/2019 8/26/2019 8/16/2019 8/14/2019 8/8/2019 8/6/2019 Today's Wt 170 lb 14.4 oz 174 lb 2.6 oz 166 lb 170 lb 170 lb 177 lb 177 lb 4.8 oz  
BMI 25.24 kg/m2 25.72 kg/m2 24.51 kg/m2 25.1 kg/m2 25.1 kg/m2 26.14 kg/m2 26.18 kg/m2 BMI: Body mass index is 25.24 kg/m². This BMI is indicative of: 
 []Underweight    []Normal    [x]Overweight    [] Obesity   [] Extreme Obesity (BMI>40) Estimated Nutrition Needs (Based on):  
1900 Kcals/day(BMR: 1475 x 1.3) , 75 g(1 g/kg) Protein Carbohydrate: At Least 130 g/day  Fluids: 1900 mL/day (1ml/kcal) or per primary team 
 
NUTRITION DIAGNOSES:  
Problem:  No nutritional diagnosis at this time Etiology: related to Signs/Symptoms: as evidenced by NUTRITION INTERVENTIONS: 
Meals/Snacks: General/healthful diet GOAL:  
consume >50% of meals in 5-7 days LEARNING NEEDS (Diet, Food/Nutrient-Drug Interaction):  
 [x] None Identified (provided by cardiac rehab) [] Identified and Education Provided/Documented 
 [] Identified and Pt declined/was not appropriate Cultureal, Confucianist, OR Ethnic Dietary Needs:  
 [x] None Identified 
 [] Identified and Addressed 
 
 [x] Interdisciplinary Care Plan Reviewed/Documented  
 [x] Discharge Planning:   Heart healthy, 2g Na diet MONITORING /EVALUATION:  
  
Food/Nutrient Intake Outcomes: Total energy intake Physical Signs/Symptoms Outcomes: Weight/weight change, Electrolyte and renal profile, Glucose profile, GI 
 
NUTRITION RISK:  
 [] High              [] Moderate           [x]  Low  []  Minimal/Uncompromised PT SEEN FOR:  
 []  MD Consult: []Calorie Count []Diabetic Diet Education []Diet Education []Electrolyte Management []General Nutrition Management and Supplements []Management of Tube Feeding []TPN Recommendations []  RN Referral:  []MST score >=2 
   []Enteral/Parenteral Nutrition PTA []Pregnant: Gestational DM or Multigestation 
   []Pressure Ulcer/Wound Care needs 
     
[]  Low BMI [x]  LOS Referral  
 
 
Citlaly Bennett RDN Pager [phone] Weekend Pager [phone]

## 2019-09-28 NOTE — PROGRESS NOTES
[facility] Nadeem Aragon [address], [address] [facility], [address] [place], [address] [facility] Phone: [phone]   Fax: [phone]      Phone: [phone]   
www. [facility] Progress Note Pt Name : Rich Silva Date of Admission : 9/21/2019 CC:  Follow up for MARCELL Assessment and Plan MARCELL on CKD  
- 2/2 CRS 
- Cr trending down w/ IV diuretics and Inotropes - Inotropes per cards  
- continue Lasix 120 mg BID 
 
CKD Stage III  
- baseline Cr 1.5 mg/dl Lytes : 
- Hypo K : replete PRN  
- continue aldactone Hyponatremia : 
- 2/2 CHF  
- stable Dilated CMP w/ EF 25% A fib w/ RVR Cardiogenic shock - per Dr Roque Rodriguez For other plans, see orders. Interval History: 
Seen and examined Reports bloating in abdomen Improving SOB Cr trending down No chest pain//N/V/abdo pain Review of Systems: A comprehensive review of systems was negative. Current Facility-Administered Medications Medication Dose Route Frequency  apixaban (ELIQUIS) tablet 5 mg  5 mg Oral Q12H  
 spironolactone (ALDACTONE) tablet 25 mg  25 mg Oral DAILY  lactulose (CHRONULAC) 10 gram/15 mL solution 45 mL  30 g Oral PRN  
 DOBUTamine (DOBUTREX) 1,000 mg/250 mL (4,000 mcg/mL) infusion  5 mcg/kg/min IntraVENous CONTINUOUS  
 amiodarone (CORDARONE) tablet 200 mg  200 mg Oral DAILY  furosemide (LASIX) injection 120 mg  120 mg IntraVENous BID  levothyroxine (SYNTHROID) tablet 100 mcg  100 mcg Oral 6am  
 simethicone (MYLICON) tablet 80 mg  80 mg Oral Q6H PRN  
 traZODone (DESYREL) tablet 25 mg  25 mg Oral QHS  allopurinol (ZYLOPRIM) tablet 200 mg  200 mg Oral DAILY  therapeutic multivitamin (THERAGRAN) tablet 1 Tab  1 Tab Oral DAILY  loratadine (CLARITIN) tablet 10 mg  10 mg Oral DAILY  pravastatin (PRAVACHOL) tablet 80 mg  80 mg Oral QHS  sodium chloride (NS) flush 5-40 mL  5-40 mL IntraVENous Q8H  
 sodium chloride (NS) flush 5-40 mL  5-40 mL IntraVENous PRN  
 acetaminophen (TYLENOL) tablet 500 mg  500 mg Oral Q4H PRN  
 HYDROcodone-acetaminophen (NORCO) 5-325 mg per tablet 1 Tab  1 Tab Oral Q6H PRN  
 naloxone (NARCAN) injection 0.4 mg  0.4 mg IntraVENous PRN  
 ondansetron (ZOFRAN) injection 2 mg  2 mg IntraVENous Q6H PRN  
 bisacodyl (DULCOLAX) tablet 5 mg  5 mg Oral DAILY PRN  
 melatonin tablet 3 mg  3 mg Oral QHS PRN No Known Allergies Objective: 
Vitals:   
Vitals:  
 09/28/19 0214 09/28/19 0710 09/28/19 6639 09/28/19 1101 BP:  98/65 93/64 94/66 Pulse:  (!) 103  100 Resp:  18  18 Temp:  97.3 °F (36.3 °C)  98.3 °F (36.8 °C) SpO2:  96%  98% Weight: 77.7 kg (171 lb 4.8 oz) Height:      
 
Intake and Output: 
09/28 0701 - 09/28 1900 In: 505.9 [P.O.:360; I.V.:145.9] Out: 500 [Urine:500] 09/26 1901 - 09/28 0700 In: 1220 [P.O.:1220] Out: 2550 [Urine:2550] Physical Examination: 
General: NAD,Conversant Neck:  Supple, no mass Resp:  Lungs CTA B/L, no wheezing , normal respiratory effort CV:  RRR,  no murmur or rub,no LE edema GI:  Soft, NT, + Bowel sounds, distension+ Neurologic:  Non focal 
Psych:             AAO x 3 appropriate affect Skin:  No Rash 
 
 
[]    High complexity decision making was performed 
[]    Patient is at high-risk of decompensation with multiple organ involvement Lab Data Personally Reviewed: I have reviewed all the pertinent labs, microbiology data and radiology studies during assessment. Recent Labs  
  09/28/19 
0323 09/27/19 
0302 09/26/19 
0316 09/25/19 
1526 * 134* 135* 133* K 3.8 3.7 2.9* 3.2*  
CL 98 98 98 97 CO2 28 29 28 26 * 114* 86 149* BUN 40* 48* 60* 69* CREA 2.08* 2.44* 2.75* 3.20* CA 8.5 8.4* 8.5 8.1*  
MG  --   --  2.3 2.1 PHOS 3.3 2.9 3.0  --   
ALB 3.6 3.7 3.8 4.0  
 SGOT  --   --   --  505* ALT  --   --   --  1,476* Recent Labs  
  09/27/19 
0302 WBC 6.3 HGB 10.9* HCT 33.9*  
* No results found for: SDES Lab Results Component Value Date/Time Culture result: NO GROWTH 1 DAY 07/28/2019 11:44 PM  
 Culture result: NO GROWTH 5 DAYS 07/28/2019 12:20 PM  
 Culture result: NO GROWTH 6 DAYS 07/27/2019 09:27 PM  
 
Recent Results (from the past 24 hour(s)) RENAL FUNCTION PANEL Collection Time: 09/28/19  3:23 AM  
Result Value Ref Range Sodium 133 (L) 136 - 145 mmol/L Potassium 3.8 3.5 - 5.1 mmol/L Chloride 98 97 - 108 mmol/L  
 CO2 28 21 - 32 mmol/L Anion gap 7 5 - 15 mmol/L Glucose 101 (H) 65 - 100 mg/dL BUN 40 (H) 6 - 20 MG/DL Creatinine 2.08 (H) 0.70 - 1.30 MG/DL  
 BUN/Creatinine ratio 19 12 - 20 GFR est AA 38 (L) >60 ml/min/1.73m2 GFR est non-AA 31 (L) >60 ml/min/1.73m2 Calcium 8.5 8.5 - 10.1 MG/DL Phosphorus 3.3 2.6 - 4.7 MG/DL Albumin 3.6 3.5 - 5.0 g/dL Total time spent with patient:  xxx   min. Care Plan discussed with: 
Patient Family RN Consulting Physician [address] [facility],      
 
I have reviewed the flowsheets. Chart reviewed. Pertinent Notes reviewed. Current Medications list reviewed by me. No change in PMH ,family and social history from Consult note. Shruti Dee MD 
09/28/19

## 2019-09-28 NOTE — PROGRESS NOTES
Hospitalist Progress Note NAME: Reg Anthony :  1940 MRN:  [id number] Interim Hospital Summary: 77 y.o. male whom presented on 2019 with Assessment / Plan: 
Acute on chronic systolic HF Cardiogenic shock Non ischemic CM S/P AICD P. Atrial fibrillation Hypotension 
-recent Echo EF 21-25%, No AS, trace MR 
-IV lasix per nephro 
-cont' dobutamine gtt 
-cont' aldactone, held today due to low bp 
-continue amiodarone and eliquis 
- entresto, coreg MARCELL, due to cardiorenal syndrome, cr improving Hyponatremia, resolved Hyperkalemia, resolved NAGMA, resolved Hypokalemia, replete prn 
-renal US no hydronephrosis; no prior protenuria 
-diurese per nephro 
-s/p IV bicarb, kayexalate 
-no urgent need for HD 
-avoid nephrotoxic agents, monitor bmp   
-nephro consultation appreciate Hypothyroidism 
-synthroid Gout - allopurinol 18.5 - 24.9 Normal weight / Body mass index is 25.3 kg/m². Code status: DNR Prophylaxis: ELiquis Recommended Disposition: Home w/Family Subjective: Chief Complaint / Reason for Physician Visit Follow up of CHF, weight gain, MARCELL Pt seen, NAD. Discussed with RN events overnight. Review of Systems: 
Symptom Y/N Comments  Symptom Y/N Comments Fever/Chills n   Chest Pain n   
Poor Appetite    Edema n   
Cough    Abdominal Pain Sputum    Joint Pain SOB/MURILLO n    Pruritis/Rash Nausea/vomit    Tolerating PT/OT Diarrhea    Tolerating Diet Constipation    Other Could NOT obtain due to:   
 
PO intake:  
Patient Vitals for the past 72 hrs: 
 % Diet Eaten  
19 0859 100 % 19 1813 90 % 19 1300 90 % 19 0941 85 %  
19 1324 25 % 19 0912 75 %  
19 1805 85 %  
19 1333 75 % Objective: VITALS:  
Last 24hrs VS reviewed since prior progress note. Most recent are: 
Patient Vitals for the past 24 hrs: Temp Pulse Resp BP SpO2  
09/28/19 1101 98.3 °F (36.8 °C) 100 18 94/66 98 %  
09/28/19 0953    93/64   
09/28/19 0710 97.3 °F (36.3 °C) (!) 103 18 98/65 96 %  
09/28/19 0209 97.4 °F (36.3 °C) 99 18 95/62 98 %  
09/27/19 2243 98.1 °F (36.7 °C) 97 18 105/66 98 %  
09/27/19 1913 98.2 °F (36.8 °C) (!) 108 18 101/69 94 % 09/27/19 [address] 98/66   
09/27/19 1450 98.2 °F (36.8 °C) 98 18 95/61 99 % Intake/Output Summary (Last 24 hours) at 9/28/2019 1244 Last data filed at 9/28/2019 1041 Gross per 24 hour Intake 1225.93 ml Output 1700 ml Net -474.07 ml PHYSICAL EXAM: 
General: WD, WN. Alert, cooperative, no acute distress   
EENT:  EOMI. Anicteric sclerae. MMM Resp:  Basal rales L>R  No accessory muscle use CV:  Regular  rhythm,  No leg edema GI:  Soft, Non distended, Non tender.  +BS Neurologic:  Alert and oriented X 3, normal speech Psych:   Good insight. Not anxious nor agitated Skin:  No rashes. No jaundice Reviewed most current lab test results and cultures  YES Reviewed most current radiology test results   YES Review and summation of old records today    NO Reviewed patient's current orders and MAR    YES 
PMH/SH reviewed - no change compared to H&P 
________________________________________________________________________ Care Plan discussed with: 
  Comments Patient x Family  x wife RN x Care Manager Consultant Multidiciplinary team rounds were held today with , nursing, pharmacist and clinical coordinator. Patient's plan of care was discussed; medications were reviewed and discharge planning was addressed. ________________________________________________________________________ Total NON critical care TIME:   35  Minutes Total CRITICAL CARE TIME Spent:   Minutes non procedure based Comments >50% of visit spent in counseling and coordination of care x This includes time during multidisciplinary rounds if indicated above  
________________________________________________________________________ Davonte Del Castillo MD  
 
Procedures: see electronic medical records for all procedures/Xrays and details which were not copied into this note but were reviewed prior to creation of Plan. LABS: 
I reviewed today's most current labs and imaging studies. Pertinent labs include: 
Recent Labs  
  09/27/19 
0302 WBC 6.3 HGB 10.9* HCT 33.9*  
* Recent Labs  
  09/28/19 
0323 09/27/19 
0302 09/26/19 
0316 09/25/19 
1526 * 134* 135* 133* K 3.8 3.7 2.9* 3.2*  
CL 98 98 98 97 CO2 28 29 28 26 * 114* 86 149* BUN 40* 48* 60* 69* CREA 2.08* 2.44* 2.75* 3.20* CA 8.5 8.4* 8.5 8.1*  
MG  --   --  2.3 2.1 PHOS 3.3 2.9 3.0  --   
ALB 3.6 3.7 3.8 4.0 TBILI  --   --   --  1.5* SGOT  --   --   --  505* ALT  --   --   --  1,476*

## 2019-09-28 NOTE — PROGRESS NOTES
Bedside shift change report given to Mathieu Blunt (oncoming nurse) by Louis Eckert (offgoing nurse). Report included the following information Dary Barrett - Dr. Carlos Shrestha aware of patient's BP and HR, OK to give lasix this morning. 46 - Dr. Anup Sevilla aware of BP this morning. Will adjust dobutamine drip. 18 - Notified Dr. Carlos Shrestha of BP 94/66. Given orders to hold aldactone 3142 - Paged Dr. Carlos Shrestha for BP 93/57. Lasix due this evening. Patient still complaining of bloating, given orders for a PRN dulcolax suppository. 1 - Paged Dr. Anup Sevilla regarding low BP and 120mg lasix this evening. 1815 - Given orders from [facility] to hold lasix tonight.

## 2019-09-28 NOTE — PROGRESS NOTES
Problem: Falls - Risk of 
Goal: *Absence of Falls Description Document Devere Fairchance Fall Risk and appropriate interventions in the flowsheet. Outcome: Progressing Towards Goal 
Note:  
Fall Risk Interventions: 
Mobility Interventions: Assess mobility with egress test, Bed/chair exit alarm, Communicate number of staff needed for ambulation/transfer, OT consult for ADLs, Patient to call before getting OOB, PT Consult for mobility concerns Medication Interventions: Assess postural VS orthostatic hypotension, Bed/chair exit alarm, Evaluate medications/consider consulting pharmacy, Patient to call before getting OOB, Teach patient to arise slowly History of Falls Interventions: Bed/chair exit alarm Problem: Patient Education: Go to Patient Education Activity Goal: Patient/Family Education Outcome: Progressing Towards Goal 
  
Problem: Heart Failure: Day 3 Goal: Medications Outcome: Progressing Towards Goal

## 2019-09-28 NOTE — PROGRESS NOTES
Cardiology Progress Note 9/28/2019     Admit Date: 9/21/2019 Admit Diagnosis: Acute on chronic heart failure (HCC) [I50.9]  CC: none currently Problem List (as per Dr Shant Guadalupe):  
  
1. Acute on chronic systolic heart failure 2. Acute on chronic renal insufficiency; Stg 3 
3. Cardiogenic shock 4. Dilated NICM; EF 25%. Gerri Muñiz 7/18 w/ EF 20%, dil LV, nml RV, mild MR/TR 5. Persistent AFib s/p VIGNESH/DCCV 8/5/19 6. Remote ICD implantation 7. Recurrent ventricular tachycardia 8. Hyperlipidemia 9. Former smoker 10. DNR  
  
Assessment/Plan (as per Dr Shant Guadalupe):      
     
Last 3 Recorded Weights in this Encounter  
  09/25/19 4264 09/26/19 0255 09/27/19 1737 Weight: 77.6 kg (171 lb 1.6 oz) 77.7 kg (171 lb 4.8 oz) 77.5 kg (170 lb 14.4 oz)  
  
sCr down to 2.4 K 3.7 
  
If back in AFib will need to consider restoration of SR Continue oral amio 200mg daily; increase if back in AF. Currently appears to be in a sinus tach; possibly 2/2 dobutamine Resume eliquis 5mg BID Diuresis per renal; UOP better; 120mg IV lasix BID for now Replete K as needed Continue dobutamine 7.5mcg; look to wean by 2.5mcg daily over the next few days Ideally he gets back on a beta blocker Continue aldactone 25mg Renal consult appreciated Continue statin Cardiac rehab at discharge Will need to consider home inotropes if inability to wean or palliative May need to consider pall care consult pending course Holding prior Entresto, coreg, with marginal/low BPs  
  
9/28: NSR on tele; BPs marginal; net neg UOP. Remains off entresto/coreg; On eliquis. Cr 2.08 from 2.44; Na 133. Decreased dobutamine to 5; Diuretics per Dr Katie boss. 
 
For other plans, see orders. Hospital problem list  
Active Hospital Problems Diagnosis Date Noted  Acute on chronic heart failure ([facility] [address] 09/21/2019  AICD (automatic cardioverter/defibrillator) present 09/21/2019  Other hyperlipidemia 09/21/2019  Chronic renal insufficiency, stage 3 (moderate) ([address] [facility] 2019  Acute on chronic renal insufficiency 2019  Acquired hypothyroidism 2019  Cardiomyopathy, dilated, nonischemic ([address] 2019  Gout 2019  
 History of atrial fibrillation 2019  Pleural plaque 2019  CHF (congestive heart failure) ([address] 2019 Subjective: Marcus Pearson reports Chest pain X none  consistent with:  Non-cardiac CP Atypical CP None now  On going  Anginal CP Dyspnea X none  at rest  with exertion    
    improved  unchanged  worse PND X none  overnight Orthopnea X none  improved  unchanged  worse Presyncope X none  improved  unchanged  worse Ambulated in hallway without symptoms   Yes Ambulated in room without symptoms  Yes Objective:  
 Physical Exam: 
Overall VSSAF;   
Visit Vitals BP 93/64 (BP 1 Location: Left arm, BP Patient Position: At rest) Pulse (!) 103 Temp 97.3 °F (36.3 °C) Resp 18 Ht 5' 9\" (1.753 m) Wt 77.7 kg (171 lb 4.8 oz) SpO2 96% BMI 25.30 kg/m² Temp (24hrs), Av.9 °F (36.6 °C), Min:97.3 °F (36.3 °C), Max:98.4 °F (36.9 °C) Patient Vitals for the past 8 hrs: 
 Pulse  
19 0710 (!) 103 Patient Vitals for the past 8 hrs: 
 Resp  
19 0710 18 Patient Vitals for the past 8 hrs: 
 BP  
19 0953 93/64  
19 0710 98/65 Intake/Output Summary (Last 24 hours) at 2019 1053 Last data filed at 2019 1041 Gross per 24 hour Intake 1225.93 ml Output 2225 ml Net -999.07 ml General Appearance: Well developed, well nourished, no acute distress. Ears/Nose/Mouth/Throat:   Normal MM; anicteric. JVP: WNL Resp:   clear to auscultation bilaterally. Nl resp effort. Cardiovascular:  RRR, S1, S2 normal, no new murmur. No gallop or rub. Abdomen:   Soft, non-tender, bowel sounds are present. Extremities: No edema bilaterally.    
Skin: 
 Neuro: Warm and dry. A/O x3, grossly nonfocal  
cath site intact w/o hematoma or new bruit; distal pulse unchanged  Yes Data Review:    
Telemetry independently reviewed x sinus  chronic afib  parox afib  NSVT  
 
ECG independently reviewed  NSR  afib  no significant changes  NSST-Tw chgs  
x no new ECG provided for review Lab results reviewed as noted below. Current medications reviewed as noted below. No results for input(s): PH, PCO2, PO2 in the last 72 hours. No results for input(s): CPK, CKMB, CKNDX, TROIQ in the last 72 hours. Recent Labs  
  09/28/19 0323 09/27/19 0302 09/26/19 0316 * 134* 135* K 3.8 3.7 2.9*  
CL 98 98 98 CO2 28 29 28 BUN 40* 48* 60* CREA 2.08* 2.44* 2.75* GFRAA 38* 31* 27* * 114* 86 PHOS 3.3 2.9 3.0  
CA 8.5 8.4* 8.5 ALB 3.6 3.7 3.8 WBC  --  6.3  --   
HGB  --  10.9*  --   
HCT  --  33.9*  --   
PLT  --  129*  -- No results found for: CHOL, CHOLX, CHLST, CHOLV, HDL, HDLP, LDL, LDLC, DLDLP, TGLX, TRIGL, TRIGP, CHHD, CHHDX Recent Labs  
  09/28/19 0323 09/27/19 0302 09/26/19 0316 09/25/19 
1526 SGOT  --   --   --  505* AP  --   --   --  196* TP  --   --   --  6.7 ALB 3.6 3.7 3.8 4.0  
GLOB  --   --   --  2.7 No results for input(s): INR, PTP, APTT, INREXT in the last 72 hours. No components found for: [place] Current Facility-Administered Medications Medication Dose Route Frequency  apixaban (ELIQUIS) tablet 5 mg  5 mg Oral Q12H  
 spironolactone (ALDACTONE) tablet 25 mg  25 mg Oral DAILY  lactulose (CHRONULAC) 10 gram/15 mL solution 45 mL  30 g Oral PRN  
 DOBUTamine (DOBUTREX) 1,000 mg/250 mL (4,000 mcg/mL) infusion  7.5 mcg/kg/min IntraVENous CONTINUOUS  
 amiodarone (CORDARONE) tablet 200 mg  200 mg Oral DAILY  furosemide (LASIX) injection 120 mg  120 mg IntraVENous BID  levothyroxine (SYNTHROID) tablet 100 mcg  100 mcg Oral 6am  
 simethicone (MYLICON) tablet 80 mg  80 mg Oral Q6H PRN  
  traZODone (DESYREL) tablet 25 mg  25 mg Oral QHS  allopurinol (ZYLOPRIM) tablet 200 mg  200 mg Oral DAILY  therapeutic multivitamin (THERAGRAN) tablet 1 Tab  1 Tab Oral DAILY  loratadine (CLARITIN) tablet 10 mg  10 mg Oral DAILY  pravastatin (PRAVACHOL) tablet 80 mg  80 mg Oral QHS  sodium chloride (NS) flush 5-40 mL  5-40 mL IntraVENous Q8H  
 sodium chloride (NS) flush 5-40 mL  5-40 mL IntraVENous PRN  
 acetaminophen (TYLENOL) tablet 500 mg  500 mg Oral Q4H PRN  
 HYDROcodone-acetaminophen (NORCO) 5-325 mg per tablet 1 Tab  1 Tab Oral Q6H PRN  
 naloxone (NARCAN) injection 0.4 mg  0.4 mg IntraVENous PRN  
 ondansetron (ZOFRAN) injection 2 mg  2 mg IntraVENous Q6H PRN  
 bisacodyl (DULCOLAX) tablet 5 mg  5 mg Oral DAILY PRN  
 melatonin tablet 3 mg  3 mg Oral QHS PRN Jami Parish MD

## 2019-09-28 NOTE — PROGRESS NOTES
Bedside and Verbal shift change report given to Tash Deras RN (oncoming nurse) by Kimberlee Joyce RN (offgoing nurse). Report included the following information SBAR and Kardex. Bedside and Verbal shift change report GIVEN TO Judy Kay RN. Report included the following information SBAR and Kardex. SIGNIFICANT CHANGES DURING SHIFT:   
 
 
CONCERNS TO ADDRESS WITH MD:   
 
 
 
 
[facility] NURSING NOTE Admission Date 9/21/2019 Admission Diagnosis Acute on chronic heart failure ([address] [I50.9] Consults IP CONSULT TO CARDIOLOGY 
IP CONSULT TO NEPHROLOGY 
IP CONSULT TO PALLIATIVE CARE - PROVIDER Cardiac Monitoring [x] Yes [] No  
  
Purposeful Hourly Rounding [x] Yes   
Froylan Score Total Score: 2 Froylan score 3 or > [] Bed Alarm [] Avasys [] 1:1 sitter [] Patient refused (Signed refusal form in chart) Arsen Score Arsen Score: 21 Arsen score 14 or < [] PMT consult [] Wound Care consult  
 []  Specialty bed  [] Nutrition consult Influenza Vaccine Received Flu Vaccine for Current Season (usually Sept-March): Yes Oxygen needs? [] Room air Oxygen @  []1L    []2L    []3L   []4L    []5L   []6L via NC Chronic home O2 use? [] Yes [] No 
Perform O2 challenge test and document in progress note using Pathfire[employer] (.Homeoxygen) Last bowel movement Last Bowel Movement Date: 09/28/19 Urinary Catheter LDAs Peripheral IV 09/21/19 Right Forearm (Active) Site Assessment Clean, dry, & intact 9/29/2019  2:56 AM  
Phlebitis Assessment 0 9/29/2019  2:56 AM  
Infiltration Assessment 0 9/29/2019  2:56 AM  
Dressing Status Clean, dry, & intact 9/29/2019  2:56 AM  
Dressing Type Tape;Transparent 9/29/2019  2:56 AM  
Hub Color/Line Status Pink 9/29/2019  2:56 AM  
Alcohol Cap Used Yes 9/21/2019  7:44 PM  
   
Peripheral IV 09/24/19 Left Antecubital (Active) Site Assessment Clean, dry, & intact 9/29/2019  2:56 AM  
Phlebitis Assessment 0 9/29/2019  2:56 AM  
 Infiltration Assessment 0 9/29/2019  2:56 AM  
Dressing Status Clean, dry, & intact 9/29/2019  2:56 AM  
Dressing Type Tape;Transparent 9/29/2019  2:56 AM  
Hub Color/Line Status Pink; Infusing 9/29/2019  2:56 AM  
                  
  
Readmission Risk Assessment Tool Score High Risk   
      
 21 Total Score 3 Patient Length of Stay (>5 days = 3) 4 IP Visits Last 12 Months (1-3=4, 4=9, >4=11) 5 Pt. Coverage (Medicare=5 , Medicaid, or Self-Pay=4) 9 Charlson Comorbidity Score (Age + Comorbid Conditions) Criteria that do not apply:  
 Has Seen PCP in Last 6 Months (Yes=3, No=0) . Living with Significant Other. Assisted Living. LTAC. SNF. or  
Rehab Expected Length of Stay 4d 2h Actual Length of Stay 8

## 2019-09-28 NOTE — PROGRESS NOTES
Bedside shift change report GIVEN TO Reid Daley RN. Report included the following information SBAR. SIGNIFICANT CHANGES DURING SHIFT:  Patient had 1 BM this evening. Evening lasix held per cardiology. CONCERNS TO ADDRESS WITH MD:   
 
 
 
 
[facility] NURSING NOTE Admission Date 9/21/2019 Admission Diagnosis Acute on chronic heart failure ([facility] [address] [I50.9] Consults IP CONSULT TO CARDIOLOGY 
IP CONSULT TO NEPHROLOGY 
IP CONSULT TO PALLIATIVE CARE - PROVIDER Cardiac Monitoring [x] Yes [] No  
  
Purposeful Hourly Rounding [x] Yes   
Froylan Score Total Score: 2 Froylan score 3 or > [x] Bed Alarm [] Avasys [] 1:1 sitter [] Patient refused (Signed refusal form in chart) Arsen Score Arsen Score: 21 Arsen score 14 or < [] PMT consult [] Wound Care consult  
 []  Specialty bed  [] Nutrition consult Influenza Vaccine Received Flu Vaccine for Current Season (usually Sept-March): Yes Oxygen needs? [x] Room air Oxygen @  []1L    []2L    []3L   []4L    []5L   []6L via NC Chronic home O2 use? [] Yes [] No 
Perform O2 challenge test and document in progress note using smartphrase (.Homeoxygen) Last bowel movement Last Bowel Movement Date: 09/27/19 Urinary Catheter LDAs Peripheral IV 09/21/19 Right Forearm (Active) Site Assessment Clean, dry, & intact 9/28/2019  3:00 PM  
Phlebitis Assessment 0 9/28/2019  3:00 PM  
Infiltration Assessment 0 9/28/2019  3:00 PM  
Dressing Status Clean, dry, & intact 9/28/2019  3:00 PM  
Dressing Type Transparent;Tape 9/28/2019  3:00 PM  
Hub Color/Line Status Pink;Flushed 9/28/2019  3:00 PM  
Alcohol Cap Used Yes 9/21/2019  7:44 PM  
   
Peripheral IV 09/24/19 Left Antecubital (Active) Site Assessment Clean, dry, & intact 9/28/2019  3:00 PM  
Phlebitis Assessment 0 9/28/2019  3:00 PM  
Infiltration Assessment 0 9/28/2019  3:00 PM  
Dressing Status Clean, dry, & intact 9/28/2019  3:00 PM  
 Dressing Type Transparent;Tape 9/28/2019  3:00 PM  
Hub Color/Line Status Pink; Infusing 9/28/2019  3:00 PM  
                  
  
Readmission Risk Assessment Tool Score High Risk   
      
 21 Total Score 3 Patient Length of Stay (>5 days = 3) 4 IP Visits Last 12 Months (1-3=4, 4=9, >4=11) 5 Pt. Coverage (Medicare=5 , Medicaid, or Self-Pay=4) 9 Charlson Comorbidity Score (Age + Comorbid Conditions) Criteria that do not apply:  
 Has Seen PCP in Last 6 Months (Yes=3, No=0) . Living with Significant Other. Assisted Living. LTAC. SNF. or  
Rehab Expected Length of Stay 4d 2h Actual Length of Stay 7

## 2019-09-29 NOTE — PROGRESS NOTES
Cardiology Progress Note 9/29/2019     Admit Date: 9/21/2019 Admit Diagnosis: Acute on chronic heart failure (HCC) [I50.9]  CC: none currently Problem List (as per Dr Cazares Medicine):  
  
1. Acute on chronic systolic heart failure 2. Acute on chronic renal insufficiency; Stg 3 
3. Cardiogenic shock 4. Dilated NICM; EF 25%. Gertrude Keller 7/18 w/ EF 20%, dil LV, nml RV, mild MR/TR 5. Persistent AFib s/p VIGNESH/DCCV 8/5/19 6. Remote ICD implantation 7. Recurrent ventricular tachycardia 8. Hyperlipidemia 9. Former smoker 10. DNR  
  
Assessment/Plan (as per Dr Cazares Medicine):      
     
Last 3 Recorded Weights in this Encounter  
  09/25/19 2539 09/26/19 0255 09/27/19 0123 Weight: 77.6 kg (171 lb 1.6 oz) 77.7 kg (171 lb 4.8 oz) 77.5 kg (170 lb 14.4 oz)  
  
sCr down to 2.4 K 3.7 
  
If back in AFib will need to consider restoration of SR Continue oral amio 200mg daily; increase if back in AF. Currently appears to be in a sinus tach; possibly 2/2 dobutamine Resume eliquis 5mg BID Diuresis per renal; UOP better; 120mg IV lasix BID for now Replete K as needed Continue dobutamine 7.5mcg; look to wean by 2.5mcg daily over the next few days Ideally he gets back on a beta blocker Continue aldactone 25mg Renal consult appreciated Continue statin Cardiac rehab at discharge Will need to consider home inotropes if inability to wean or palliative May need to consider pall care consult pending course Holding prior Entresto, coreg, with marginal/low BPs  
  
9/28: NSR on tele; BPs marginal; net neg UOP. Remains off entresto/coreg; On eliquis. Cr 2.08 from 2.44; Na 133. Decreased dobutamine to 5; Diuretics per Dr Landen flood 
 
9/29: Prob atrial tachy on tele; Marginal BPs: PM lasix dose held yesterday. Cr 2.15; Na 133. Decrease dobut to 2.5 then off if able. Diuretics per renal (?hold later today). For other plans, see orders. Hospital problem list  
Active Hospital Problems Diagnosis Date Noted  [facility] 2019  AICD (automatic cardioverter/defibrillator) present 2019  Other hyperlipidemia 2019  Chronic renal insufficiency, stage 3 (moderate) ([facility] 2019  Acute on chronic renal insufficiency 2019  Acquired hypothyroidism 2019  Cardiomyopathy, dilated, nonischemic ([facility] 2019  Gout 2019  
 History of atrial fibrillation 2019  Pleural plaque 2019  CHF (congestive heart failure) ([facility] 2019 Subjective: Jovanna Wickgartner reports Chest pain X none  consistent with:  Non-cardiac CP Atypical CP None now  On going  Anginal CP Dyspnea X none  at rest  with exertion    
    improved  unchanged  worse PND X none  overnight Orthopnea X none  improved  unchanged  worse Presyncope X none  improved  unchanged  worse Ambulated in hallway without symptoms   Yes Ambulated in room without symptoms  Yes Objective:  
 Physical Exam: 
Overall VSSAF;   
Visit Vitals BP (!) 87/60 (BP 1 Location: Right arm, BP Patient Position: At rest) Pulse 100 Temp 97.3 °F (36.3 °C) Resp 18 Ht 5' 9\" (1.753 m) Wt 77.6 kg (171 lb 1.6 oz) SpO2 99% BMI 25.27 kg/m² Temp (24hrs), Av.9 °F (36.6 °C), Min:97.3 °F (36.3 °C), Max:98.4 °F (36.9 °C) Patient Vitals for the past 8 hrs: 
 Pulse  
19 1149 100  
19 0924 99  
19 0738 95 Patient Vitals for the past 8 hrs: 
 Resp  
19 1149 18  
19 0738 18 Patient Vitals for the past 8 hrs: 
 BP  
19 1149 (!) 87/60  
19 0924 95/76  
19 0738 97/57 Intake/Output Summary (Last 24 hours) at 2019 1243 Last data filed at 2019 1118 Gross per 24 hour Intake 600 ml Output 1050 ml Net -450 ml General Appearance: Well developed, well nourished, no acute distress. Ears/Nose/Mouth/Throat:   Normal MM; anicteric. JVP: WNL Resp:   clear to auscultation bilaterally. Nl resp effort. Cardiovascular:  RRR, S1, S2 normal, no new murmur. No gallop or rub. Abdomen:   Soft, non-tender, bowel sounds are present. Extremities: No edema bilaterally. Skin: 
Neuro: Warm and dry. A/O x3, grossly nonfocal  
cath site intact w/o hematoma or new bruit; distal pulse unchanged  Yes Data Review:    
Telemetry independently reviewed x sinus  chronic afib  parox afib  NSVT  
 
ECG independently reviewed  NSR  afib  no significant changes  NSST-Tw chgs  
x no new ECG provided for review Lab results reviewed as noted below. Current medications reviewed as noted below. No results for input(s): PH, PCO2, PO2 in the last 72 hours. No results for input(s): CPK, CKMB, CKNDX, TROIQ in the last 72 hours. Recent Labs  
  09/29/19 
9566 09/28/19 
0323 09/27/19 
0302 * 133* 134* K 3.9 3.8 3.7 CL 97 98 98 CO2 29 28 29 BUN 38* 40* 48* CREA 2.15* 2.08* 2.44* GFRAA 36* 38* 31* * 101* 114* PHOS 3.9 3.3 2.9 CA 8.2* 8.5 8.4* ALB 3.5 3.6 3.7 WBC  --   --  6.3 HGB  --   --  10.9* HCT  --   --  33.9*  
PLT  --   --  129* No results found for: CHOL, CHOLX, CHLST, CHOLV, HDL, HDLP, LDL, LDLC, DLDLP, TGLX, TRIGL, TRIGP, CHHD, CHHDX Recent Labs  
  09/29/19 
7692 09/28/19 
0323 09/27/19 
0302 ALB 3.5 3.6 3.7 No results for input(s): INR, PTP, APTT, INREXT, INREXT in the last 72 hours. No components found for: [place] Current Facility-Administered Medications Medication Dose Route Frequency  bisacodyl (DULCOLAX) suppository 10 mg  10 mg Rectal DAILY PRN  
 apixaban (ELIQUIS) tablet 5 mg  5 mg Oral Q12H  
 spironolactone (ALDACTONE) tablet 25 mg  25 mg Oral DAILY  lactulose (CHRONULAC) 10 gram/15 mL solution 45 mL  30 g Oral PRN  
 DOBUTamine (DOBUTREX) 1,000 mg/250 mL (4,000 mcg/mL) infusion  5 mcg/kg/min IntraVENous CONTINUOUS  
 amiodarone (CORDARONE) tablet 200 mg  200 mg Oral DAILY  furosemide (LASIX) injection 120 mg  120 mg IntraVENous BID  levothyroxine (SYNTHROID) tablet 100 mcg  100 mcg Oral 6am  
 simethicone (MYLICON) tablet 80 mg  80 mg Oral Q6H PRN  
 traZODone (DESYREL) tablet 25 mg  25 mg Oral QHS  allopurinol (ZYLOPRIM) tablet 200 mg  200 mg Oral DAILY  therapeutic multivitamin (THERAGRAN) tablet 1 Tab  1 Tab Oral DAILY  loratadine (CLARITIN) tablet 10 mg  10 mg Oral DAILY  pravastatin (PRAVACHOL) tablet 80 mg  80 mg Oral QHS  sodium chloride (NS) flush 5-40 mL  5-40 mL IntraVENous Q8H  
 sodium chloride (NS) flush 5-40 mL  5-40 mL IntraVENous PRN  
 acetaminophen (TYLENOL) tablet 500 mg  500 mg Oral Q4H PRN  
 HYDROcodone-acetaminophen (NORCO) 5-325 mg per tablet 1 Tab  1 Tab Oral Q6H PRN  
 naloxone (NARCAN) injection 0.4 mg  0.4 mg IntraVENous PRN  
 ondansetron (ZOFRAN) injection 2 mg  2 mg IntraVENous Q6H PRN  
 bisacodyl (DULCOLAX) tablet 5 mg  5 mg Oral DAILY PRN  
 melatonin tablet 3 mg  3 mg Oral QHS PRN Daniel Rodríguez MD

## 2019-09-29 NOTE — PROGRESS NOTES
Hospitalist Progress Note NAME: Drew Leong :  1940 MRN:  [id number] Interim Hospital Summary: 77 y.o. male whom presented on 2019 with Assessment / Plan: 
Acute on chronic systolic HF Cardiogenic shock Non ischemic CM S/P AICD P. Atrial fibrillation Hypotension 
-recent Echo EF 21-25%, No AS, trace MR 
-IV lasix per nephro 
-cont' dobutamine gtt 
-cont' aldactone 
-continue amiodarone and eliquis 
- entresto, coreg dc/ed MARCELL, due to cardiorenal syndrome Hyponatremia, resolved Hyperkalemia, resolved NAGMA, resolved Hypokalemia, replete prn 
-renal US no hydronephrosis; no prior protenuria 
-s/p IV bicarb, kayexalate 
-no urgent need for HD as cr cont' to improve 
-avoid nephrotoxic agents, monitor bmp   
-nephro consultation appreciated Hypothyroidism 
-synthroid Gout - allopurinol 18.5 - 24.9 Normal weight / Body mass index is 25.27 kg/m². Code status: DNR Prophylaxis: ELiquis Recommended Disposition: Home w/Family Subjective: Chief Complaint / Reason for Physician Visit Follow up of CHF, weight gain, MARCELL Pt seen, NAD. Ambulates well around the unit. Discussed with RN events overnight. Review of Systems: 
Symptom Y/N Comments  Symptom Y/N Comments Fever/Chills n   Chest Pain n   
Poor Appetite    Edema n   
Cough    Abdominal Pain Sputum    Joint Pain SOB/MURILLO n    Pruritis/Rash Nausea/vomit    Tolerating PT/OT Diarrhea    Tolerating Diet Constipation    Other Could NOT obtain due to:   
 
PO intake:  
Patient Vitals for the past 72 hrs: 
 % Diet Eaten  
19 1021 100 % 19 1352 90 % 19 0859 100 % 19 1813 90 % 19 1300 90 % 19 0941 85 %  
19 1324 25 % Objective: VITALS:  
Last 24hrs VS reviewed since prior progress note. Most recent are: 
Patient Vitals for the past 24 hrs: 
 Temp Pulse Resp BP SpO2 09/29/19 0924  99  95/76   
09/29/19 0738 98 °F (36.7 °C) 95 18 97/57 97 % 09/29/19 0301 98.2 °F (36.8 °C) 96 18 97/62 98 %  
09/28/19 2254 98.4 °F (36.9 °C) 97 16 90/66 98 %  
09/28/19 1919 97.9 °F (36.6 °C) 92 16 100/68 95 % 09/28/19 1720    93/57   
09/28/19 1511 97.7 °F (36.5 °C) 98 16 95/60 99 % 09/28/19 1101 98.3 °F (36.8 °C) 100 18 94/66 98 % Intake/Output Summary (Last 24 hours) at 9/29/2019 1055 Last data filed at 9/29/2019 1021 Gross per 24 hour Intake 720 ml Output 850 ml Net -130 ml PHYSICAL EXAM: 
General: WD, WN. Alert, cooperative, no acute distress   
EENT:  EOMI. Anicteric sclerae. MMM Resp:  Basal rales L>R  No accessory muscle use CV:  Regular  rhythm,  No leg edema GI:  Soft, Non distended, Non tender.  +BS Neurologic:  Alert and oriented X 3, normal speech Psych:   Good insight. Not anxious nor agitated Skin:  No rashes. No jaundice Reviewed most current lab test results and cultures  YES Reviewed most current radiology test results   YES Review and summation of old records today    NO Reviewed patient's current orders and MAR    YES 
PMH/ reviewed - no change compared to H&P 
________________________________________________________________________ Care Plan discussed with: 
  Comments Patient x Family  x wife RN x Care Manager Consultant Multidiciplinary team rounds were held today with , nursing, pharmacist and clinical coordinator. Patient's plan of care was discussed; medications were reviewed and discharge planning was addressed. ________________________________________________________________________ Total NON critical care TIME:   35  Minutes Total CRITICAL CARE TIME Spent:   Minutes non procedure based Comments >50% of visit spent in counseling and coordination of care x This includes time during multidisciplinary rounds if indicated above ________________________________________________________________________ Torin Alejandro MD  
 
Procedures: see electronic medical records for all procedures/Xrays and details which were not copied into this note but were reviewed prior to creation of Plan. LABS: 
I reviewed today's most current labs and imaging studies. Pertinent labs include: 
Recent Labs  
  09/27/19 
0302 WBC 6.3 HGB 10.9* HCT 33.9*  
* Recent Labs  
  09/29/19 
8832 09/28/19 
0323 09/27/19 
0302 * 133* 134* K 3.9 3.8 3.7 CL 97 98 98 CO2 29 28 29 * 101* 114* BUN 38* 40* 48* CREA 2.15* 2.08* 2.44* CA 8.2* 8.5 8.4* PHOS 3.9 3.3 2.9 ALB 3.5 3.6 3.7

## 2019-09-29 NOTE — PROGRESS NOTES
Problem: Falls - Risk of 
Goal: *Absence of Falls Description Document Ronan Harris Fall Risk and appropriate interventions in the flowsheet. Outcome: Progressing Towards Goal 
Note:  
Fall Risk Interventions: 
Mobility Interventions: Bed/chair exit alarm, Communicate number of staff needed for ambulation/transfer, Patient to call before getting OOB Medication Interventions: Assess postural VS orthostatic hypotension, Evaluate medications/consider consulting pharmacy, Patient to call before getting OOB History of Falls Interventions: Bed/chair exit alarm, Door open when patient unattended, Evaluate medications/consider consulting pharmacy Problem: Patient Education: Go to Patient Education Activity Goal: Patient/Family Education Outcome: Progressing Towards Goal 
  
Problem: Heart Failure: Day 3 Goal: Activity/Safety Outcome: Progressing Towards Goal 
Goal: Diagnostic Test/Procedures Outcome: Progressing Towards Goal 
Goal: *Oxygen saturation within defined limits Outcome: Progressing Towards Goal 
  
Problem: Acute Renal Failure: Day 3 Goal: Activity/Safety Outcome: Progressing Towards Goal 
Goal: Consults, if ordered Outcome: Progressing Towards Goal 
Goal: Diagnostic Test/Procedures Outcome: Progressing Towards Goal

## 2019-09-29 NOTE — PROGRESS NOTES
0700: Bedside shift change report given to Phoenix Bautista (oncoming nurse) by Kenan Rodrigues (offgoing nurse). Report included the following information SBAR, Kardex, Intake/Output, MAR, Recent Results and Cardiac Rhythm NSR/sinus arrythmia. 0930: Pt ambulated in hallway with tech. Tolerated well. 1245: Spoke with Dr. Mary Ayala regarding pt's BP. Orders received to decrease dobuatmine gtt to 2.9 mL/hr. Per MD, will discontinue dobutamine in 3 hours if pt remains stable. Will continue to monitor. 1605: Dobutamine gtt stopped per order. 1723: Pt with complaints of feeling bloated. Pt's abdomen is somewhat distended. Pt states he does not feel like he needs to have a bowel movement and has declined medications for that. Pt also has poor appetite. Dr. Sid Delgado paged. Will continue to monitor. [id number]: spoke with Dr. Sid Delgado. Telephone orders received for mylanta daily as needed. Will place order. Will continue to monitor. 1900:  
Bedside shift change report GIVEN TO Gigi Hernandez RN. Report included the following information SBAR, Kardex, Intake/Output, MAR, Recent Results and Cardiac Rhythm NSR/tach/arrythmia. SIGNIFICANT CHANGES DURING SHIFT:  See above. CONCERNS TO ADDRESS WITH MD:  BPs low with no hold parameters. 1360 Emilia Anderson NURSING NOTE Admission Date 9/21/2019 Admission Diagnosis Acute on chronic heart failure ([facility] [I50.9] Consults IP CONSULT TO CARDIOLOGY 
IP CONSULT TO NEPHROLOGY 
IP CONSULT TO PALLIATIVE CARE - PROVIDER Cardiac Monitoring [x] Yes [] No  
  
Purposeful Hourly Rounding [x] Yes   
Froylan Score Total Score: 2 Froylan score 3 or > [] Bed Alarm [] Avasys [] 1:1 sitter [] Patient refused (Signed refusal form in chart) Arsen Score Arsen Score: 21 Arsen score 14 or < [] PMT consult [] Wound Care consult  
 []  Specialty bed  [] Nutrition consult Influenza Vaccine Received Flu Vaccine for Current Season (usually Sept-March):  Yes  
    
  
 Oxygen needs? [x] Room air Oxygen @  []1L    []2L    []3L   []4L    []5L   []6L via NC Chronic home O2 use? [] Yes [] No 
Perform O2 challenge test and document in progress note using smartphrase (.Homeoxygen) Last bowel movement Last Bowel Movement Date: 09/29/19 Urinary Catheter LDAs Peripheral IV 09/21/19 Right Forearm (Active) Site Assessment Clean, dry, & intact 9/29/2019  3:32 PM  
Phlebitis Assessment 0 9/29/2019  3:32 PM  
Infiltration Assessment 0 9/29/2019  3:32 PM  
Dressing Status Clean, dry, & intact 9/29/2019  3:32 PM  
Dressing Type Tape;Transparent 9/29/2019  3:32 PM  
Hub Color/Line Status Pink;Flushed 9/29/2019  3:32 PM  
Alcohol Cap Used Yes 9/21/2019  7:44 PM  
   
Peripheral IV 09/24/19 Left Antecubital (Active) Site Assessment Clean, dry, & intact 9/29/2019  3:32 PM  
Phlebitis Assessment 0 9/29/2019  3:32 PM  
Infiltration Assessment 0 9/29/2019  3:32 PM  
Dressing Status Clean, dry, & intact 9/29/2019  3:32 PM  
Dressing Type Tape;Transparent 9/29/2019  3:32 PM  
Hub Color/Line Status Pink; Infusing 9/29/2019  3:32 PM  
                  
  
Readmission Risk Assessment Tool Score High Risk   
      
 21 Total Score 3 Patient Length of Stay (>5 days = 3) 4 IP Visits Last 12 Months (1-3=4, 4=9, >4=11) 5 Pt. Coverage (Medicare=5 , Medicaid, or Self-Pay=4) 9 Charlson Comorbidity Score (Age + Comorbid Conditions) Criteria that do not apply:  
 Has Seen PCP in Last 6 Months (Yes=3, No=0) . Living with Significant Other. Assisted Living. LTAC. SNF. or  
Rehab Expected Length of Stay 4d 2h Actual Length of Stay 8

## 2019-09-29 NOTE — PROGRESS NOTES
Problem: Falls - Risk of 
Goal: *Absence of Falls [facility] and appropriate interventions in the flowsheet. Outcome: Progressing Towards Goal 
Note:  
Fall Risk Interventions: 
Mobility Interventions: Bed/chair exit alarm, Communicate number of staff needed for ambulation/transfer, Patient to call before getting OOB, Strengthening exercises (ROM-active/passive), Utilize walker, cane, or other assistive device Medication Interventions: Bed/chair exit alarm, Patient to call before getting OOB, Teach patient to arise slowly History of Falls Interventions: Bed/chair exit alarm, Consult care management for discharge planning, Door open when patient unattended Problem: Patient Education: Go to Patient Education Activity Goal: Patient/Family Education Outcome: Progressing Towards Goal 
  
Problem: Patient Education: Go to Patient Education Activity Goal: Patient/Family Education Outcome: Progressing Towards Goal

## 2019-09-29 NOTE — PROGRESS NOTES
[facility] [employer] [address], [address], [address] [place], [address] Phone: [phone]   Fax: [phone]      Phone: [phone]   
[email or web address] Progress Note Pt Name : Clair Armando Date of Admission : 9/21/2019 CC:  Follow up for MARCELL Assessment and Plan MARCELL on CKD  
- 2/2 CRS 
- Cr improved  
- stopped IV lasix as he is dry  
- one dose of IV albumin  
- changed diuretics to p.o torsemide 20 mg BID starting tomorrow CKD Stage III  
- baseline Cr 1.5 mg/dl Lytes : 
- Hypo K : replete PRN  
- continue aldactone Hyponatremia : 
- 2/2 CHF  
- stable Dilated CMP w/ EF 25% A fib w/ RVR Cardiogenic shock - per Dr Sharon Mcclendon D/w son at bedside For other plans, see orders. Interval History: 
Seen and examined Reports feeling weak, some dizziness. bp LOW Urine dark No chest pain//N/V/abdo pain Review of Systems: A comprehensive review of systems was negative. Current Facility-Administered Medications Medication Dose Route Frequency  albumin human 5% (BUMINATE) solution 12.5 g  12.5 g IntraVENous ONCE  
 [START ON 9/30/2019] torsemide (DEMADEX) tablet 20 mg  20 mg Oral BID  
 bisacodyl (DULCOLAX) suppository 10 mg  10 mg Rectal DAILY PRN  
 apixaban (ELIQUIS) tablet 5 mg  5 mg Oral Q12H  
 spironolactone (ALDACTONE) tablet 25 mg  25 mg Oral DAILY  lactulose (CHRONULAC) 10 gram/15 mL solution 45 mL  30 g Oral PRN  
 DOBUTamine (DOBUTREX) 1,000 mg/250 mL (4,000 mcg/mL) infusion  2.5 mcg/kg/min IntraVENous CONTINUOUS  
 amiodarone (CORDARONE) tablet 200 mg  200 mg Oral DAILY  levothyroxine (SYNTHROID) tablet 100 mcg  100 mcg Oral 6am  
 simethicone (MYLICON) tablet 80 mg  80 mg Oral Q6H PRN  
 traZODone (DESYREL) tablet 25 mg  25 mg Oral QHS  allopurinol (ZYLOPRIM) tablet 200 mg  200 mg Oral DAILY  therapeutic multivitamin (THERAGRAN) tablet 1 Tab  1 Tab Oral DAILY  loratadine (CLARITIN) tablet 10 mg  10 mg Oral DAILY  pravastatin (PRAVACHOL) tablet 80 mg  80 mg Oral QHS  sodium chloride (NS) flush 5-40 mL  5-40 mL IntraVENous Q8H  
 sodium chloride (NS) flush 5-40 mL  5-40 mL IntraVENous PRN  
 acetaminophen (TYLENOL) tablet 500 mg  500 mg Oral Q4H PRN  
 HYDROcodone-acetaminophen (NORCO) 5-325 mg per tablet 1 Tab  1 Tab Oral Q6H PRN  
 naloxone (NARCAN) injection 0.4 mg  0.4 mg IntraVENous PRN  
 ondansetron (ZOFRAN) injection 2 mg  2 mg IntraVENous Q6H PRN  
 bisacodyl (DULCOLAX) tablet 5 mg  5 mg Oral DAILY PRN  
 melatonin tablet 3 mg  3 mg Oral QHS PRN No Known Allergies Objective: 
Vitals:   
Vitals:  
 09/29/19 0738 09/29/19 0924 09/29/19 1149 09/29/19 1237 BP: 97/57 95/76 (!) 87/60 (!) 86/53 Pulse: 95 99 100 94 Resp: 18  18 Temp: 98 °F (36.7 °C)  97.3 °F (36.3 °C) SpO2: 97%  99% Weight:      
Height:      
 
Intake and Output: 
09/29 0701 - 09/29 1900 In: 360 [P.O.:360] Out: 200 [Urine:200] 09/27 1901 - 09/29 0700 In: 1225.9 [P.O.:1080; I.V.:145.9] Out: 2150 [Urine:2150] Physical Examination: 
General: NAD,Conversant Neck:  Supple, no mass Resp:  Lungs CTA B/L, no wheezing , normal respiratory effort CV:  RRR,  no murmur or rub,no LE edema GI:  Soft, NT, + Bowel sounds, distension+ Neurologic:  Non focal 
Psych:             AAO x 3 appropriate affect Skin:  No Rash 
 
 
[]    High complexity decision making was performed 
[]    Patient is at high-risk of decompensation with multiple organ involvement Lab Data Personally Reviewed: I have reviewed all the pertinent labs, microbiology data and radiology studies during assessment. Recent Labs  
  09/29/19 
0863 09/28/19 
0323 09/27/19 
0302 * 133* 134* K 3.9 3.8 3.7 CL 97 98 98 CO2 29 28 29 * 101* 114* BUN 38* 40* 48* CREA 2.15* 2.08* 2.44* CA 8.2* 8.5 8.4* PHOS 3.9 3.3 2.9 ALB 3.5 3.6 3.7 Recent Labs  
  09/27/19 
0302 WBC 6.3 HGB 10.9* HCT 33.9*  
* No results found for: SDES Lab Results Component Value Date/Time Culture result: NO GROWTH 1 DAY 07/28/2019 11:44 PM  
 Culture result: NO GROWTH 5 DAYS 07/28/2019 12:20 PM  
 Culture result: NO GROWTH 6 DAYS 07/27/2019 09:27 PM  
 
Recent Results (from the past 24 hour(s)) RENAL FUNCTION PANEL Collection Time: 09/29/19  3:12 AM  
Result Value Ref Range Sodium 133 (L) 136 - 145 mmol/L Potassium 3.9 3.5 - 5.1 mmol/L Chloride 97 97 - 108 mmol/L  
 CO2 29 21 - 32 mmol/L Anion gap 7 5 - 15 mmol/L Glucose 105 (H) 65 - 100 mg/dL BUN 38 (H) 6 - 20 MG/DL Creatinine 2.15 (H) 0.70 - 1.30 MG/DL  
 BUN/Creatinine ratio 18 12 - 20 GFR est AA 36 (L) >60 ml/min/1.73m2 GFR est non-AA 30 (L) >60 ml/min/1.73m2 Calcium 8.2 (L) 8.5 - 10.1 MG/DL Phosphorus 3.9 2.6 - 4.7 MG/DL Albumin 3.5 3.5 - 5.0 g/dL Total time spent with patient:  xxx   min. Care Plan discussed with: 
Patient Family RN Consulting Physician [address] [place] [address],      
 
I have reviewed the flowsheets. Chart reviewed. Pertinent Notes reviewed. Current Medications list reviewed by me. No change in PMH ,family and social history from Consult note. Emilio Linton MD 
09/29/19

## 2019-09-30 NOTE — PROGRESS NOTES
0700: Bedside shift change report given to Debra Arguello (oncoming nurse) by Mary Edge (offgoing nurse). Report included the following information SBAR, Kardex, Intake/Output, MAR, Recent Results and Cardiac Rhythm NSR/BBB.  
 
0738: Dr. Irene Malik paged regarding pt's BP of 86/55.  
 
0746: Spoke with Dr. Irene Malik. Orders received to hold torsemide and spironolactone and to give amiodarone. Will continue to monitor BP. 
 
1047: Received message from pharmacy regarding veltassa order. Per pharmacy, [facility] currently out of this medication, awaiting to receive it from another facility. Will administer medication when able. 1252: Message sent to pharmacy regarding Lori Boroughs order. Medication still not available from pharmacy. 1310: Dr. Tyree Rosen paged regarding pt's lactic acid level of 5.2. 
 
1314: Spoke with Dr. Tyree Rosen. MD advised this RN to reach out to Dr. Ella Dorsey regarding right heart cath. Will page Dr. Ella Dorsey. 1316: Dr. Ella Dorsey paged. 1319: Dr. Demi Winters paged. 1327: Spoke with Dr. Demi Winters regarding pt's metabolic panel. Orders received to discontinue veltassa order. Will continue to monitor. 1400: Dr. Ella Dorsey paged again. 1801: Spoke with Dr. Ella Dorsey. Verbal orders received for PICC line placement and case management consult. Pt will be NPO at midnight for cardiac cath in AM and PICC line to be placed after. 1900:  
Bedside shift change report GIVEN TO Ruchi/Phi, RN. Report included the following information SBAR, Kardex, Intake/Output, MAR, Recent Results and Cardiac Rhythm NSR/BBB. SIGNIFICANT CHANGES DURING SHIFT:  See above. CONCERNS TO ADDRESS WITH MD:  None. [facility] NURSING NOTE Admission Date 9/21/2019 Admission Diagnosis Acute on chronic heart failure ([facility] [address] [I50.9] Consults IP CONSULT TO CARDIOLOGY 
IP CONSULT TO NEPHROLOGY 
IP CONSULT TO PALLIATIVE CARE - PROVIDER 
IP CONSULT TO PALLIATIVE CARE - PROVIDER 
IP CONSULT TO ADVANCED HEART FAILURE Cardiac Monitoring [x] Yes [] No  
  
 Purposeful Hourly Rounding [x] Yes   
Froylan Score Total Score: 2 Froylan score 3 or > [] Bed Alarm [] Avasys [] 1:1 sitter [] Patient refused (Signed refusal form in chart) Arsen Score Arsen Score: 19 Arsen score 14 or < [] PMT consult [] Wound Care consult  
 []  Specialty bed  [] Nutrition consult Influenza Vaccine Received Flu Vaccine for Current Season (usually Sept-March): Yes Oxygen needs? [x] Room air Oxygen @  []1L    []2L    []3L   []4L    []5L   []6L via NC Chronic home O2 use? [] Yes [] No 
Perform O2 challenge test and document in progress note using [employer]ocal [employer]Homeoxygen) Last bowel movement Last Bowel Movement Date: 09/30/19 Urinary Catheter LDAs Peripheral IV 09/21/19 Right Forearm (Active) Site Assessment Clean, dry, & intact 9/30/2019  3:25 PM  
Phlebitis Assessment 0 9/30/2019  3:25 PM  
Infiltration Assessment 0 9/30/2019  3:25 PM  
Dressing Status Clean, dry, & intact 9/30/2019  3:25 PM  
Dressing Type Tape;Transparent 9/30/2019  3:25 PM  
Hub Color/Line Status Pink; Infusing 9/30/2019  3:25 PM  
Alcohol Cap Used Yes 9/21/2019  7:44 PM  
   
Peripheral IV 09/30/19 Left Forearm (Active) Site Assessment Clean, dry, & intact 9/30/2019  3:25 PM  
Phlebitis Assessment 0 9/30/2019  3:25 PM  
Infiltration Assessment 0 9/30/2019  3:25 PM  
Dressing Status Clean, dry, & intact 9/30/2019  3:25 PM  
Dressing Type Tape;Transparent 9/30/2019  3:25 PM  
Hub Color/Line Status Blue; Infusing 9/30/2019  3:25 PM  
                  
  
Readmission Risk Assessment Tool Score High Risk   
      
 21 Total Score 3 Patient Length of Stay (>5 days = 3) 4 IP Visits Last 12 Months (1-3=4, 4=9, >4=11) 5 Pt. Coverage (Medicare=5 , Medicaid, or Self-Pay=4) 9 Charlson Comorbidity Score (Age + Comorbid Conditions) Criteria that do not apply:  
 Has Seen PCP in Last 6 Months (Yes=3, No=0) . Living with Significant Other. Assisted Living. LTAC. SNF. or  
Rehab Expected Length of Stay 4d 2h Actual Length of Stay 9

## 2019-09-30 NOTE — PROGRESS NOTES
Problem: Falls - Risk of 
Goal: *Absence of Falls Description Document Sulaiman Gray Fall Risk and appropriate interventions in the flowsheet. Outcome: Progressing Towards Goal 
Note:  
Fall Risk Interventions: 
Mobility Interventions: Bed/chair exit alarm, Communicate number of staff needed for ambulation/transfer, Mechanical lift, OT consult for ADLs, Patient to call before getting OOB, PT Consult for mobility concerns, PT Consult for assist device competence Medication Interventions: Bed/chair exit alarm, Evaluate medications/consider consulting pharmacy, Patient to call before getting OOB, Teach patient to arise slowly, Utilize gait belt for transfers/ambulation History of Falls Interventions: Bed/chair exit alarm, Consult care management for discharge planning, Door open when patient unattended, Evaluate medications/consider consulting pharmacy, Investigate reason for fall, Room close to nurse's station, Utilize gait belt for transfer/ambulation, Assess for delayed presentation/identification of injury for 48 hrs (comment for end date) Problem: Patient Education: Go to Patient Education Activity Goal: Patient/Family Education Outcome: Progressing Towards Goal 
  
Problem: Heart Failure: Day 5 Goal: Off Pathway (Use only if patient is Off Pathway) Outcome: Progressing Towards Goal 
Goal: Activity/Safety Outcome: Progressing Towards Goal 
Goal: Diagnostic Test/Procedures Outcome: Progressing Towards Goal 
Goal: Nutrition/Diet Outcome: Progressing Towards Goal 
Goal: Discharge Planning Outcome: Progressing Towards Goal 
Goal: Medications Outcome: Progressing Towards Goal 
Goal: Respiratory Outcome: Progressing Towards Goal 
Goal: Treatments/Interventions/Procedures Outcome: Progressing Towards Goal 
Goal: Psychosocial 
Outcome: Progressing Towards Goal

## 2019-09-30 NOTE — PROGRESS NOTES
Nephrology Progress Note T[facility]  
 
[email or web address]                  Phone - [phone] Patient: Lary Cea Date- 9/30/2019 Admit Date: 9/21/2019 YOB: 1940 CC: Follow up for MARCELL Subjective: Interval History:  
-CR WORSE Off dobutamine gtt 
k high at 5.7 Lasix was switched to torsemide No c/o sob, No c/o chest pain, No c/o nausea or vomiting No c/o  fever. ROS:- as above Assessment & Plan: MARCELL  likely CARDIORENAL SYNDROME- No hydro on renal usg - plan for restarting dobutamine noted 
- hold diuretics today 
- stop aldactone - check bmp in am 
 
 Hyperkalemia 
- stop aldactone 
- give veltassa 
- repeat labs at 3 pm 
 
 CHF 
- plan for cath for tomorrow noted Hypotension.- likely due to cardiogenic shock Hyponatremia likely due to chf 
 
 Chronic kidney disease stage III, baseline creatinine 1.5. 
 
  history of atrial fibrillation and ventricular tachycardia. Physical Exam:  
GEN:  NAD NECK:  Supple, no thyromegaly RESP: CTA  b/l, no  wheezing, CVS: RRR,S1,S2 ABDO:  soft , non tender, No mass NEURO: non focal, normal speech EXT: Edema trace Care Plan discussed with: patient,DR. Ella Dorsey Objective:  
Visit Vitals BP (!) 86/55 (BP 1 Location: Left arm, BP Patient Position: At rest) Pulse 85 Temp 97.4 °F (36.3 °C) Resp 20 Ht 5' 9\" (1.753 m) Wt 78.4 kg (172 lb 12.8 oz) SpO2 96% BMI 25.52 kg/m² Last 3 Recorded Weights in this Encounter  
 09/28/19 0214 09/29/19 0301 09/30/19 0498 Weight: 77.7 kg (171 lb 4.8 oz) 77.6 kg (171 lb 1.6 oz) 78.4 kg (172 lb 12.8 oz) 09/28 1901 - 09/30 0700 In: 900 [P.O.:900] Out: 660 [Urine:660] Intake/Output Summary (Last 24 hours) at 9/30/2019 6412 Last data filed at 9/30/2019 5870 Gross per 24 hour Intake 780 ml Output 360 ml Net 420 ml Chart reviewed. Pertinent Notes reviewed. Medication list  reviewed Current Facility-Administered Medications Medication  albumin human 5% (BUMINATE) solution 25 g  polyethylene glycol (MIRALAX) packet 17 g  [START ON 10/1/2019] torsemide (DEMADEX) tablet 20 mg  
 patiromer calcium sorbitex (VELTASSA) powder 8.4 g  
 alum-mag hydroxide-simeth (MYLANTA) oral suspension 30 mL  bisacodyl (DULCOLAX) suppository 10 mg  
 lactulose (CHRONULAC) 10 gram/15 mL solution 45 mL  DOBUTamine (DOBUTREX) 1,000 mg/250 mL (4,000 mcg/mL) infusion  amiodarone (CORDARONE) tablet 200 mg  levothyroxine (SYNTHROID) tablet 100 mcg  simethicone (MYLICON) tablet 80 mg  
 traZODone (DESYREL) tablet 25 mg  
 allopurinol (ZYLOPRIM) tablet 200 mg  therapeutic multivitamin (THERAGRAN) tablet 1 Tab  loratadine (CLARITIN) tablet 10 mg  
 pravastatin (PRAVACHOL) tablet 80 mg  
 sodium chloride (NS) flush 5-40 mL  sodium chloride (NS) flush 5-40 mL  acetaminophen (TYLENOL) tablet 500 mg  
 HYDROcodone-acetaminophen (NORCO) 5-325 mg per tablet 1 Tab  naloxone (NARCAN) injection 0.4 mg  
 ondansetron (ZOFRAN) injection 2 mg  bisacodyl (DULCOLAX) tablet 5 mg  melatonin tablet 3 mg Data Review : 
Recent Labs  
  09/30/19 0221 09/29/19 
7160 09/28/19 
6742 * 133* 133*  
K 5.7* 3.9 3.8 CL 92* 97 98 CO2 26 29 28 BUN 48* 38* 40* CREA 2.96* 2.15* 2.08* * 105* 101* CA 8.8 8.2* 8.5 PHOS 5.6* 3.9 3.3 Recent Labs  
  09/30/19 0221 WBC 9.1 HGB 12.0*  
HCT 36.5*  
* No results for input(s): FE, TIBC, PSAT, FERR in the last 72 hours. No results for input(s): CPK, CKNDX, TROIQ in the last 72 hours. No lab exists for component: CPKMB Lab Results Component Value Date/Time  Color YELLOW/STRAW 07/28/2019 11:44 PM  
 Appearance CLEAR 07/28/2019 11:44 PM  
 Specific gravity 1.021 07/28/2019 11:44 PM  
 pH (UA) 5.0 07/28/2019 11:44 PM  
 Protein NEGATIVE  07/28/2019 11:44 PM  
 Glucose NEGATIVE  07/28/2019 11:44 PM  
 Ketone NEGATIVE  07/28/2019 11:44 PM  
 Bilirubin NEGATIVE  07/28/2019 11:44 PM  
 Urobilinogen 0.2 07/28/2019 11:44 PM  
 Nitrites NEGATIVE  07/28/2019 11:44 PM  
 Leukocyte Esterase NEGATIVE  07/28/2019 11:44 PM  
 Epithelial cells FEW 07/28/2019 11:44 PM  
 Bacteria 1+ (A) 07/28/2019 11:44 PM  
 WBC 0-4 07/28/2019 11:44 PM  
 RBC 0-5 07/28/2019 11:44 PM  
 
Lab Results Component Value Date/Time Culture result: NO GROWTH 1 DAY 07/28/2019 11:44 PM  
 Culture result: NO GROWTH 5 DAYS 07/28/2019 12:20 PM  
 Culture result: NO GROWTH 6 DAYS 07/27/2019 09:27 PM  
 
No results found for: SDES Lab Results Component Value Date/Time Sodium,urine random 14 09/22/2019 03:37 PM  
 Creatinine, urine 166.00 09/22/2019 03:37 PM  
 
 
Results from Hospital Encounter encounter on 09/21/19 XR CHEST PORT Narrative EXAM:  XR CHEST PORT. INDICATION: ? vol overload. COMPARISON: 9/21/2019. FINDINGS:  
A portable AP radiograph of the chest was obtained at 0854 hours. There is a 
pacemaker in the left chest, unchanged in position. Lines and tubes: The patient is on a cardiac monitor. Lungs: There is mild interstitial edema throughout the lungs and atelectasis at 
the lung bases. Pleura: There is a new small right pleural effusion. There are calcified pleural 
plaques. Mediastinum: The cardiac and mediastinal contours and pulmonary vascularity are 
normal. 
Bones and soft tissues: The bones and soft tissues are grossly within normal 
limits. Impression IMPRESSION: Cardiac pacemaker with increased interstitial edema and new small 
right pleural effusion and basilar atelectasis. Maria Elena Panchal MD 
[facility] 
 www. [facility] [employer] [address], [address] [place], [address] Phone - [phone] Fax - [phone]

## 2019-09-30 NOTE — CONSULTS
Palliative Medicine Consult Jun: [phone] ([phone]) Patient Name: Yaw Reddy YOB: 1940 Date of Initial Consult: 9/26/19 Reason for Consult: care decisions, CHF bundle Requesting Provider: Ashley Ford 
Primary Care Physician: Bulmaro Krause MD 
 
 SUMMARY:  
Yaw Reddy is a 77 y.o. with a past history of CHF (EF 21-25% 2019)- follows w/ [facility], Dr Alba Kaur, ICD in place, a fib, CKD who was admitted on 9/21/2019 from home with worsening SOB, weight gain. Was hospitalized 7/28-8/4/19 w/ CHF exacerbation and a fib w/ RVR. Was medically managed, was on dobutamine ggt while hospitalized. This admission also started on dobutamine ggt as well as IV diuretics. Renal function improving, likely cardiorenal syndrome. No indication for RRT at this time. Interim hx: Patient was admitted 9/21 with decompensation of heart failure and diuresed. Found to be hypotensive with acute on chronic renal failure and acute liver injury; unable to be weaned of dobutamine due to deterioration of symptoms and end organ function. AHF service and palliative care was consulted. Current medical issues leading to Palliative Medicine involvement include: care decisions. Social: Pt  to [place] x 31 years. They both have children from previous relationships who are supportive. PALLIATIVE DIAGNOSES:  
1. Shortness of breath/MURILLO improving 2. LE swelling improving 3. Fatigue 4. Goals of care 5. Advance heart failure dobutamine dependant . 6. Advance directives review. PLAN:  
1. I was paged by the bed side RN , family is at bed side wants to meet with palliative care . 2. Prior to visit , I reviewed the chart , including  initial palliative consult note, by my colleague Dr Alessia Bermudez, cardiology  notes , echo results . 3. Family meeting for goals of care discussion : 
· Met with patient his spouse Jose De Jesus Crump, son  Yaw Reddy , his two daughters. · Patient is hard of hearing does not uses hearing aids , participated in discussion , he denies any active symptoms, except  he is very weak , family notes he is \" wobbly \", tried to walk with walker . · Patient and family has discussed with Dr Mary Pulido and with Jeniffer Villanueva regarding the severity of heart failure and option of right heart catheterization , for option to go home on dobutamine or hospice. · We discussed hospice services , what it entails and it is possible , he can be on doubting while on hospice , suggested information session with hospice , they agreed , consult for hospice placed . · Wife notes she has health issues , with LE amputation , prosthesis and will not be able to take care of patient, we discussed out of care pocket care giver care support , referred them to case manger for resources for paid care giver . · Patient and family trust Dr Mary Pulido , they want to discuss further with Dr Mary Pulido regarding decision to continue with dobutamine or hospice and deactivation of defibrillator as heart failure is progressing . 4. Confirm DNR status, which he also had in place during the summer hospital stay. I encouraged him to sign DNR, his wife is not ready for him to sign it, she thinks patient has living will in place and does not need DNR, explained living will cannot replace DDNR, left empty copy of DNR  with wife to go over . 5. Advance directives reviewed in place. 6. We will follow peripherally . 7. Initial consult note routed to primary continuity provider and/or primary health care team members 8. Communicated plan of care with: Palliative [place] [facility] Team 
 
 
ADDENDUM 130pm~ Wife left message for us, states she has a DDNR already. GOALS OF CARE / TREATMENT PREFERENCES:  
 
GOALS OF CARE: 
Patient/Health Care Proxy Stated Goals: (on going discussion for home with hospice , vs home with dobutamine  .) TREATMENT PREFERENCES:  
Code Status: DNR- to complete Advance Care Planning: 
[] The [facility] Interdisciplinary Team has updated the ACP Navigator with Milagro and Patient Capacity Primary Decision Maker: Jessika Griggs ([facility]) - Spouse - [phone] Advance Care Planning 9/21/2019 Confirm Advance Directive Yes, on file Medical Interventions: Full interventions Other: As far as possible, the palliative care team has discussed with patient / health care proxy about goals of care / treatment preferences for patient. HISTORY:  
 
History obtained from: Pt, chart CHIEF COMPLAINT: sob , weak . HPI/SUBJECTIVE: The patient is:  
[x] Verbal and participatory [] Non-participatory due to:  
 
Pt w/ shortness of breath when walking, he is weak , legs are specifically weak due to \" laying in bed\", he is eating poorly , denies constipation . Clinical Pain Assessment (nonverbal scale for severity on nonverbal patients):  
Clinical Pain Assessment Severity: 0 Duration: for how long has pt been experiencing pain (e.g., 2 days, 1 month, years) Frequency: how often pain is an issue (e.g., several times per day, once every few days, constant) FUNCTIONAL ASSESSMENT:  
 
Palliative Performance Scale (PPS): PPS: 60 PSYCHOSOCIAL/SPIRITUAL SCREENING:  
 
Palliative IDT has assessed this patient for cultural preferences / practices and a referral made as appropriate to needs (Cultural Services, Patient Advocacy, Ethics, etc.) Any spiritual / Jainism concerns: 
[] Yes /  [x] No 
 
Caregiver Burnout: 
[] Yes /  [x] No /  [] No Caregiver Present Anticipatory grief assessment:  
[x] Normal  / [] Maladaptive ESAS Anxiety: Anxiety: 0 
 
ESAS Depression: Depression: 0 REVIEW OF SYSTEMS:  
 
Positive and pertinent negative findings in ROS are noted above in HPI. The following systems were [x] reviewed / [] unable to be reviewed as noted in HPI Other findings are noted below. Systems: constitutional, ears/nose/mouth/throat, respiratory, gastrointestinal, genitourinary, musculoskeletal, integumentary, neurologic, psychiatric, endocrine. Positive findings noted below. Modified ESAS Completed by: provider Fatigue: 7 Drowsiness: 0 Depression: 0 Pain: 0 Anxiety: 0 Nausea: 0 Anorexia: 0 Dyspnea: 0 Constipation: No  
  Stool Occurrence(s): 1 PHYSICAL EXAM:  
 
From RN flowsheet: 
Wt Readings from Last 3 Encounters:  
09/30/19 172 lb 12.8 oz (78.4 kg) 09/12/19 174 lb 2.6 oz (79 kg) 08/26/19 166 lb (75.3 kg) Blood pressure 99/68, pulse 99, temperature 97.5 °F (36.4 °C), resp. rate 18, height 5' 9\" (1.753 m), weight 172 lb 12.8 oz (78.4 kg), SpO2 100 %. Pain Scale 1: Numeric (0 - 10) Pain Intensity 1: 0 Pain Location 1: Rib cage Pain Orientation 1: Lower, Right Pain Description 1: Aching Pain Intervention(s) 1: Medication (see MAR) Last bowel movement, if known:  
 
Constitutional: awake, alert, oriented, NAD, hard of hearing . Eyes: pupils equal, anicteric ENMT: no nasal discharge, moist mucous membranes Cardiovascular: trace LE edema Respiratory: breathing not labored Musculoskeletal: no deformity, no tenderness to palpation Skin: warm, dry Neurologic: following commands, moving all extremities HISTORY:  
 
Principal Problem: 
  Acute on chronic heart failure ([facility] [address] (9/21/2019) Active Problems: 
  CHF (congestive heart failure) ([address] (7/28/2019) AICD (automatic cardioverter/defibrillator) present (9/21/2019) Other hyperlipidemia (9/21/2019) Chronic renal insufficiency, stage 3 (moderate) (HCC) (9/21/2019) Acute on chronic renal insufficiency (9/21/2019) Acquired hypothyroidism (9/21/2019) Cardiomyopathy, dilated, nonischemic ([address] (9/21/2019) Gout (9/21/2019) History of atrial fibrillation (9/21/2019) Pleural plaque (9/21/2019) Past Medical History:  
Diagnosis Date  A-fib ([address]  AICD (automatic cardioverter/defibrillator) present  Arthritis  Cancer ([facility] [address] skin  CKD (chronic kidney disease)  Heart failure ([facility] [address]  Other ill-defined conditions(799.89)   
 high cholesterol  V tach ([facility] [address] Past Surgical History:  
Procedure Laterality Date  ABDOMEN SURGERY PROC UNLISTED  6/2/11  
 colon resection  HX CATARACT REMOVAL Left  HX HEENT    
 repaired right eardrum  HX OTHER SURGICAL    
 benign cyst removed from back and thyroid  HX OTHER SURGICAL    
 skin graft  HX PACEMAKER    
 aicd  [place] COLONOSCOPY FLX DX W/COLLJ SPEC WHEN PFRMD  6/6/2012  [place] COLONOSCOPY W/BIOPSY SINGLE/MULTIPLE  4/27/2011 Family History Problem Relation Age of Onset  Cancer Mother   
     colon  Heart Disease Father  Heart Disease Brother History reviewed, no pertinent family history. Social History Tobacco Use  Smoking status: Former Smoker  Smokeless tobacco: Never Used Substance Use Topics  Alcohol use: Not Currently Alcohol/week: 4.2 standard drinks Types: 5 Glasses of wine per week No Known Allergies Current Facility-Administered Medications Medication Dose Route Frequency  polyethylene glycol (MIRALAX) packet 17 g  17 g Oral DAILY  alum-mag hydroxide-simeth (MYLANTA) oral suspension 30 mL  30 mL Oral DAILY PRN  
 bisacodyl (DULCOLAX) suppository 10 mg  10 mg Rectal DAILY PRN  
 lactulose (CHRONULAC) 10 gram/15 mL solution 45 mL  30 g Oral PRN  
 DOBUTamine (DOBUTREX) 1,000 mg/250 mL (4,000 mcg/mL) infusion  5 mcg/kg/min IntraVENous CONTINUOUS  
 amiodarone (CORDARONE) tablet 200 mg  200 mg Oral DAILY  levothyroxine (SYNTHROID) tablet 100 mcg  100 mcg Oral 6am  
 simethicone (MYLICON) tablet 80 mg  80 mg Oral Q6H PRN  
 traZODone (DESYREL) tablet 25 mg  25 mg Oral QHS  allopurinol (ZYLOPRIM) tablet 200 mg  200 mg Oral DAILY  therapeutic multivitamin (THERAGRAN) tablet 1 Tab  1 Tab Oral DAILY  loratadine (CLARITIN) tablet 10 mg  10 mg Oral DAILY  pravastatin (PRAVACHOL) tablet 80 mg  80 mg Oral QHS  sodium chloride (NS) flush 5-40 mL  5-40 mL IntraVENous Q8H  
 sodium chloride (NS) flush 5-40 mL  5-40 mL IntraVENous PRN  
 acetaminophen (TYLENOL) tablet 500 mg  500 mg Oral Q4H PRN  
 HYDROcodone-acetaminophen (NORCO) 5-325 mg per tablet 1 Tab  1 Tab Oral Q6H PRN  
 naloxone (NARCAN) injection 0.4 mg  0.4 mg IntraVENous PRN  
 ondansetron (ZOFRAN) injection 2 mg  2 mg IntraVENous Q6H PRN  
 bisacodyl (DULCOLAX) tablet 5 mg  5 mg Oral DAILY PRN  
 melatonin tablet 3 mg  3 mg Oral QHS PRN  
 
 
 
 LAB AND IMAGING FINDINGS:  
 
Lab Results Component Value Date/Time WBC 9.1 09/30/2019 02:21 AM  
 HGB 12.0 (L) 09/30/2019 02:21 AM  
 PLATELET 375 (L) 78/05/1460 02:21 AM  
 
Lab Results Component Value Date/Time Sodium 129 (L) 09/30/2019 12:17 PM  
 Potassium 4.2 09/30/2019 12:17 PM  
 Chloride 91 (L) 09/30/2019 12:17 PM  
 CO2 27 09/30/2019 12:17 PM  
 BUN 54 (H) 09/30/2019 12:17 PM  
 Creatinine 3.33 (H) 09/30/2019 12:17 PM  
 Calcium 8.7 09/30/2019 12:17 PM  
 Magnesium 2.3 09/26/2019 03:16 AM  
 Phosphorus 5.6 (H) 09/30/2019 02:21 AM  
  
Lab Results Component Value Date/Time AST (SGOT) 1,466 (H) 09/30/2019 12:17 PM  
 Alk. phosphatase 192 (H) 09/30/2019 12:17 PM  
 Protein, total 6.4 09/30/2019 12:17 PM  
 Albumin 3.6 09/30/2019 12:17 PM  
 Globulin 2.8 09/30/2019 12:17 PM  
 
Lab Results Component Value Date/Time INR 2.0 (H) 09/25/2019 05:45 AM  
 Prothrombin time 19.8 (H) 09/25/2019 05:45 AM  
 aPTT 31.4 05/24/2011 12:32 PM  
  
Lab Results Component Value Date/Time Iron 79 09/30/2019 12:17 PM  
 TIBC 223 (L) 09/30/2019 12:17 PM  
 Iron % saturation 35 09/30/2019 12:17 PM  
 Ferritin 6,468 (H) 09/30/2019 12:17 PM  
  
No results found for: PH, PCO2, PO2 No components found for: [place] No results found for: CPK, CKMB Total time: 45 mins Counseling / coordination time, spent as noted above: 35 mins 
> 50% counseling / coordination?: yes Prolonged service was provided for  []30 min   []75 min in face to face time in the presence of the patient, spent as noted above. Time Start:  
Time End:  
Note: this can only be billed with 18243 (initial) or 75619 (follow up). If multiple start / stop times, list each separately.

## 2019-09-30 NOTE — PROGRESS NOTES
Bedside shift change report GIVEN TO Patricio Dunn RN. Report included the following information SBAR, Kardex, MAR and Cardiac Rhythm SA.  
 
 
 
SIGNIFICANT CHANGES DURING SHIFT:  Patient urinating 10-25ml each time. PVR at 0230 56, PVR at 0600 107. Patient is not complaining of having the urge to urinate. CONCERNS TO ADDRESS WITH MD:  Blood pressures continue to remain low, abd distended. [facility] NURSING NOTE Admission Date 9/21/2019 Admission Diagnosis Acute on chronic heart failure ([facility] [address] [I50.9] Consults IP CONSULT TO CARDIOLOGY 
IP CONSULT TO NEPHROLOGY 
IP CONSULT TO PALLIATIVE CARE - PROVIDER Cardiac Monitoring [x] Yes [] No  
  
Purposeful Hourly Rounding [x] Yes   
Froylan Score Total Score: 2 Froylan score 3 or > [x] Bed Alarm [] Avasys [] 1:1 sitter [] Patient refused (Signed refusal form in chart) Arsen Score Arsen Score: 19 Arsen score 14 or < [] PMT consult [] Wound Care consult  
 []  Specialty bed  [] Nutrition consult Influenza Vaccine Received Flu Vaccine for Current Season (usually Sept-March): Yes Oxygen needs? [x] Room air Oxygen @  []1L    []2L    []3L   []4L    []5L   []6L via NC Chronic home O2 use? [] Yes [x] No 
Perform O2 challenge test and document in progress note using smartphrase (.Homeoxygen) Last bowel movement Last Bowel Movement Date: 09/29/19 Urinary Catheter LDAs Peripheral IV 09/21/19 Right Forearm (Active) Site Assessment Clean, dry, & intact 9/29/2019  8:11 PM  
Phlebitis Assessment 0 9/29/2019  8:11 PM  
Infiltration Assessment 0 9/29/2019  8:11 PM  
Dressing Status Clean, dry, & intact 9/29/2019  8:11 PM  
Dressing Type Transparent 9/29/2019  8:11 PM  
Hub Color/Line Status Pink; Infusing 9/29/2019  8:11 PM  
Alcohol Cap Used Yes 9/21/2019  7:44 PM  
   
Peripheral IV 09/24/19 Left Antecubital (Active) Site Assessment Intact;Drainage (comment) 9/29/2019  8:11 PM  
 Phlebitis Assessment 0 9/29/2019  8:11 PM  
Infiltration Assessment 0 9/29/2019  8:11 PM  
Dressing Status Intact; Old drainage 9/29/2019  8:11 PM  
Dressing Type Transparent 9/29/2019  8:11 PM  
Hub Color/Line Status Pink;Capped 9/29/2019  8:11 PM  
                  
  
Readmission Risk Assessment Tool Score High Risk   
      
 21 Total Score 3 Patient Length of Stay (>5 days = 3) 4 IP Visits Last 12 Months (1-3=4, 4=9, >4=11) 5 Pt. Coverage (Medicare=5 , Medicaid, or Self-Pay=4) 9 Charlson Comorbidity Score (Age + Comorbid Conditions) Criteria that do not apply:  
 Has Seen PCP in Last 6 Months (Yes=3, No=0) . Living with Significant Other. Assisted Living. LTAC. SNF. or  
Rehab Expected Length of Stay 4d 2h Actual Length of Stay 8

## 2019-09-30 NOTE — PROGRESS NOTES
Progress Note 9/30/2019 7:48 AM 
NAME: Vianca Abel MRN:  [id number] Admit Diagnosis: Acute on chronic heart failure ([facility] [address] [I50.9] Problem List: 1. Acute on chronic systolic heart failure 2. Acute on chronic renal insufficiency; Stg 3 
3. Cardiogenic shock 4. Dilated NICM; EF 25%. Patriot Cushing 7/18 w/ EF 20%, dil LV, nml RV, mild MR/TR 5. Persistent AFib s/p VIGNESH/DCCV 8/5/19 6. Remote ICD implantation 7. Recurrent ventricular tachycardia 8. Hyperlipidemia 9. Former smoker 10. DNR Assessment/Plan:  
Last 3 Recorded Weights in this Encounter  
 09/28/19 0214 09/29/19 0301 09/30/19 9199 Weight: 77.7 kg (171 lb 4.8 oz) 77.6 kg (171 lb 1.6 oz) 78.4 kg (172 lb 12.8 oz) sCr down to 2 then up to 3 with cessation of inotropes K 5.8 I did say that it was ok for him to a filet vida (wife to bring in) for dinner; it is his anniversary. Resume dobutamine @ 5mcg. Had long discussion with patient and wife. Palliative care consult Advanced heart failure consult NPO p MN for RHC on/off dobutamine. He would like to try and go home with dobutamine (for palliation) if possible. Continue oral amio 200mg daily; increase if back in AF. Hold eliquis 5mg BID Diuresis per renal; holding aldactone/diuretics Renal consult appreciated Continue statin Holding prior Entresto, coreg, with marginal/low BPs [x]       High complexity decision making was performed in this patient at high risk for decompensation with multiple organ involvement. Subjective:  
 
Vianca Abel denies chest pain. Breathing worse Discussed with RN events overnight. Review of Systems: 
 
Symptom Y/N Comments  Symptom Y/N Comments Fever/Chills N   Chest Pain N Poor Appetite N   Edema N   
Cough N   Abdominal Pain N Sputum N   Joint Pain N   
SOB/MURILLO N   Pruritis/Rash N   
Nausea/vomit N   Tolerating PT/OT Y Diarrhea N   Tolerating Diet Y Constipation N   Other Could NOT obtain due to:   
 
Objective:  
  
Physical Exam: 
 
Last 24hrs VS reviewed since prior progress note. Most recent are: 
 
Visit Vitals BP (!) 86/55 (BP 1 Location: Left arm, BP Patient Position: At rest) Pulse 85 Temp 97.4 °F (36.3 °C) Resp 20 Ht 5' 9\" (1.753 m) Wt 78.4 kg (172 lb 12.8 oz) SpO2 96% BMI 25.52 kg/m² Intake/Output Summary (Last 24 hours) at 9/30/2019 2181 Last data filed at 9/30/2019 7164 Gross per 24 hour Intake 780 ml Output 360 ml Net 420 ml General Appearance: Well developed, well nourished, alert & oriented x 3,  
 no acute distress. Ears/Nose/Mouth/Throat: Hearing grossly normal. 
Neck: Supple. Chest: Lungs clear to auscultation bilaterally. Cardiovascular: Regular rate and rhythm, S1S2 normal, no murmur. Abdomen: Soft, non-tender, bowel sounds are active. Extremities: No edema bilaterally. Skin: Warm and dry. []         Post-cath site without hematoma, bruit, tenderness, or thrill. Distal pulses intact. PMH/ reviewed - no change compared to H&P Data Review Telemetry: Sinus EKG:  
[x]  No new EKG for review Lab Data Personally Reviewed: 
 
Recent Labs  
  09/30/19 0221 WBC 9.1 HGB 12.0*  
HCT 36.5*  
* No results for input(s): INR, PTP, APTT, INREXT, INREXT in the last 72 hours. Recent Labs  
  09/30/19 
0221 09/29/19 
4717 09/28/19 
6642 * 133* 133*  
K 5.7* 3.9 3.8 CL 92* 97 98 CO2 26 29 28 BUN 48* 38* 40* CREA 2.96* 2.15* 2.08* * 105* 101* CA 8.8 8.2* 8.5 No results for input(s): CPK, CKNDX, TROIQ in the last 72 hours. No lab exists for component: CPKMB No results found for: CHOL, CHOLX, CHLST, CHOLV, HDL, HDLP, LDL, LDLC, DLDLP, TGLX, TRIGL, TRIGP, CHHD, CHHDX Recent Labs  
  09/30/19 
0221 09/29/19 
7441 09/28/19 
3945 ALB 3.8 3.5 3.6 No results for input(s): PH, PCO2, PO2 in the last 72 hours. Medications Personally Reviewed: Current Facility-Administered Medications Medication Dose Route Frequency  albumin human 5% (BUMINATE) solution 25 g  25 g IntraVENous ONCE  polyethylene glycol (MIRALAX) packet 17 g  17 g Oral DAILY  [START ON 10/1/2019] torsemide (DEMADEX) tablet 20 mg  20 mg Oral BID  patiromer calcium sorbitex (VELTASSA) powder 8.4 g  8.4 g Oral NOW  alum-mag hydroxide-simeth (MYLANTA) oral suspension 30 mL  30 mL Oral DAILY PRN  
 bisacodyl (DULCOLAX) suppository 10 mg  10 mg Rectal DAILY PRN  
 lactulose (CHRONULAC) 10 gram/15 mL solution 45 mL  30 g Oral PRN  
 DOBUTamine (DOBUTREX) 1,000 mg/250 mL (4,000 mcg/mL) infusion  5 mcg/kg/min IntraVENous CONTINUOUS  
 amiodarone (CORDARONE) tablet 200 mg  200 mg Oral DAILY  levothyroxine (SYNTHROID) tablet 100 mcg  100 mcg Oral 6am  
 simethicone (MYLICON) tablet 80 mg  80 mg Oral Q6H PRN  
 traZODone (DESYREL) tablet 25 mg  25 mg Oral QHS  allopurinol (ZYLOPRIM) tablet 200 mg  200 mg Oral DAILY  therapeutic multivitamin (THERAGRAN) tablet 1 Tab  1 Tab Oral DAILY  loratadine (CLARITIN) tablet 10 mg  10 mg Oral DAILY  pravastatin (PRAVACHOL) tablet 80 mg  80 mg Oral QHS  sodium chloride (NS) flush 5-40 mL  5-40 mL IntraVENous Q8H  
 sodium chloride (NS) flush 5-40 mL  5-40 mL IntraVENous PRN  
 acetaminophen (TYLENOL) tablet 500 mg  500 mg Oral Q4H PRN  
 HYDROcodone-acetaminophen (NORCO) 5-325 mg per tablet 1 Tab  1 Tab Oral Q6H PRN  
 naloxone (NARCAN) injection 0.4 mg  0.4 mg IntraVENous PRN  
 ondansetron (ZOFRAN) injection 2 mg  2 mg IntraVENous Q6H PRN  
 bisacodyl (DULCOLAX) tablet 5 mg  5 mg Oral DAILY PRN  
 melatonin tablet 3 mg  3 mg Oral QHS PRN Lexy Bianchi III, DO

## 2019-09-30 NOTE — PROGRESS NOTES
Problem: Falls - Risk of 
Goal: *Absence of Falls Description Document Yoon Maldonado Fall Risk and appropriate interventions in the flowsheet. Outcome: Progressing Towards Goal 
Note:  
Fall Risk Interventions: 
Mobility Interventions: Bed/chair exit alarm, Communicate number of staff needed for ambulation/transfer, Patient to call before getting OOB, Strengthening exercises (ROM-active/passive), Utilize walker, cane, or other assistive device Medication Interventions: Patient to call before getting OOB, Teach patient to arise slowly History of Falls Interventions: Bed/chair exit alarm Problem: Patient Education: Go to Patient Education Activity Goal: Patient/Family Education Outcome: Progressing Towards Goal

## 2019-09-30 NOTE — PROGRESS NOTES
[address] [facility] [address] [facility] in [address] [place], [place] Inpatient Progress Note Patient name: Zoe Laws Patient : 1940 Patient MRN: [id number] Attending MD: Maris Collazo MD 
Date of service: 19 CHIEF COMPLAINT: 
Acute on chronic systolic heart failure PLAN: 
Agree with RHC to determine filling pressures and adequacy of cardiac index on current dose of dubutamine 5 mcg/kg/min Would increase dobutamine to 7.5 in cath lab if index low If needs higher dose of dobutamine or two inotropes; would discuss discharge to hospice Check orthostatics today and daily Ambulate daily; check 6MW Continue home nocturnal oxygen for GARTH Amiodarone and statin dosing per primary team 
Eliquis on hold for procedure tomorrow Hyperkalemia management per nephrology Off nephrotoxic agents for now; no ARB/ACEi/ARNi/AI Poor candidate for palliative dialysis if needed; due to hypotension Check LFT, lactic acid, thyroid profile, spot cortisol, gammopathy profile, procalcitonin, iron profile (oder placed) Palliative care consult pending Plan d/w patient and his wife AHF will follow along IMPRESSION: 
Fatigue Shortness of breath Hypotension Acute on chronic systolic heart failure Stage D, NYHA class IV symptoms Non-ischemic cardiomyopathy, LVEF 25% Persistent atrial fibrillation, s/p VIGNESH/DCCV 19 Hemodynamic inotrope dependence Remote ICD implantation Recurrent VT 
HL Former smoker Anemia Pleural plaque Acute on CKD, stage 4 Liver failure, transaminitis GARTH requiring nocturnal oxygen DNR 
 
CARDIAC IMAGING: 
VIGNESH (19) LVEF 21-25%, normal RV size and function, no valvular abnormalities EKG (19) small voltage, inferior Q waves, RBBB 
LHC not in epic INTERVAL HISTORY: 
Poor appetite Feels bloated Walked one hallway yesterday Afebrile SBP 80-100s/50-70s, HR 80-90s Dobutamine 5 I/O inaccurate HISTORY OF PRESENT ILLNESS; 
 Briefly, this is a 78 y/o patient of Dr. Vandana Mcdonald, with h/o HL, HTN, AFib s/p DCCV, NICM LVEF 25%, NYHA class IIIB/IV, CKD stage 4 with recent 2 hospitalizations for decompensation. INTERVAL HISTORY: 
Patient was admitted 9/21 with decompensation of heart failure and diuresed. Found to be hypotensive with acute on chronic renal failure and acute liver injury; unable to be weaned of dobutamine due to deterioration of symptoms and end organ function. AHF service and palliative care was consulted. PHYSICAL EXAM: 
Visit Vitals /71 (BP 1 Location: Right arm, BP Patient Position: At rest) Pulse 94 Temp 98 °F (36.7 °C) Resp 20 Ht 5' 9\" (1.753 m) Wt 172 lb 12.8 oz (78.4 kg) SpO2 97% BMI 25.52 kg/m² General: Patient is well developed, well-nourished in no acute distress HEENT: Normocephalic and atraumatic. No scleral icterus. Pupils are equal, round and reactive to light and accomodation. No conjunctival injection. Oropharynx is clear. Neck: Supple. No evidence of thyroid enlargements or lymphadenopathy. JVD: Cannot be appreciated Lungs: Breath sounds are equal and clear bilaterally. No wheezes, rhonchi, or rales. Heart: Regular rate and rhythm with normal S1 and S2. No murmurs, gallops or rubs. Abdomen: Soft, no mass or tenderness. No organomegaly or hernia. Bowel sounds present. Genitourinary and rectal: deferred Extremities: No cyanosis, clubbing, or edema. Neurologic: No focal sensory or motor deficits are noted. Grossly intact. Psychiatric: Awake, alert an doriented x 3. Appropriate mood and affect. Skin: Warm, dry and well perfused. No lesions, nodules or rashes are noted. REVIEW OF SYSTEMS: 
General: Denies fever, night sweats. Ear, nose and throat: Denies difficulty hearing, sinus problems, runny nose, post-nasal drip, ringing in ears, mouth sores, loose teeth, ear pain, nosebleeds, sore throate, facial pain or numbess Cardiovascular: see above in the interval history Respiratory: Denies cough, wheezing, sputum production, hemoptysis. Gastrointestinal: Poor appetite and abdominal bloating, otherwise denies heartburn, constipation, intolerance to certain foods, diarrhea, abdominal pain, nausea, vomiting, difficulty swallowing, blood in stool Kidney and bladder: Denies painful urination, frequent urination, urgency, prostate problems and impotence Musculoskeletal: Denies joint pain, muscle weakness Skin and hair: Denies change in existing skin lesions, hair loss or increase, breast changes PAST MEDICAL HISTORY: 
Past Medical History:  
Diagnosis Date  A-fib ([facility] [address]  AICD (automatic cardioverter/defibrillator) present  Arthritis  Cancer ([facility] [address] skin  CKD (chronic kidney disease)  Heart failure ([facility] [address]  Other ill-defined conditions(799.89)   
 high cholesterol  V tach ([facility] [address] PAST SURGICAL HISTORY: 
Past Surgical History:  
Procedure Laterality Date  ABDOMEN SURGERY PROC UNLISTED  6/2/11  
 colon resection  HX CATARACT REMOVAL Left  HX HEENT    
 repaired right eardrum  HX OTHER SURGICAL    
 benign cyst removed from back and thyroid  HX OTHER SURGICAL    
 skin graft  HX PACEMAKER    
 aicd  SD COLONOSCOPY FLX DX W/COLLJ SPEC WHEN PFRMD  6/6/2012  SD COLONOSCOPY W/BIOPSY SINGLE/MULTIPLE  4/27/2011 FAMILY HISTORY: 
Family History Problem Relation Age of Onset  Cancer Mother   
     colon  Heart Disease Father  Heart Disease Brother SOCIAL HISTORY: 
Social History Socioeconomic History  Marital status:  Spouse name: Not on file  Number of children: Not on file  Years of education: Not on file  Highest education level: Not on file Tobacco Use  Smoking status: Former Smoker  Smokeless tobacco: Never Used Substance and Sexual Activity  Alcohol use: Not Currently Alcohol/week: 4.2 standard drinks Types: 5 Glasses of wine per week  Drug use: No  
 
 
LABORATORY RESULTS: 
  
Labs Latest Ref Rng & Units 9/30/2019 9/29/2019 9/28/2019 9/27/2019 9/26/2019 9/25/2019 9/25/2019 WBC 4.1 - 11.1 K/uL 9.1 - - 6.3 - - -  
RBC 4.10 - 5.70 M/uL 3.82(L) - - 3.54(L) - - - Hemoglobin 12.1 - 17.0 g/dL 12. 0(L) - - 10. 9(L) - - - Hematocrit 36.6 - 50.3 % 36. 5(L) - - 33. 9(L) - - - MCV 80.0 - 99.0 FL 95.5 - - 95.8 - - - Platelets 323 - 901 K/uL 129(L) - - 129(L) - - - Lymphocytes 12 - 49 % 8(L) - - 16 - - - Monocytes 5 - 13 % 9 - - 15(H) - - - Eosinophils 0 - 7 % 0 - - 1 - - - Basophils 0 - 1 % 0 - - 0 - - - Albumin 3.5 - 5.0 g/dL 3.8 3.5 3.6 3.7 3.8 4.0 3.9 Calcium 8.5 - 10.1 MG/DL 8.8 8.2(L) 8.5 8.4(L) 8.5 8.1(L) 8.2(L) SGOT 15 - 37 U/L - - - - - 505(H) -  
Glucose 65 - 100 mg/dL 121(H) 105(H) 101(H) 114(H) 86 149(H) 84 BUN 6 - 20 MG/DL 48(H) 38(H) 40(H) 48(H) 60(H) 69(H) 74(H) Creatinine 0.70 - 1.30 MG/DL 2.96(H) 2.15(H) 2.08(H) 2.44(H) 2.75(H) 3.20(H) 3.35(H) Sodium 136 - 145 mmol/L 129(L) 133(L) 133(L) 134(L) 135(L) 133(L) 135(L) Potassium 3.5 - 5.1 mmol/L 5.7(H) 3.9 3.8 3.7 2. 9(L) 3. 2(L) 2. 8(L) Some recent data might be hidden No results found for: TSH, TSH2, TSH3, TSHP, TSHELE, TSHEXT ALLERGY: 
No Known Allergies CURRENT MEDICATIONS: 
 
Current Facility-Administered Medications:  
  polyethylene glycol (MIRALAX) packet 17 g, 17 g, Oral, DAILY, Maris Collazo MD, 17 g at 09/30/19 1445   patiromer calcium sorbitex (VELTASSA) powder 8.4 g, 8.4 g, Oral, NOW, Mixon, Maris Cramer MD 
  alum-mag hydroxide-simeth (MYLANTA) oral suspension 30 mL, 30 mL, Oral, DAILY PRN, Maris Collazo MD, 30 mL at 09/29/19 1803   bisacodyl (DULCOLAX) suppository 10 mg, 10 mg, Rectal, DAILY PRN, Maris Collazo MD, 10 mg at 09/28/19 1817 
  lactulose (CHRONULAC) 10 gram/15 mL solution 45 mL, 30 g, Oral, PRN, Eleazar Almodovar MD, 45 mL at 09/28/19 1727 
  DOBUTamine (DOBUTREX) 1,000 mg/250 mL (4,000 mcg/mL) infusion, 5 mcg/kg/min, IntraVENous, CONTINUOUS, Jose L Remy III, DO, Last Rate: 5.9 mL/hr at 09/30/19 0919, 5 mcg/kg/min at 09/30/19 0919 
  amiodarone (CORDARONE) tablet 200 mg, 200 mg, Oral, DAILY, Jose L Remy III, DO, 200 mg at 09/30/19 7806   levothyroxine (SYNTHROID) tablet 100 mcg, 100 mcg, Oral, 6am, Joelle Phelan MD, 100 mcg at 09/30/19 2447   simethicone (MYLICON) tablet 80 mg, 80 mg, Oral, Q6H PRN, James Guido MD, 80 mg at 09/28/19 1422   traZODone (DESYREL) tablet 25 mg, 25 mg, Oral, QHS, James Guido MD, 25 mg at 09/29/19 2140   allopurinol (ZYLOPRIM) tablet 200 mg, 200 mg, Oral, DAILY, Krystal Billings MD, 200 mg at 09/30/19 9414   therapeutic multivitamin (THERAGRAN) tablet 1 Tab, 1 Tab, Oral, DAILY, Krystal Billings MD, 1 Tab at 09/30/19 3462   loratadine (CLARITIN) tablet 10 mg, 10 mg, Oral, DAILY, Krystal Billings MD, 10 mg at 09/30/19 2383   pravastatin (PRAVACHOL) tablet 80 mg, 80 mg, Oral, QHS, Krystal Billings MD, 80 mg at 09/29/19 2141   sodium chloride (NS) flush 5-40 mL, 5-40 mL, IntraVENous, Q8H, Meri Bedolla MD, 10 mL at 09/30/19 0529 
  sodium chloride (NS) flush 5-40 mL, 5-40 mL, IntraVENous, PRN, Krystal Billings MD 
  acetaminophen (TYLENOL) tablet 500 mg, 500 mg, Oral, Q4H PRN, Krystal Billings MD, 500 mg at 09/29/19 2252 
  HYDROcodone-acetaminophen (NORCO) 5-325 mg per tablet 1 Tab, 1 Tab, Oral, Q6H PRN, Krystal Billings MD, 1 Tab at 09/21/19 1953 
  naloxone [facility]) injection 0.4 mg, 0.4 mg, IntraVENous, PRN, Krystal Billings MD 
  ondansetron [facility]) injection 2 mg, 2 mg, IntraVENous, Q6H PRN, Krystal Billings MD, 2 mg at 09/22/19 1356   bisacodyl (DULCOLAX) tablet 5 mg, 5 mg, Oral, DAILY PRN, Krystal Billings MD, 5 mg at 09/28/19 1422   melatonin tablet 3 mg, 3 mg, Oral, QHS PRN, Donny Alvarado MD, 3 mg at 09/30/19 7596 Thank you for allowing me to participate in this patient's care. Fernando Hernandez MD PhD 
[facility]ie [facility] [address], [address] Phone: [phone] Fax: [phone]

## 2019-09-30 NOTE — PROGRESS NOTES
Hospitalist Progress Note NAME: Yaw Reddy :  1940 MRN:  [id number] Interim Hospital Summary: 77 y.o. male whom presented on 2019 with Assessment / Plan: 
Acute on chronic systolic HF Cardiogenic shock Non ischemic CM S/P AICD P. Atrial fibrillation 
-recent Echo EF 21-25%, No AS, trace MR 
-received multiple doses of albumine for hypotension, has been on dobutamine gtt, discontinued on .   
-discussed with cardiology/nephro, dobutamine to be re-started, plan for cath tomorrow with plan of discharging home with dobutamine for palliation 
-diuresis per nephro, hold today due to elevated cr 
-continue amiodarone. Eliquis held for cath tomorrow 
- entresto, coreg dc/ed 
-consult to advanced heart failure 
-palliative consultation -appreciate cardiology following MARCELL, due to cardiorenal syndrome 
-renal US no hydronephrosis; no prior protenuria 
-cr was improving while on dobutamine, worst today with hypotension overnight after dobutamine discontinued on  
-hold nephrotoxic agents  
-nephrology following Hyponatremia, monitor Hyperkalemia, K worst today, likely due to worsening of renal failure, s/p kayexalate, will monitor bmp NAGMA, resolved Hypokalemia, resolved Hypothyroidism 
-synthroid Gout - allopurinol 18.5 - 24.9 Normal weight / Body mass index is 25.52 kg/m². Code status: DNR Prophylaxis: ELiquis Recommended Disposition: Home w/Family Subjective: Chief Complaint / Reason for Physician Visit Follow up of CHF, weight gain, MARCELL Pt seen, NAD. Poor urine outpt overnight. Poor appetite. denies cp, sob. Discussed with RN events overnight. Review of Systems: 
Symptom Y/N Comments  Symptom Y/N Comments Fever/Chills n   Chest Pain n   
Poor Appetite y   Edema n   
Cough    Abdominal Pain Sputum    Joint Pain SOB/MURILLO n    Pruritis/Rash Nausea/vomit    Tolerating PT/OT Diarrhea    Tolerating Diet Constipation    Other Could NOT obtain due to:   
 
PO intake:  
Patient Vitals for the past 72 hrs: 
 % Diet Eaten  
09/29/19 1818 0 %  
09/29/19 1341 25 % 09/29/19 1021 100 % 09/28/19 1352 90 % 09/28/19 0859 100 % 09/27/19 1813 90 % 09/27/19 1300 90 % 09/27/19 0941 85 % Objective: VITALS:  
Last 24hrs VS reviewed since prior progress note. Most recent are: 
Patient Vitals for the past 24 hrs: 
 Temp Pulse Resp BP SpO2  
09/30/19 0727 97.4 °F (36.3 °C) 85 20 (!) 86/55 96 % 09/30/19 0211 97.6 °F (36.4 °C) 87 18 (!) 84/56 98 %  
09/29/19 2253 97.6 °F (36.4 °C) 87 18 (!) 84/62 98 %  
09/29/19 2000    (!) 84/50   
09/29/19 1929 97.5 °F (36.4 °C) (!) 102 18 (!) 81/59 100 % 09/29/19 1524 97.7 °F (36.5 °C) 88 18 (!) 84/56 100 % 09/29/19 1237  94  (!) 86/53   
09/29/19 1149 97.3 °F (36.3 °C) 100 18 (!) 87/60 99 % 09/29/19 0924  99  95/76  Intake/Output Summary (Last 24 hours) at 9/30/2019 0747 Last data filed at 9/30/2019 3847 Gross per 24 hour Intake 780 ml Output 360 ml Net 420 ml PHYSICAL EXAM: 
General: WD, WN. Alert, cooperative, no acute distress   
EENT:  EOMI. Anicteric sclerae. MMM Resp:  CTA b/l. No accessory muscle use CV:  Regular  rhythm,  No leg edema GI:  Soft, Non distended, Non tender.  +BS Neurologic:  Alert and oriented X 3, normal speech Psych:   Good insight. Not anxious nor agitated Skin:  No rashes. No jaundice Reviewed most current lab test results and cultures  YES Reviewed most current radiology test results   YES Review and summation of old records today    NO Reviewed patient's current orders and MAR    YES 
PMH/SH reviewed - no change compared to H&P 
________________________________________________________________________ Care Plan discussed with: 
  Comments Patient x Family  x wife RN x Care Manager Consultant  x Cardiology/nephro Multidiciplinary team rounds were held today with , nursing, pharmacist and clinical coordinator. Patient's plan of care was discussed; medications were reviewed and discharge planning was addressed. ________________________________________________________________________ Total NON critical care TIME:   35  Minutes Total CRITICAL CARE TIME Spent:   Minutes non procedure based Comments >50% of visit spent in counseling and coordination of care x This includes time during multidisciplinary rounds if indicated above  
________________________________________________________________________ Kimmie Flanagan MD  
 
Procedures: see electronic medical records for all procedures/Xrays and details which were not copied into this note but were reviewed prior to creation of Plan. LABS: 
I reviewed today's most current labs and imaging studies. Pertinent labs include: 
Recent Labs  
  09/30/19 0221 WBC 9.1 HGB 12.0*  
HCT 36.5*  
* Recent Labs  
  09/30/19 0221 09/29/19 
6705 09/28/19 
3139 * 133* 133*  
K 5.7* 3.9 3.8 CL 92* 97 98 CO2 26 29 28 * 105* 101* BUN 48* 38* 40* CREA 2.96* 2.15* 2.08* CA 8.8 8.2* 8.5 PHOS 5.6* 3.9 3.3 ALB 3.8 3.5 3.6

## 2019-10-01 NOTE — PROGRESS NOTES
FRANCINE: Home with possible hospice vs. Home health CM acknowledges consult for hospice referral. CM sent referral to [address] [place] [address] via [facility], requesting hospice info session. CM will await meeting before setting up home infusion services for dobutamine. Will continue to follow. Dimitri Motta, MSW Care Manager [phone] 
 
 
UPDATE 10:44 AM 
CM sent referral to [facility] ([facility]) via [employer] for IV home infusion. CM included MD notes, PICC procedure note, and Dobutamine order. CM updated [address] that pt will need H2H visit upgraded to [facility] skilled nursing. UPDATE 11:50 AM 
CM received phone call from [facility] liaison, Shelton Cabezas, informed CM of additional documentation needed for dobutamine infusion. CM sent PTA med list, Cardiology note, and Advanced HF Cardiology note via [employer]. UPDATE 4:12 PM 
Pt's insurance has approved IV Dobutamine. CM sent the final order for medication to [facility] via [employer]. Shelton Cabezas with [facility] informed CM they plan to meet pt at the hospital tomorrow prior to d/c to setup infusion equipment and will visit the pt again at the home. [address] will provive skilled nursing and assist with dressing changes for PICC line.

## 2019-10-01 NOTE — HOSPICE
[facility] RN note:  Consult noted. Reviewing chart. Discussed pt with RISSA Araujo. Pt just returned from [address] . Wife amenable to information session around 11:00am.   
 
11:00---In to meet with pt, wife Emely Peng and 3 adult children. Discussed hospice philosophy and services as related to home with hospice support. Family gathering information to \"francisco\" in, no decision made at this time. Family discussing desire for PT at home for regaining strength, continued monitoring with Cardiology appointments. Dobutamine gtt is possible at home with hospice but would not be adjusted except for possible weaning if in line with goals of care. All are in agreement with pt being comfortable with as much support as possible at home. Information packet provided with 24hr contact information. Hospice to remain available for continued discussion as discharge plans evolve. Thank you for the opportunity to care for this pt and family. Please contact hospice at [phone] with any questions or concerns.

## 2019-10-01 NOTE — PROGRESS NOTES
Problem: Falls - Risk of 
Goal: *Absence of Falls Description Document Zohrehdayne Dallas Fall Risk and appropriate interventions in the flowsheet. Outcome: Progressing Towards Goal 
Note:  
Fall Risk Interventions: 
Mobility Interventions: Bed/chair exit alarm Medication Interventions: Evaluate medications/consider consulting pharmacy, Patient to call before getting OOB History of Falls Interventions: Bed/chair exit alarm Problem: Patient Education: Go to Patient Education Activity Goal: Patient/Family Education Outcome: Progressing Towards Goal

## 2019-10-01 NOTE — PROGRESS NOTES
Progress Note 
 
 
10/1/2019 7:48 AM 
NAME: Loyd Buerger MRN:  [id number] Admit Diagnosis: Acute on chronic heart failure ([facility] [address] [I50.9] Problem List: 1. Acute on chronic systolic heart failure 2. Acute on chronic renal insufficiency; Stg 3 
3. Cardiogenic shock 4. Dilated NICM; EF 25%. Joseph Randall 7/18 w/ EF 20%, dil LV, nml RV, mild MR/TR 5. Persistent AFib s/p VIGNESH/DCCV 8/5/19 6. Remote ICD implantation 7. Recurrent ventricular tachycardia 8. Hyperlipidemia 9. Former smoker 10. DNR Assessment/Plan:  
Last 3 Recorded Weights in this Encounter  
 09/29/19 0301 09/30/19 0211 10/01/19 4337 Weight: 77.6 kg (171 lb 1.6 oz) 78.4 kg (172 lb 12.8 oz) 78.7 kg (173 lb 8 oz) sCr down to 2 then up to 3 and K 5.8 with cessation of inotropes Long discussion w/ patient and family last night. He wishes to remain a DNR and would like to go home with dobutamine. RHC scheduled for this morning; on/off inotropes. Will plan to place PICC line in cath lab; not a long term HD candidate. He will rescind his DNR this AM for the duration of the procedure. Continue palliative dobutamine @ 5mcg. May increase pending outcome of cath. Palliative care consult noted Advanced heart failure consult noted Stop amio and statin w/ LFTs Hold eliquis 5mg BID; resume tonight Diuresis per renal; holding aldactone/diuretics for now. Will also need to consider a CARDIOMEMS device as an outpt. Renal consult appreciated Holding prior Entresto, coreg, with marginal/low BPs Pt will need weekly BMP and Mg. I'm happy to follow pt with dobutamine given HF service has signed off. [x]       High complexity decision making was performed in this patient at high risk for decompensation with multiple organ involvement. Subjective:  
 
Loyd Buerger denies chest pain. Breathing better. Discussed with RN events overnight. Review of Systems: Symptom Y/N Comments  Symptom Y/N Comments Fever/Chills N   Chest Pain N Poor Appetite N   Edema N   
Cough N   Abdominal Pain N Sputum N   Joint Pain N   
SOB/MURILLO N   Pruritis/Rash N   
Nausea/vomit N   Tolerating PT/OT Y Diarrhea N   Tolerating Diet Y Constipation N   Other Could NOT obtain due to:   
 
Objective:  
  
Physical Exam: 
 
Last 24hrs VS reviewed since prior progress note. Most recent are: 
 
Visit Vitals BP 98/54 (BP 1 Location: Left arm, BP Patient Position: At rest) Pulse 84 Temp 98 °F (36.7 °C) Resp 18 Ht 5' 9\" (1.753 m) Wt 78.7 kg (173 lb 8 oz) SpO2 91% BMI 25.62 kg/m² Intake/Output Summary (Last 24 hours) at 10/1/2019 7370 Last data filed at 10/1/2019 8439 Gross per 24 hour Intake 813.89 ml Output 350 ml Net 463.89 ml General Appearance: Well developed, well nourished, alert & oriented x 3,  
 no acute distress. Ears/Nose/Mouth/Throat: Hearing grossly normal. 
Neck: Supple. Chest: Lungs clear to auscultation bilaterally. Cardiovascular: Regular rate and rhythm, S1S2 normal, no murmur. Abdomen: Soft, non-tender, bowel sounds are active. Extremities: No edema bilaterally. Skin: Warm and dry. []         Post-cath site without hematoma, bruit, tenderness, or thrill. Distal pulses intact. PMH/SH reviewed - no change compared to H&P Data Review Telemetry: Sinus EKG:  
[x]  No new EKG for review Lab Data Personally Reviewed: 
 
Recent Labs 10/01/19 
0315 09/30/19 
0221 WBC 6.5 9.1 HGB 11.0* 12.0*  
HCT 32.7* 36.5*  
* 129* No results for input(s): INR, PTP, APTT, INREXT, INREXT in the last 72 hours. Recent Labs 10/01/19 
0315 09/30/19 22 [id number] 09/30/19 0221 * 129* 129*  
K 3.5 4.2 5.7* CL 93* 91* 92* CO2 24 27 26 BUN 58* 54* 48* CREA 2.86* 3.33* 2.96* GLU 86 152* 121* CA 8.3* 8.7 8.8 No results for input(s): CPK, CKNDX, TROIQ in the last 72 hours. No lab exists for component: CPKMB No results found for: CHOL, CHOLX, CHLST, CHOLV, HDL, HDLP, LDL, LDLC, DLDLP, TGLX, TRIGL, TRIGP, CHHD, CHHDX Recent Labs 10/01/19 
0315 09/30/19 22 [id number] 09/30/19 
0221 SGOT  --  1,466*  --   
AP  --  192*  --   
TP  --  6.4  --   
ALB 3.5 3.6 3.8 GLOB  --  2.8  -- No results for input(s): PH, PCO2, PO2 in the last 72 hours. Medications Personally Reviewed: 
 
Current Facility-Administered Medications Medication Dose Route Frequency  polyethylene glycol (MIRALAX) packet 17 g  17 g Oral DAILY  alum-mag hydroxide-simeth (MYLANTA) oral suspension 30 mL  30 mL Oral DAILY PRN  
 bisacodyl (DULCOLAX) suppository 10 mg  10 mg Rectal DAILY PRN  
 lactulose (CHRONULAC) 10 gram/15 mL solution 45 mL  30 g Oral PRN  
 DOBUTamine (DOBUTREX) 1,000 mg/250 mL (4,000 mcg/mL) infusion  5 mcg/kg/min IntraVENous CONTINUOUS  
 amiodarone (CORDARONE) tablet 200 mg  200 mg Oral DAILY  levothyroxine (SYNTHROID) tablet 100 mcg  100 mcg Oral 6am  
 simethicone (MYLICON) tablet 80 mg  80 mg Oral Q6H PRN  
 traZODone (DESYREL) tablet 25 mg  25 mg Oral QHS  allopurinol (ZYLOPRIM) tablet 200 mg  200 mg Oral DAILY  therapeutic multivitamin (THERAGRAN) tablet 1 Tab  1 Tab Oral DAILY  loratadine (CLARITIN) tablet 10 mg  10 mg Oral DAILY  pravastatin (PRAVACHOL) tablet 80 mg  80 mg Oral QHS  sodium chloride (NS) flush 5-40 mL  5-40 mL IntraVENous Q8H  
 sodium chloride (NS) flush 5-40 mL  5-40 mL IntraVENous PRN  
 acetaminophen (TYLENOL) tablet 500 mg  500 mg Oral Q4H PRN  
 HYDROcodone-acetaminophen (NORCO) 5-325 mg per tablet 1 Tab  1 Tab Oral Q6H PRN  
 naloxone (NARCAN) injection 0.4 mg  0.4 mg IntraVENous PRN  
 ondansetron (ZOFRAN) injection 2 mg  2 mg IntraVENous Q6H PRN  
 bisacodyl (DULCOLAX) tablet 5 mg  5 mg Oral DAILY PRN  
 melatonin tablet 3 mg  3 mg Oral QHS PRN John Paul Breed III, DO

## 2019-10-01 NOTE — PROGRESS NOTES
Hospitalist Progress Note NAME: Tami Onofre :  1940 MRN:  [id number] Interim Hospital Summary: 77 y.o. male whom presented on 2019 with Assessment / Plan: 
Acute on chronic systolic HF  / ESHF Cardiogenic shock Non ischemic CM S/P AICD P. Atrial fibrillation 
- all consultants help were appreciated S/p card cath 10/01: Elevated R/L heart pressures, PHTN of post-cap etiology, poor CO/CI improved w/ dobutamine PICC 10/01 
- Dr Jackie Escobar help appreciated:  
Would recommend discharge home with hospice due to persistent end-stage HF with multiorgan failure due to hypoperfusion; this is despite single high-dose inotrope support. If home hospice accepts dobutamine gtt, would continue infusion at 7.5mcg/kg/min for palliation only; not titratable Change lasix IV to oral bumex 2mg daily at discharge Discontinue statin due to liver failure Consider discontinuation of amiodarone at discharge Minimize medications at time of discharge to hospice Deactivation of ICD at discharge to hospice if preferred Continue home nocturnal oxygen for GARTH -recent Echo EF 21-25%, No AS, trace MR 
-s/p received multiple doses of albumine for hypotension 
- entresto, coreg dc/ed MARCELL, due to cardiorenal syndrome / CKD stage III ( baseline Cr 1.5) Hyponatremia Hyperkalemia  
-renal US no hydronephrosis; no prior protenuria 
-cr was improving while on dobutamine, worst today with hypotension overnight after dobutamine discontinued on  
-hold nephrotoxic agents  
-nephrology following NAGMA, resolved Hypokalemia, resolved Hypothyroidism, synthroid Gout - allopurinol 18.5 - 24.9 Normal weight / Body mass index is 25.62 kg/m². Code status: DNR Prophylaxis: ELiquis Recommended Disposition: Home w/Family Subjective: Chief Complaint / Reason for Physician Visit: Follow up of CHF, weight gain, MARCELL No CP/dyspnea S/p card cath today Discussed with RN events overnight. Review of Systems: 
Symptom Y/N Comments  Symptom Y/N Comments Fever/Chills n   Chest Pain n   
Poor Appetite y   Edema n   
Cough    Abdominal Pain Sputum    Joint Pain SOB/MURILLO n    Pruritis/Rash Nausea/vomit    Tolerating PT/OT Diarrhea    Tolerating Diet Constipation    Other Could NOT obtain due to:   
 
PO intake:  
Patient Vitals for the past 72 hrs: 
 % Diet Eaten 09/30/19 1807 0 % 09/30/19 1445 0 % 09/30/19 0933 0 %  
09/29/19 1818 0 %  
09/29/19 1341 25 % 09/29/19 1021 100 % Objective: VITALS:  
Last 24hrs VS reviewed since prior progress note. Most recent are: 
Patient Vitals for the past 24 hrs: 
 Temp Pulse Resp BP SpO2  
10/01/19 1456 97.5 °F (36.4 °C) 88 18 91/54 98 % 10/01/19 1244  87  103/54   
10/01/19 1022 97.4 °F (36.3 °C) 88 16 113/56 97 % 10/01/19 0312 98 °F (36.7 °C) 84 18 98/54 91 % 09/30/19 2335 98.1 °F (36.7 °C) 93 18 (!) 89/50 94 % 09/30/19 2041 97.7 °F (36.5 °C) 95 18 96/61 95 % Intake/Output Summary (Last 24 hours) at 10/1/2019 1538 Last data filed at 10/1/2019 1370 Gross per 24 hour Intake 703.89 ml Output 300 ml Net 403.89 ml PHYSICAL EXAM: 
General: WD, WN. Alert, cooperative, no acute distress   
EENT:  EOMI. Anicteric sclerae. MMM Resp:  CTA b/l. No accessory muscle use CV:  Regular  rhythm,  No leg edema GI:  Soft, Non distended, Non tender.  +BS Neurologic:  Alert and oriented X 3, normal speech Psych:   Good insight. Not anxious nor agitated Skin:  No rashes. No jaundice Reviewed most current lab test results and cultures  YES Reviewed most current radiology test results   YES Review and summation of old records today    NO Reviewed patient's current orders and MAR    YES 
PMH/SH reviewed - no change compared to H&P 
________________________________________________________________________ Care Plan discussed with: 
  Comments Patient x Family  x Wife bedside RN x Care Manager x Consultant  x Cardiology/ hospice Multidiciplinary team rounds were held today with , nursing, pharmacist and clinical coordinator. Patient's plan of care was discussed; medications were reviewed and discharge planning was addressed. ________________________________________________________________________ Total NON critical care TIME:   35  Minutes Total CRITICAL CARE TIME Spent:   Minutes non procedure based Comments >50% of visit spent in counseling and coordination of care x This includes time during multidisciplinary rounds if indicated above  
________________________________________________________________________ Marlene Walsh MD  
 
Procedures: see electronic medical records for all procedures/Xrays and details which were not copied into this note but were reviewed prior to creation of Plan. LABS: 
I reviewed today's most current labs and imaging studies. Pertinent labs include: 
Recent Labs 10/01/19 
0315 09/30/19 
0221 WBC 6.5 9.1 HGB 11.0* 12.0*  
HCT 32.7* 36.5*  
* 129* Recent Labs 10/01/19 
0315 09/30/19 22 [id number] 09/30/19 0221 09/29/19 
4340 * 129* 129* 133* K 3.5 4.2 5.7* 3.9 CL 93* 91* 92* 97  
CO2 24 27 26 29 GLU 86 152* 121* 105* BUN 58* 54* 48* 38* CREA 2.86* 3.33* 2.96* 2.15* CA 8.3* 8.7 8.8 8.2* PHOS 4.6  --  5.6* 3.9 ALB 3.5 3.6 3.8 3.5 TBILI  --  2.7*  --   --   
SGOT  --  1,466*  --   --   
ALT  --  1,666*  --   --

## 2019-10-01 NOTE — PROGRESS NOTES
[address] [facility] [address] [facility] in [place], [facility] Inpatient Progress Note Patient name: Zohreh Nash Patient : 1940 Patient MRN: [id number] Attending MD: Emily Garcia MD 
Date of service: 10/01/19 CHIEF COMPLAINT: 
Acute on chronic systolic heart failure, ESHF 
  
PLAN: 
Would recommend discharge home with hospice due to persistent end-stage HF with multiorgan failure due to hypoperfusion; this is despite single high-dose inotrope support. If home hospice accepts dobutamine gtt, would continue infusion at 7.5mcg/kg/min for palliation only; not titratable Change lasix IV to oral bumex 2mg daily at discharge Discontinue statin due to liver failure Consider discontinuation of amiodarone at discharge Minimize medications at time of discharge to hospice Deactivation of ICD at discharge to hospice if preferred Continue home nocturnal oxygen for GARTH Poor candidate for palliative dialysis if needed; due to hypotension Palliative care and hospice consult greatly appreciated Plan d/w patient and his wife 
  
AHF service will sign off; please, call with questions 
  
IMPRESSION: 
Fatigue at rest 
Shortness of breath at rest 
Hypotension due to persistent cardiogenic shock Acute on chronic systolic heart failure Stage D, NYHA class IV symptoms Non-ischemic cardiomyopathy, LVEF 25% Persistent atrial fibrillation, s/p VIGNESH/DCCV 19 Hemodynamic inotrope dependence Acute liver failure with transaminits Acute on chronic renal failure, stage 4 Remote ICD implantation Recurrent VT 
HL Former smoker Anemia Pleural plaque GARTH requiring nocturnal oxygen DNR 
  
CARDIAC IMAGING: 
VIGNESH (19) LVEF 21-25%, normal RV size and function, no valvular abnormalities EKG (19) small voltage, inferior Q waves, RBBB 
LHC not in epic 
  
INTERVAL HISTORY: 
Poor appetite Feels bloated Walked one hallway yesterday Feels weak Afebrile SBP 80-100s/50-70s, HR 80-90s Dobutamine 7.5 I/O inaccurate RHC done, awaiting report in epic Ferritin > 6,000 Lactate > 5 Acute liver failure TBili > 2  
  
HISTORY OF PRESENT ILLNESS; 
Briefly, this is a 78 y/o patient of Dr. Everardo Coleman, with h/o HL, HTN, AFib s/p DCCV, NICM LVEF 25%, NYHA class IIIB/IV, CKD stage 4 with recent 2 hospitalizations for decompensation.  
  
Patient was admitted 9/21 with decompensation of heart failure and diuresed. Found to be hypotensive with acute on chronic renal failure and acute liver injury; unable to be weaned of dobutamine due to deterioration of symptoms and end organ function. AHF service and palliative care was consulted. PHYSICAL EXAM: 
Visit Vitals /56 (BP 1 Location: Left arm, BP Patient Position: At rest) Pulse 88 Temp 97.4 °F (36.3 °C) Resp 16 Ht 5' 9\" (1.753 m) Wt 173 lb 8 oz (78.7 kg) SpO2 97% BMI 25.62 kg/m² General: Patient is well developed, well-nourished in no acute distress HEENT: Normocephalic and atraumatic. No scleral icterus. Pupils are equal, round and reactive to light and accomodation. No conjunctival injection. Oropharynx is clear. Neck: Supple. No evidence of thyroid enlargements or lymphadenopathy. JVD: Cannot be appreciated Lungs: Breath sounds are equal and clear bilaterally. No wheezes, rhonchi, or rales. Heart: Regular rate and rhythm with normal S1 and S2. No murmurs, gallops or rubs. Abdomen: Soft, no mass or tenderness. No organomegaly or hernia. Bowel sounds present. Genitourinary and rectal: deferred Extremities: No cyanosis, clubbing, or edema. Neurologic: No focal sensory or motor deficits are noted. Grossly intact. Psychiatric: Awake, alert an doriented x 3. Appropriate mood and affect. Skin: Warm, dry and well perfused. No lesions, nodules or rashes are noted. REVIEW OF SYSTEMS: 
General: Denies fever, night sweats.  
Ear, nose and throat: Denies difficulty hearing, sinus problems, runny nose, post-nasal drip, ringing in ears, mouth sores, loose teeth, ear pain, nosebleeds, sore throate, facial pain or numbess Cardiovascular: see above in the interval history Respiratory: Denies cough, wheezing, sputum production, hemoptysis. Gastrointestinal: Denies heartburn, constipation, intolerance to certain foods, diarrhea, abdominal pain, nausea, vomiting, difficulty swallowing, blood in stool Kidney and bladder: Denies painful urination, frequent urination, urgency, prostate problems and impotence Musculoskeletal: Denies joint pain, muscle weakness Skin and hair: Denies change in existing skin lesions, hair loss or increase, breast changes PAST MEDICAL HISTORY: 
Past Medical History:  
Diagnosis Date  A-fib ([facility] [address]  AICD (automatic cardioverter/defibrillator) present  Arthritis  Cancer ([facility] [address] skin  CKD (chronic kidney disease)  Heart failure ([facility] [address]  Other ill-defined conditions(799.89)   
 high cholesterol  V tach ([facility] [address] PAST SURGICAL HISTORY: 
Past Surgical History:  
Procedure Laterality Date  ABDOMEN SURGERY PROC UNLISTED  6/2/11  
 colon resection  HX CATARACT REMOVAL Left  HX HEENT    
 repaired right eardrum  HX OTHER SURGICAL    
 benign cyst removed from back and thyroid  HX OTHER SURGICAL    
 skin graft  HX PACEMAKER    
 aicd  [place] COLONOSCOPY FLX DX W/COLLJ SPEC WHEN PFRMD  6/6/2012  [place] COLONOSCOPY W/BIOPSY SINGLE/MULTIPLE  4/27/2011 FAMILY HISTORY: 
Family History Problem Relation Age of Onset  Cancer Mother   
     colon  Heart Disease Father  Heart Disease Brother SOCIAL HISTORY: 
Social History Socioeconomic History  Marital status:  Spouse name: Not on file  Number of children: Not on file  Years of education: Not on file  Highest education level: Not on file Tobacco Use  Smoking status: Former Smoker  Smokeless tobacco: Never Used Substance and Sexual Activity  Alcohol use: Not Currently Alcohol/week: 4.2 standard drinks Types: 5 Glasses of wine per week  Drug use: No  
 
 
LABORATORY RESULTS: 
  
Labs Latest Ref Rng & Units 10/1/2019 9/30/2019 9/30/2019 9/29/2019 9/28/2019 9/27/2019 9/26/2019 WBC 4.1 - 11.1 K/uL 6.5 - 9.1 - - 6.3 -  
RBC 4.10 - 5.70 M/uL 3.46(L) - 3.82(L) - - 3.54(L) - Hemoglobin 12.1 - 17.0 g/dL 11. 0(L) - 12. 0(L) - - 10. 9(L) - Hematocrit 36.6 - 50.3 % 32. 7(L) - 36. 5(L) - - 33. 9(L) -  
MCV 80.0 - 99.0 FL 94.5 - 95.5 - - 95.8 - Platelets 885 - 315 K/uL 112(L) - 129(L) - - 129(L) - Lymphocytes 12 - 49 % 13 - 8(L) - - 16 - Monocytes 5 - 13 % 13 - 9 - - 15(H) - Eosinophils 0 - 7 % 0 - 0 - - 1 - Basophils 0 - 1 % 1 - 0 - - 0 - Albumin 3.5 - 5.0 g/dL 3.5 3.6 3.8 3.5 3.6 3.7 3.8 Calcium 8.5 - 10.1 MG/DL 8. 3(L) 8.7 8.8 8.2(L) 8.5 8.4(L) 8.5 SGOT 15 - 37 U/L - 1,466(H) - - - - -  
Glucose 65 - 100 mg/dL 86 152(H) 121(H) 105(H) 101(H) 114(H) 86 BUN 6 - 20 MG/DL 58(H) 54(H) 48(H) 38(H) 40(H) 48(H) 60(H) Creatinine 0.70 - 1.30 MG/DL 2.86(H) 3.33(H) 2.96(H) 2.15(H) 2.08(H) 2.44(H) 2.75(H) Sodium 136 - 145 mmol/L 127(L) 129(L) 129(L) 133(L) 133(L) 134(L) 135(L) Potassium 3.5 - 5.1 mmol/L 3.5 4.2 5.7(H) 3.9 3.8 3.7 2. 9(L) TSH 0.36 - 3.74 uIU/mL - 2.22 - - - - - Some recent data might be hidden Lab Results Component Value Date/Time TSH 2.22 09/30/2019 12:17 PM  
 
 
ALLERGY: 
No Known Allergies CURRENT MEDICATIONS: 
 
Current Facility-Administered Medications:  
  bacitracin 500 unit/gram packet 1 Packet, 1 Packet, Topical, PRN, Meaghan Spence H III, DO 
  heparin (porcine) pf 300 Units, 300 Units, InterCATHeter, PRN, Jose L Remy H III, DO 
  polyethylene glycol (MIRALAX) packet 17 g, 17 g, Oral, DAILY, Kelsey Crenshaw MD, 17 g at 09/30/19 5723   alum-mag hydroxide-simeth (MYLANTA) oral suspension 30 mL, 30 mL, Oral, DAILY PRN, Kelsey Crenshaw MD, 30 mL at 09/29/19 2756   bisacodyl (DULCOLAX) suppository 10 mg, 10 mg, Rectal, DAILY PRN, Frederick Chaney MD, 10 mg at 09/28/19 1817 
  lactulose (CHRONULAC) 10 gram/15 mL solution 45 mL, 30 g, Oral, PRN, Marie BERGER MD, 45 mL at 09/28/19 1727 
  DOBUTamine (DOBUTREX) 1,000 mg/250 mL (4,000 mcg/mL) infusion, 7.5 mcg/kg/min, IntraVENous, CONTINUOUS, Jose L Remy III, DO, Last Rate: 8.8 mL/hr at 10/01/19 0944, 7.5 mcg/kg/min at 10/01/19 0944 
  amiodarone (CORDARONE) tablet 200 mg, 200 mg, Oral, DAILY, Jose L Remy III, DO, 200 mg at 10/01/19 1054   levothyroxine (SYNTHROID) tablet 100 mcg, 100 mcg, Oral, 6am, Frederick Chaney MD, Stopped at 10/01/19 0600 
  simethicone (MYLICON) tablet 80 mg, 80 mg, Oral, Q6H PRN, Jaclyn Reddy MD, 80 mg at 09/28/19 1422   traZODone (DESYREL) tablet 25 mg, 25 mg, Oral, QHS, Jaclyn Reddy MD, 25 mg at 09/30/19 2103   allopurinol (ZYLOPRIM) tablet 200 mg, 200 mg, Oral, DAILY, Lynn Pozo MD, 200 mg at 10/01/19 1054   therapeutic multivitamin (THERAGRAN) tablet 1 Tab, 1 Tab, Oral, DAILY, Lynn Pozo MD, 1 Tab at 10/01/19 1054   loratadine (CLARITIN) tablet 10 mg, 10 mg, Oral, DAILY, Lynn Pozo MD, 10 mg at 10/01/19 1054   pravastatin (PRAVACHOL) tablet 80 mg, 80 mg, Oral, QHS, Lynn Pozo MD, 80 mg at 09/30/19 2103   sodium chloride (NS) flush 5-40 mL, 5-40 mL, IntraVENous, Q8H, Meri Bedolla MD, 10 mL at 10/01/19 0600 
  sodium chloride (NS) flush 5-40 mL, 5-40 mL, IntraVENous, PRN, Lynn Pozo MD 
  acetaminophen (TYLENOL) tablet 500 mg, 500 mg, Oral, Q4H PRN, Lynn Pozo MD, 500 mg at 09/29/19 2252 
  HYDROcodone-acetaminophen (NORCO) 5-325 mg per tablet 1 Tab, 1 Tab, Oral, Q6H PRN, Lynn Pozo MD, 1 Tab at 09/21/19 1953 
  naloxone [facility]) injection 0.4 mg, 0.4 mg, IntraVENous, PRN, Lynn Pozo MD 
  ondansetron [facility]) injection 2 mg, 2 mg, IntraVENous, Q6H PRN, Lynn Pozo MD, 2 mg at 09/22/19 1106   bisacodyl (DULCOLAX) tablet 5 mg, 5 mg, Oral, DAILY PRN, Lurdes Perez MD, 5 mg at 09/28/19 1422   melatonin tablet 3 mg, 3 mg, Oral, QHS PRN, Sterling Brooks MD, 3 mg at 09/30/19 0186 Thank you for allowing me to participate in this patient's care. Cash Sandoval MD PhD 
Nadeem Barbosa [address] [place] [address], [address] Phone: [phone] Fax: [phone]

## 2019-10-01 NOTE — PROGRESS NOTES
Nephrology Progress Note Tung Steven  
 
www. [facility]                  Phone - [phone] Patient: Reg Grad Date- 10/1/2019 Admit Date: 9/21/2019 YOB: 1940 CC: [facility] Subjective: Interval History:  
-cr. Improved on dobutamine gtt Na low 127 
k improved- he didn't require veltassa yesterday S/p RHC today No c/o sob, No c/o chest pain, No c/o nausea or vomiting No c/o  fever. ROS:- as above Assessment & Plan: MARCELL  likely CARDIORENAL SYNDROME- No hydro on renal usg 
- RESTART LASIX 80 mg daily 
- continue dobutamine gtt 
- check bmp in am 
- hopefully his cr. Continue to improve. He won't be a good dialysis candidate in future. hyperkalemia 
- hold aldactone 
 
 
 chf 
-on dobutamine gtt 
- s/p RHC Hypotension.- likely due to cardiogenic shock 
 
hyponatremia likely due to chf 
 
 Chronic kidney disease stage III, baseline creatinine 1.5. 
 
  history of atrial fibrillation and ventricular tachycardia. Physical Exam:  
GEN:  NAD NECK:  Supple, no thyromegaly RESP: CTA  b/l, no  wheezing, CVS: RRR,S1,S2 ABDO:  soft , non tender, No mass NEURO: non focal, normal speech EXT: Edema  Trace Care Plan discussed with: patient,family Objective:  
Visit Vitals /56 (BP 1 Location: Left arm, BP Patient Position: At rest) Pulse 88 Temp 97.4 °F (36.3 °C) Resp 16 Ht 5' 9\" (1.753 m) Wt 78.7 kg (173 lb 8 oz) SpO2 97% BMI 25.62 kg/m² Last 3 Recorded Weights in this Encounter  
 09/29/19 0301 09/30/19 0211 10/01/19 1948 Weight: 77.6 kg (171 lb 1.6 oz) 78.4 kg (172 lb 12.8 oz) 78.7 kg (173 lb 8 oz) 09/29 1901 - 10/01 0700 In: 993.9 [P.O.:440; I.V.:553.9] Out: 510 [Urine:510] Intake/Output Summary (Last 24 hours) at 10/1/2019 1028 Last data filed at 10/1/2019 4168 Gross per 24 hour Intake 753.89 ml Output 350 ml Net 403.89 ml  
  
 Chart reviewed. Pertinent Notes reviewed. Medication list  reviewed Current Facility-Administered Medications Medication  bacitracin 500 unit/gram packet 1 Packet  heparin (porcine) pf 300 Units  polyethylene glycol (MIRALAX) packet 17 g  
 alum-mag hydroxide-simeth (MYLANTA) oral suspension 30 mL  bisacodyl (DULCOLAX) suppository 10 mg  
 lactulose (CHRONULAC) 10 gram/15 mL solution 45 mL  DOBUTamine (DOBUTREX) 1,000 mg/250 mL (4,000 mcg/mL) infusion  amiodarone (CORDARONE) tablet 200 mg  levothyroxine (SYNTHROID) tablet 100 mcg  simethicone (MYLICON) tablet 80 mg  
 traZODone (DESYREL) tablet 25 mg  
 allopurinol (ZYLOPRIM) tablet 200 mg  therapeutic multivitamin (THERAGRAN) tablet 1 Tab  loratadine (CLARITIN) tablet 10 mg  
 pravastatin (PRAVACHOL) tablet 80 mg  
 sodium chloride (NS) flush 5-40 mL  sodium chloride (NS) flush 5-40 mL  acetaminophen (TYLENOL) tablet 500 mg  
 HYDROcodone-acetaminophen (NORCO) 5-325 mg per tablet 1 Tab  naloxone (NARCAN) injection 0.4 mg  
 ondansetron (ZOFRAN) injection 2 mg  bisacodyl (DULCOLAX) tablet 5 mg  melatonin tablet 3 mg Data Review : 
Recent Labs 10/01/19 
0315 09/30/19 22 [id number] 09/30/19 0221 09/29/19 
1358 * 129* 129* 133* K 3.5 4.2 5.7* 3.9 CL 93* 91* 92* 97  
CO2 24 27 26 29 BUN 58* 54* 48* 38* CREA 2.86* 3.33* 2.96* 2.15* GLU 86 152* 121* 105* CA 8.3* 8.7 8.8 8.2* PHOS 4.6  --  5.6* 3.9 Recent Labs 10/01/19 
0315 09/30/19 
0221 WBC 6.5 9.1 HGB 11.0* 12.0*  
HCT 32.7* 36.5*  
* 129* Recent Labs  
  09/30/19 22 [id number] TIBC 223* PSAT 35 FERR 6,468* No results for input(s): CPK, CKNDX, TROIQ in the last 72 hours. No lab exists for component: CPKMB Lab Results Component Value Date/Time  Color YELLOW/STRAW 07/28/2019 11:44 PM  
 Appearance CLEAR 07/28/2019 11:44 PM  
 Specific gravity 1.021 07/28/2019 11:44 PM  
 pH (UA) 5.0 07/28/2019 11:44 PM  
 Protein NEGATIVE  07/28/2019 11:44 PM  
 Glucose NEGATIVE  07/28/2019 11:44 PM  
 Ketone NEGATIVE  07/28/2019 11:44 PM  
 Bilirubin NEGATIVE  07/28/2019 11:44 PM  
 Urobilinogen 0.2 07/28/2019 11:44 PM  
 Nitrites NEGATIVE  07/28/2019 11:44 PM  
 Leukocyte Esterase NEGATIVE  07/28/2019 11:44 PM  
 Epithelial cells FEW 07/28/2019 11:44 PM  
 Bacteria 1+ (A) 07/28/2019 11:44 PM  
 WBC 0-4 07/28/2019 11:44 PM  
 RBC 0-5 07/28/2019 11:44 PM  
 
Lab Results Component Value Date/Time Culture result: NO GROWTH 1 DAY 07/28/2019 11:44 PM  
 Culture result: NO GROWTH 5 DAYS 07/28/2019 12:20 PM  
 Culture result: NO GROWTH 6 DAYS 07/27/2019 09:27 PM  
 
No results found for: SDES Lab Results Component Value Date/Time Sodium,urine random 14 09/22/2019 03:37 PM  
 Creatinine, urine 166.00 09/22/2019 03:37 PM  
 
 
Results from Hospital Encounter encounter on 09/21/19 XR CHEST PORT Narrative EXAM:  XR CHEST PORT. INDICATION: ? vol overload. COMPARISON: 9/21/2019. FINDINGS:  
A portable AP radiograph of the chest was obtained at 0854 hours. There is a 
pacemaker in the left chest, unchanged in position. Lines and tubes: The patient is on a cardiac monitor. Lungs: There is mild interstitial edema throughout the lungs and atelectasis at 
the lung bases. Pleura: There is a new small right pleural effusion. There are calcified pleural 
plaques. Mediastinum: The cardiac and mediastinal contours and pulmonary vascularity are 
normal. 
Bones and soft tissues: The bones and soft tissues are grossly within normal 
limits. Impression IMPRESSION: Cardiac pacemaker with increased interstitial edema and new small 
right pleural effusion and basilar atelectasis. Anna Marie Campbell MD 
[facility] 
 www. [facility] / [employer] [address], [address] [place], [address] Phone - [phone] Fax - [phone]

## 2019-10-01 NOTE — PROGRESS NOTES
PICC Education: Explained reason and rationale for PICC placement along with providing education in order to make an informed consent including nature, risks, benefits, potential complications, care and maintenance of PICC line. The opportunity for questions or concerns was given. A 'Patient PICC Handbook was also provided. Patient's spouse, Andres Valenzuela, gave written consent for PICC procedure to be done at the bedside. Mrs. Hannah Reyes 21 verbalizes understanding at this time. Patient medicated for cardiac cath procedure and unable to sign consent. Procedure: 
Time out completed. Pre procedure assessment done. Maximum sterile barrier precautions observed throughout procedure. Patient has high creatinine, per Dr. Jessica Jay, patient is not a dialysis candidate and okay to proceed with PICC line. Right basilic vessel cleansed, prep, and accessed prior by Dr. Jessica Jay for cardiac cath and 5 fr sheath left under maximum sterile barrier for PICC line insertion. Inserted double lumen 5 fr PICC in  right arm using, MindBodyGreen Tip Location System and 3CG Patient has 1st degree heart block with P-wave. Tip Positioning System indicating tall P wave and no negative deflection before P wave which would indicate the PICC tip is properly placed in the distal SVC or the CAJ. PICC tip was confirmed by 2 PICC nurses and 3CG printout was placed on patients chart. Blood return verified and flushed with 20ml NS in each port. Sterile dressing applied with Biopatch, Stat loc, occlusive dressing per protocol. Curios caps applied to each port. Reason for access (home dobutamine). Complications related to insertion (none). Patient tolerated procedure well with minimal blood loss. PICC procedure performed by: VIKASH Mercado-BC / Vascular Access Nurse Assisted by: Jerad BERKOWITZBC / Vascular Access Nurse and Cath Lab nursing staff Right arm circumference: 28 cm. Catheter internal length:  43 cm Catheter external length: 0 cm PICC catheter occupies 14% of vein Brand of catheter BARD SOLO POWER PICC, Lot #[id number] REF# [id number] EXP 2020-07-31. Dr. Amara Grimes, notified PICC line may be used and to hang new infusion tubing prior to use. Raissa Shaffer RN [facility] Vascular Access Team

## 2019-10-01 NOTE — PROGRESS NOTES
Brief Procedure Note Patient: Ned Mcarthur MRN: [id number]  SSN: [id number] YOB: 1940  Age: 77 y.o. Sex: male Date of Procedure: 10/1/2019 Pre-procedure Diagnosis: Cardiogenic shock, end-stage HF, Class IV Post-procedure Diagnosis: Elevated R/L heart pressures, PHTN of post-cap etiology, poor CO/CI improved w/ dobutamine Procedure: RHC on/off dobutamine, ultrasound-guided vascular access, PICC line placement Performed By: Elaine Babb III, DO Anesthesia: Moderate Sedation Estimated Blood Loss: Less than 10 mL Specimens:  None Findings: as above Complications: None Implants: None. PICC line per PICC team 
 
Recommendations: Continue medical therapy. Set up for home inotropes @ 7.5mcg/kg/min per case management. Signed By: Elaine Babb III, DO October 1, 2019

## 2019-10-01 NOTE — PROGRESS NOTES
Bedside shift change report GIVEN TO Gregory Reyes RN. Report included the following information SBAR, Kardex and MAR. SIGNIFICANT CHANGES DURING SHIFT:   
 
 
CONCERNS TO ADDRESS WITH MD:   
 
 
 
 
[address] NURSING NOTE Admission Date 9/21/2019 Admission Diagnosis Acute on chronic heart failure ([facility] [address] [I50.9] Consults IP CONSULT TO CARDIOLOGY 
IP CONSULT TO NEPHROLOGY 
IP CONSULT TO PALLIATIVE CARE - PROVIDER 
IP CONSULT TO PALLIATIVE CARE - PROVIDER 
IP CONSULT TO ADVANCED HEART FAILURE Cardiac Monitoring [x] Yes [] No  
  
Purposeful Hourly Rounding [x] Yes   
Froylan Score Total Score: 2 Froylan score 3 or > [x] Bed Alarm [] Avasys [] 1:1 sitter [] Patient refused (Signed refusal form in chart) Arsen Score Arsen Score: 19 Arsen score 14 or < [] PMT consult [] Wound Care consult  
 []  Specialty bed  [] Nutrition consult Influenza Vaccine Received Flu Vaccine for Current Season (usually Sept-March): Yes Oxygen needs? [x] Room air Oxygen @  []1L    []2L    []3L   []4L    []5L   []6L via NC Chronic home O2 use? [] Yes [] No 
Perform O2 challenge test and document in progress note using smartphrase (.Homeoxygen) Last bowel movement Last Bowel Movement Date: 09/30/19 Urinary Catheter LDAs Peripheral IV 09/21/19 Right Forearm (Active) Site Assessment Clean, dry, & intact 9/30/2019  7:35 PM  
Phlebitis Assessment 0 9/30/2019  7:35 PM  
Infiltration Assessment 0 9/30/2019  7:35 PM  
Dressing Status Clean, dry, & intact 9/30/2019  7:35 PM  
Dressing Type Transparent 9/30/2019  7:35 PM  
Hub Color/Line Status Pink 9/30/2019  7:35 PM  
Alcohol Cap Used Yes 9/21/2019  7:44 PM  
   
Peripheral IV 09/30/19 Left Forearm (Active) Site Assessment Clean, dry, & intact 9/30/2019  7:35 PM  
Phlebitis Assessment 0 9/30/2019  7:35 PM  
Infiltration Assessment 0 9/30/2019  7:35 PM  
Dressing Status Clean, dry, & intact 9/30/2019  7:35 PM  
 Dressing Type Transparent 9/30/2019  7:35 PM  
Hub Color/Line Status Blue 9/30/2019  7:35 PM  
                  
  
Readmission Risk Assessment Tool Score High Risk   
      
 21 Total Score 3 Patient Length of Stay (>5 days = 3) 4 IP Visits Last 12 Months (1-3=4, 4=9, >4=11) 5 Pt. Coverage (Medicare=5 , Medicaid, or Self-Pay=4) 9 Charlson Comorbidity Score (Age + Comorbid Conditions) Criteria that do not apply:  
 Has Seen PCP in Last 6 Months (Yes=3, No=0) . Living with Significant Other. Assisted Living. LTAC. SNF. or  
Rehab Expected Length of Stay 4d 2h Actual Length of Stay 10
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
## 2019-10-02 NOTE — PROGRESS NOTES
Progress Note 
 
 
10/2/2019 7:48 AM 
NAME: Cristy Bay MRN:  [id number] Admit Diagnosis: Acute on chronic heart failure ([facility] [address] [I50.9] Problem List: 1. Acute on chronic systolic heart failure; Stage D, Class IV 2. Acute on chronic renal insufficiency; Stg 3 
3. Cardiogenic shock 4. End stage cardiomyopathy 5. Dilated NICM; EF 25%. David Minor 7/18 w/ EF 20%, dil LV, nml RV, mild MR/TR 6. Persistent AFib s/p VIGNESH/DCCV 8/5/19 7. Remote ICD implantation 8. Recurrent ventricular tachycardia 9. Hyperlipidemia 10. Former smoker 11. DNR Assessment/Plan:  
Last 3 Recorded Weights in this Encounter 09/30/19 0211 10/01/19 6363 10/02/19 0134 Weight: 78.4 kg (172 lb 12.8 oz) 78.7 kg (173 lb 8 oz) 79.7 kg (175 lb 11.3 oz) sCr down to 2 then up to 3 and K 5.8 with cessation of inotropes 1. Elevated left and right heart pressures 2. Pulmonary hypertension of post-cap etiology 3. Reduced cardiac outputs and indices 4. Improvement in CO/CI and PA sat w/ dobutamine infusion Home with 7.5mcg/kg/min of dobutamine for palliation Change lasix IV to bumex 2mg PO BID Replete K this am and home with 20mEq daily He will need weekly CMP, Mg.  Results can be faxed to my office. I'm happy to follow him as an outpatient for HF He will need to see me in one week Stop amio and statin w/ LFTs Resume eliquis 2.5mg BID (turns 80 in 3 weeks) Will discuss turning off ICD function with patient Will need to consider a CARDIOMEMS device as an outpt. Renal, ADHF, pall care consults appreciated Holding prior Entresto, coreg, ARB, with marginal/low BPs Can he go home today????? Need CM to work on home health, PT, OT, etc.  Kirt Kim placed consult for cardiac rehab. [x]       High complexity decision making was performed in this patient at high risk for decompensation with multiple organ involvement. Subjective:  
 
Cristy Bay denies chest pain. Breathing better. Discussed with RN events overnight. Review of Systems: 
 
Symptom Y/N Comments  Symptom Y/N Comments Fever/Chills N   Chest Pain N Poor Appetite N   Edema N   
Cough N   Abdominal Pain N Sputum N   Joint Pain N   
SOB/MURILLO N   Pruritis/Rash N   
Nausea/vomit N   Tolerating PT/OT Y Diarrhea N   Tolerating Diet Y Constipation N   Other Could NOT obtain due to:   
 
Objective:  
  
Physical Exam: 
 
Last 24hrs VS reviewed since prior progress note. Most recent are: 
 
Visit Vitals /53 (BP 1 Location: Left arm, BP Patient Position: Supine; At rest) Pulse 87 Temp 97.5 °F (36.4 °C) Resp 20 Ht 5' 9\" (1.753 m) Wt 79.7 kg (175 lb 11.3 oz) SpO2 94% BMI 25.95 kg/m² Intake/Output Summary (Last 24 hours) at 10/2/2019 3731 Last data filed at 10/2/2019 4198 Gross per 24 hour Intake 756 ml Output 1300 ml Net -544 ml General Appearance: Well developed, well nourished, alert & oriented x 3,  
 no acute distress. Ears/Nose/Mouth/Throat: Hearing grossly normal. 
Neck: Supple. Chest: Lungs clear to auscultation bilaterally. Cardiovascular: Regular rate and rhythm, S1S2 normal, no murmur. Abdomen: Soft, non-tender, bowel sounds are active. Extremities: No edema bilaterally. Skin: Warm and dry. []         Post-cath site without hematoma, bruit, tenderness, or thrill. Distal pulses intact. PMH/SH reviewed - no change compared to H&P Data Review Telemetry: Sinus EKG:  
[x]  No new EKG for review Lab Data Personally Reviewed: 
 
Recent Labs 10/01/19 
0315 09/30/19 
0221 WBC 6.5 9.1 HGB 11.0* 12.0*  
HCT 32.7* 36.5*  
* 129* No results for input(s): INR, PTP, APTT, INREXT, INREXT in the last 72 hours. Recent Labs 10/02/19 
0120 10/01/19 
0315 09/30/19 [address] * 127* 129*  
K 3.1* 3.5 4.2 CL 95* 93* 91* CO2 31 24 27 BUN 54* 58* 54* CREA 2.47* 2.86* 3.33* * 86 152* CA 7.4* 8.3* 8.7 No results for input(s): CPK, CKNDX, TROIQ in the last 72 hours. No lab exists for component: CPKMB No results found for: CHOL, CHOLX, CHLST, CHOLV, HDL, HDLP, LDL, LDLC, DLDLP, TGLX, TRIGL, TRIGP, CHHD, CHHDX Recent Labs 10/02/19 
0120 10/01/19 
0315 09/30/19 [address] SGOT  --   --  1,466* AP  --   --  192* TP  --   --  6.4 ALB 3.3* 3.5 3.6 GLOB  --   --  2.8 No results for input(s): PH, PCO2, PO2 in the last 72 hours. Medications Personally Reviewed: 
 
Current Facility-Administered Medications Medication Dose Route Frequency  potassium chloride SR (KLOR-CON 10) tablet 40 mEq  40 mEq Oral NOW  bumetanide (BUMEX) tablet 2 mg  2 mg Oral BID  potassium chloride SR (KLOR-CON 10) tablet 20 mEq  20 mEq Oral DAILY  bacitracin 500 unit/gram packet 1 Packet  1 Packet Topical PRN  
 heparin (porcine) pf 300 Units  300 Units InterCATHeter PRN  polyethylene glycol (MIRALAX) packet 17 g  17 g Oral DAILY  alum-mag hydroxide-simeth (MYLANTA) oral suspension 30 mL  30 mL Oral DAILY PRN  
 bisacodyl (DULCOLAX) suppository 10 mg  10 mg Rectal DAILY PRN  
 lactulose (CHRONULAC) 10 gram/15 mL solution 45 mL  30 g Oral PRN  
 DOBUTamine (DOBUTREX) 1,000 mg/250 mL (4,000 mcg/mL) infusion  7.5 mcg/kg/min IntraVENous CONTINUOUS  
 levothyroxine (SYNTHROID) tablet 100 mcg  100 mcg Oral 6am  
 simethicone (MYLICON) tablet 80 mg  80 mg Oral Q6H PRN  
 traZODone (DESYREL) tablet 25 mg  25 mg Oral QHS  allopurinol (ZYLOPRIM) tablet 200 mg  200 mg Oral DAILY  therapeutic multivitamin (THERAGRAN) tablet 1 Tab  1 Tab Oral DAILY  loratadine (CLARITIN) tablet 10 mg  10 mg Oral DAILY  sodium chloride (NS) flush 5-40 mL  5-40 mL IntraVENous Q8H  
 sodium chloride (NS) flush 5-40 mL  5-40 mL IntraVENous PRN  
 acetaminophen (TYLENOL) tablet 500 mg  500 mg Oral Q4H PRN  
 HYDROcodone-acetaminophen (NORCO) 5-325 mg per tablet 1 Tab  1 Tab Oral Q6H PRN  
  naloxone (NARCAN) injection 0.4 mg  0.4 mg IntraVENous PRN  
 ondansetron (ZOFRAN) injection 2 mg  2 mg IntraVENous Q6H PRN  
 bisacodyl (DULCOLAX) tablet 5 mg  5 mg Oral DAILY PRN  
 melatonin tablet 3 mg  3 mg Oral QHS PRN Randa Rinne III, DO

## 2019-10-02 NOTE — DISCHARGE INSTRUCTIONS
Patient Discharge Instructions    Drew Gautam / [id number] : 1940    Admitted 2019 Discharged: 10/2/2019         DISCHARGE DIAGNOSIS:       End stage cardiomyopathy          Take Home Medications         General drug facts     If you have a very bad allergy, wear an allergy ID at all times. It is important that you take the medication exactly as they are prescribed. Keep your medication in the bottles provided by the pharmacist.  Keep a list of all your drugs (prescription, natural products, vitamins, OTC) with you. Give this list to your doctor. Do not take other medications without consulting your doctor. Do not share your drugs with others and do not take anyone else's drugs. Keep all drugs out of the reach of children and pets. Most drugs may be thrown away in household trash after mixing with coffee grounds or gamaliel litter and sealing in a plastic bag. Keep a list Call your doctor for help with any side effects. If in the [place] you may also call the FDA at 3-560-JPH-8880    Talk with the doctor before starting any new drug, including OTC, natural products, or vitamins. What to do at Home    1. Recommended diet: cardiac     2. Recommended activity: Activity as tolerated    3. If you experience any of the following symptoms then please call your primary care physician or return to the emergency room if you cannot get hold of your doctor:call Dr Jeanne Stephen office with all questions     4. Lab work: home health care will check every week     5. Continue to wear oxygen as previously     6. Bring these papers with you to your follow up appointments.  The papers will help your doctors be sure to continue the care plan from the hospital.      Follow-up with:   PCP: Rohan Paez MD  Follow-up Information     Follow up With Specialties Details Why Contact Info    Rohan Paez MD Family Practice Go on 10/9/2019 hospital follow-up at 1:45 pm [address] [facility]  [phone]      Heber Irwin DO Cardiology Go to Previously scheduled appointment [address]  [address]  [facility]  [phone]      Daniela Beach MD Internal Medicine, Pulmonary Disease Go on 11/25/2019 As previously scheduled [facility] [address] [facility]  [phone]      [facility] Office on [facility] additional CHF education, med management, resources, and support in the community. [address] [place] [address]  skilled nursing [address]   IV infusion agency that will provide medication, equipment, and education [address] [facility]  [phone]    MRM [address] Mati Anderson on 11/19/2019  [address] [place] [address]  [phone]           Please call for your own appointment        Information obtained by :  I understand that if any problems occur once I am at home I am to contact my physician. I understand and acknowledge receipt of the instructions indicated above.                                                                                                                                            Physician's or R.N.'s Signature                                                                  Date/Time                                                                                                                                              Patient or Representative Signature                                                          Date/Time

## 2019-10-02 NOTE — HOSPICE
190 Kaur Andres RN note: In to meet with pt and wife angela. Further discussed hospice philosophy of comfort oriented care. Clarified some confusion regarding medicare parameters for services in that the hospice benefit does not cover cardiac rehab or continued monitoring and treatment that is not comfort oriented. Discussed with Dr Amara Grimes and Dr Sumaya Lozoya. Plan is for pt to go home with [facility] support. Hospice to remain available at family request.  Family has hospice 24hr contact information. CM coordinating dobutamine gtt with [facility] in home of discharge today. Thank you for the opportunity to care for this pt and family. Please contact hospice at [phone] with any questions or concerns.

## 2019-10-02 NOTE — PROGRESS NOTES
7282 
Pt d/c home. I reviewed all d/c instructions, follow up appts, and medications. Pt stated understanding. Pt left with Picc line and doubutamine infusing that was set up by [facility]. Pt left with all personal belongings and Rxs.

## 2019-10-02 NOTE — PROGRESS NOTES
Problem: Falls - Risk of 
Goal: *Absence of Falls Description Document Alexia [place] Fall Risk and appropriate interventions in the flowsheet. Outcome: Progressing Towards Goal 
Note:  
Fall Risk Interventions: 
Mobility Interventions: Bed/chair exit alarm Medication Interventions: Bed/chair exit alarm, Patient to call before getting OOB History of Falls Interventions: Bed/chair exit alarm, Door open when patient unattended Problem: Patient Education: Go to Patient Education Activity Goal: Patient/Family Education Outcome: Progressing Towards Goal 
  
Problem: Acute Renal Failure: Discharge Outcomes Goal: *Verbalizes understanding and describes medication purposes and frequencies Outcome: Progressing Towards Goal

## 2019-10-02 NOTE — PROGRESS NOTES
Problem: Falls - Risk of 
Goal: *Absence of Falls Description Document Chente  Fall Risk and appropriate interventions in the flowsheet. Outcome: Progressing Towards Goal 
Note:  
Fall Risk Interventions: 
Mobility Interventions: Bed/chair exit alarm, Communicate number of staff needed for ambulation/transfer, OT consult for ADLs, Patient to call before getting OOB, PT Consult for mobility concerns, PT Consult for assist device competence, Strengthening exercises (ROM-active/passive), Utilize gait belt for transfers/ambulation Medication Interventions: Bed/chair exit alarm, Evaluate medications/consider consulting pharmacy, Patient to call before getting OOB, Teach patient to arise slowly, Utilize gait belt for transfers/ambulation History of Falls Interventions: Bed/chair exit alarm, Door open when patient unattended

## 2019-10-02 NOTE — PROGRESS NOTES
Hospitalist Progress Note NAME: Loyd Buerger :  1940 MRN:  [id number] Interim Hospital Summary: 77 y.o. male whom presented on 2019 with Assessment / Plan: 
Acute on chronic systolic HF  Stage D, Class IV/ end stage cardiomyopathy Cardiogenic shock Non ischemic CM S/P AICD P. Atrial fibrillation 
- all consultants help were appreciated S/p card cath 10/01: Elevated R/L heart pressures, PHTN of post-cap etiology, poor CO/CI improved w/ dobutamine PICC 10/01 for home dobutamine - Dr Aliya Onofre help appreciated:  
Would recommend discharge home with hospice due to persistent end-stage HF with multiorgan failure due to hypoperfusion; this is despite single high-dose inotrope support. If home hospice accepts dobutamine gtt, would continue infusion at 7.5mcg/kg/min for palliation only; not titratable Change lasix IV to oral bumex 2mg daily at discharge Discontinue statin due to liver failure Consider discontinuation of amiodarone at discharge Minimize medications at time of discharge to hospice Deactivation of ICD at discharge to hospice if preferred Continue home nocturnal oxygen for GARTH 
-Cardiology help Dr Debbie Padgett appreciated: 
Home with 7.5mcg/kg/min of dobutamine for palliation Change lasix IV to bumex 2mg PO BID Replete K this am and home with 20mEq daily He will need weekly CMP, Mg.  Results can be faxed to my office. I'm happy to follow him as an outpatient for HF He will need to see me in one week Stop amio and statin w/ LFTs Resume eliquis 2.5mg BID (turns 80 in 3 weeks) Will discuss turning off ICD function with patient Will need to consider a CARDIOMEMS device as an outpt. Renal, ADHF, pall care consults appreciated Holding prior Entresto, coreg, ARB, with marginal/low BPs 
-recent Echo EF 21-25%, No AS, trace MR 
-s/p received multiple doses of albumine for hypotension 
- entresto, coreg dc/ed MARCELL, due to cardiorenal syndrome / CKD stage III ( baseline Cr 1.5) Hyponatremia Hyperkalemia  
-renal US no hydronephrosis; no prior protenuria 
-cr was improving while on dobutamine, worst today with hypotension overnight after dobutamine discontinued on 9/29 
-hold nephrotoxic agents  
-nephrology following NAGMA, resolved Hypokalemia, resolved Hypothyroidism, synthroid Gout - allopurinol 18.5 - 24.9 Normal weight / Body mass index is 25.95 kg/m². Code status: DNR Prophylaxis: ELiquis Recommended Disposition: hospice help appreciated. Wife/pt wants to go home with HHC/ cardiac rehab at this time. Dobutamine gtt for palliative care. Subjective: Chief Complaint / Reason for Physician Visit: Follow up of CHF, weight gain, MARCELL No CP/dyspnea Multiple questions from wife about dc planning Discussed with RN events overnight. Review of Systems: 
Symptom Y/N Comments  Symptom Y/N Comments Fever/Chills n   Chest Pain n   
Poor Appetite y   Edema n   
Cough    Abdominal Pain Sputum    Joint Pain SOB/MURILLO n    Pruritis/Rash Nausea/vomit    Tolerating PT/OT Diarrhea    Tolerating Diet Constipation    Other Could NOT obtain due to:   
 
PO intake:  
Patient Vitals for the past 72 hrs: 
 % Diet Eaten 10/02/19 1409 75 % 10/02/19 0830 50 % 09/30/19 1807 0 % 09/30/19 1445 0 % 09/30/19 0933 0 %  
09/29/19 1818 0 % Objective: VITALS:  
Last 24hrs VS reviewed since prior progress note. Most recent are: 
Patient Vitals for the past 24 hrs: 
 Temp Pulse Resp BP SpO2  
10/02/19 1105 98 °F (36.7 °C) 87 20 92/64 98 % 10/02/19 0744 97.8 °F (36.6 °C) 85 20 94/62 97 % 10/02/19 0354 97.5 °F (36.4 °C) 87 20 103/53 94 % 10/01/19 2308     94 % 10/01/19 2240 97.7 °F (36.5 °C) 89 20 97/60 (!) 87 % 10/01/19 2034 97.2 °F (36.2 °C) 88 18 (!) 89/57 96 % 10/01/19 1954     96 % 10/01/19 1551  88  95/59  Intake/Output Summary (Last 24 hours) at 10/2/2019 1456 Last data filed at 10/2/2019 1409 Gross per 24 hour Intake 1078 ml Output 1425 ml Net -347 ml PHYSICAL EXAM: 
General: WD, WN. Alert, cooperative, no acute distress   
EENT:  EOMI. Anicteric sclerae. MMM Resp:  CTA b/l. No accessory muscle use CV:  Regular  rhythm,  No leg edema GI:  Soft, Non distended, Non tender.  +BS Neurologic:  Alert and oriented X 3, normal speech Psych:   Good insight. Not anxious nor agitated Skin:  No rashes. No jaundice Reviewed most current lab test results and cultures  YES Reviewed most current radiology test results   YES Review and summation of old records today    NO Reviewed patient's current orders and MAR    YES 
PMH/SH reviewed - no change compared to H&P 
________________________________________________________________________ Care Plan discussed with: 
  Comments Patient x Family  x Wife bedside RN x Care Manager x Consultant  x  hospice Multidiciplinary team rounds were held today with , nursing, pharmacist and clinical coordinator. Patient's plan of care was discussed; medications were reviewed and discharge planning was addressed. ________________________________________________________________________ Total NON critical care TIME:   35  Minutes Total CRITICAL CARE TIME Spent:   Minutes non procedure based Comments >50% of visit spent in counseling and coordination of care y Dc coordination   
________________________________________________________________________ Abdirahman Allen MD  
 
Procedures: see electronic medical records for all procedures/Xrays and details which were not copied into this note but were reviewed prior to creation of Plan. LABS: 
I reviewed today's most current labs and imaging studies. Pertinent labs include: 
Recent Labs 10/01/19 
0315 09/30/19 
0221 WBC 6.5 9.1 HGB 11.0* 12.0*  
 HCT 32.7* 36.5*  
* 129* Recent Labs 10/02/19 
0120 10/01/19 
0315 09/30/19 22 [id number] 09/30/19 0221 * 127* 129* 129*  
K 3.1* 3.5 4.2 5.7* CL 95* 93* 91* 92* CO2 31 24 27 26 * 86 152* 121* BUN 54* 58* 54* 48* CREA 2.47* 2.86* 3.33* 2.96* CA 7.4* 8.3* 8.7 8.8 PHOS 3.0 4.6  --  5.6* ALB 3.3* 3.5 3.6 3.8 TBILI  --   --  2.7*  --   
SGOT  --   --  1,466*  --   
ALT  --   --  1,666*  --

## 2019-10-02 NOTE — PROGRESS NOTES
FRANCINE: Home Hospice CM met with pt and wife at the bedside to discuss d/c planning. Pt and wife have decided to turn off the ICD but were unsure whether to return home with [facility] or to choose hospice. CM offered support and thoroughly discussed the options. Pt/family decided it would be best to receive home hospice services. CM spoke with [facility] liaison, who will see pt/wife in about 30 minutes to coordinate. CM informed William Calabrese with [employer] ([phone]) that pt has decided on hospice. Per William Calabrese, the Dobutamine drip will be arranged through [employer]. CM also spoke with pt/family about personal caregivers. Wife stated she feels like she may need more assistance at home to care for pt but does not think they will qualify for Medicaid. CM will provide them with list of personal caregiver agencies. [facility] Care Manager [phone] 
 
 
UPDATE 2:15 PM 
Pt and wife were under the impression they could receive hospice services while also attending Cardiac Rehab and regularly seeing their Cardiologist. Plan is now for pt d/c home with [facility] (and dobutamine drip) and see Dr. Jerez Else next week for ICD deactivation - they will further discuss goals of care and consider hospice. HH can coordinate hospice care if and when pt decides he wants it. UPDATE 2:56 PM 
William Calabrese with [employer] will meet with pt and wife in their room at 4:30 pm to setup the dobutamine gtt. Pt should then be able to d/c afterwards. [facility] will see pt tomorrow at the home for start of services.

## 2019-10-02 NOTE — PROGRESS NOTES
Pharmacist Discharge Medication Reconciliation Significant PMH:  
Past Medical History:  
Diagnosis Date A-fib ([facility] AICD (automatic cardioverter/defibrillator) present Arthritis Cancer ([facility] skin CKD (chronic kidney disease) Heart failure ([facility] Other ill-defined conditions(799.89)   
 high cholesterol V tach ([facility] Chief Complaint for this Admission: Chief Complaint Patient presents with Cough  
  times one week Shortness of Breath  
  times two days Allergies: Patient has no known allergies. Discharge Medications:  
Current Discharge Medication List  
  
 
START taking these medications Details  
bumetanide (BUMEX) 2 mg tablet Take 1 Tab by mouth two (2) times a day. Qty: 60 Tab, Refills: 1  
  
potassium chloride SR (K-TAB) 20 mEq tablet Take 1 Tab by mouth daily. Qty: 30 Tab, Refills: 1 DOBUTamine (DOBUTREX) 1,000 mg/250 mL (4,000 mcg/mL) infusion 587 mcg/min by IntraVENous route continuous. Qty: 250 mL, Refills: 11 CONTINUE these medications which have CHANGED Details  
apixaban (ELIQUIS) 2.5 mg tablet Take 1 Tab by mouth every twelve (12) hours. Qty: 30 Tab, Refills: 1 CONTINUE these medications which have NOT CHANGED Details  
benzonatate (TESSALON) 100 mg capsule Take 100 mg by mouth three (3) times daily as needed for Cough. !! prednisoLONE acetate (PRED FORTE) 1 % ophthalmic suspension Administer 1 Drop to right eye four (4) times daily. 1 drop to right eye 4 times daily x 1 week then 1 drop in right eye 3 times daily x 1 week then 1 drop in right eye 2 times daily x 1 week then 1 drop daily in right eye once daily x 1 week !! prednisoLONE acetate (PRED FORTE) 1 % ophthalmic suspension Administer 1 Drop to left eye two (2) times a day.  Tapering down - 1 drop every day starts 9/27  
  
moxifloxacin (VIGAMOX) 0.5 % ophthalmic solution Administer 1 Drop to right eye two (2) times a day. Tapering down - 1 drop every day starts 9/26. folic acid/multivit-min/lutein (CENTRUM SILVER PO) Take  by mouth.  
  
levothyroxine (SYNTHROID) 100 mcg tablet Take 100 mcg by mouth every evening. allopurinol (ZYLOPRIM) 100 mg tablet Take 200 mg by mouth daily. Indications: 2 pills daily  
  
loratadine (CLARITIN) 10 mg tablet Take 10 mg by mouth daily. OXYGEN-AIR DELIVERY SYSTEMS 2 L/min by Nasal route as needed (shortness of breath). !! - Potential duplicate medications found. Please discuss with provider. STOP taking these medications  
  
 amiodarone (CORDARONE) 200 mg tablet Comments:  
Reason for Stopping:   
   
 furosemide (LASIX) 40 mg tablet Comments:  
Reason for Stopping:   
   
 pravastatin (PRAVACHOL) 80 mg tablet Comments:  
Reason for Stopping:   
   
  
 
 
The patient's chart, MAR and AVS were reviewed by Ricardo Joseph PHARMD. Discharging Provider: Dr. Yannick Hernandez Thank You, Ricardo Joseph, NIMCOD

## 2019-10-02 NOTE — PROGRESS NOTES
FRANCINE plan: Pt will discharge home today, transported by his wife in a personal vehicle. Pt will receive HH through [facility] for skilled nursing. [facility] will setup pt's dobutamine infusion today at 4:30 pm prior to d/c. Pt's dobutamine bag will be replaced every 48 hours which [facility] nursing can assist with. Pt is scheduled to follow-up with his PCP on 10/9/19 and Pulmonologist on 11/25/19. Pt will f/u with his Cardiologist in one week - office will call pt directly to schedule. Pt is also scheduled to start Cardiac Rehab at [facility] on 11/19/19. Pt and wife were provided palliative and hospice information for them to consider when ready. Pt and wife also have list of caregiver agencies for additional assistance in the home. No further concerns indicated at this time. AVS updated. Patient is ready for discharge from Care Managment standpoint. Medicare pt has received, reviewed, and signed 2nd IM letter informing them of their right to appeal the discharge. Signed copy has been placed on pt bedside chart. Care Management Interventions PCP Verified by CM: Yes 
Palliative Care Criteria Met (RRAT>21 & CHF Dx)?: No 
Mode of Transport at Discharge: Other (see comment)(wife) Transition of Care Consult (CM Consult): Home Health([facility]) [address]: Yes MyChart Signup: No 
Discharge Durable Medical Equipment: No 
Health Maintenance Reviewed: Yes Physical Therapy Consult: Yes Occupational Therapy Consult: Yes Speech Therapy Consult: No 
Current Support Network: Lives with Spouse(One-story house ([address]) with spouse in [place] [facility] [place] [facility]) Confirm Follow Up Transport: Family Plan discussed with Pt/Family/Caregiver: Yes(discussed plan with pt and wife at bedside) Freedom of Choice Offered: Yes Discharge Location Discharge Placement: Home with home health SUSU Garay Care Manager [phone]

## 2019-10-02 NOTE — DISCHARGE SUMMARY
Hospitalist Discharge Summary Patient ID: 
Renu Guillaume 
[id number] 
19 y.o. 
1940 
9/21/2019 PCP on record: Lei Salgado MD 
 
Admit date: 9/21/2019 Discharge date and time: 10/2/2019 DISCHARGE DIAGNOSIS: 
 
Acute on chronic systolic HF  Stage D, Class IV/ end stage cardiomyopathy Cardiogenic shock Non ischemic CM S/P AICD P. Atrial fibrillation MARCELL, due to cardiorenal syndrome CKD stage III ( baseline Cr 1.5) Hyponatremia Hyperkalemia NAGMA, resolved Hypokalemia, resolved Hypothyroidism Gout 18.5 - 24.9 Normal weight / Body mass index is 25.95 kg/m². Code status: DNR 
 
 
CONSULTATIONS: 
IP CONSULT TO CARDIOLOGY 
IP CONSULT TO NEPHROLOGY 
IP CONSULT TO PALLIATIVE CARE - PROVIDER 
IP CONSULT TO PALLIATIVE CARE - PROVIDER 
IP CONSULT TO ADVANCED HEART FAILURE Excerpted HPI from H&P of Arlin Moffett MD: The pt reports he is quite compliant with his medications and CHF management, including monitoring his intake and output , checking his weight daily. Recently his lasix dose was increased for five days. He just completed that and last two nights he had PND sx. This morning he was very short of breath and came to ER. He also reports about four pounds weight gain in the last few days. He does report that his blood pressure had been low, and we see from recent hospital care that his entresto and coreg were held due to same reason at the time of that previous discharge.  
 
______________________________________________________________________ DISCHARGE SUMMARY/HOSPITAL COURSE:  for full details see H&P, daily progress notes, labs, consult notes. Acute on chronic systolic HF  Stage D, Class IV/ end stage cardiomyopathy Cardiogenic shock Non ischemic CM S/P AICD P. Atrial fibrillation 
- all consultants help were appreciated S/p card cath 10/01: Elevated R/L heart pressures, PHTN of post-cap etiology, poor CO/CI improved w/ dobutamine PICC 10/01 for home dobutamine - Dr Bob Rico help appreciated:  
Would recommend discharge home with hospice due to persistent end-stage HF with multiorgan failure due to hypoperfusion; this is despite single high-dose inotrope support. If home hospice accepts dobutamine gtt, would continue infusion at 7.5mcg/kg/min for palliation only; not titratable Change lasix IV to oral bumex 2mg daily at discharge Discontinue statin due to liver failure Consider discontinuation of amiodarone at discharge Minimize medications at time of discharge to hospice Deactivation of ICD at discharge to hospice if preferred Continue home nocturnal oxygen for GARTH 
-Cardiology help Dr Cazares Medicine appreciated: 
Home with 7.5mcg/kg/min of dobutamine for palliation Change lasix IV to bumex 2mg PO BID Replete K this am and home with 20mEq daily He will need weekly CMP, Mg.  Results can be faxed to my office. I'm happy to follow him as an outpatient for HF He will need to see me in one week Stop amio and statin w/ LFTs Resume eliquis 2.5mg BID (turns 80 in 3 weeks) Will discuss turning off ICD function with patient Will need to consider a CARDIOMEMS device as an outpt. Renal, ADHF, pall care consults appreciated Holding prior Entresto, coreg, ARB, with marginal/low BPs 
-recent Echo EF 21-25%, No AS, trace MR 
-s/p received multiple doses of albumine for hypotension 
- entresto, coreg dc/ed 
  
MARCELL, due to cardiorenal syndrome / CKD stage III ( baseline Cr 1.5) Hyponatremia Hyperkalemia  
-renal US no hydronephrosis; no prior protenuria 
-cr was improving while on dobutamine, worst today with hypotension overnight after dobutamine discontinued on 9/29 
-hold nephrotoxic agents  
-nephrology following 
  
NAGMA, resolved Hypokalemia, resolved Hypothyroidism, synthroid Gout - allopurinol  
  
18.5 - 24.9 Normal weight / Body mass index is 25.95 kg/m². 
  
  
Code status: DNR Prophylaxis: ELiquis  
  
 Recommended Disposition: hospice help appreciated. Wife/pt wants to go home with HHC/ cardiac rehab at this time. Dobutamine gtt for palliative care. 
 
 
 
_______________________________________________________________________ Patient seen and examined by me on discharge day. Pertinent Findings: 
General:          WD, WN. Alert, cooperative, no acute distress   
EENT:              EOMI. Anicteric sclerae. MMM Resp:               CTA b/l. No accessory muscle use CV:                  Regular  rhythm,  No leg edema GI:                   Soft, Non distended, Non tender.  +BS Neurologic:      Alert and oriented X 3, normal speech Psych:             Good insight. Not anxious nor agitated Skin:                No rashes. No jaundice 
_______________________________________________________________________ DISCHARGE MEDICATIONS:  
Current Discharge Medication List  
  
START taking these medications Details  
bumetanide (BUMEX) 2 mg tablet Take 1 Tab by mouth two (2) times a day. Qty: 60 Tab, Refills: 1  
  
potassium chloride SR (K-TAB) 20 mEq tablet Take 1 Tab by mouth daily. Qty: 30 Tab, Refills: 1 DOBUTamine (DOBUTREX) 1,000 mg/250 mL (4,000 mcg/mL) infusion 587 mcg/min by IntraVENous route continuous. Qty: 250 mL, Refills: 11 CONTINUE these medications which have CHANGED Details  
apixaban (ELIQUIS) 2.5 mg tablet Take 1 Tab by mouth every twelve (12) hours. Qty: 30 Tab, Refills: 1 CONTINUE these medications which have NOT CHANGED Details  
benzonatate (TESSALON) 100 mg capsule Take 100 mg by mouth three (3) times daily as needed for Cough. !! prednisoLONE acetate (PRED FORTE) 1 % ophthalmic suspension Administer 1 Drop to right eye four (4) times daily. 1 drop to right eye 4 times daily x 1 week then 1 drop in right eye 3 times daily x 1 week then 1 drop in right eye 2 times daily x 1 week then 1 drop daily in right eye once daily x 1 week !! prednisoLONE acetate (PRED FORTE) 1 % ophthalmic suspension Administer 1 Drop to left eye two (2) times a day. Tapering down - 1 drop every day starts 9/27  
  
moxifloxacin (VIGAMOX) 0.5 % ophthalmic solution Administer 1 Drop to right eye two (2) times a day. Tapering down - 1 drop every day starts 9/26. folic acid/multivit-min/lutein (CENTRUM SILVER PO) Take  by mouth.  
  
levothyroxine (SYNTHROID) 100 mcg tablet Take 100 mcg by mouth every evening. allopurinol (ZYLOPRIM) 100 mg tablet Take 200 mg by mouth daily. Indications: 2 pills daily  
  
loratadine (CLARITIN) 10 mg tablet Take 10 mg by mouth daily. OXYGEN-AIR DELIVERY SYSTEMS 2 L/min by Nasal route as needed (shortness of breath). !! - Potential duplicate medications found. Please discuss with provider. STOP taking these medications  
  
 amiodarone (CORDARONE) 200 mg tablet Comments:  
Reason for Stopping:   
   
 furosemide (LASIX) 40 mg tablet Comments:  
Reason for Stopping:   
   
 pravastatin (PRAVACHOL) 80 mg tablet Comments:  
Reason for Stopping:   
   
  
 
 
 
Patient Follow Up Instructions: Activity: Activity as tolerated Diet: Cardiac Diet Wound Care: None needed Follow-up Information Follow up With Specialties Details Why Contact Info Francesco Cornejo MD Family Practice Go on 10/9/2019 hospital follow-up at 1:45 pm [address] [place] [address] 
[phone] Effie Orona,  Cardiology Go in 1 week Office will call you to schedule an appointment. If you do not receive a call from them within 48 hours of discharge, please call the number provided. [address] [facility] 
[phone] Shawn Sheffield MD Internal Medicine, Pulmonary Disease Go on 11/25/2019 As previously scheduled [address] [facility] 
[phone]  [facility] Office on Aging   For additional CHF education, med management, resources, and support in the community. [address] [facility] [address] 
[phone] [facility]   IV infusion agency that will provide medication, equipment, and education [address] [facility] [phone] [facility] Go on 11/19/2019  [address] [facility] 
[phone]  
  
 
________________________________________________________________ Risk of deterioration: High 
 
Condition at Discharge:  Stable 
__________________________________________________________________ Disposition Home with family and home health services 
 
____________________________________________________________________ Code Status: DNR/DNI 
___________________________________________________________________ Total time in minutes spent coordinating this discharge (includes going over instructions, follow-up, prescriptions, and preparing report for sign off to her PCP) :  > 30 minutes Signed: 
Edel Simons MD

## 2019-10-02 NOTE — PROGRESS NOTES
Nephrology Progress Note Tung Steven  
 
[facility]. [facility]                  Phone - [phone] Patient: Tami Onofre Date- 10/2/2019 Admit Date: 9/21/2019 YOB: 1940 CC: Follow up for MARCELL Subjective: Interval History:  
-cr. Improved to 2.5 today Na improved to 133 
k low 3.1 No C/O of chest pain,SOB, vomiting, abdominal pain,fever,chills ROS:- as above Assessment & Plan: MARCELL likely CARDIORENAL SYNDROME- No hydro on renal usg 
- continue dobutamine gtt per cards 
- hopefully his cr. Continue to improve. He won't be a good dialysis candidate in future. - avoid acei or arb 
 
 hyperkalemia 
- improved Hypokalemia 
kcl 60 meq po 
 
 chf 
-on dobutamine gtt 
- s/p RHC 
- CHF team recs noted - plan for home hospice noted. - resume home bumex dose Hypotension.- likely due to cardiogenic shock Hyponatremia- due to chf 
 
 Chronic kidney disease stage III, baseline creatinine 1.5. 
 
  history of atrial fibrillation and ventricular tachycardia. WE WILL SIGN OFF. CALL US IF NEEDED. Physical Exam:  
GEN:  NAD NECK:  Supple, no thyromegaly RESP: CTA  b/l, no  wheezing, CVS: RRR,S1,S2 ABDO:  soft , non tender, No mass NEURO: non focal, normal speech RIGHT ARM PICC LINE Care Plan discussed with: patient,family Objective:  
Visit Vitals BP 94/62 (BP 1 Location: Left arm, BP Patient Position: At rest) Pulse 85 Temp 97.8 °F (36.6 °C) Resp 20 Ht 5' 9\" (1.753 m) Wt 79.7 kg (175 lb 11.3 oz) SpO2 97% BMI 25.95 kg/m² Last 3 Recorded Weights in this Encounter 09/30/19 0211 10/01/19 7706 10/02/19 0134 Weight: 78.4 kg (172 lb 12.8 oz) 78.7 kg (173 lb 8 oz) 79.7 kg (175 lb 11.3 oz) 09/30 1901 - 10/02 0700 In: 856 [P.O.:856] Out: 1600 [Urine:1600] Intake/Output Summary (Last 24 hours) at 10/2/2019 1042 Last data filed at 10/2/2019 1853 Gross per 24 hour Intake 756 ml  
 Output 1250 ml Net -494 ml Chart reviewed. Pertinent Notes reviewed. Medication list  reviewed Current Facility-Administered Medications Medication  bumetanide (BUMEX) tablet 2 mg  potassium chloride SR (KLOR-CON 10) tablet 20 mEq  apixaban (ELIQUIS) tablet 2.5 mg  
 bacitracin 500 unit/gram packet 1 Packet  heparin (porcine) pf 300 Units  polyethylene glycol (MIRALAX) packet 17 g  
 alum-mag hydroxide-simeth (MYLANTA) oral suspension 30 mL  bisacodyl (DULCOLAX) suppository 10 mg  
 lactulose (CHRONULAC) 10 gram/15 mL solution 45 mL  DOBUTamine (DOBUTREX) 1,000 mg/250 mL (4,000 mcg/mL) infusion  levothyroxine (SYNTHROID) tablet 100 mcg  simethicone (MYLICON) tablet 80 mg  
 traZODone (DESYREL) tablet 25 mg  
 allopurinol (ZYLOPRIM) tablet 200 mg  therapeutic multivitamin (THERAGRAN) tablet 1 Tab  loratadine (CLARITIN) tablet 10 mg  
 sodium chloride (NS) flush 5-40 mL  sodium chloride (NS) flush 5-40 mL  acetaminophen (TYLENOL) tablet 500 mg  
 HYDROcodone-acetaminophen (NORCO) 5-325 mg per tablet 1 Tab  naloxone (NARCAN) injection 0.4 mg  
 ondansetron (ZOFRAN) injection 2 mg  bisacodyl (DULCOLAX) tablet 5 mg  melatonin tablet 3 mg Data Review : 
Recent Labs 10/02/19 
0120 10/01/19 
0315 09/30/19 22 [id number] 09/30/19 0221 * 127* 129* 129*  
K 3.1* 3.5 4.2 5.7* CL 95* 93* 91* 92* CO2 31 24 27 26 BUN 54* 58* 54* 48* CREA 2.47* 2.86* 3.33* 2.96* * 86 152* 121* CA 7.4* 8.3* 8.7 8.8 PHOS 3.0 4.6  --  5.6* Recent Labs 10/01/19 
0315 09/30/19 
0221 WBC 6.5 9.1 HGB 11.0* 12.0*  
HCT 32.7* 36.5*  
* 129* Recent Labs  
  09/30/19 22 [id number] TIBC 223* PSAT 35 FERR 6,468* No results for input(s): CPK, CKNDX, TROIQ in the last 72 hours. No lab exists for component: CPKMB Lab Results Component Value Date/Time  Color YELLOW/STRAW 07/28/2019 11:44 PM  
 Appearance CLEAR 07/28/2019 11:44 PM  
 Specific gravity 1.021 07/28/2019 11:44 PM  
 pH (UA) 5.0 07/28/2019 11:44 PM  
 Protein NEGATIVE  07/28/2019 11:44 PM  
 Glucose NEGATIVE  07/28/2019 11:44 PM  
 Ketone NEGATIVE  07/28/2019 11:44 PM  
 Bilirubin NEGATIVE  07/28/2019 11:44 PM  
 Urobilinogen 0.2 07/28/2019 11:44 PM  
 Nitrites NEGATIVE  07/28/2019 11:44 PM  
 Leukocyte Esterase NEGATIVE  07/28/2019 11:44 PM  
 Epithelial cells FEW 07/28/2019 11:44 PM  
 Bacteria 1+ (A) 07/28/2019 11:44 PM  
 WBC 0-4 07/28/2019 11:44 PM  
 RBC 0-5 07/28/2019 11:44 PM  
 
Lab Results Component Value Date/Time Culture result: NO GROWTH 1 DAY 07/28/2019 11:44 PM  
 Culture result: NO GROWTH 5 DAYS 07/28/2019 12:20 PM  
 Culture result: NO GROWTH 6 DAYS 07/27/2019 09:27 PM  
 
No results found for: SDES Lab Results Component Value Date/Time Sodium,urine random 14 09/22/2019 03:37 PM  
 Creatinine, urine 166.00 09/22/2019 03:37 PM  
 
 
Results from Hospital Encounter encounter on 09/21/19 XR CHEST PORT Narrative EXAM:  XR CHEST PORT. INDICATION: ? vol overload. COMPARISON: 9/21/2019. FINDINGS:  
A portable AP radiograph of the chest was obtained at 0854 hours. There is a 
pacemaker in the left chest, unchanged in position. Lines and tubes: The patient is on a cardiac monitor. Lungs: There is mild interstitial edema throughout the lungs and atelectasis at 
the lung bases. Pleura: There is a new small right pleural effusion. There are calcified pleural 
plaques. Mediastinum: The cardiac and mediastinal contours and pulmonary vascularity are 
normal. 
Bones and soft tissues: The bones and soft tissues are grossly within normal 
limits. Impression IMPRESSION: Cardiac pacemaker with increased interstitial edema and new small 
right pleural effusion and basilar atelectasis. Elliott Silver MD 
[address] [facility] 
 www. [facility] [employer] [address], [address] [place], [address] Phone - [phone] Fax - [phone]

## 2019-10-02 NOTE — PROGRESS NOTES
Problem: Falls - Risk of 
Goal: *Absence of Falls Description Document [facility] Fall Risk and appropriate interventions in the flowsheet. Outcome: Progressing Towards Goal 
Note:  
Fall Risk Interventions: 
Mobility Interventions: Bed/chair exit alarm Medication Interventions: Bed/chair exit alarm History of Falls Interventions: Bed/chair exit alarm

## 2019-10-03 NOTE — PROGRESS NOTES
HUG process not implemented due to initially Hospice discharge was planned. Then it was rescinded at patient notification that his Cardiologist could not be retained for medical management due to philosopical hopsice approach. Pt declined Hospice discharge in lieu on continuing ongoing care per Dr Gene Gandhi. Katja Rene has routine f/u with PCP and Cardiology arranged during transitional period. He is to be dc with dobutamine gtt to be managed per Cardiology at this time.     Ronak Nathan NP  C/ [address]

## 2019-10-06 NOTE — ED NOTES
Assumed care of patient triage, arrived with concerns that abdomen \"is filled with fluid\" and is pressing against chest.  Reports feeling constipated earlier in the week, taken stool softners and has had 3-4 soft bowel movements and since the abdomen has grown in size. Does have a h/o CHF, currently on a dobutamine drip that is continuous through right arm PICC.

## 2019-10-06 NOTE — ED NOTES
Pt prefers to sit on side of bed; he is more comfortable with that position Transitioned himoff of his home Dobutamine drip onto our hospital Dobutamine drip based upon his home dose order

## 2019-10-06 NOTE — DISCHARGE INSTRUCTIONS
Patient Education        Heart Failure: Care Instructions  Your Care Instructions    Heart failure occurs when your heart does not pump as much blood as the body needs. Failure does not mean that the heart has stopped pumping but rather that it is not pumping as well as it should. Over time, this causes fluid buildup in your lungs and other parts of your body. Fluid buildup can cause shortness of breath, fatigue, swollen ankles, and other problems. By taking medicines regularly, reducing sodium (salt) in your diet, checking your weight every day, and making lifestyle changes, you can feel better and live longer. Follow-up care is a key part of your treatment and safety. Be sure to make and go to all appointments, and call your doctor if you are having problems. It's also a good idea to know your test results and keep a list of the medicines you take. How can you care for yourself at home? Medicines    · Be safe with medicines. Take your medicines exactly as prescribed. Call your doctor if you think you are having a problem with your medicine.     · Do not take any vitamins, over-the-counter medicine, or herbal products without talking to your doctor first. Bradford Oiler not take ibuprofen (Advil or Motrin) and naproxen (Aleve) without talking to your doctor first. They could make your heart failure worse.     · You may take some of the following medicine. ? Angiotensin-converting enzyme inhibitors (ACEIs) or angiotensin II receptor blockers (ARBs) reduce the heart's workload, lower blood pressure, and reduce swelling. Taking an ACEI or ARB may lower your chance of needing to be hospitalized. ? Beta-blockers can slow heart rate, decrease blood pressure, and improve your condition. Taking a beta-blocker may lower your chance of needing to be hospitalized. ? Diuretics, also called water pills, reduce swelling.    You will get more details on the specific medicines your doctor prescribes.   Diet    · Your doctor may suggest that you limit sodium. Your doctor can tell you how much sodium is right for you. An example is less than 3,000 mg a day. This includes all the salt you eat in cooking or in packaged foods. People get most of their sodium from processed foods. Fast food and restaurant meals also tend to be very high in sodium.     · Ask your doctor how much liquid you can drink each day. You may have to limit liquids.    Weight    · Weigh yourself without clothing at the same time each day. Record your weight. Call your doctor if you have a sudden weight gain, such as more than 2 to 3 pounds in a day or 5 pounds in a week. (Your doctor may suggest a different range of weight gain.) A sudden weight gain may mean that your heart failure is getting worse.    Activity level    · Start light exercise (if your doctor says it is okay). Even if you can only do a small amount, exercise will help you get stronger, have more energy, and manage your weight and your stress. Walking is an easy way to get exercise. Start out by walking a little more than you did before. Bit by bit, increase the amount you walk.     · When you exercise, watch for signs that your heart is working too hard. You are pushing yourself too hard if you cannot talk while you are exercising. If you become short of breath or dizzy or have chest pain, stop, sit down, and rest.     · If you feel \"wiped out\" the day after you exercise, walk slower or for a shorter distance until you can work up to a better pace.     · Get enough rest at night. Sleeping with 1 or 2 pillows under your upper body and head may help you breathe easier.    Lifestyle changes    · Do not smoke. Smoking can make a heart condition worse. If you need help quitting, talk to your doctor about stop-smoking programs and medicines. These can increase your chances of quitting for good.  Quitting smoking may be the most important step you can take to protect your heart.     · Limit alcohol to 2 drinks a day for men and 1 drink a day for women. Too much alcohol can cause health problems.     · Avoid getting sick from colds and the flu. Get a pneumococcal vaccine shot. If you have had one before, ask your doctor whether you need another dose. Get a flu shot each year. If you must be around people with colds or the flu, wash your hands often. When should you call for help? Call 911 if you have symptoms of sudden heart failure such as:    · You have severe trouble breathing.     · You cough up pink, foamy mucus.     · You have a new irregular or rapid heartbeat.    Call your doctor now or seek immediate medical care if:    · You have new or increased shortness of breath.     · You are dizzy or lightheaded, or you feel like you may faint.     · You have sudden weight gain, such as more than 2 to 3 pounds in a day or 5 pounds in a week. (Your doctor may suggest a different range of weight gain.)     · You have increased swelling in your legs, ankles, or feet.     · You are suddenly so tired or weak that you cannot do your usual activities.    Watch closely for changes in your health, and be sure to contact your doctor if you develop new symptoms. Where can you learn more? Go to http://jordan-rolly.info/. Enter F712 in the search box to learn more about \"Heart Failure: Care Instructions. \"  Current as of: April 9, 2019  Content Version: 12.2  © 2594-6247 [employer]. Care instructions adapted under license by [employer] (which disclaims liability or warranty for this information). If you have questions about a medical condition or this instruction, always ask your healthcare professional. [address] any warranty or liability for your use of this information.

## 2019-10-06 NOTE — PROGRESS NOTES
Per patient's nurse, patient is in transitional hospice care. [facility] will not be completing a med history at this time. If there are any changes to the patient's POC, then [facility] will reassess at that time. Beena Donald CPhT Medication History Pharmacy Technician

## 2019-10-06 NOTE — ED NOTES
Clarified with Dr. Suze waldropay to give ordered Bumex as pt last four bp readings as validated- Yes Pt may go home Family will check with home health nurse if they are able to come to house to change out his dobutamine home infusion as was intended at 0930am today Or can they come to the hospital to do it before he is discharged. Unsure if plan of care is for pt discharge at this time, though.

## 2019-10-06 NOTE — ED PROVIDER NOTES
EMERGENCY DEPARTMENT HISTORY AND PHYSICAL EXAM 
 
 
Date: 10/6/2019 Patient Name: Chanel Laws Patient Age and Sex: 77 y.o. male History of Presenting Illness Chief Complaint Patient presents with  Abdominal Pain  
  notes having fluid build up in abdomen & has poc line in right arm now. just d/c from hospital  
 
 
History Provided By: Patient HPI: Chanel Laws, is a 77 y.o. male with a complex med history as noted below which includes CKD stage III, CHF, afib, aicd, hospital admission 9/21-10/2 for acute on chronic systolic chf, end-stage non-ischemic cardiomyopathy and cardiogenic shock. He was discharged home with PICC for palliative home dobutamine administration at 7.5mcg/kg/min. On oral Bumex 2 mg daily. Has an appointment with Dr. Willy Justin on Wednesday when they are planning to likely turn off his AICD. During his last hospitalization, hospice was consulted to evaluate patient and discuss their services and care philosophy with the patient and his wife. The patient and family were not yet ready then and are not ready today for comfort-oriented care only and prefer to have continued monitoring and medical treatment by the patient's cardiologist and pmd. 
The patient is in the emergency room today because last night he was unable to sleep due to orthopnea. His shortness of breath is only when he lays down flat. He has no chest pain, cough, fevers or chills. Noticed an increase in his abdominal girth over the past 2 to 3 days. Has been compliant with his Bumex and says that generally he has good urine output when taking the medication. He and his wife were both wondering if draining his abdomen from the extra fluid would perhaps alleviate his respiratory symptoms. Pt denies any other alleviating or exacerbating factors. There are no other complaints, changes or physical findings at this time. Past Medical History:  
Diagnosis Date  A-fib ([facility] [address]  AICD (automatic cardioverter/defibrillator) present  Arthritis  Cancer ([address] skin  CKD (chronic kidney disease)  Heart failure ([address]  Other ill-defined conditions(799.89)   
 high cholesterol  V tach ([address] Past Surgical History:  
Procedure Laterality Date  ABDOMEN SURGERY PROC UNLISTED  6/2/11  
 colon resection  HX CATARACT REMOVAL Left  HX HEENT    
 repaired right eardrum  HX OTHER SURGICAL    
 benign cyst removed from back and thyroid  HX OTHER SURGICAL    
 skin graft  HX PACEMAKER    
 aicd  AR COLONOSCOPY FLX DX W/COLLJ SPEC WHEN PFRMD  6/6/2012  AR COLONOSCOPY W/BIOPSY SINGLE/MULTIPLE  4/27/2011 PCP: Sarah Phan MD 
 
Past History Past Medical History: 
Past Medical History:  
Diagnosis Date  A-fib ([address]  AICD (automatic cardioverter/defibrillator) present  Arthritis  Cancer ([address] skin  CKD (chronic kidney disease)  Heart failure ([address]  Other ill-defined conditions(799.89)   
 high cholesterol  V tach ([address] Past Surgical History: 
Past Surgical History:  
Procedure Laterality Date  ABDOMEN SURGERY PROC UNLISTED  6/2/11  
 colon resection  HX CATARACT REMOVAL Left  HX HEENT    
 repaired right eardrum  HX OTHER SURGICAL    
 benign cyst removed from back and thyroid  HX OTHER SURGICAL    
 skin graft  HX PACEMAKER    
 aicd  AR COLONOSCOPY FLX DX W/COLLJ SPEC WHEN PFRMD  6/6/2012  AR COLONOSCOPY W/BIOPSY SINGLE/MULTIPLE  4/27/2011 Family History: 
Family History Problem Relation Age of Onset  Cancer Mother   
     colon  Heart Disease Father  Heart Disease Brother Social History: 
Social History Tobacco Use  Smoking status: Former Smoker  Smokeless tobacco: Never Used Substance Use Topics  Alcohol use: Not Currently Alcohol/week: 4.2 standard drinks Types: 5 Glasses of wine per week  Drug use:  No  
 
 
 Allergies: 
No Known Allergies Current Medications: No current facility-administered medications on file prior to encounter. Current Outpatient Medications on File Prior to Encounter Medication Sig Dispense Refill  apixaban (ELIQUIS) 2.5 mg tablet Take 1 Tab by mouth every twelve (12) hours. 30 Tab 1  
 bumetanide (BUMEX) 2 mg tablet Take 1 Tab by mouth two (2) times a day. 60 Tab 1  potassium chloride SR (K-TAB) 20 mEq tablet Take 1 Tab by mouth daily. 30 Tab 1  
 DOBUTamine (DOBUTREX) 1,000 mg/250 mL (4,000 mcg/mL) infusion 587 mcg/min by IntraVENous route continuous. 250 mL 11  
 benzonatate (TESSALON) 100 mg capsule Take 100 mg by mouth three (3) times daily as needed for Cough.  prednisoLONE acetate (PRED FORTE) 1 % ophthalmic suspension Administer 1 Drop to right eye four (4) times daily. 1 drop to right eye 4 times daily x 1 week then 1 drop in right eye 3 times daily x 1 week then 1 drop in right eye 2 times daily x 1 week then 1 drop daily in right eye once daily x 1 week  prednisoLONE acetate (PRED FORTE) 1 % ophthalmic suspension Administer 1 Drop to left eye two (2) times a day. Tapering down - 1 drop every day starts 9/27    
 moxifloxacin (VIGAMOX) 0.5 % ophthalmic solution Administer 1 Drop to right eye two (2) times a day. Tapering down - 1 drop every day starts 9/26.  folic acid/multivit-min/lutein (CENTRUM SILVER PO) Take  by mouth.  OXYGEN-AIR DELIVERY SYSTEMS 2 L/min by Nasal route as needed (shortness of breath).  levothyroxine (SYNTHROID) 100 mcg tablet Take 100 mcg by mouth every evening.  allopurinol (ZYLOPRIM) 100 mg tablet Take 200 mg by mouth daily. Indications: 2 pills daily  loratadine (CLARITIN) 10 mg tablet Take 10 mg by mouth daily. Review of Systems Review of Systems Constitutional: Positive for appetite change and fatigue. Negative for diaphoresis and fever. Respiratory: Positive for shortness of breath. Negative for cough and chest tightness. Cardiovascular: Negative for chest pain, palpitations and leg swelling. Gastrointestinal: Positive for abdominal distention. Negative for abdominal pain, blood in stool, constipation, diarrhea, nausea and vomiting. Genitourinary: Negative for decreased urine volume, difficulty urinating and dysuria. Musculoskeletal: Negative for arthralgias, joint swelling, myalgias, neck pain and neck stiffness. Neurological: Negative for dizziness, syncope, weakness, light-headedness and headaches. Hematological: Negative for adenopathy. All other systems reviewed and are negative. Physical Exam  
Physical Exam  
Constitutional: He is oriented to person, place, and time. He appears well-nourished. Non-toxic appearance. HENT:  
Head: Atraumatic. Mouth/Throat: Oropharynx is clear and moist.  
Eyes: Pupils are equal, round, and reactive to light. Conjunctivae and EOM are normal. No scleral icterus. Neck: Normal range of motion. Neck supple. JVD present. Cardiovascular: Normal rate, regular rhythm, normal heart sounds and intact distal pulses. Pulmonary/Chest: No respiratory distress. He has no wheezes. He has rales. He exhibits no tenderness. Abdominal: Soft. Bowel sounds are normal. He exhibits distension. He exhibits no mass. There is no tenderness. Musculoskeletal: Normal range of motion. He exhibits edema (trace bilateral). Neurological: He is alert and oriented to person, place, and time. No cranial nerve deficit. Skin: Skin is warm and dry. Nursing note and vitals reviewed. Diagnostic Study Results Labs - Recent Results (from the past 24 hour(s)) EKG, 12 LEAD, INITIAL Collection Time: 10/06/19  7:21 AM  
Result Value Ref Range Ventricular Rate 111 BPM  
 Atrial Rate 111 BPM  
 P-R Interval 112 ms QRS Duration 114 ms Q-T Interval 388 ms QTC Calculation (Bezet) 527 ms Calculated R Axis -109 degrees Calculated T Axis 129 degrees Diagnosis ** Suspect arm lead reversal, interpretation assumes no reversal 
Sinus tachycardia Right ventricular hypertrophy Inferior infarct (cited on or before 25-SEP-2019) Anterolateral infarct (cited on or before 25-SEP-2019) Prolonged QT 
** ** ACUTE MI / STEMI ** ** 
Consider right ventricular involvement in acute inferior infarct When compared with ECG of 25-SEP-2019 08:25, 
fusion complexes are no longer present Right bundle branch block is no longer present Questionable change in initial forces of Lateral leads EKG, 12 LEAD, INITIAL Collection Time: 10/06/19  7:27 AM  
Result Value Ref Range Ventricular Rate 111 BPM  
 Atrial Rate 111 BPM  
 P-R Interval 138 ms QRS Duration 112 ms  
 Q-T Interval 402 ms QTC Calculation (Bezet) 546 ms Calculated R Axis -111 degrees Calculated T Axis 125 degrees Diagnosis ** Suspect arm lead reversal, interpretation assumes no reversal 
Sinus tachycardia Right ventricular hypertrophy Inferior infarct , age undetermined Anterolateral infarct , age undetermined Prolonged QT When compared with ECG of 06-OCT-2019 07:21, 
MANUAL COMPARISON REQUIRED, DATA IS UNCONFIRMED 
  
CBC WITH AUTOMATED DIFF Collection Time: 10/06/19  7:45 AM  
Result Value Ref Range WBC 5.8 4.1 - 11.1 K/uL  
 RBC 3.74 (L) 4.10 - 5.70 M/uL  
 HGB 11.5 (L) 12.1 - 17.0 g/dL HCT 35.4 (L) 36.6 - 50.3 % MCV 94.7 80.0 - 99.0 FL  
 MCH 30.7 26.0 - 34.0 PG  
 MCHC 32.5 30.0 - 36.5 g/dL  
 RDW 17.2 (H) 11.5 - 14.5 % PLATELET 260 748 - 737 K/uL MPV 11.0 8.9 - 12.9 FL  
 NRBC 0.0 0  WBC ABSOLUTE NRBC 0.00 0.00 - 0.01 K/uL NEUTROPHILS 80 (H) 32 - 75 % LYMPHOCYTES 7 (L) 12 - 49 % MONOCYTES 10 5 - 13 % EOSINOPHILS 1 0 - 7 % BASOPHILS 1 0 - 1 % IMMATURE GRANULOCYTES 1 (H) 0.0 - 0.5 % ABS. NEUTROPHILS 4.5 1.8 - 8.0 K/UL ABS. LYMPHOCYTES 0.4 (L) 0.8 - 3.5 K/UL  
 ABS. MONOCYTES 0.6 0.0 - 1.0 K/UL  
 ABS. EOSINOPHILS 0.1 0.0 - 0.4 K/UL  
 ABS. BASOPHILS 0.1 0.0 - 0.1 K/UL  
 ABS. IMM. GRANS. 0.1 (H) 0.00 - 0.04 K/UL  
 DF AUTOMATED    
 RBC COMMENTS ANISOCYTOSIS 
1+ METABOLIC PANEL, COMPREHENSIVE Collection Time: 10/06/19  7:45 AM  
Result Value Ref Range Sodium 130 (L) 136 - 145 mmol/L Potassium 4.2 3.5 - 5.1 mmol/L Chloride 93 (L) 97 - 108 mmol/L  
 CO2 27 21 - 32 mmol/L Anion gap 10 5 - 15 mmol/L Glucose 104 (H) 65 - 100 mg/dL BUN 45 (H) 6 - 20 MG/DL Creatinine 2.58 (H) 0.70 - 1.30 MG/DL  
 BUN/Creatinine ratio 17 12 - 20 GFR est AA 29 (L) >60 ml/min/1.73m2 GFR est non-AA 24 (L) >60 ml/min/1.73m2 Calcium 8.8 8.5 - 10.1 MG/DL Bilirubin, total 1.8 (H) 0.2 - 1.0 MG/DL  
 ALT (SGPT) 376 (H) 12 - 78 U/L  
 AST (SGOT) 72 (H) 15 - 37 U/L Alk. phosphatase 115 45 - 117 U/L Protein, total 6.6 6.4 - 8.2 g/dL Albumin 3.6 3.5 - 5.0 g/dL Globulin 3.0 2.0 - 4.0 g/dL A-G Ratio 1.2 1.1 - 2.2 URINALYSIS W/ REFLEX CULTURE Collection Time: 10/06/19  7:45 AM  
Result Value Ref Range Color DARK YELLOW Appearance CLEAR CLEAR Specific gravity 1.018 1.003 - 1.030    
 pH (UA) 5.0 5.0 - 8.0 Protein TRACE (A) NEG mg/dL Glucose NEGATIVE  NEG mg/dL Ketone NEGATIVE  NEG mg/dL Blood NEGATIVE  NEG Urobilinogen 1.0 0.2 - 1.0 EU/dL Nitrites NEGATIVE  NEG Leukocyte Esterase NEGATIVE  NEG    
 WBC 0-4 0 - 4 /hpf  
 RBC 0-5 0 - 5 /hpf Epithelial cells FEW FEW /lpf Bacteria NEGATIVE  NEG /hpf  
 UA:UC IF INDICATED CULTURE NOT INDICATED BY UA RESULT CNI Hyaline cast 2-5 0 - 5 /lpf  
LIPASE Collection Time: 10/06/19  7:45 AM  
Result Value Ref Range Lipase 225 73 - 393 U/L  
NT-PRO BNP Collection Time: 10/06/19  7:45 AM  
Result Value Ref Range NT pro-BNP 18,645 (H) <450 PG/ML  
BILIRUBIN, CONFIRM  Collection Time: 10/06/19  7:45 AM  
 Result Value Ref Range Bilirubin UA, confirm NEGATIVE  NEG    
TROPONIN I Collection Time: 10/06/19  7:45 AM  
Result Value Ref Range Troponin-I, Qt. <0.05 <0.05 ng/mL Radiologic Studies -  
XR CHEST PORT Final Result Impression: 1. No complicating features post PICC line placement Procedure Note - Bedside Ultrasound: 
10:52 AM 
Performed by: Manjeet Santana Echo and thoracic US performed at beside, showing severely reduced EF, no pericardial effusion, bilateral pleural sliding, numerous bilateral b-lines suggestive of pleural edema. Abdominal US: Large, non-collapsing IVC, as expected. Few pockets of ascites around the liver but not a significant amount of fluid in the abdomen. The procedure took 1-15 minutes, and pt tolerated well. Medical Decision Making I am the first provider for this patient. Records Reviewed: I reviewed our electronic medical record system for any past medical records that were available that may contribute to the patient's current condition, including their PMH, surgical history, social and family history. Reviewed the nursing notes and vital signs from today's visit. Vital Signs-Reviewed the patient's vital signs. Patient Vitals for the past 24 hrs: 
 Temp Pulse Resp BP SpO2  
10/06/19 1000  (!) 103 19 91/57 100 % 10/06/19 0930  (!) 103 18 99/67 99 % 10/06/19 0904  (!) 103 18 99/62 100 % 10/06/19 0900  (!) 107 17 99/62 99 % 10/06/19 0832  (!) 105  93/53   
10/06/19 0830  (!) 103 20 93/53 100 % 10/06/19 0800  (!) 106 21 103/64 99 % 10/06/19 0742  (!) 109 18 107/68 100 % 10/06/19 0715    99/59 99 % 10/06/19 0700    101/63 99 % 10/06/19 0632 97.8 °F (36.6 °C) (!) 108 20 103/63 99 % ED ECG interpretation: 
ECG shows sinus tachy, with , RVH, old inferior infarct, , no ST changes concerning for acute ischemia, similar to prior.  This ECG was interpreted by Felicia Clark M.D. 
 
 Provider Notes (Medical Decision Making):  
Mr. Norma Coreas is a very pleasant 70-year-old gentleman who advanced cardiomyopathy, currently on palliative dobutamine at home and who presents with worsening orthopnea. Ddx: worsening heart failure, MI, worsening ascites, worsening renal function. Plan: labs including trop and bnp. ECG, CXR, Continue dobutamine at home dose. Bedside US. 10:57 AM 
I have spoken with cardiology, reassessed the patient, spoken with the family for approximately 20 minutes in regards to disposition and our options. The patient is fully aware of his prognosis and severity of illness and desires to go home today. Our difficulty is in that he has clearly not done well on the outpatient Bumex. I do not think he would benefit from a paracentesis today - he does not have a significant amount of ascites and I do not think that the risk of doing the procedure (causing potentially worsening hypotension or bleeding as he is on Eliquis) outweighs the marginal benefits he would get form it. His orthopnea more likely due to worsening CHF and accumulation of fluid in his lungs as evidenced by multiple B lines on ultrasound and his worsening bnp. One reassuring thing is that his oxygen level in fact normal and his work of breathing is not significantly increased. His borderline low blood pressures provide us from diuresing him more aggressively, particularly if he desires to go home. He is already on a very high dose of dobutamine and going up from that particularly if discharged is not advised. I will give him a dose of IV dobutamine here and make sure that he makes urine while we can monitor his blood pressure response to the diuretic. I appreciate his desire to not spend time in the hospital as being at home will certainly be more comfortable for him. I will reassess him after the IV dobutamine, discussed his options again with the family and make final disposition accordingly. ED Course:  
Initial assessment performed. The patients presenting problems have been discussed, and they are in agreement with the care plan formulated and outlined with them. I have encouraged them to ask questions as they arise throughout their visit. Medications Administered During ED Course: 
Medications DOBUTamine (DOBUTREX) 500 mg/250 mL (2,000 mcg/mL) infusion (7.5 mcg/kg/min × 79.8 kg IntraVENous New Bag 10/6/19 0832) bumetanide (BUMEX) injection 2 mg (has no administration in time range) Consult Note: 
Hussein Steinberg MD spoke with Dr. Jadiel Blas, Discussed pt's hx, physical exam and available diagnostic and imaging results. Reviewed care plans. Agree with management and plan to discharge. Recommends adding Zaroxolyn to augment Bumex in diuresis. Will follow up. Progress note: 
Patient has been reassessed and reports feeling considerably better, has normal vital signs and feels comfortable going home. I think this is reasonable as no findings today suggest a life-threatening condition. DISPOSITION: DISCHARGE The patient's results have been reviewed with patient and available family and/or caregiver. They verbally convey their understanding and agreement of the patient's signs, symptoms, diagnosis, treatment and prognosis and additionally agree to follow up as recommended in the discharge instructions or to return to the Emergency Department should the patient's condition change prior to their follow-up appointment. The patient and available family and/or caregiver verbally agree with the care plan and all of their questions have been answered. The discharge instructions have also been provided to the them with educational information regarding the patient's diagnosis as well a list of reasons why the patient would want to return to the ER prior to their follow-up appointment should any concerns arise, the patient's condition change or symptoms worsen.  
 
Hussein Steinberg MD, MSc 
 
 
 CRITICAL CARE NOTE : 
 
IMPENDING DETERIORATION -Cardiovascular ASSOCIATED RISK FACTORS - Hypotension and Shock MANAGEMENT- Bedside Assessment INTERPRETATION -  Xrays, ECG and Blood Pressure INTERVENTIONS - hemodynamic mngmt CASE REVIEW - Medical Sub-Specialist, Nursing and Family TREATMENT RESPONSE -Stable PERFORMED BY - Self NOTES   : 
 
I have spent 30 minutes of critical care time involved in lab review, consultations with specialist, family decision- making, bedside attention and documentation. During this entire length of time I was immediately available to the patient . Critical Care: The reason for providing this level of medical care for this critically ill patient was due to a critical illness that impaired one or more vital organ systems, such that there was a high probability of imminent or life threatening deterioration in the patient's condition. This care involved high complexity decision making to assess, manipulate, and support vital system functions, to treat this degree of vital organ system failure, and to prevent further life threatening deterioration of the patients condition. Mireille Hernandez MD 
 
 
Diagnosis Clinical Impression: 1. Acute on chronic systolic CHF (congestive heart failure) ([address] 2. Orthopnea 3. Weight gain Attestation: 
I personally performed the services described in this documentation on this date 10/6/2019 for patient Yaw Reddy. Mireille Hernandez MD 
 
Please note that this dictation was completed with [employer], the computer voice recognition software. Quite often unanticipated grammatical, syntax, homophones, and other interpretive errors are inadvertently transcribed by the computer software. Please disregard these errors. Please excuse any errors that have escaped final proofreading.

## 2019-10-07 PROBLEM — R57.0 CARDIOGENIC SHOCK (HCC): Status: ACTIVE | Noted: 2019-01-01

## 2019-10-07 PROBLEM — N17.9 AKI (ACUTE KIDNEY INJURY) (HCC): Status: ACTIVE | Noted: 2019-01-01

## 2019-10-07 NOTE — ED NOTES
Reassessed pt, noted fine crackles in all lobes bilaterally, sinus tach at a rate of 104 bpm, lower leg extremity edema +2 pitting bilaterally, with a cap refill of > 3 seconds. Called housekeeping in regards to obtaining a hospital bed for pt. Pt is stable and respirations are even and unlabored.

## 2019-10-07 NOTE — ED NOTES
[facility] pharmacist helping to clarify patients dobutamine order that is currently going at 7.5mcg/kg/min into his PICC line

## 2019-10-07 NOTE — ED NOTES
Dr. Kenia Adam reviewed chest x-ray and reports that it is fine to use the PICC line despite it not being mentioned in radiologists impression

## 2019-10-07 NOTE — ED NOTES
Bedside and Verbal shift change report given to Lamberto Rowe (oncoming nurse) by Davi Matthews (offgoing nurse). Report included the following information SBAR, ED Summary, MAR and Recent Results.

## 2019-10-07 NOTE — ED NOTES
Dr. Leger Patch to come in as consult. Ice water given per approval Dr. Arnold Max. Family at bedside.

## 2019-10-08 PROBLEM — I13.10 CARDIORENAL SYNDROME: Status: ACTIVE | Noted: 2019-01-01

## 2019-10-08 PROBLEM — R06.02 SOB (SHORTNESS OF BREATH): Status: ACTIVE | Noted: 2019-01-01

## 2019-10-08 PROBLEM — Z71.89 COUNSELING REGARDING ADVANCE CARE PLANNING AND GOALS OF CARE: Status: ACTIVE | Noted: 2019-01-01

## 2019-10-08 NOTE — ED PROVIDER NOTES
EMERGENCY DEPARTMENT HISTORY AND PHYSICAL EXAM 
 
 
Please note that this dictation was completed with BriefMe, the computer voice recognition software. Quite often unanticipated grammatical, syntax, homophones, and other interpretive errors are inadvertently transcribed by the computer software. Please disregard these errors. Please excuse any errors that have escaped final proofreading. Date: 10/7/2019 Patient Name: Zohreh Nash Patient Age and Sex: 77 y.o. male History of Presenting Illness Chief Complaint Patient presents with  Hypotension  
  pt was just d/c Saturday for \" End Stage Cardiomyopathy\" per the wife. pt states that he is getting worse and that he cant do anything. pt is currently on a Dobutamine drip  Shortness of Breath History Provided By: Patient and Patient's Wife HPI: Zohreh Nash, 77 y.o. male with past medical history as documented below presents to the ED with c/o of worsening of his shortness of breath for the past several days. Patient has have a history of end-stage cardiomyopathy and is currently on a dobutamine drip. Patient was last admitted recently for end-stage heart failure where he had a PICC line placed. Patient is currently getting dobutamine infusion. Patient reports decrease in his urine output. Patient was seen recently in the ED within the past 24 hours where he was given IV Bumex as well as starting him on metolazone. Patient is followed by Dr. Audie Pina. Patient was last admitted September 21 and discharged on October 2 for end-stage cardiomyopathy with cardiogenic shock. He also had an AICD placed. He also was admitted for cardiorenal syndrome. He is a DNR and has been seen by hospice recently. He noticed decreased urine output despite starting him on milrinone. Patient did have a cardiac cath placed recently. Pt denies any other alleviating or exacerbating factors.  Additionally, pt specifically denies any recent fever, chills, headache, nausea, vomiting, abdominal pain, CP, lightheadedness, dizziness, numbness, weakness, heart palpitations, urinary sxs, diarrhea, constipation, melena, hematochezia, cough, or congestion. There are no other complaints, changes or physical findings at this time. PCP: Yvette Harvey MD 
 
Past History Past Medical History: 
Past Medical History:  
Diagnosis Date  A-fib ([facility]  AICD (automatic cardioverter/defibrillator) present  Arthritis  Cancer ([facility] [address] skin  CKD (chronic kidney disease)  Heart failure ([facility]  Other ill-defined conditions(799.89)   
 high cholesterol  V tach ([facility] [address] Past Surgical History: 
Past Surgical History:  
Procedure Laterality Date  ABDOMEN SURGERY PROC UNLISTED  6/2/11  
 colon resection  HX CATARACT REMOVAL Left  HX HEENT    
 repaired right eardrum  HX OTHER SURGICAL    
 benign cyst removed from back and thyroid  HX OTHER SURGICAL    
 skin graft  HX PACEMAKER    
 aicd  [place] COLONOSCOPY FLX DX W/COLLJ SPEC WHEN PFRMD  6/6/2012  [place] COLONOSCOPY W/BIOPSY SINGLE/MULTIPLE  4/27/2011 Family History: 
Family History Problem Relation Age of Onset  Cancer Mother   
     colon  Heart Disease Father  Heart Disease Brother Social History: 
Social History Tobacco Use  Smoking status: Former Smoker  Smokeless tobacco: Never Used Substance Use Topics  Alcohol use: Not Currently Alcohol/week: 4.2 standard drinks Types: 5 Glasses of wine per week  Drug use: No  
 
 
Allergies: 
No Known Allergies Current Medications: No current facility-administered medications on file prior to encounter. Current Outpatient Medications on File Prior to Encounter Medication Sig Dispense Refill  DOBUTamine (DOBUTREX) 1,000 mg/250 mL (4,000 mcg/mL) infusion 587 mcg/min by IntraVENous route continuous. (Patient taking differently: 7.5 mcg/kg/min by IntraVENous route continuous. Dosing weight: 78.7kg) 250 mL 11  
 metOLazone (ZAROXOLYN) 10 mg tablet Take 0.5 Tabs by mouth daily for 14 days. 7 Tab 0  
 apixaban (ELIQUIS) 2.5 mg tablet Take 1 Tab by mouth every twelve (12) hours. 30 Tab 1  
 bumetanide (BUMEX) 2 mg tablet Take 1 Tab by mouth two (2) times a day. 60 Tab 1  potassium chloride SR (K-TAB) 20 mEq tablet Take 1 Tab by mouth daily. 30 Tab 1  
 benzonatate (TESSALON) 100 mg capsule Take 100 mg by mouth three (3) times daily as needed for Cough.  prednisoLONE acetate (PRED FORTE) 1 % ophthalmic suspension Administer 1 Drop to right eye four (4) times daily. 1 drop to right eye 4 times daily x 1 week then 1 drop in right eye 3 times daily x 1 week then 1 drop in right eye 2 times daily x 1 week then 1 drop daily in right eye once daily x 1 week  prednisoLONE acetate (PRED FORTE) 1 % ophthalmic suspension Administer 1 Drop to left eye two (2) times a day. Tapering down - 1 drop every day starts 9/27    
 moxifloxacin (VIGAMOX) 0.5 % ophthalmic solution Administer 1 Drop to right eye two (2) times a day. Tapering down - 1 drop every day starts 9/26.  folic acid/multivit-min/lutein (CENTRUM SILVER PO) Take  by mouth.  OXYGEN-AIR DELIVERY SYSTEMS 2 L/min by Nasal route as needed (shortness of breath).  levothyroxine (SYNTHROID) 100 mcg tablet Take 100 mcg by mouth every evening.  allopurinol (ZYLOPRIM) 100 mg tablet Take 200 mg by mouth daily. Indications: 2 pills daily  loratadine (CLARITIN) 10 mg tablet Take 10 mg by mouth daily. Review of Systems Review of Systems Constitutional: Positive for fatigue. Negative for chills and fever. HENT: Negative. Negative for congestion, facial swelling, rhinorrhea, sore throat, trouble swallowing and voice change. Eyes: Negative. Respiratory: Positive for chest tightness and shortness of breath. Negative for apnea, cough and wheezing. Cardiovascular: Positive for leg swelling. Negative for chest pain and palpitations. Gastrointestinal: Negative. Negative for abdominal distention, abdominal pain, blood in stool, constipation, diarrhea, nausea and vomiting. Endocrine: Negative. Negative for cold intolerance, heat intolerance and polyuria. Genitourinary: Negative. Negative for difficulty urinating, dysuria, flank pain, frequency, hematuria and urgency. Musculoskeletal: Negative. Negative for arthralgias, back pain, myalgias, neck pain and neck stiffness. Skin: Negative. Negative for color change and rash. Neurological: Positive for weakness. Negative for dizziness, syncope, facial asymmetry, speech difficulty, light-headedness, numbness and headaches. Hematological: Negative. Does not bruise/bleed easily. Psychiatric/Behavioral: Negative. Negative for confusion and self-injury. The patient is not nervous/anxious. Physical Exam  
Physical Exam  
Constitutional: He is oriented to person, place, and time. He appears well-developed and well-nourished. He appears lethargic. He is cooperative. He appears toxic. He has a sickly appearance. He appears ill. He appears distressed. HENT:  
Head: Normocephalic and atraumatic. Mouth/Throat: Mucous membranes are normal. No posterior oropharyngeal erythema. Eyes: Pupils are equal, round, and reactive to light. Conjunctivae and EOM are normal.  
Neck: Normal range of motion. JVD present. Cardiovascular: Regular rhythm and intact distal pulses. Tachycardia present. Exam reveals no gallop and no friction rub. Murmur heard. Pulmonary/Chest: Effort normal and breath sounds normal. No respiratory distress. He has no wheezes. He has no rales. He exhibits no tenderness. Abdominal: Soft.  Bowel sounds are normal. He exhibits no distension and no mass. There is no tenderness. There is no rebound and no guarding. Musculoskeletal: Normal range of motion. He exhibits edema (+3 pitting edema BLE). He exhibits no tenderness or deformity. UE PICC line in place Neurological: He is oriented to person, place, and time. He appears lethargic. He displays normal reflexes. No cranial nerve deficit. He exhibits normal muscle tone. Coordination normal.  
Skin: Skin is warm. No rash noted. Psychiatric: He has a normal mood and affect. Nursing note and vitals reviewed. Diagnostic Study Results Labs - Recent Results (from the past 24 hour(s)) EKG, 12 LEAD, INITIAL Collection Time: 10/07/19  4:09 PM  
Result Value Ref Range Ventricular Rate 137 BPM  
 Atrial Rate 113 BPM  
 QRS Duration 142 ms  
 Q-T Interval 228 ms QTC Calculation (Bezet) 344 ms Calculated R Axis 166 degrees Calculated T Axis -42 degrees Diagnosis ** Suspect arm lead reversal, interpretation assumes no reversal 
Undetermined rhythm Nonspecific intraventricular block Possible Right ventricular hypertrophy Inferior infarct (cited on or before 25-SEP-2019) Anterolateral infarct (cited on or before 25-SEP-2019) When compared with ECG of 06-OCT-2019 07:27, Significant changes have occurred CBC WITH AUTOMATED DIFF Collection Time: 10/07/19  4:55 PM  
Result Value Ref Range WBC 8.0 4.1 - 11.1 K/uL  
 RBC 4.07 (L) 4.10 - 5.70 M/uL  
 HGB 12.9 12.1 - 17.0 g/dL HCT 38.4 36.6 - 50.3 % MCV 94.3 80.0 - 99.0 FL  
 MCH 31.7 26.0 - 34.0 PG  
 MCHC 33.6 30.0 - 36.5 g/dL  
 RDW 17.1 (H) 11.5 - 14.5 % PLATELET 380 088 - 289 K/uL MPV 11.4 8.9 - 12.9 FL  
 NRBC 0.4 (H) 0  WBC ABSOLUTE NRBC 0.03 (H) 0.00 - 0.01 K/uL NEUTROPHILS 88 (H) 32 - 75 % LYMPHOCYTES 5 (L) 12 - 49 % MONOCYTES 7 5 - 13 % EOSINOPHILS 0 0 - 7 % BASOPHILS 0 0 - 1 % IMMATURE GRANULOCYTES 0 0.0 - 0.5 % ABS. NEUTROPHILS 7.0 1.8 - 8.0 K/UL ABS. LYMPHOCYTES 0.4 (L) 0.8 - 3.5 K/UL  
 ABS. MONOCYTES 0.6 0.0 - 1.0 K/UL  
 ABS. EOSINOPHILS 0.0 0.0 - 0.4 K/UL  
 ABS. BASOPHILS 0.0 0.0 - 0.1 K/UL  
 ABS. IMM. GRANS. 0.0 0.00 - 0.04 K/UL  
 DF MANUAL    
 RBC COMMENTS ANISOCYTOSIS 
1+ METABOLIC PANEL, COMPREHENSIVE Collection Time: 10/07/19  4:55 PM  
Result Value Ref Range Sodium 126 (L) 136 - 145 mmol/L Potassium 6.0 (H) 3.5 - 5.1 mmol/L Chloride 91 (L) 97 - 108 mmol/L  
 CO2 25 21 - 32 mmol/L Anion gap 10 5 - 15 mmol/L Glucose 118 (H) 65 - 100 mg/dL BUN 67 (H) 6 - 20 MG/DL Creatinine 4.12 (H) 0.70 - 1.30 MG/DL  
 BUN/Creatinine ratio 16 12 - 20 GFR est AA 17 (L) >60 ml/min/1.73m2 GFR est non-AA 14 (L) >60 ml/min/1.73m2 Calcium 9.1 8.5 - 10.1 MG/DL Bilirubin, total 2.2 (H) 0.2 - 1.0 MG/DL  
 ALT (SGPT) 962 (H) 12 - 78 U/L  
 AST (SGOT) 728 (H) 15 - 37 U/L Alk. phosphatase 180 (H) 45 - 117 U/L Protein, total 6.8 6.4 - 8.2 g/dL Albumin 3.7 3.5 - 5.0 g/dL Globulin 3.1 2.0 - 4.0 g/dL A-G Ratio 1.2 1.1 - 2.2 CK W/ REFLX CKMB Collection Time: 10/07/19  4:55 PM  
Result Value Ref Range CK 43 39 - 308 U/L  
TROPONIN I Collection Time: 10/07/19  4:55 PM  
Result Value Ref Range Troponin-I, Qt. <0.05 <0.05 ng/mL NT-PRO BNP Collection Time: 10/07/19  4:55 PM  
Result Value Ref Range NT pro-BNP 27,184 (H) <450 PG/ML Radiologic Studies -  
XR CHEST PORT Final Result IMPRESSION: Slight interval increase in the right pleural effusion and right  
basilar airspace opacity which may represent volume loss CT Results  (Last 48 hours) None CXR Results  (Last 48 hours) 10/07/19 9070  XR CHEST PORT Final result Impression:  IMPRESSION: Slight interval increase in the right pleural effusion and right  
basilar airspace opacity which may represent volume loss Narrative:  EXAM: XR CHEST PORT INDICATION: sob COMPARISON: 10/6/2019 FINDINGS: A portable AP radiograph of the chest was obtained at 1731 hours. Patient is on a cardiac monitor. Left subclavian defibrillator is in stable  
position. . There is interval slight increase in the right pleural effusion right  
basilar atelectasis. Possible calcified pleural-based plaques are noted. .. The  
cardiac and mediastinal contours and pulmonary vascularity are normal.  The  
bones and soft tissues are grossly within normal limits. 10/06/19 0805  XR CHEST PORT Final result Impression:  Impression: 1. No complicating features post PICC line placement Narrative:  INDICATION: PICC line placement Exam:  Single portable view of the chest 0 740 hours. Comparison 9/24/2019. FINDINGS: A right arm PICC line has been placed. Tip overlies the superior vena  
cava. Cardiomediastinal silhouette remains enlarged. AICD unchanged. The right  
costophrenic angle again noted which may represent pleural fluid and/or  
scarring. Mild interstitial prominence unchanged. There are calcified pleural  
plaques bilaterally. No pneumothorax Medical Decision Making I am the first provider for this patient. I reviewed the vital signs, available nursing notes, past medical history, past surgical history, family history and social history. Vital Signs-Reviewed the patient's vital signs. Patient Vitals for the past 24 hrs: 
 Temp Pulse Resp BP SpO2  
10/07/19 2230  (!) 110 21 108/70 95 % 10/07/19 2220  (!) 112 17 103/71 96 % 10/07/19 2210  (!) 112 19 100/76 96 % 10/07/19 2200  (!) 110 22 107/65 94 % 10/07/19 2150  (!) 111 18 103/59 97 % 10/07/19 2140  (!) 110 22 102/72 94 % 10/07/19 2120    101/67   
10/07/19 2119  (!) 110 20  95 % 10/07/19 2118  (!) 109 20 99/67 95 % 10/07/19 2110  (!) 108 23 99/60 94 % 10/07/19 2100  (!) 107 21 102/69 97 % 10/07/19 2050  (!) 109 22 (!) 87/79 96 % 10/07/19 2040  (!) 110 22  95 % 10/07/19 2030  (!) 107 21 103/70 96 % 10/07/19 2026  (!) 107  105/72   
10/07/19 2020  (!) 105 22 105/72 99 % 10/07/19 2014     97 % 10/07/19 2000  (!) 104 19 100/87 97 % 10/07/19 1955  (!) 104 22 (!) 104/92 99 % 10/07/19 1950  (!) 103 20 103/84 95 % 10/07/19 1945  (!) 102 20 106/66 96 % 10/07/19 1940  (!) 102 21 130/80 97 % 10/07/19 1935  (!) 103 21 105/65 97 % 10/07/19 1930  (!) 103 22 107/76 97 % 10/07/19 1925  (!) 103 20 101/71 98 % 10/07/19 1921  (!) 104 22 102/61 99 % 10/07/19 1920  (!) 103 25 100/63 100 % 10/07/19 1916  (!) 104 22 101/52 94 % 10/07/19 1915  (!) 102 20 102/51 96 % 10/07/19 1900  (!) 106 28 (!) 83/71   
10/07/19 1831  (!) 107 22 (!) 82/62 96 % 10/07/19 1815    (!) 78/51   
10/07/19 1814  (!) 107 22  94 % 10/07/19 1801  (!) 107  (!) 82/60   
10/07/19 1733  (!) 109 22  97 % 10/07/19 1730  (!) 109 21 (!) 76/64 (!) 88 % 10/07/19 1601 97.7 °F (36.5 °C) (!) 107 20 98/57 95 % Pulse Oximetry Analysis - 95% on RA Cardiac Monitor:  
Rate: 107 bpm 
Rhythm: Sinus Tachycardia ED EKG interpretation: 
Rhythm: sinus tachycardia; and regular . Rate (approx.): 137; Axis: normal; P wave: normal; QRS interval: normal ; ST/T wave: non-specific changes; Other findings: borderline ekg. This EKG was interpreted by Yuni Galloway M.D. Records Reviewed: Nursing Notes, Old Medical Records, Previous electrocardiograms, Previous Radiology Studies and Previous Laboratory Studies Provider Notes (Medical Decision Making):  
Patient presents with acute dyspnea. DDx: asthma, copd, pna, pulmonary edema, acute bronchitis, ACS, ptx, pna. Will obtain EKG, labs, CXR, provide O2 as needed for hypoxia, treat symptomatically and reassess. Will continue to monitor closely in ED. ED Course:  
Initial assessment performed.  The patients presenting problems have been discussed, and they are in agreement with the care plan formulated and outlined with them. I have encouraged them to ask questions as they arise throughout their visit. I reviewed our electronic medical record system for any past medical records that were available that may contribute to the patient's current condition, the nursing notes and vital signs from today's visit. Mitesh Lim MD 
 
ED Orders Placed : 
Orders Placed This Encounter  XR CHEST PORT  CBC WITH AUTOMATED DIFF  
 METABOLIC PANEL, COMPREHENSIVE  
 CK W/ REFLX CKMB  TROPONIN I  
 NT-PRO BNP  METABOLIC PANEL, COMPREHENSIVE  
 CBC W/ AUTOMATED DIFF  
 MAGNESIUM  
 PHOSPHORUS  
 DIET REGULAR 2 GM NA (House Low NA)  SOB PANEL TRACKING (DO NOT DESELECT)  OXYGEN CANNULA (To maintain O2 sat greater than 92%) Consult MD if Hx. of COPD [address]  PULSE OXIMETRY CONTINUOUS (if clinically indicated)  CARDIAC MONITOR (if Clinically indicated)  TURN & POSITION  
 APPLY/MAINTAIN SEQUENTIAL COMPRESSION DEVICE  VITAL SIGNS PER UNIT ROUTINE  WEIGH PATIENT  CARDIAC MONITORING  
 NOTIFY PROVIDER: VITAL SIGNS CHANGES  BLADDER CHECKS  
 NURSING-MISCELLANEOUS: Please allow Mrs. Grgigs to stay with patient tonight in case he passes CONTINUOUS  
 DO NOT RESUSCITATE  
 IP CONSULT TO NEPHROLOGY  OXIMETRY, CONTINUOUS  
 EKG, 12 LEAD, INITIAL  
 SALINE LOCK IV ONE TIME STAT  DISCONTD: DOBUTamine (DOBUTREX) 500 mg/250 mL (2,000 mcg/mL) infusion  DISCONTD: milrinone (PRIMACOR) 200 mcg/mL in 0.9% sodium chloride 100 mL infusion  DOBUTamine (DOBUTREX) 500 mg/250 mL (2,000 mcg/mL) infusion  milrinone (PRIMACOR) 200 mcg/mL in 0.9% sodium chloride 100 mL infusion  insulin regular (NOVOLIN R, HUMULIN R) injection 10 Units  dextrose (D50W) injection syrg 25 g  
 DISCONTD: calcium gluconate injection 2 g  
 calcium gluconate 2 g in 0.9% sodium chloride 100 mL IVPB  DISCONTD: apixaban (ELIQUIS) tablet 2.5 mg  
 benzonatate (TESSALON) capsule 100 mg  
  levothyroxine (SYNTHROID) tablet 100 mcg  loratadine (CLARITIN) tablet 10 mg  
 prednisoLONE acetate (PRED FORTE) 1 % ophthalmic suspension 1 Drop  prednisoLONE acetate (PRED FORTE) 1 % ophthalmic suspension 1 Drop  sodium chloride (NS) flush 5-40 mL  sodium chloride (NS) flush 5-40 mL  acetaminophen (TYLENOL) tablet 650 mg  
 famotidine (PEPCID) tablet 10 mg  
 calcium gluconate 2 g in 0.9% sodium chloride 100 mL IVPB  sodium polystyrene (KAYEXALATE) 15 gram/60 mL oral suspension 30 g  furosemide (LASIX) 100 mg in 0.9% sodium chloride 100 mL infusion  alcohol 62% (NOZIN) nasal  1 Ampule  IP CONSULT TO CARDIOLOGY  INITIAL PHYSICIAN ORDER: INPATIENT Intensive Care; 4. Patient requires ICU level of care interventions (further clarification in H&P documentation) ED Medications Administered: 
Medications DOBUTamine (DOBUTREX) 500 mg/250 mL (2,000 mcg/mL) infusion (7.5 mcg/kg/min × 78.7 kg (Order-Specific) IntraVENous New Bag 10/7/19 1825)  
milrinone (PRIMACOR) 200 mcg/mL in 0.9% sodium chloride 100 mL infusion (0.25 mcg/kg/min × 78.7 kg (Order-Specific) IntraVENous Rate Change 10/7/19 2026)  
benzonatate (TESSALON) capsule 100 mg (has no administration in time range) levothyroxine (SYNTHROID) tablet 100 mcg (has no administration in time range)  
loratadine (CLARITIN) tablet 10 mg (has no administration in time range) prednisoLONE acetate (PRED FORTE) 1 % ophthalmic suspension 1 Drop (has no administration in time range) prednisoLONE acetate (PRED FORTE) 1 % ophthalmic suspension 1 Drop (has no administration in time range)  
sodium chloride (NS) flush 5-40 mL (has no administration in time range)  
sodium chloride (NS) flush 5-40 mL (has no administration in time range)  
acetaminophen (TYLENOL) tablet 650 mg (has no administration in time range)  
famotidine (PEPCID) tablet 10 mg (has no administration in time range) calcium gluconate 2 g in 0.9% sodium chloride 100 mL IVPB (has no administration in time range)  
sodium polystyrene (KAYEXALATE) 15 gram/60 mL oral suspension 30 g (has no administration in time range)  
furosemide (LASIX) 100 mg in 0.9% sodium chloride 100 mL infusion (has no administration in time range) alcohol 62% (NOZIN) nasal  1 Ampule (has no administration in time range)  
insulin regular (NOVOLIN R, HUMULIN R) injection 10 Units (10 Units IntraVENous Given 10/7/19 1857) dextrose (D50W) injection syrg 25 g (25 g IntraVENous Given 10/7/19 1857) calcium gluconate 2 g in 0.9% sodium chloride 100 mL IVPB (0 g IntraVENous IV Completed 10/7/19 1928) Consult Note: 
Paul Patel MD spoke with Dr. Jada Simmons, Specialty: Cardiology Discussed pt's hx, disposition, and available diagnostic and imaging results. Reviewed care plans. Agree with management and plan thus far. Consultant will evaluate pt; agree with IV milrinone drip, IV dobutamine. Consult Note: 
Paul Patel MD spoke with Dr. Gail Ramirez, Specialty: Nephrology Discussed pt's hx, disposition, and available diagnostic and imaging results. Reviewed care plans. Agree with management and plan thus far. Consultant agrees that patient is not a candidate for dialysis. Agree with IV calcium, insulin, Kayexalate. We will continue to monitor renal function in the morning. Progress Note: CXR used to confirm PICC and notified RN to use; start dobutamine as previous at 7.5mcg/kg/min. Progress Note: 
Per family, family has appt with Dr. Jeronimo Medina this week with plans to turn off AICD. Progress Note: 
Labs noted; significantly higher BNP, elevated LFT's likely from hepatic congestion and concern for shock liver, pt is end stage cardiomyopathy with cardiogenic shock. Will start milrinone gtt. Progress Note: 
2nd pressor now started; BP remains soft 83/71, tachycardic. Pt mentation at baseline. Progress Note: Elevated potassium noted at 6.0, will give chemical dialysis with IV calcium, lasix, kayexalate. Progress Note: 
Milrinone drip infusing, pt has made about 200cc of dark urine thus far, place [place], monitor strict ins/outs. Progress Note: 
Updated family, pt has poor prognosis, now on two pressors/inotropes. Will need ICU admission. Progress Note: 
Discussed with hospitalist, will start lasix gtt. Consult Note: 
Zev Yang MD spoke with Dr. Bev Villegas, Specialty: Hospitalist 
Discussed pt's hx, disposition, and available diagnostic and imaging results. Reviewed care plans. Agree with management and plan thus far. Consultant will evaluate pt for admission to ICU. CRITICAL CARE NOTE : 
 
IMPENDING DETERIORATION -Airway, Respiratory, Cardiovascular, Metabolic, Renal and Hepatic ASSOCIATED RISK FACTORS - Hypotension, Shock, Hypoxia, Dysrhythmia, Metabolic changes, Dehydration and Vascular Compromise MANAGEMENT- Bedside Assessment and Supervision of Care INTERPRETATION -  Xrays, ECG, Blood Pressure, Cardiac Output Measures  and Screening Ultrasound INTERVENTIONS - hemodynamic mgmt, Metobolic interventions and management of cardiogenic shock, shock liver, end stage CHF, requiring multiple inotropes, pressors, management of acute hyperkalemia needing chemical dialysis CASE REVIEW - Hospitalist, Medical Sub-Specialist, Nursing, Family and Cardiology, Nephrology TREATMENT RESPONSE -Improved PERFORMED BY - Self NOTES   : 
 
I have spent 95 minutes of critical care time involved in lab review, consultations with specialist, family decision- making, bedside attention and documentation. During this entire length of time I was immediately available to the patient . Critical Care:   The reason for providing this level of medical care for this critically ill patient was due to a critical illness that impaired one or more vital organ systems, such that there was a high probability of imminent or life threatening deterioration in the patient's condition. This care involved high complexity decision making to assess, manipulate, and support vital system functions, to treat this degree of vital organ system failure, and to prevent further life threatening deterioration of the patients condition. Amy Ahn MD 
 
 
Disposition:  ADMIT Patient is being admitted to the hospital.  The results of their tests and reasons for their admission have been discussed with them and/or available family. They convey agreement and understanding for the need to be admitted and for their admission diagnosis. Consultation has been made with the inpatient physician specialist for hospitalization. Diagnosis Clinical Impression: 1. Cardiogenic shock ([address] 2. Cardiorenal syndrome with renal failure, stage 5 chronic kidney disease or end stage renal disease, with heart failure ([address] 3. SOB (shortness of breath) 4. Cardiorenal syndrome with renal failure, stage 1-4 or unspecified chronic kidney disease, with heart failure ([address] 5. Counseling regarding advance care planning and goals of care 6. Localized edema 7. Acute on chronic systolic (congestive) heart failure ([address] 8. Cardiomyopathy of end-stage congenital heart disease ([address] 9. Elevated LFTs 10. Acute hyperkalemia 11. Acute hyponatremia 12. Shock liver Attestation: 
I personally performed the services described in this documentation on this date 10/7/2019 for patientRajwinder. Amy Ahn MD 
 
 
This note will not be viewable in [address]

## 2019-10-08 NOTE — PROGRESS NOTES
Long discussion with patient, son, and wife Pt wants to be comfortable and does not want to live like this (in/out of the hospital, SOB, attached to gtts, etc). Pt wants to go with hospice and wants shocking fxn of ICD turned OFF He turns 78yo on 10/22. Party planned for 10/19. Plan: 
Consult hospice this AM for comfort care Will leave gtts/orders as are for now until a final plan is created Pt wants SCDs off Ativan 0.5mg PO q4h Will have [employer] turn his shocking fxn OFF this AM 
DNR/DNI He can have whatever he wants to eat; currently wants ice cream 
No more labs No escalation of gtts

## 2019-10-08 NOTE — CONSULTS
Nephrology Progress Note Tung Steven  
 
[facility]. [facility]                  Phone - [phone] Patient: Albin Motley Date- 10/8/2019 Admit Date: 10/7/2019 YOB: 1940 CC: Follow up for ARF, HYPERKALEMIA Subjective: Interval History:  
-  Mr. Carmen Fuller is 77 y.o. W.m. With chf, ckd presented to er with sob He was found to have hyperkalemia, hyponatremia, ARF. Na 126, k 6.0 and cr 4.1 He was recently discharged from [facility] on dobutamine gtt His k improved to 3.9 He c/o sob No chest pain No fever No vomiting or diarrhea ROS:- as above Assessment & Plan:  
 
nahomy likely CARDIORENAL SYNDROME- No hydro on renal usg 
- continue dobutamine gtt per cards 
- he is not a dialysis candidate 
- CONSIDER HOSPICE CARE 
  
           hyperkalemia 
- improved  
  
    
          chf -on dobutamine gtt 
 
  
            Hypotension.- likely due to cardiogenic shock 
  
           Hyponatremia- due to chf 
  
           Chronic kidney disease stage III, baseline creatinine 1.5. 
  
            history of atrial fibrillation and ventricular tachycardia. Physical exam:  
GEN: NAD NECK- Supple, no mass RESP: Clear b/l, no wheezing,DECREASED BS b/l CVS: RRR,S1,S2 ABDO: soft , non tender, No mass EXT: + Edema NEURO: normal speech, non focal 
 
Care Plan discussed with:patient, wife, nurse Objective:  
Visit Vitals BP 98/58 (BP 1 Location: Left arm, BP Patient Position: At rest) Pulse (!) 106 Temp 98.1 °F (36.7 °C) Resp 16 Wt 78.4 kg (172 lb 13.5 oz) SpO2 97% BMI 25.52 kg/m² Last 3 Recorded Weights in this Encounter 10/07/19 1819 10/08/19 0000 Weight: 78.7 kg (173 lb 8 oz) 78.4 kg (172 lb 13.5 oz) 10/06 1901 - 10/08 0700 In: 434 [I.V.:434] Out: 1106 [KBPBM:8201] Intake/Output Summary (Last 24 hours) at 10/8/2019 9030 Last data filed at 10/8/2019 0876 Gross per 24 hour Intake 433.96 ml Output 1326 ml Net -892.04 ml Chart reviewed. Pertinent Notes reviewed. Medication list  reviewed Current Facility-Administered Medications Medication  influenza vaccine 2019-20 (6 mos+)(PF) (FLUARIX/FLULAVAL/FLUZONE QUAD) injection 0.5 mL  bacitracin 500 unit/gram packet 1 Packet  heparin (porcine) pf 300 Units  DOBUTamine (DOBUTREX) 500 mg/250 mL (2,000 mcg/mL) infusion  milrinone (PRIMACOR) 200 mcg/mL in 0.9% sodium chloride 100 mL infusion  benzonatate (TESSALON) capsule 100 mg  levothyroxine (SYNTHROID) tablet 100 mcg  loratadine (CLARITIN) tablet 10 mg  
 prednisoLONE acetate (PRED FORTE) 1 % ophthalmic suspension 1 Drop  prednisoLONE acetate (PRED FORTE) 1 % ophthalmic suspension 1 Drop  sodium chloride (NS) flush 5-40 mL  sodium chloride (NS) flush 5-40 mL  acetaminophen (TYLENOL) tablet 650 mg  
 famotidine (PEPCID) tablet 10 mg  
 furosemide (LASIX) 100 mg in 0.9% sodium chloride 100 mL infusion  alcohol 62% (NOZIN) nasal  1 Ampule Data Review : 
Recent Labs 10/08/19 
0451 10/07/19 
1655 10/06/19 
0745 * 126* 130*  
K 3.9 6.0* 4.2 CL 92* 91* 93* CO2 28 25 27 BUN 68* 67* 45* CREA 3.80* 4.12* 2.58* * 118* 104* CA 9.3 9.1 8.8 MG 2.4  --   --   
PHOS 5.3*  --   --   
 
Recent Labs 10/08/19 
0451 10/07/19 
1655 10/06/19 
0745 WBC 7.6 8.0 5.8 HGB 10.9* 12.9 11.5* HCT 32.0* 38.4 35.4*  
 185 154 No results for input(s): FE, TIBC, PSAT, FERR in the last 72 hours. Recent Labs 10/07/19 
1655 10/06/19 
0745 TROIQ <0.05 <0.05 Lab Results Component Value Date/Time  Color DARK YELLOW 10/06/2019 07:45 AM  
 Appearance CLEAR 10/06/2019 07:45 AM  
 Specific gravity 1.018 10/06/2019 07:45 AM  
 pH (UA) 5.0 10/06/2019 07:45 AM  
 Protein TRACE (A) 10/06/2019 07:45 AM  
 Glucose NEGATIVE  10/06/2019 07:45 AM  
 Ketone NEGATIVE  10/06/2019 07:45 AM  
 Bilirubin NEGATIVE  07/28/2019 11:44 PM  
 Urobilinogen 1.0 10/06/2019 07:45 AM  
 Nitrites NEGATIVE  10/06/2019 07:45 AM  
 Leukocyte Esterase NEGATIVE  10/06/2019 07:45 AM  
 Epithelial cells FEW 10/06/2019 07:45 AM  
 Bacteria NEGATIVE  10/06/2019 07:45 AM  
 WBC 0-4 10/06/2019 07:45 AM  
 RBC 0-5 10/06/2019 07:45 AM  
 
Lab Results Component Value Date/Time Culture result: NO GROWTH 1 DAY 07/28/2019 11:44 PM  
 Culture result: NO GROWTH 5 DAYS 07/28/2019 12:20 PM  
 Culture result: NO GROWTH 6 DAYS 07/27/2019 09:27 PM  
 
No results found for: SDES Lab Results Component Value Date/Time Sodium,urine random 14 09/22/2019 03:37 PM  
 Creatinine, urine 166.00 09/22/2019 03:37 PM  
 
 
Results from Hospital Encounter encounter on 10/07/19 XR CHEST PORT Narrative EXAM: XR CHEST PORT INDICATION: sob COMPARISON: 10/6/2019 FINDINGS: A portable AP radiograph of the chest was obtained at 1731 hours. Patient is on a cardiac monitor. Left subclavian defibrillator is in stable 
position. . There is interval slight increase in the right pleural effusion right 
basilar atelectasis. Possible calcified pleural-based plaques are noted. .. The 
cardiac and mediastinal contours and pulmonary vascularity are normal.  The 
bones and soft tissues are grossly within normal limits. Impression IMPRESSION: Slight interval increase in the right pleural effusion and right 
basilar airspace opacity which may represent volume loss Danelle Pillai MD 
[facility] 
 www. [facility] [employer] [address] [place], [address] [facility] Phone - [phone] Fax - [phone]

## 2019-10-08 NOTE — CONSULTS
Palliative Medicine Consult Jun: [phone] ([phone]) Patient Name: Rich Silva YOB: 1940 Date of Initial Consult: 10/8/19 Reason for Consult: End Stage Disease Requesting Provider: Bard Isabelle MD 
Primary Care Physician: Elsie Jones MD 
 
 SUMMARY:  
Rich Silva is a 77 y.o. 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 Current medical issues leading to Palliative Medicine involvement include: pt with end stage cardiomyopathy dependent on dobutamine drip at home, symptoms worsened over past week. Upon admission was started on Milrinone was added. Patient has decided to withdraw from artificial support, knowing that his time will be short. The hope was that he could already be in Hospice prior to stopping drips, however, Hospice wants patient off the drips before they will accept him. There is concerns that pt will become symptomatic before he is admitted to Hospice, therefore, Palliative Medicine has been consulted to assist with the transition. PALLIATIVE DIAGNOSES:  
1. SOB 2. Hypotension: cardiogenic shock 3. MARCELL cardiorenal syndrome 4. Elevated LFTs 5. Care decisions PLAN:  
1. Prior to visit, I completed an extensive review of patient's medical records, including medical documentation, vital signs, MARs, and results of various labs and other diagnostics. I also spoke with patient's nurse, Maddie Rabago and pulmonologist/intensivist Dr. Brittany Ramirez. 2. Met with pt, wife and extended family; they are clear that patient is ready to stop the drips, his ICD has already been deactivated in anticipation. Both pt and wife state he is \"ready to go\" just wants to be sure he doesn't suffer.   Reassured pt and family that we will manage his symptoms and he is not to worry, no matter where he is in this hospital or who's care, we will not let him suffer. 3. Comfort medications ordered, reviewed with VIKASH Rabago. RN can call me tonight for adjustments if needed: [phone].  
4. Initial consult note routed to primary continuity provider and/or primary health care team members 5. Communicated plan of care with: Palliative [facility] Team 
 
 GOALS OF CARE / TREATMENT PREFERENCES:  
 
GOALS OF CARE: 
Patient/Health Care Proxy Stated Goals: Comfort TREATMENT PREFERENCES:  
Code Status: DNR Advance Care Planning: 
[x] The [facility] Interdisciplinary Team has updated the ACP Navigator with [place] and Patient Capacity Primary Decision Maker: Jessika Griggs ([facility]) - Spouse - [phone] Advance Care Planning 10/8/2019 Patient's Healthcare Decision Maker is: Named in scanned ACP document Confirm Advance Directive Yes, on file Does the patient have other document types Do Not Resuscitate Medical Interventions: Comfort measures Other Instructions:  
Artificially Administered Nutrition: No feeding tube Other: As far as possible, the palliative care team has discussed with patient / health care proxy about goals of care / treatment preferences for patient. HISTORY:  
 
History obtained from: chart, patient and family CHIEF COMPLAINT: SOB 
 
HPI/SUBJECTIVE: The patient is:  
[x] Verbal and participatory [] Non-participatory due to:  
 
10/7: pt presented to ED with SOB, progressively worsening over the past week, found to be hypotensive in ED and in MARCELL. Started on Milrinone in ED, feeling better. 10/8: pt is resting comfortably in bed, \"holding court\" with his family at this time. No complaints. Clinical Pain Assessment (nonverbal scale for severity on nonverbal patients):  
Clinical Pain Assessment Severity: 0 Activity (Movement): Lying quietly, normal position Duration: for how long has pt been experiencing pain (e.g., 2 days, 1 month, years) Frequency: how often pain is an issue (e.g., several times per day, once every few days, constant) FUNCTIONAL ASSESSMENT:  
 
Palliative Performance Scale (PPS): PPS: 30 
 
 
 PSYCHOSOCIAL/SPIRITUAL SCREENING:  
 
Palliative IDT has assessed this patient for cultural preferences / practices and a referral made as appropriate to needs (Cultural Services, Patient Advocacy, Ethics, etc.) Any spiritual / Religion concerns: 
[] Yes /  [x] No 
 
Caregiver Burnout: 
[] Yes /  [x] No /  [] No Caregiver Present Anticipatory grief assessment:  
[x] Normal  / [] Maladaptive ESAS Anxiety: Anxiety: 0 
 
ESAS Depression: Depression: 0 REVIEW OF SYSTEMS:  
 
Positive and pertinent negative findings in ROS are noted above in HPI. The following systems were [x] reviewed / [] unable to be reviewed as noted in HPI Other findings are noted below. Systems: constitutional, ears/nose/mouth/throat, respiratory, gastrointestinal, genitourinary, musculoskeletal, integumentary, neurologic, psychiatric, endocrine. Positive findings noted below. Modified ESAS Completed by: provider Fatigue: 5 Drowsiness: 0 Depression: 0 Pain: 0 Anxiety: 0 Nausea: 0 Anorexia: 0 Dyspnea: 0 Constipation: No  
  Stool Occurrence(s): 1 PHYSICAL EXAM:  
 
From RN flowsheet: 
Wt Readings from Last 3 Encounters:  
10/08/19 172 lb 13.5 oz (78.4 kg) 10/06/19 176 lb (79.8 kg) 10/06/19 176 lb (79.8 kg) Blood pressure 105/46, pulse (!) 107, temperature 98.1 °F (36.7 °C), resp. rate 18, weight 172 lb 13.5 oz (78.4 kg), SpO2 96 %. Pain Scale 1: Numeric (0 - 10) Pain Intensity 1: 0 Pain Location 1: Chest 
  
  
  
Last bowel movement, if known:  
 
Constitutional: frail, NAD Eyes: pupils equal, anicteric ENMT: no nasal discharge, moist mucous membranes Cardiovascular: regular rhythm, distal pulses intact Respiratory: breathing not labored, symmetric Gastrointestinal: soft non-tender, +bowel sounds Musculoskeletal: no deformity, no tenderness to palpation Skin: warm, dry Neurologic: following commands, moving all extremities Psychiatric: full affect, no hallucinations HISTORY:  
 
Active Problems: 
  Cardiogenic shock ([facility] (10/7/2019) MARCELL (acute kidney injury) ([facility] (10/7/2019) Past Medical History:  
Diagnosis Date  A-fib ([facility]  AICD (automatic cardioverter/defibrillator) present  Arthritis  Cancer ([facility] skin  CKD (chronic kidney disease)  Heart failure ([facility]  Other ill-defined conditions(799.89)   
 high cholesterol  V tach ([facility] Past Surgical History:  
Procedure Laterality Date  ABDOMEN SURGERY PROC UNLISTED  6/2/11  
 colon resection  HX CATARACT REMOVAL Left  HX HEENT    
 repaired right eardrum  HX OTHER SURGICAL    
 benign cyst removed from back and thyroid  HX OTHER SURGICAL    
 skin graft  HX PACEMAKER    
 aicd  TX COLONOSCOPY FLX DX W/COLLJ SPEC WHEN PFRMD  6/6/2012  TX COLONOSCOPY W/BIOPSY SINGLE/MULTIPLE  4/27/2011 Family History Problem Relation Age of Onset  Cancer Mother   
     colon  Heart Disease Father  Heart Disease Brother History reviewed, no pertinent family history. Social History Tobacco Use  Smoking status: Former Smoker  Smokeless tobacco: Never Used Substance Use Topics  Alcohol use: Not Currently Alcohol/week: 4.2 standard drinks Types: 5 Glasses of wine per week No Known Allergies Current Facility-Administered Medications Medication Dose Route Frequency  influenza vaccine 2019-20 (6 mos+)(PF) (FLUARIX/FLULAVAL/FLUZONE QUAD) injection 0.5 mL  0.5 mL IntraMUSCular PRIOR TO DISCHARGE  bacitracin 500 unit/gram packet 1 Packet  1 Packet Topical PRN  
 heparin (porcine) pf 300 Units  300 Units InterCATHeter PRN  
 furosemide (LASIX) injection 80 mg  80 mg IntraVENous BID  
  ondansetron (ZOFRAN) injection 4 mg  4 mg IntraVENous Q4H PRN  
 haloperidol (HALDOL) 2 mg/mL oral solution 2 mg  2 mg SubLINGual Q6H PRN Or  
 haloperidol lactate (HALDOL) injection 2 mg  2 mg IntraVENous Q6H PRN  
 LORazepam (ATIVAN) injection 0.5 mg  0.5 mg IntraVENous Q15MIN PRN  
 albuterol (PROVENTIL VENTOLIN) nebulizer solution 2.5 mg  2.5 mg Nebulization Q2H PRN  
 morphine injection 2 mg  2 mg IntraVENous Q15MIN PRN  
 scopolamine (TRANSDERM-SCOP) 1 mg over 3 days 1 Patch  1 Patch TransDERmal Q72H PRN  
 benzonatate (TESSALON) capsule 100 mg  100 mg Oral TID PRN  
 levothyroxine (SYNTHROID) tablet 100 mcg  100 mcg Oral QPM  
 loratadine (CLARITIN) tablet 10 mg  10 mg Oral DAILY  prednisoLONE acetate (PRED FORTE) 1 % ophthalmic suspension 1 Drop  1 Drop Right Eye QID  prednisoLONE acetate (PRED FORTE) 1 % ophthalmic suspension 1 Drop  1 Drop Left Eye BID  sodium chloride (NS) flush 5-40 mL  5-40 mL IntraVENous Q8H  
 sodium chloride (NS) flush 5-40 mL  5-40 mL IntraVENous PRN  
 acetaminophen (TYLENOL) tablet 650 mg  650 mg Oral Q4H PRN  
 famotidine (PEPCID) tablet 10 mg  10 mg Oral DAILY  alcohol 62% (NOZIN) nasal  1 Ampule  1 Ampule Topical Q12H  
 
 
 
 LAB AND IMAGING FINDINGS:  
 
Lab Results Component Value Date/Time WBC 7.6 10/08/2019 04:51 AM  
 HGB 10.9 (L) 10/08/2019 04:51 AM  
 PLATELET 835 68/87/6334 04:51 AM  
 
Lab Results Component Value Date/Time Sodium 130 (L) 10/08/2019 04:51 AM  
 Potassium 3.9 10/08/2019 04:51 AM  
 Chloride 92 (L) 10/08/2019 04:51 AM  
 CO2 28 10/08/2019 04:51 AM  
 BUN 68 (H) 10/08/2019 04:51 AM  
 Creatinine 3.80 (H) 10/08/2019 04:51 AM  
 Calcium 9.3 10/08/2019 04:51 AM  
 Magnesium 2.4 10/08/2019 04:51 AM  
 Phosphorus 5.3 (H) 10/08/2019 04:51 AM  
  
Lab Results Component Value Date/Time AST (SGOT) 882 (H) 10/08/2019 04:51 AM  
 Alk.  phosphatase 160 (H) 10/08/2019 04:51 AM  
 Protein, total 6.1 (L) 10/08/2019 04:51 AM  
 Albumin 3.5 10/08/2019 04:51 AM  
 Globulin 2.6 10/08/2019 04:51 AM  
 
Lab Results Component Value Date/Time INR 2.0 (H) 09/25/2019 05:45 AM  
 Prothrombin time 19.8 (H) 09/25/2019 05:45 AM  
 aPTT 31.4 05/24/2011 12:32 PM  
  
Lab Results Component Value Date/Time Iron 79 09/30/2019 12:17 PM  
 TIBC 223 (L) 09/30/2019 12:17 PM  
 Iron % saturation 35 09/30/2019 12:17 PM  
 Ferritin 6,468 (H) 09/30/2019 12:17 PM  
  
No results found for: PH, PCO2, PO2 No components found for: [place] No results found for: CPK, CKMB Total time: 75 
Counseling / coordination time, spent as noted above: 65 
> 50% counseling / coordination?: y 
 
Prolonged service was provided for  []30 min   []75 min in face to face time in the presence of the patient, spent as noted above. Time Start:  
Time End:  
Note: this can only be billed with 39123 (initial) or 95540 (follow up). If multiple start / stop times, list each separately.

## 2019-10-08 NOTE — ED NOTES
TRANSFER - OUT REPORT: 
 
Verbal report given to Arabella Martinez RN (name) on Lary Cea  being transferred to CCU 2510 (unit) for routine progression of care Report consisted of patients Situation, Background, Assessment and  
Recommendations(SBAR). Information from the following report(s) SBAR was reviewed with the receiving nurse. Lines: PICC Double Lumen 99/15/58 Right;Basilic (Active) Peripheral IV 10/07/19 Left Antecubital (Active) Site Assessment Clean, dry, & intact 10/7/2019  5:23 PM  
Phlebitis Assessment 0 10/7/2019  5:23 PM  
Infiltration Assessment 0 10/7/2019  5:23 PM  
Dressing Status Clean, dry, & intact 10/7/2019  5:23 PM  
Dressing Type Transparent 10/7/2019  5:23 PM  
Hub Color/Line Status Pink;Flushed 10/7/2019  5:23 PM  
Action Taken Blood drawn 10/7/2019  5:23 PM  
  
 
Opportunity for questions and clarification was provided. Patient transported with: CCU RN, cardiac monitoring, IV drips, and family members. Receiving RN aware that medications will be started once received from central pharmacy. Receiving RN states she will come down to ER to get bedside report and take pt upstairs.

## 2019-10-08 NOTE — HOSPICE
[employer] Good Help to Those in Need 
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 Chart reviewed. Plan of care discussed with patients nurse & care manager. In to meet with pt, who is lethargic, his wife Jerad Mckeon, daughters Spenser Murphy and son Valerie Winters for hospice consult. Discussed Hospice philosophy, general plan of care, levels of care, services and on call procedures. In pt hospice criteria and services were discussed at length. Pt/wife goals are comfort. Pt is being weaned from drips per Dr. Felipe Ely. Hospice will reassess for [facility]pt hospice. [facility] for reassessment. Please contact [employer] for any questions or 
concerns. Thank you for the opportunity to be of service to Mr. Cliff Ham and his family. Kathy Alvarenga LCSW, MSG Jonathon Blunt [phone]

## 2019-10-08 NOTE — ED NOTES
Placed fall risk and DNR wrist band on pt. Family at bedside. Skin cool and dry. Respirations even and unlabored. In no apparent distress at this time.

## 2019-10-08 NOTE — PROGRESS NOTES
Reason for Admission:   SOB; Acute/chronic heart failure RRAT Score:     18 Do you (patient/family) have any concerns for transition/discharge? Spouse has expressed inability to physically care for patient in the home. Plan for utilizing home health:   Appropriate for hospice care Current Advanced Directive/Advance Care Plan:  DNR and AD on file Transition of Care Plan:      Patient came to ED with c/o SOB related to has end stage heart failure with heart defibrillator and on home dobutamine. After long discussion with cardiology, patient and family made decision to focus on QOL and comfort measures. They are interested in IP hospice. CM had lengthy discussion about IP hospice criteria vs hospice provided at home. Mac has concern that once his comfort measures are in place, docutamine discontinued and defbrillator off, he will need IP care. Family would like to meet with  hospice liaison today to have evaluation for IP hospice. Referral sent to [facility] hospice. CM notified HUG team. 
 
Care Management Interventions PCP Verified by CM: Yes Mode of Transport at Discharge: Other (see comment)(Disposition not determined) Transition of Care Consult (CM Consult): Discharge Planning(Anticipate home hospice vs IP hospice) Current Support Network: Lives with Spouse Confirm Follow Up Transport: Family Plan discussed with Pt/Family/Caregiver: Yes Freedom of Choice Offered: Yes Discharge Location Discharge Placement: Other: CM will continue to assist family with FRANCINE planning. Omari Garrison RN, CHPN, [facility]
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
## 2019-10-08 NOTE — HOME CARE
Patient is currently open to [facility] for SN with a certification period ending 12/1/19. CHAGO order will be required prior to discharge. Eliezer Iqbal

## 2019-10-08 NOTE — HOSPICE
Referral received, acceptance pending. Will forward to hospice liaison. Thank you for the referral. 
 
Bharath Hinkle,

## 2019-10-08 NOTE — PROGRESS NOTES
2350- Patient arrived to CCU to Room 2510. Patient is awake, alert and oriented x4. Dual skin performed with B. Sabino. SCD placed to Bilateral lower extremities. Lasix infusing at 5mg , Dobutamine at 7.5mcg  and Milrinone infusing at 0.25mcg. 0100-  Patient assisted to stand to use urinal, voided 100 ml. Bladder scanned 149 ml/hr. 0400- Reassessment performed. See flow sheet. Voided at bedside. 0700- Dr. Ronnie Nicholson at bedside. 3219- Report given to Anjali Dahl RN.

## 2019-10-08 NOTE — PROGRESS NOTES
Informed by 2 nurses this afternoon that hospice came by and  Told the pt and family that he would not be accepted into hospice until he became symptomatic. Pt is currently on dobut that is being weaned and is on ra. Family is at bedside and pt and family expressed concern to me that pt would need to show signs of dyspnea or discomfort before they could get some help. I assured them that we would not let that happen and my pa has placed comfort care orders. Family and pt expressed relief that  We have  His needs covered by intensivist team and the icu nurses.

## 2019-10-08 NOTE — H&P
Hospitalist Admission Note NAME: Zohreh Nash :  1940 MRN:  [id number] Date/Time:  10/7/2019 10:14 PM 
 
Patient PCP: Yvette Harvey MD 
______________________________________________________________________ Given the patient's current clinical presentation, I have a high level of concern for decompensation if discharged from the emergency department. Complex decision making was performed, which includes reviewing the patient's available past medical records, laboratory results, and x-ray films. My assessment of this patient's clinical condition and my plan of care is as follows. Assessment / Plan: 
Cardiogenic shock Acute on chronic systolic Heart Failure; Stage D, Class IV - End stage cardiomyopathy  
Non ischemic Cardiomyopathy S/P AICD Paroxysmal Atrial fibrillation MARCELL from Cardiorenal Syndrome Elevated LFT's: eitherfrom passive congestion or hypoperfusion or both Hyperkalemia Hyponatremia 
-admit to ICU 
-Continue on Milrinone and Dobutamine 
-start Lasix gtt 
-Give IV calcium and Kayexalate 
-stop home potassium 
-bladder checks 
-stop eliquis  
-monitor potassium, renal function and LFTs 
-Cardiology assistance appreciated, Consult Nephrology Hypothyroidism 
-continue synthroid Gout: 
-stop allopurinol with elevated LFTs 
  
18.5 - 24.9 Normal weight / Body mass index is 25.95 kg/m². 
  
Code status: DNR; wife is TIMOTHY [facility] Prophylaxis: SCDs Mr. Kang Bradley is critically ill and at great risk for death. A total of 50 minutes of non-procedure based critical care was provided during this encounter. Subjective: CHIEF COMPLAINT: SOB HISTORY OF PRESENT ILLNESS:    
Stew Martin is a 77 y.o.  male who presents with SOB. Patient has end stage CHF and is on home dobutamine. He has felt progressively worse over the last week.  He began to get more SOB today and this prompted his presentation to the ED. Patient was found to be hypotensive and in MARCELL on evaluation. Patient reports decreased UOP and decreased PO intake. He denies CP. He does feel better since being started on milrinone in the ED. We were asked to admit for work up and evaluation of the above problems. Past Medical History:  
Diagnosis Date  [facility]fib [facility]  AICD (automatic cardioverter/defibrillator) present  Arthritis  Cancer ([facility] skin  CKD (chronic kidney disease)  Heart failure ([facility]  Other ill-defined conditions(799.89)   
 high cholesterol  V tach ([facility] Past Surgical History:  
Procedure Laterality Date  ABDOMEN SURGERY PROC UNLISTED  6/2/11  
 colon resection  HX CATARACT REMOVAL Left  HX HEENT    
 repaired right eardrum  HX OTHER SURGICAL    
 benign cyst removed from back and thyroid  HX OTHER SURGICAL    
 skin graft  HX PACEMAKER    
 aicd  MN COLONOSCOPY FLX DX W/COLLJ SPEC WHEN PFRMD  6/6/2012  MN COLONOSCOPY W/BIOPSY SINGLE/MULTIPLE  4/27/2011 Social History Tobacco Use  Smoking status: Former Smoker  Smokeless tobacco: Never Used Substance Use Topics  Alcohol use: Not Currently Alcohol/week: 4.2 standard drinks Types: 5 Glasses of wine per week Family History Problem Relation Age of Onset  Cancer Mother   
     colon  Heart Disease Father  Heart Disease Brother No Known Allergies Prior to Admission medications Medication Sig Start Date End Date Taking? Authorizing Provider DOBUTamine (DOBUTREX) 1,000 mg/250 mL (4,000 mcg/mL) infusion 587 mcg/min by IntraVENous route continuous. Patient taking differently: 7.5 mcg/kg/min by IntraVENous route continuous. Dosing weight: 78.7kg 10/1/19 9/30/20 Yes Flex Remy III, DO  
metOLazone (ZAROXOLYN) 10 mg tablet Take 0.5 Tabs by mouth daily for 14 days.  10/6/19 10/20/19  Abdoulaye Blanchard MD  
 apixaban (ELIQUIS) 2.5 mg tablet Take 1 Tab by mouth every twelve (12) hours. 10/2/19   Magalie Richards MD  
bumetanide (BUMEX) 2 mg tablet Take 1 Tab by mouth two (2) times a day. 10/2/19   Magalie Richards MD  
potassium chloride SR (K-TAB) 20 mEq tablet Take 1 Tab by mouth daily. 10/3/19   Magalie Richards MD  
benzonatate (TESSALON) 100 mg capsule Take 100 mg by mouth three (3) times daily as needed for Cough. Provider, Historical  
prednisoLONE acetate (PRED FORTE) 1 % ophthalmic suspension Administer 1 Drop to right eye four (4) times daily. 1 drop to right eye 4 times daily x 1 week then 1 drop in right eye 3 times daily x 1 week then 1 drop in right eye 2 times daily x 1 week then 1 drop daily in right eye once daily x 1 week    Provider, Historical  
prednisoLONE acetate (PRED FORTE) 1 % ophthalmic suspension Administer 1 Drop to left eye two (2) times a day. Tapering down - 1 drop every day starts 9/27    Provider, Historical  
moxifloxacin (VIGAMOX) 0.5 % ophthalmic solution Administer 1 Drop to right eye two (2) times a day. Tapering down - 1 drop every day starts 9/26. Provider, Historical  
folic acid/multivit-min/lutein (CENTRUM SILVER PO) Take  by mouth. Other, MD Stefani  
OXYGEN-AIR DELIVERY SYSTEMS 2 L/min by Nasal route as needed (shortness of breath). Provider, Historical  
levothyroxine (SYNTHROID) 100 mcg tablet Take 100 mcg by mouth every evening. Lynn, MD Stefani  
allopurinol (ZYLOPRIM) 100 mg tablet Take 200 mg by mouth daily. Indications: 2 pills daily    Provider, Historical  
loratadine (CLARITIN) 10 mg tablet Take 10 mg by mouth daily. 4/26/11   Provider, Historical  
 
 
REVIEW OF SYSTEMS:    
Total of 12 systems reviewed as follows:   
   POSITIVE= underlined text  Negative = text not underlined General:  fever, chills, sweats, generalized weakness, weight loss/gain,  
   loss of appetite Eyes:    blurred vision, eye pain, loss of vision, double vision ENT:    rhinorrhea, pharyngitis Respiratory:   cough, sputum production, SOB, MURILLO, wheezing, pleuritic pain  
Cardiology:   chest pain, palpitations, orthopnea, PND, edema, syncope Gastrointestinal:  abdominal pain , N/V, diarrhea, dysphagia, constipation, bleeding Genitourinary:  frequency, urgency, dysuria, hematuria, incontinence Muskuloskeletal :  arthralgia, myalgia, back pain Hematology:  easy bruising, nose or gum bleeding, lymphadenopathy Dermatological: rash, ulceration, pruritis, color change / jaundice Endocrine:   hot flashes or polydipsia Neurological:  headache, dizziness, confusion, focal weakness, paresthesia, Speech difficulties, memory loss, gait difficulty Psychological: Feelings of anxiety, depression, agitation Objective: VITALS:   
Visit Vitals /59 Pulse (!) 111 Temp 97.7 °F (36.5 °C) Resp 18 Wt 78.7 kg (173 lb 8 oz) SpO2 97% BMI 25.62 kg/m² PHYSICAL EXAM: 
 
General:    Alert, cooperative, no distress, appears stated age. HEENT: Atraumatic, anicteric sclerae, pink conjunctivae No oral ulcers, mucosa moist, throat clear, dentition fair Neck:  Supple, symmetrical,  Thyroid not enlarged Lungs:   Decreased breath sounds on right. No Wheezing or Rhonchi. No rales. Chest wall:  No tenderness  No Accessory muscle use. Heart:   Regular  Rhythm with tachycardia,  No  murmur  +BLE edema Abdomen:   Soft, non-tender. Not distended. Bowel sounds present Extremities: No cyanosis. No clubbing Skin turgor normal, Capillary refill normal 
Skin:     Not pale. Not Jaundiced  No rashes Psych:  Good insight. Not depressed. Not anxious or agitated. Neurologic: EOMs intact. No facial asymmetry. No aphasia or slurred speech. Symmetrical strength, No focal neurologic deficits. Alert and oriented X 4.  
 
_______________________________________________________________________ Care Plan discussed with: 
  Comments Patient x Family  x RN x   
Care Manager Consultant:     
_______________________________________________________________________ Expected  Disposition:  
Home with Family ? HH/PT/OT/RN   
SNF/LTC   
LEN   
________________________________________________________________________ TOTAL TIME:   Minutes Critical Care Provided   50  Minutes non procedure based Comments  
 x Reviewed previous records  
>50% of visit spent in counseling and coordination of care x Discussion with patient and/or family and questions answered 
  
 
________________________________________________________________________ Signed: Robert Mcdonald MD 
 
Procedures: see electronic medical records for all procedures/Xrays and details which were not copied into this note but were reviewed prior to creation of Plan. LAB DATA REVIEWED:   
Recent Results (from the past 24 hour(s)) EKG, 12 LEAD, INITIAL Collection Time: 10/07/19  4:09 PM  
Result Value Ref Range Ventricular Rate 137 BPM  
 Atrial Rate 113 BPM  
 QRS Duration 142 ms  
 Q-T Interval 228 ms QTC Calculation (Bezet) 344 ms Calculated R Axis 166 degrees Calculated T Axis -42 degrees Diagnosis ** Suspect arm lead reversal, interpretation assumes no reversal 
Undetermined rhythm Nonspecific intraventricular block Possible Right ventricular hypertrophy Inferior infarct (cited on or before 25-SEP-2019) Anterolateral infarct (cited on or before 25-SEP-2019) When compared with ECG of 06-OCT-2019 07:27, Significant changes have occurred CBC WITH AUTOMATED DIFF Collection Time: 10/07/19  4:55 PM  
Result Value Ref Range WBC 8.0 4.1 - 11.1 K/uL  
 RBC 4.07 (L) 4.10 - 5.70 M/uL  
 HGB 12.9 12.1 - 17.0 g/dL HCT 38.4 36.6 - 50.3 % MCV 94.3 80.0 - 99.0 FL  
 MCH 31.7 26.0 - 34.0 PG  
 MCHC 33.6 30.0 - 36.5 g/dL  
 RDW 17.1 (H) 11.5 - 14.5 % PLATELET 159 636 - 125 K/uL MPV 11.4 8.9 - 12.9 FL  
 NRBC 0.4 (H) 0  WBC ABSOLUTE NRBC 0.03 (H) 0.00 - 0.01 K/uL NEUTROPHILS 88 (H) 32 - 75 % LYMPHOCYTES 5 (L) 12 - 49 % MONOCYTES 7 5 - 13 % EOSINOPHILS 0 0 - 7 % BASOPHILS 0 0 - 1 % IMMATURE GRANULOCYTES 0 0.0 - 0.5 % ABS. NEUTROPHILS 7.0 1.8 - 8.0 K/UL  
 ABS. LYMPHOCYTES 0.4 (L) 0.8 - 3.5 K/UL  
 ABS. MONOCYTES 0.6 0.0 - 1.0 K/UL  
 ABS. EOSINOPHILS 0.0 0.0 - 0.4 K/UL  
 ABS. BASOPHILS 0.0 0.0 - 0.1 K/UL  
 ABS. IMM. GRANS. 0.0 0.00 - 0.04 K/UL  
 DF MANUAL    
 RBC COMMENTS ANISOCYTOSIS 
1+ METABOLIC PANEL, COMPREHENSIVE Collection Time: 10/07/19  4:55 PM  
Result Value Ref Range Sodium 126 (L) 136 - 145 mmol/L Potassium 6.0 (H) 3.5 - 5.1 mmol/L Chloride 91 (L) 97 - 108 mmol/L  
 CO2 25 21 - 32 mmol/L Anion gap 10 5 - 15 mmol/L Glucose 118 (H) 65 - 100 mg/dL BUN 67 (H) 6 - 20 MG/DL Creatinine 4.12 (H) 0.70 - 1.30 MG/DL  
 BUN/Creatinine ratio 16 12 - 20 GFR est AA 17 (L) >60 ml/min/1.73m2 GFR est non-AA 14 (L) >60 ml/min/1.73m2 Calcium 9.1 8.5 - 10.1 MG/DL Bilirubin, total 2.2 (H) 0.2 - 1.0 MG/DL  
 ALT (SGPT) 962 (H) 12 - 78 U/L  
 AST (SGOT) 728 (H) 15 - 37 U/L Alk. phosphatase 180 (H) 45 - 117 U/L Protein, total 6.8 6.4 - 8.2 g/dL Albumin 3.7 3.5 - 5.0 g/dL Globulin 3.1 2.0 - 4.0 g/dL A-G Ratio 1.2 1.1 - 2.2 CK W/ REFLX CKMB Collection Time: 10/07/19  4:55 PM  
Result Value Ref Range CK 43 39 - 308 U/L  
TROPONIN I Collection Time: 10/07/19  4:55 PM  
Result Value Ref Range Troponin-I, Qt. <0.05 <0.05 ng/mL NT-PRO BNP Collection Time: 10/07/19  4:55 PM  
Result Value Ref Range  NT pro-BNP 27,184 (H) <450 PG/ML

## 2019-10-08 NOTE — PROGRESS NOTES
Bedside shift change report given to Eva Mckeon RN (oncoming nurse) by Colleen Gray RN (offgoing nurse). Report included the following information SBAR, Intake/Output, MAR, Accordion, Recent Results and Med Rec Status. 1515: Received patient sitting up in bed AxO x4. Hospice meeting with patient. Received orders from Dr. Alpesh Alvarez to stop Milronone and Lasix immediately and to shut off Dobutamine at 1615. 
 
1600: Spoke with Dr. Mirian Mondragon, comfort care measures received in anticipation of stopping the drips. 1615: Dobutamine stopped. Informed patient to let this nurse know if he is feeling short of breath, pain or uncomfortable. 1645: Hospice notified patient's Dobutamine dripped was stopped. 1745: Large family presence at the bedside. 1900: Bedside shift change report given to VIKASH Lee (oncoming nurse) by Eva Mckeon RN (offgoing nurse). Report included the following information SBAR, Intake/Output, MAR, Accordion, Recent Results and Med Rec Status.

## 2019-10-08 NOTE — PROGRESS NOTES
[facility] Patient and family have to decided to receive Hospice services after multiple readmissions. [facility] team available prn. All medical staff in agreement. SANDIE Lu/ Mark [address] [phone]

## 2019-10-08 NOTE — PROGRESS NOTES
Responded to staff request for pastoral support of patient and family in CCU. Patient and son were sitting on bed with back to the door. Met with patient's wife at doorway. She shared that her  is very much at peace with the decisions they have made. Family, too, is at peace. They have very good spiritual support from their ; wife intends to call him this morning. Daughter and son's pastors are also supportive. Provided pastoral presence, empathic listening, assurance of prayer, and assurance of ongoing availability of pastoral support as needed. Lili Kyle, MPS, [address] [employer],  [facility]  Paging Service  [phone])

## 2019-10-08 NOTE — HOSPICE
[facility] [facility] Good Help to Those in Need 
[phone] Patient Name: Ned Mcarthur YOB: 1940 Age: 77 y.o. [employer] RN Note:  Hospice consult received, reviewing chart. Will follow up with Unit Nurse and Care Manager to discuss plan of care, patient status and discharge disposition within the day. Met with family and patient at bedside with Kathy Alvarenga LCSW, discussed different levels of hospice care. Patient on cardiac IV medications. Discussed with family the need to wean off drips and then assess for symptoms. Currently patient was alert, was able to stand and use urinal with minimal assistance, no complaints of pain, breathing appeared regular. Family states he was very symptomatic at home. Family is unable to take patient home for hospice care. Discussed with MD to wean off drips and make patient comfort. We will reassess in the am.  
 
1804 Checked on patient again, patient resting comfortably in bed with family surrounding him, alert and able to converse. Thank you for the opportunity to be of service to this patient. Kodak Hernandez RN [facility]T

## 2019-10-08 NOTE — PROGRESS NOTES
Dr. Harshal Harper in to see patient. 0930 Dr. Harshal Harper in to talk with patient and patient's family. Patient to go on Hospice service today. 1030 Patient asking for medication for his nerves, patient states he feels anxious at this time. Ativan . 5mg po given at this time. 1130 Patient resting quietly. Family at bedside. 1500 No changes. Report to Leon Eddie.

## 2019-10-08 NOTE — CONSULTS
Tung Steven  
 
 
 
NAME:[address]   SJV:[id number]/7086 Patient seen He has end stage heart disease and cardiorenal syndrome. 
k         6.0 on admission. k improved to 3.9 He is not a dialysis candidate. D/w pt and wife. Danelle Rosas, [address] [facility] [facility] [place], [address] [facility] Phone - [phone] Fax - [phone] [address], [address] [facility] Phone - [phone] Fax - [phone]    
[email or web address]

## 2019-10-08 NOTE — INTERDISCIPLINARY ROUNDS
Interdisciplinary team rounds were held 10/8/2019 with the following team members:Care Management, Diabetes Treatment Specialist, Nursing, Nutrition, Palliative Care, Pharmacy, Physical Therapy, Physician and Respiratory. Plan of care discussed. See clinical pathway and/or care plan for interventions and desired outcomes.

## 2019-10-08 NOTE — HOSPICE
[facility] [facility] Help to Those in Need 
[phone] Patient Name: Cristy Bay YOB: 1940 Age: 77 y.o. [facility] LCSW Note: 
 
LCSW spoke with wife and will meet with pt/family about 2: 30 pm LCSW spoke with CM to Gokul Hunt to up date. Thank you for the opportunity to be of service to this patient. Kathy Alvarenga LCSW, MS[facility] [phone]

## 2019-10-08 NOTE — PROGRESS NOTES
TRANSFER - IN REPORT: 
 
Verbal report received from DianaRN(name) on Geovanna Chatman  being received from ED(unit) for routine progression of care Report consisted of patients Situation, Background, Assessment and  
Recommendations(SBAR). Information from the following report(s)  was reviewed with the receiving nurse. Opportunity for questions and clarification was provided. Assessment completed upon patients arrival to unit and care assumed.

## 2019-10-09 PROBLEM — R60.0 LOCALIZED EDEMA: Status: ACTIVE | Noted: 2019-01-01

## 2019-10-09 NOTE — PROGRESS NOTES
Hospitalist Progress Note NAME: Neema Smith :  1940 MRN:  [id number] Assessment / Plan: 
Cardiogenic shock Acute on chronic systolic Heart Failure; Stage D, Class IV - End stage cardiomyopathy  
Non ischemic Cardiomyopathy S/P AICD Paroxysmal Atrial fibrillation MARCELL from Cardiorenal Syndrome Elevated LFT's: eitherfrom passive congestion or hypoperfusion or both Hyperkalemia - resolved Hyponatremia - improved 
-admit to ICU 
-Continue on Milrinone and Dobutamine 
-start Lasix gtt 
-Give IV calcium and Kayexalate 
-stop home potassium 
-bladder checks 
-stop eliquis  
-monitor potassium, renal function and LFTs 
-Cardiology assistance appreciated, Consult Nephrology 10/8: 
Pt to be transitioned to comfort measures. Cardiology follow up appreciated - drips to be weaned off and ICD fx to be stopped. Hospice Consult. Discussed at length with family and pt at bedside and they are in agreement with the pt's decision to be on comfort measures. 10/9: 
Pt to be transferred out of the ICU. Hospice for repeat evaluations. Pt is minimally symptomatic currently - therefore doubt he will qualify for inpatient hospice - expect pt to be discharged home with  Hospice once equipment is set up. Cardiology and Nephrology evals appreciated. 
  
Hypothyroidism 
-continue synthroid Gout: 
-stop allopurinol with elevated LFTs 25.0 - 29.9 Overweight / Body mass index is 25.52 kg/m². Code status: DNR Prophylaxis: SCD Recommended Disposition: Hospice Subjective: Chief Complaint / Reason for Physician Visit Complaining of SOB. Wants to be comfort care. Discussed at length with family at bedside and they are in agreement with the pt making the decision to opt for comfort measures instead of restorative therapy. Discussed with RN events overnight. Review of Systems: 
Symptom Y/N Comments  Symptom Y/N Comments Fever/Chills    Chest Pain n   
Poor Appetite    Edema Cough y Chronic, dry  Abdominal Pain n   
Sputum    Joint Pain SOB/MURILLO y   Pruritis/Rash Nausea/vomit    Tolerating PT/OT Diarrhea    Tolerating Diet Constipation    Other Could NOT obtain due to:   
 
Objective: VITALS:  
Last 24hrs VS reviewed since prior progress note. Most recent are: 
Patient Vitals for the past 24 hrs: 
 Temp Pulse Resp BP SpO2  
10/09/19 0800  96 18 98/59 93 % 10/09/19 0700  97 14 94/51 92 % 10/09/19 0600  96 18 91/57 90 % 10/09/19 0500  93 22 (!) 89/51 (!) 89 % 10/09/19 0400 98 °F (36.7 °C) 94 21 (!) 89/57 (!) 87 % 10/09/19 0300  98 18 94/54 94 % 10/09/19 0200  93 18 (!) 89/52 94 % 10/09/19 0100  96 21 (!) 87/52 (!) 89 % 10/09/19 0000  95 20  92 % 10/08/19 2300 97.9 °F (36.6 °C) 100 19 (!) 87/52 94 % 10/08/19 2200  100 21 111/45 94 % 10/08/19 2100  (!) 109 21 91/49 95 % 10/08/19 2000 98.1 °F (36.7 °C) (!) 109 18 (!) 84/49 95 % 10/08/19 1900  98 20 90/49 94 % 10/08/19 1800  (!) 101 17 (!) 78/54 95 % 10/08/19 1700  (!) 101 17 94/44 97 % 10/08/19 1600  (!) 107 18 105/46 96 % 10/08/19 1500  (!) 109 25 (!) 90/38 94 % 10/08/19 1400  (!) 103 14 96/44 97 % 10/08/19 1300  (!) 102 12 97/47 93 % 10/08/19 1200 98.1 °F (36.7 °C) (!) 102 12 96/52 95 % 10/08/19 1100  (!) 102 16 99/46 94 % Intake/Output Summary (Last 24 hours) at 10/9/2019 1004 Last data filed at 10/9/2019 6026 Gross per 24 hour Intake 143 ml Output 2865 ml Net -2722 ml PHYSICAL EXAM: 
General: Alert, cooperative, ill appearing EENT:  EOMI. Anicteric sclerae. MMM Resp:  CTA bilaterally, no wheezing or rales. No accessory muscle use CV:  Regular  rhythm,  2+ edema pitting GI:  Soft, Non distended, Non tender.  +Bowel sounds Neurologic:  Alert and oriented X 3, normal speech, Psych:   Good insight. Not anxious nor agitated Skin:  No rashes. No jaundice Reviewed most current lab test results and cultures  YES 
 Reviewed most current radiology test results   YES Review and summation of old records today    NO Reviewed patient's current orders and MAR    YES 
PMH/SH reviewed - no change compared to H&P 
________________________________________________________________________ Care Plan discussed with: 
  Comments Patient x Family  x   
RN x Care Manager Consultant  x Multidiciplinary team rounds were held today with , nursing, pharmacist and clinical coordinator. Patient's plan of care was discussed; medications were reviewed and discharge planning was addressed. ________________________________________________________________________ Total NON critical care TIME:  35   Minutes Total CRITICAL CARE TIME Spent:   Minutes non procedure based Comments >50% of visit spent in counseling and coordination of care    
________________________________________________________________________ Sarah Perez MD  
 
Procedures: see electronic medical records for all procedures/Xrays and details which were not copied into this note but were reviewed prior to creation of Plan. LABS: 
I reviewed today's most current labs and imaging studies. Pertinent labs include: 
Recent Labs 10/08/19 
0451 10/07/19 
1655 WBC 7.6 8.0 HGB 10.9* 12.9 HCT 32.0* 38.4  185 Recent Labs 10/08/19 
0451 10/07/19 
1655 * 126*  
K 3.9 6.0*  
CL 92* 91* CO2 28 25 * 118* BUN 68* 67* CREA 3.80* 4.12* CA 9.3 9.1 MG 2.4  --   
PHOS 5.3*  --   
ALB 3.5 3.7 TBILI 2.3* 2.2*  
SGOT 882* 728* ALT 1,142* 962* Signed: Sarah Perez MD

## 2019-10-09 NOTE — PROGRESS NOTES
Hospitalist Progress Note NAME: Lary Price :  1940 MRN:  [id number] Assessment / Plan: 
Cardiogenic shock Acute on chronic systolic Heart Failure; Stage D, Class IV - End stage cardiomyopathy  
Non ischemic Cardiomyopathy S/P AICD Paroxysmal Atrial fibrillation MARCELL from Cardiorenal Syndrome Elevated LFT's: eitherfrom passive congestion or hypoperfusion or both Hyperkalemia Hyponatremia 
-admit to ICU 
-Continue on Milrinone and Dobutamine 
-start Lasix gtt 
-Give IV calcium and Kayexalate 
-stop home potassium 
-bladder checks 
-stop eliquis  
-monitor potassium, renal function and LFTs 
-Cardiology assistance appreciated, Consult Nephrology 10/8: 
Pt to be transitioned to comfort measures. Cardiology follow up appreciated - drips to be weaned off and ICD fx to be stopped. Hospice Consult. Discussed at length with family and pt at bedside and they are in agreement with the pt's decision to be on comfort measures. 
  
Hypothyroidism 
-continue synthroid Gout: 
-stop allopurinol with elevated LFTs 25.0 - 29.9 Overweight / Body mass index is 25.52 kg/m². Code status: DNR Prophylaxis: SCD Recommended Disposition: Hospice Subjective: Chief Complaint / Reason for Physician Visit Complaining of SOB. Wants to be comfort care. Discussed at length with family at bedside and they are in agreement with the pt making the decision to opt for comfort measures instead of restorative therapy. Discussed with RN events overnight. Review of Systems: 
Symptom Y/N Comments  Symptom Y/N Comments Fever/Chills    Chest Pain n   
Poor Appetite    Edema Cough y Chronic, dry  Abdominal Pain n   
Sputum    Joint Pain SOB/MURILLO y   Pruritis/Rash Nausea/vomit    Tolerating PT/OT Diarrhea    Tolerating Diet Constipation    Other Could NOT obtain due to:   
 
Objective: VITALS:  
Last 24hrs VS reviewed since prior progress note. Most recent are: Patient Vitals for the past 24 hrs: 
 Temp Pulse Resp BP SpO2  
10/09/19 0800  96 18 98/59 93 % 10/09/19 0700  97 14 94/51 92 % 10/09/19 0600  96 18 91/57 90 % 10/09/19 0500  93 22 (!) 89/51 (!) 89 % 10/09/19 0400 98 °F (36.7 °C) 94 21 (!) 89/57 (!) 87 % 10/09/19 0300  98 18 94/54 94 % 10/09/19 0200  93 18 (!) 89/52 94 % 10/09/19 0100  96 21 (!) 87/52 (!) 89 % 10/09/19 0000  95 20  92 % 10/08/19 2300 97.9 °F (36.6 °C) 100 19 (!) 87/52 94 % 10/08/19 2200  100 21 111/45 94 % 10/08/19 2100  (!) 109 21 91/49 95 % 10/08/19 2000 98.1 °F (36.7 °C) (!) 109 18 (!) 84/49 95 % 10/08/19 1900  98 20 90/49 94 % 10/08/19 1800  (!) 101 17 (!) 78/54 95 % 10/08/19 1700  (!) 101 17 94/44 97 % 10/08/19 1600  (!) 107 18 105/46 96 % 10/08/19 1500  (!) 109 25 (!) 90/38 94 % 10/08/19 1400  (!) 103 14 96/44 97 % 10/08/19 1300  (!) 102 12 97/47 93 % 10/08/19 1200 98.1 °F (36.7 °C) (!) 102 12 96/52 95 % 10/08/19 1100  (!) 102 16 99/46 94 % Intake/Output Summary (Last 24 hours) at 10/9/2019 1001 Last data filed at 10/9/2019 4069 Gross per 24 hour Intake 143 ml Output 2865 ml Net -2722 ml PHYSICAL EXAM: 
General: Alert, cooperative, ill appearing EENT:  EOMI. Anicteric sclerae. MMM Resp:  CTA bilaterally, no wheezing or rales. No accessory muscle use CV:  Regular  rhythm, 2+ edema GI:  Soft, Non distended, Non tender.  +Bowel sounds Neurologic:  Alert and oriented X 3, normal speech, Psych:   Good insight. Not anxious nor agitated Skin:  No rashes. No jaundice Reviewed most current lab test results and cultures  YES Reviewed most current radiology test results   YES Review and summation of old records today    NO Reviewed patient's current orders and MAR    YES 
PMH/SH reviewed - no change compared to H&P 
________________________________________________________________________ Care Plan discussed with: 
  Comments Patient x Family  x RN x   
Care Manager Consultant  x Multidiciplinary team rounds were held today with , nursing, pharmacist and clinical coordinator. Patient's plan of care was discussed; medications were reviewed and discharge planning was addressed. ________________________________________________________________________ Total NON critical care TIME:  35   Minutes Total CRITICAL CARE TIME Spent:   Minutes non procedure based Comments >50% of visit spent in counseling and coordination of care    
________________________________________________________________________ William Fu MD  
 
Procedures: see electronic medical records for all procedures/Xrays and details which were not copied into this note but were reviewed prior to creation of Plan. LABS: 
I reviewed today's most current labs and imaging studies. Pertinent labs include: 
Recent Labs 10/08/19 
0451 10/07/19 
1655 WBC 7.6 8.0 HGB 10.9* 12.9 HCT 32.0* 38.4  185 Recent Labs 10/08/19 
0451 10/07/19 
1655 * 126*  
K 3.9 6.0*  
CL 92* 91* CO2 28 25 * 118* BUN 68* 67* CREA 3.80* 4.12* CA 9.3 9.1 MG 2.4  --   
PHOS 5.3*  --   
ALB 3.5 3.7 TBILI 2.3* 2.2*  
SGOT 882* 728* ALT 1,142* 962* Signed: William Fu MD

## 2019-10-09 NOTE — HOSPICE
[facility] Liaison note: 
 
Mayur Xavier to reassess patient and was requested to hold off for now by CM as family may wish to go in different direction. Patient per chart review does not meet inpatient criteria today for hospice. Symptoms are managed, minimal use of medications, vitals are stable. Family does not wish to take patient home for hospice care. Please let us know if we can be of assistance to this family. Thank you, 
 
Vero Cheng RN [facility]

## 2019-10-09 NOTE — PROGRESS NOTES
[facility] Pulmonary, Critical Care, and Sleep Medicine Name: Thomas Rosas MRN: [id number] : 1940 Hospital: [address] Date: 10/9/2019 Critical Care Patient Consult IMPRESSION:  
· End Stage Cardiomyopathy on home dobutamine. He was taken off ionotropic  therapy yesterday. · Acute on Chronic Renal Failure · Hyponatremia · A fib · Has AICD in place. The ICD function was turned off. · Ex Smoker · Anemia: Hgb of 10.9 · Desires to be DNR. · Discussed with pt, his family, nurse.  and Dr. Nam Xiao. RECOMMENDATIONS:  
· Focus on comfort treatment. Does not meet criteria for inpt hospice. · Off  Milronone and Dobutamine drips. · On Diuretics lasix 80mg iv twice daily. · For transfer to first floor. · Discussed with Dr. Nam Xiao. · Will see again as needed. Subjective/History: Pt was seen this am. NO acute complaints. He was up frequently with urination last pm.  
No chest pain, no back pain, no  Leg pain. He feels his breathing is comfortable at present. No new issues. His family was able to meet with hospice rep last pm.  
 
 
This patient has been seen and evaluated at the request of Dr. Marguerite Daly for above. Patient is a 77 y.o. male who presents for above. Has increased dyspnea. Early satiety. Notes some left anterior chest pain, pressure. NO fevers, no coughing, no sweats. Tolerating po intake well. NO leg pain or swelling. Past Medical History:  
Diagnosis Date  A-fib ([facility]  AICD (automatic cardioverter/defibrillator) present  Arthritis  Cancer ([facility] skin  CKD (chronic kidney disease)  Heart failure ([facility]  Other ill-defined conditions(799.89)   
 high cholesterol  V tach ([address] Past Surgical History:  
Procedure Laterality Date  ABDOMEN SURGERY PROC UNLISTED  11  
 colon resection  HX CATARACT REMOVAL Left  HX HEENT    
 repaired right eardrum  HX OTHER SURGICAL    
 benign cyst removed from back and thyroid  HX OTHER SURGICAL    
 skin graft  HX PACEMAKER    
 aicd  [place] COLONOSCOPY FLX DX W/COLLJ SPEC WHEN PFRMD  6/6/2012  [place] COLONOSCOPY W/BIOPSY SINGLE/MULTIPLE  4/27/2011 Prior to Admission medications Medication Sig Start Date End Date Taking? Authorizing Provider DOBUTamine (DOBUTREX) 1,000 mg/250 mL (4,000 mcg/mL) infusion 587 mcg/min by IntraVENous route continuous. Patient taking differently: 7.5 mcg/kg/min by IntraVENous route continuous. Dosing weight: 78.7kg 10/1/19 9/30/20 Yes Albina Remy III, DO  
metOLazone (ZAROXOLYN) 10 mg tablet Take 0.5 Tabs by mouth daily for 14 days. 10/6/19 10/20/19  Miguel Angel Petersen MD  
apixaban (ELIQUIS) 2.5 mg tablet Take 1 Tab by mouth every twelve (12) hours. 10/2/19   Tim Lara MD  
bumetanide (BUMEX) 2 mg tablet Take 1 Tab by mouth two (2) times a day. 10/2/19   Tim Lara MD  
potassium chloride SR (K-TAB) 20 mEq tablet Take 1 Tab by mouth daily. 10/3/19   Tim Lara MD  
benzonatate (TESSALON) 100 mg capsule Take 100 mg by mouth three (3) times daily as needed for Cough. Provider, Historical  
prednisoLONE acetate (PRED FORTE) 1 % ophthalmic suspension Administer 1 Drop to right eye four (4) times daily. 1 drop to right eye 4 times daily x 1 week then 1 drop in right eye 3 times daily x 1 week then 1 drop in right eye 2 times daily x 1 week then 1 drop daily in right eye once daily x 1 week    Provider, Historical  
prednisoLONE acetate (PRED FORTE) 1 % ophthalmic suspension Administer 1 Drop to left eye two (2) times a day. Tapering down - 1 drop every day starts 9/27    Provider, Historical  
moxifloxacin (VIGAMOX) 0.5 % ophthalmic solution Administer 1 Drop to right eye two (2) times a day. Tapering down - 1 drop every day starts 9/26. Provider, Historical  
folic acid/multivit-min/lutein (CENTRUM SILVER PO) Take  by mouth. Lynn, MD Stefani  
OXYGEN-AIR DELIVERY SYSTEMS 2 L/min by Nasal route as needed (shortness of breath). Provider, Historical  
levothyroxine (SYNTHROID) 100 mcg tablet Take 100 mcg by mouth every evening. Lynn, MD Stefani  
allopurinol (ZYLOPRIM) 100 mg tablet Take 200 mg by mouth daily. Indications: 2 pills daily    Provider, Historical  
loratadine (CLARITIN) 10 mg tablet Take 10 mg by mouth daily. 11   Provider, Historical  
 
Current Facility-Administered Medications Medication Dose Route Frequency  influenza vaccine - (6 mos+)(PF) (FLUARIX/FLULAVAL/FLUZONE QUAD) injection 0.5 mL  0.5 mL IntraMUSCular PRIOR TO DISCHARGE  
 furosemide (LASIX) injection 80 mg  80 mg IntraVENous BID  levothyroxine (SYNTHROID) tablet 100 mcg  100 mcg Oral QPM  
 loratadine (CLARITIN) tablet 10 mg  10 mg Oral DAILY  prednisoLONE acetate (PRED FORTE) 1 % ophthalmic suspension 1 Drop  1 Drop Right Eye QID  prednisoLONE acetate (PRED FORTE) 1 % ophthalmic suspension 1 Drop  1 Drop Left Eye BID  sodium chloride (NS) flush 5-40 mL  5-40 mL IntraVENous Q8H  
 famotidine (PEPCID) tablet 10 mg  10 mg Oral DAILY  alcohol 62% (NOZIN) nasal  1 Ampule  1 Ampule Topical Q12H No Known Allergies Social History Tobacco Use  Smoking status: Former Smoker  Smokeless tobacco: Never Used Substance Use Topics  Alcohol use: Not Currently Alcohol/week: 4.2 standard drinks Types: 5 Glasses of wine per week Family History Problem Relation Age of Onset  Cancer Mother   
     colon  Heart Disease Father  Heart Disease Brother Review of Systems: A comprehensive review of systems was negative. Objective:  
Vital Signs:   
Visit Vitals BP 91/57 Pulse 96 Temp 98 °F (36.7 °C) Resp 18 Wt 78.4 kg (172 lb 13.5 oz) SpO2 90% BMI 25.52 kg/m² O2 Device: Room air Temp (24hrs), Av °F (36.7 °C), Min:97.9 °F (36.6 °C), Max:98.1 °F (36.7 °C) Intake/Output:  
Last shift:      10/09 0701 - 10/09 1900 In: -  
Out: 576 [QULHR:097] Last 3 shifts: 10/07 1901 - 10/09 0700 In: 790.8 [I.V.:790.8] Out: Cecilio Urbina [XMDLS:2639] Intake/Output Summary (Last 24 hours) at 10/9/2019 8891 Last data filed at 10/9/2019 6536 Gross per 24 hour Intake 228.8 ml Output 3285 ml Net -3056.2 ml Hemodynamics:  
PAP:   CO:    
Wedge:   CI:    
CVP:    SVR:    
  PVR:    
 
Ventilator Settings: 
Mode Rate Tidal Volume Pressure FiO2 PEEP Peak airway pressure:     
Minute ventilation:     
 
Physical Exam: 
 
General:  Alert, normal speech. no distress, appears stated age. Head:  Normocephalic, without obvious abnormality, atraumatic. Eyes:  Conjunctivae/corneas clear. PERRL, EOMs intact. Nose: Nares normal. Septum midline. Mucosa normal. No drainage or sinus tenderness. Throat: Lips, mucosa, and tongue normal. Teeth and gums normal.  
Neck: Supple, symmetrical, trachea midline, no adenopathy, thyroid: no enlargment/tenderness/nodules, no carotid bruit and no JVD. Back:   Symmetric, no curvature. ROM normal.  
Lungs:   Clear to auscultation bilaterally. Chest wall:  No tenderness or deformity. Heart:  Regular rate and rhythm, S1, S2 normal, no murmur, click, rub or gallop. Abdomen:   Soft, non-tender. Bowel sounds normal. No masses,  No organomegaly. Extremities: Extremities normal, atraumatic, no cyanosis or edema. Pulses: 2+ and symmetric all extremities. Skin: Skin color, texture, turgor normal. No rashes or lesions Lymph nodes: Cervical, supraclavicular, and axillary nodes normal.  
Neurologic: Grossly nonfocal, motor is intact. Psych: No overt anxiety or depression. Data:  
 
No results found for this or any previous visit (from the past 24 hour(s)). Telemetry:10-7-19: EKG: Suspect arm lead reversal, interpretation assumes no reversal  
Undetermined rhythm Nonspecific intraventricular block Possible Right ventricular hypertrophy Inferior infarct (cited on or before 25-SEP-2019) Anterolateral infarct (cited on or before 25-SEP-2019) When compared with ECG of 06-OCT-2019 07:27, Significant changes have occurred Imaging: 
I have personally reviewed the patients radiographs and have reviewed the reports: 
10-7-19: CXR; IMPRESSION: Slight interval increase in the right pleural effusion and right 
basilar airspace opacity which may represent volume loss Liza Luke MD

## 2019-10-09 NOTE — PROGRESS NOTES
FRANCINE; Comfort Care, expected to transfer to 1st floor Oncology when room becomes available. CM met with family this morning at bedside and answered additional questions about hospice vs comfort care. Family appreciative of information and CCU staff. Marquise Louis RN, CHPN, [facility]

## 2019-10-09 NOTE — INTERDISCIPLINARY ROUNDS
Interdisciplinary team rounds were held 10/9/2019 with the following team members:Care Management, Diabetes Treatment Specialist, Nursing, Nutrition, Pharmacy, Physical Therapy, Physician and Respiratory Therapy. Plan of care discussed. See clinical pathway and/or care plan for interventions and desired outcomes.

## 2019-10-09 NOTE — PROGRESS NOTES
Palliative Medicine Family meeting with wife, extended family,  Leonela Graham: wife reports  explained that pt is not actively dying, recommends patient goes home with hospice. She is now opened to taking patient home. Leonela Graham explained how hospice would help manage symptoms, family offered to help wife by all pitching in and staying overnight, and if needed consider hiring agency nurse. Family chose [facility], Leonela Graham will contact their representative. Tentative plan is for discharge home tomorrow once Hospice is in place. Will continue to follow for symptom management until discharge. Per pt's request, order placed to remove peripheral IV in left forearm. Assured patient that we will remove the PICC in right arm tomorrow before discharge.

## 2019-10-09 NOTE — PROGRESS NOTES
Bedside shift change report given to Dinorah River RN (oncoming nurse) by Socrates Luz RN (offgoing nurse). Report included the following information SBAR, Intake/Output, MAR, Accordion, Recent Results and Med Rec Status. 0800: Patient received in bed with family at bedside. Patient still on comfort care, awaiting inpatient hospice. 4231: Telemetry turned off per Dr. Abe Sandifer. 1635: Left peripheral IV removed. 1900: Bedside shift change report given to Socrates Luz RN (oncoming nurse) by Dinorah River RN (offgoing nurse). Report included the following information SBAR, Intake/Output, MAR, Accordion, Recent Results and Med Rec Status.

## 2019-10-09 NOTE — PROGRESS NOTES
Tung Steven  
 
 
 
NAME:[address]   QTR:18/[birth year]/0983 Last 24 hours noted reviewed He has end stage cardiomyopathy He is on comfort care. We will sign off. Call us if needed. Sisi Meyer, [address] [facility] [address] [place], [address] [facility] Phone - [phone] Fax - [phone] [place] [id number], [address] [place] [address] Phone - [phone] Fax - [phone]    
www. [facility] [employer]

## 2019-10-09 NOTE — PROGRESS NOTES
Palliative Medicine Consult Jun: [phone] ([id number]) Patient Name: Cristy Bay YOB: 1940 Date of Initial Consult: 10/8/19 Reason for Consult: End Stage Disease Requesting Provider: Sayra Wang MD 
Primary Care Physician: Chandu Johansen MD 
 
 SUMMARY:  
Cristy Bay is a 77 y.o. 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 Current medical issues leading to Palliative Medicine involvement include: pt with end stage cardiomyopathy dependent on dobutamine drip at home, symptoms worsened over past week. Upon admission was started on Milrinone was added. Patient has decided to withdraw from artificial support, knowing that his time will be short. The hope was that he could already be in Hospice prior to stopping drips, however, Hospice wants patient off the drips before they will accept him. There is concerns that pt will become symptomatic before he is admitted to Hospice, therefore, Palliative Medicine has been consulted to assist with the transition. PALLIATIVE DIAGNOSES:  
1. SOB 2. Hypotension: cardiogenic shock 3. MARCELL cardiorenal syndrome 4. Elevated LFTs 5. LE edema 6. Care decisions PLAN:  
1. Prior to visit, I completed an extensive review of patient's medical records, including medical documentation, vital signs, MARs, and results of various labs and other diagnostics. I also spoke with patient's nurse, Kaleb Seaman. 2. Met with pt and son: discussed confusion with Hospice, explained that Hospice can be done anywhere, home, SNF, hospice house or hospital.  In order to stay in the hospital under hospice, the patient must meet certain criteria, mainly that he is experiencing symptoms that are difficult to manage, such as SOB that is requiring frequent medication adjustments.  If the patient is comfortable then their care can be done at home, which is always the goal although not always possible. At this time, Samantha Amor is comfortable and not experiencing any symptoms, thus, does not meet criteria to remain in the hospital under Hospice. That said, I understand that this could change at anytime, so at this time, we are going to transfer him out of the CCU and on the Oncology floor, where he will continue to be monitored and treated for symptoms. Typically, during this time Hospice works with patients and family's with making discharge plans because most people want to spend their time at home or in an environment less invasive, such as the [facility]. If his condition deteriorates rapidly, which the patient states he anticipates, then of course he would remain in the hospital.  Pt and son requested meeting with a different [address] [place] [address] Ave for an information session, however, shortly after this meeting patient's wife arrived, I repeated the above conversation, she stated that she has met with hospice three times and is not interested in meeting with them again, that  told them the patient is going to stay in the hospital and she's going to follow what he says. 3. Will continue to follow for comfort care. RN can call me tonight for adjustments if needed: [phone].  
4. Initial consult note routed to primary continuity provider and/or primary health care team members 5. Communicated plan of care with: Palliative [facility] Team 
 
 GOALS OF CARE / TREATMENT PREFERENCES:  
 
GOALS OF CARE: 
Patient/Health Care Proxy Stated Goals: Comfort TREATMENT PREFERENCES:  
Code Status: DNR Advance Care Planning: 
[x] The [facility] Interdisciplinary Team has updated the ACP Navigator with Milagro and Patient Capacity Primary Decision Maker: Jessika Griggs ([facility]) - Spouse - [phone] Advance Care Planning 10/8/2019 Patient's Healthcare Decision Maker is: Named in scanned ACP document Confirm Advance Directive Yes, on file Does the patient have other document types Do Not Resuscitate Medical Interventions: Comfort measures Other Instructions:  
Artificially Administered Nutrition: No feeding tube Other: As far as possible, the palliative care team has discussed with patient / health care proxy about goals of care / treatment preferences for patient. HISTORY:  
 
History obtained from: chart, patient and family CHIEF COMPLAINT: SOB 
 
HPI/SUBJECTIVE: The patient is:  
[x] Verbal and participatory [] Non-participatory due to:  
 
10/7: pt presented to ED with SOB, progressively worsening over the past week, found to be hypotensive in ED and in MARCELL. Started on Milrinone in ED, feeling better. 10/8: pt is resting comfortably in bed, \"holding court\" with his family at this time. No complaints. 10/9: sitting on side of bed, no complaints at this time. Clinical Pain Assessment (nonverbal scale for severity on nonverbal patients):  
Clinical Pain Assessment Severity: 0 Activity (Movement): Lying quietly, normal position Duration: for how long has pt been experiencing pain (e.g., 2 days, 1 month, years) Frequency: how often pain is an issue (e.g., several times per day, once every few days, constant) FUNCTIONAL ASSESSMENT:  
 
Palliative Performance Scale (PPS): PPS: 30 
 
 
 PSYCHOSOCIAL/SPIRITUAL SCREENING:  
 
Palliative IDT has assessed this patient for cultural preferences / practices and a referral made as appropriate to needs (Cultural Services, Patient Advocacy, Ethics, etc.) Any spiritual / Restorationism concerns: 
[] Yes /  [x] No 
 
Caregiver Burnout: 
[] Yes /  [x] No /  [] No Caregiver Present Anticipatory grief assessment:  
[x] Normal  / [] Maladaptive ESAS Anxiety: Anxiety: 2 ESAS Depression: Depression: 0  REVIEW OF SYSTEMS:  
 
 Positive and pertinent negative findings in ROS are noted above in HPI. The following systems were [x] reviewed / [] unable to be reviewed as noted in HPI Other findings are noted below. Systems: constitutional, ears/nose/mouth/throat, respiratory, gastrointestinal, genitourinary, musculoskeletal, integumentary, neurologic, psychiatric, endocrine. Positive findings noted below. Modified ESAS Completed by: provider Fatigue: 5 Drowsiness: 0 Depression: 0 Pain: 0 Anxiety: 2 Nausea: 0 Anorexia: 0 Dyspnea: 0 Constipation: No  
  Stool Occurrence(s): 1 PHYSICAL EXAM:  
 
From RN flowsheet: 
Wt Readings from Last 3 Encounters:  
10/08/19 172 lb 13.5 oz (78.4 kg) 10/06/19 176 lb (79.8 kg) 10/06/19 176 lb (79.8 kg) Blood pressure 98/59, pulse 96, temperature 98 °F (36.7 °C), resp. rate 18, weight 172 lb 13.5 oz (78.4 kg), SpO2 93 %. Pain Scale 1: Numeric (0 - 10) Pain Intensity 1: 0 Pain Location 1: Chest 
  
  
  
Last bowel movement, if known:  
 
Constitutional: frail, NAD Eyes: pupils equal, anicteric Skin: warm, dry, BLE edema Neurologic: following commands, moving all extremities Psychiatric: full affect, no hallucinations HISTORY:  
 
Active Problems: 
  Cardiogenic shock ([address] (10/7/2019) MARCELL (acute kidney injury) ([address] (10/7/2019) Counseling regarding advance care planning and goals of care (10/8/2019) SOB (shortness of breath) (10/8/2019) Cardiorenal syndrome (10/8/2019) Past Medical History:  
Diagnosis Date  A-fib ([address]  AICD (automatic cardioverter/defibrillator) present  Arthritis  Cancer ([address] skin  CKD (chronic kidney disease)  Heart failure ([address]  Other ill-defined conditions(799.89)   
 high cholesterol  V tach ([address] Past Surgical History:  
Procedure Laterality Date  ABDOMEN SURGERY PROC UNLISTED  6/2/11  
 colon resection  HX CATARACT REMOVAL Left  HX HEENT    
 repaired right eardrum  HX OTHER SURGICAL    
 benign cyst removed from back and thyroid  HX OTHER SURGICAL    
 skin graft  HX PACEMAKER    
 aicd  RI COLONOSCOPY FLX DX W/COLLJ SPEC WHEN PFRMD  6/6/2012  RI COLONOSCOPY W/BIOPSY SINGLE/MULTIPLE  4/27/2011 Family History Problem Relation Age of Onset  Cancer Mother   
     colon  Heart Disease Father  Heart Disease Brother History reviewed, no pertinent family history. Social History Tobacco Use  Smoking status: Former Smoker  Smokeless tobacco: Never Used Substance Use Topics  Alcohol use: Not Currently Alcohol/week: 4.2 standard drinks Types: 5 Glasses of wine per week No Known Allergies Current Facility-Administered Medications Medication Dose Route Frequency  influenza vaccine 2019-20 (6 mos+)(PF) (FLUARIX/FLULAVAL/FLUZONE QUAD) injection 0.5 mL  0.5 mL IntraMUSCular PRIOR TO DISCHARGE  bacitracin 500 unit/gram packet 1 Packet  1 Packet Topical PRN  
 heparin (porcine) pf 300 Units  300 Units InterCATHeter PRN  
 furosemide (LASIX) injection 80 mg  80 mg IntraVENous BID  ondansetron (ZOFRAN) injection 4 mg  4 mg IntraVENous Q4H PRN  
 haloperidol (HALDOL) 2 mg/mL oral solution 2 mg  2 mg SubLINGual Q6H PRN Or  
 haloperidol lactate (HALDOL) injection 2 mg  2 mg IntraVENous Q6H PRN  
 LORazepam (ATIVAN) injection 0.5 mg  0.5 mg IntraVENous Q15MIN PRN  
 albuterol (PROVENTIL VENTOLIN) nebulizer solution 2.5 mg  2.5 mg Nebulization Q2H PRN  
 morphine injection 2 mg  2 mg IntraVENous Q15MIN PRN  
 scopolamine (TRANSDERM-SCOP) 1 mg over 3 days 1 Patch  1 Patch TransDERmal Q72H PRN  
 benzonatate (TESSALON) capsule 100 mg  100 mg Oral TID PRN  
 levothyroxine (SYNTHROID) tablet 100 mcg  100 mcg Oral QPM  
 loratadine (CLARITIN) tablet 10 mg  10 mg Oral DAILY  prednisoLONE acetate (PRED FORTE) 1 % ophthalmic suspension 1 Drop  1 Drop Right Eye QID  prednisoLONE acetate (PRED FORTE) 1 % ophthalmic suspension 1 Drop  1 Drop Left Eye BID  sodium chloride (NS) flush 5-40 mL  5-40 mL IntraVENous Q8H  
 sodium chloride (NS) flush 5-40 mL  5-40 mL IntraVENous PRN  
 acetaminophen (TYLENOL) tablet 650 mg  650 mg Oral Q4H PRN  
 famotidine (PEPCID) tablet 10 mg  10 mg Oral DAILY  alcohol 62% (NOZIN) nasal  1 Ampule  1 Ampule Topical Q12H  
 
 
 
 LAB AND IMAGING FINDINGS:  
 
Lab Results Component Value Date/Time WBC 7.6 10/08/2019 04:51 AM  
 HGB 10.9 (L) 10/08/2019 04:51 AM  
 PLATELET 868 90/49/6372 04:51 AM  
 
Lab Results Component Value Date/Time Sodium 130 (L) 10/08/2019 04:51 AM  
 Potassium 3.9 10/08/2019 04:51 AM  
 Chloride 92 (L) 10/08/2019 04:51 AM  
 CO2 28 10/08/2019 04:51 AM  
 BUN 68 (H) 10/08/2019 04:51 AM  
 Creatinine 3.80 (H) 10/08/2019 04:51 AM  
 Calcium 9.3 10/08/2019 04:51 AM  
 Magnesium 2.4 10/08/2019 04:51 AM  
 Phosphorus 5.3 (H) 10/08/2019 04:51 AM  
  
Lab Results Component Value Date/Time AST (SGOT) 882 (H) 10/08/2019 04:51 AM  
 Alk. phosphatase 160 (H) 10/08/2019 04:51 AM  
 Protein, total 6.1 (L) 10/08/2019 04:51 AM  
 Albumin 3.5 10/08/2019 04:51 AM  
 Globulin 2.6 10/08/2019 04:51 AM  
 
Lab Results Component Value Date/Time INR 2.0 (H) 09/25/2019 05:45 AM  
 Prothrombin time 19.8 (H) 09/25/2019 05:45 AM  
 aPTT 31.4 05/24/2011 12:32 PM  
  
Lab Results Component Value Date/Time Iron 79 09/30/2019 12:17 PM  
 TIBC 223 (L) 09/30/2019 12:17 PM  
 Iron % saturation 35 09/30/2019 12:17 PM  
 Ferritin 6,468 (H) 09/30/2019 12:17 PM  
  
No results found for: PH, PCO2, PO2 No components found for: [place] No results found for: CPK, CKMB Total time: 39 
Counseling / coordination time, spent as noted above: 35 
> 50% counseling / coordination?: y 
 
Prolonged service was provided for  []30 min   []75 min in face to face time in the presence of the patient, spent as noted above. Time Start:  
Time End:  
Note: this can only be billed with 15669 (initial) or 11840 (follow up). If multiple start / stop times, list each separately.

## 2019-10-09 NOTE — PROGRESS NOTES
Progress Note 
 
 
10/9/2019 7:40 AM 
NAME: Renu Guillaume MRN:  [id number] Admit Diagnosis: Cardiogenic shock ([facility] [R57.0] MARCELL (acute kidney injury) ([facility] [N17.9] Problem List: 1. Acute on chronic systolic heart failure; Stage D, Class IV 2. Acute on chronic renal insufficiency; Stg 3 
3. Cardiogenic shock 4. Shock liver 5. Hyponatremia 6. End stage cardiomyopathy 7. Dilated NICM; EF 25%. Selam Maria 7/18 w/ EF 20%, dil LV, nml RV, mild MR/TR 8. Persistent AFib s/p VIGNESH/DCCV 8/5/19 9. Remote ICD implantation 10. Recurrent ventricular tachycardia 11. Hyperlipidemia 12. Former smoker 13. DNR Assessment/Plan: 1. Comfort care 2. No labs, no tele 3. Off gtts 4. Lasix 80mg IV BID 5. ICD fxn turned off 6. Transfer to 1st floor [x]       High complexity decision making was performed in this patient at high risk for decompensation with multiple organ involvement. Subjective:  
 
Renu Guillaume denies chest pain. Still w/ dyspnea. Discussed with RN events overnight. Review of Systems: 
 
Symptom Y/N Comments  Symptom Y/N Comments Fever/Chills N   Chest Pain N Poor Appetite N   Edema N   
Cough N   Abdominal Pain N Sputum N   Joint Pain N   
SOB/MURILLO N   Pruritis/Rash N   
Nausea/vomit N   Tolerating PT/OT Y Diarrhea N   Tolerating Diet Y Constipation N   Other Could NOT obtain due to:   
 
Objective:  
  
Physical Exam: 
 
Last 24hrs VS reviewed since prior progress note. Most recent are: 
 
Visit Vitals BP 98/59 Pulse 96 Temp 98 °F (36.7 °C) Resp 18 Wt 78.4 kg (172 lb 13.5 oz) SpO2 93% BMI 25.52 kg/m² Intake/Output Summary (Last 24 hours) at 10/9/2019 9612 Last data filed at 10/9/2019 2950 Gross per 24 hour Intake 200.2 ml Output 2865 ml Net -2664.8 ml General Appearance: Well developed, well nourished, alert & oriented x 3,  
 no acute distress. Ears/Nose/Mouth/Throat: Hearing grossly normal. 
Neck: Supple. Chest: Lungs clear to auscultation bilaterally. Cardiovascular: Regular rate and rhythm, S1S2 normal, no murmur. Abdomen: Soft, non-tender, bowel sounds are active. Extremities: 2+ edema bilaterally. Skin: Warm and dry. []         Post-cath site without hematoma, bruit, tenderness, or thrill. Distal pulses intact. PMH/ reviewed - no change compared to H&P Data Review Telemetry: sinus tach EKG:  
[x]  No new EKG for review Lab Data Personally Reviewed: 
 
Recent Labs 10/08/19 
0451 10/07/19 
1655 WBC 7.6 8.0 HGB 10.9* 12.9 HCT 32.0* 38.4  185 No results for input(s): INR, PTP, APTT, INREXT, INREXT in the last 72 hours. Recent Labs 10/08/19 
0451 10/07/19 
1655 * 126*  
K 3.9 6.0*  
CL 92* 91* CO2 28 25 BUN 68* 67* CREA 3.80* 4.12* * 118* CA 9.3 9.1 MG 2.4  --   
 
Recent Labs 10/07/19 
1655 TROIQ <0.05 No results found for: CHOL, CHOLX, CHLST, CHOLV, HDL, HDLP, LDL, LDLC, DLDLP, TGLX, TRIGL, TRIGP, CHHD, CHHDX Recent Labs 10/08/19 
0451 10/07/19 
1655 SGOT 882* 728* * 180* TP 6.1* 6.8 ALB 3.5 3.7 GLOB 2.6 3.1 No results for input(s): PH, PCO2, PO2 in the last 72 hours. Medications Personally Reviewed: 
 
Current Facility-Administered Medications Medication Dose Route Frequency  influenza vaccine 2019-20 (6 mos+)(PF) (FLUARIX/FLULAVAL/FLUZONE QUAD) injection 0.5 mL  0.5 mL IntraMUSCular PRIOR TO DISCHARGE  bacitracin 500 unit/gram packet 1 Packet  1 Packet Topical PRN  
 heparin (porcine) pf 300 Units  300 Units InterCATHeter PRN  
 furosemide (LASIX) injection 80 mg  80 mg IntraVENous BID  ondansetron (ZOFRAN) injection 4 mg  4 mg IntraVENous Q4H PRN  
 haloperidol (HALDOL) 2 mg/mL oral solution 2 mg  2 mg SubLINGual Q6H PRN  Or  
 haloperidol lactate (HALDOL) injection 2 mg  2 mg IntraVENous Q6H PRN  
 LORazepam (ATIVAN) injection 0.5 mg  0.5 mg IntraVENous Q15MIN PRN  
  albuterol (PROVENTIL VENTOLIN) nebulizer solution 2.5 mg  2.5 mg Nebulization Q2H PRN  
 morphine injection 2 mg  2 mg IntraVENous Q15MIN PRN  
 scopolamine (TRANSDERM-SCOP) 1 mg over 3 days 1 Patch  1 Patch TransDERmal Q72H PRN  
 benzonatate (TESSALON) capsule 100 mg  100 mg Oral TID PRN  
 levothyroxine (SYNTHROID) tablet 100 mcg  100 mcg Oral QPM  
 loratadine (CLARITIN) tablet 10 mg  10 mg Oral DAILY  prednisoLONE acetate (PRED FORTE) 1 % ophthalmic suspension 1 Drop  1 Drop Right Eye QID  prednisoLONE acetate (PRED FORTE) 1 % ophthalmic suspension 1 Drop  1 Drop Left Eye BID  sodium chloride (NS) flush 5-40 mL  5-40 mL IntraVENous Q8H  
 sodium chloride (NS) flush 5-40 mL  5-40 mL IntraVENous PRN  
 acetaminophen (TYLENOL) tablet 650 mg  650 mg Oral Q4H PRN  
 famotidine (PEPCID) tablet 10 mg  10 mg Oral DAILY  alcohol 62% (NOZIN) nasal  1 Ampule  1 Ampule Topical Q12H Archana Lovett III, DO

## 2019-10-10 NOTE — DISCHARGE SUMMARY
Hospitalist Discharge Summary Patient ID: 
Vane Lucas 
[id number] 
46 y.o. 
1940 
10/7/2019 PCP on record: Veronika Garcia MD 
 
Admit date: 10/7/2019 Discharge date and time: 10/10/2019 DISCHARGE DIAGNOSIS: 
See below CONSULTATIONS: 
IP CONSULT TO CARDIOLOGY 
IP CONSULT TO NEPHROLOGY 
IP CONSULT TO PALLIATIVE CARE - PROVIDER Excerpted HPI from H&P of Stephane Pinon MD: 
Eron Andrade is a 77 y.o.  male who presents with SOB. Patient has end stage CHF and is on home dobutamine. He has felt progressively worse over the last week. He began to get more SOB today and this prompted his presentation to the ED. Patient was found to be hypotensive and in MARCELL on evaluation. Patient reports decreased UOP and decreased PO intake. He denies CP. He does feel better since being started on milrinone in the ED. 
______________________________________________________________________ DISCHARGE SUMMARY/HOSPITAL COURSE:  for full details see H&P, daily progress notes, labs, consult notes. Cardiogenic shock Acute on chronic systolic Heart Failure; Stage D, Class IV - End stage cardiomyopathy  
Non ischemic Cardiomyopathy S/P AICD Paroxysmal Atrial fibrillation MARCELL from Cardiorenal Syndrome Elevated LFT's: eitherfrom passive congestion or hypoperfusion or both Hyperkalemia - resolved Hyponatremia - improved 
-admit to ICU 
-Continue on Milrinone and Dobutamine 
-start Lasix gtt 
-Give IV calcium and Kayexalate 
-stop home potassium 
-bladder checks 
-stop eliquis  
-monitor potassium, renal function and LFTs 
-Cardiology assistance appreciated, Consult Nephrology 10/8: 
Pt to be transitioned to comfort measures. Cardiology follow up appreciated - drips to be weaned off and ICD fx to be stopped. Hospice Consult. Discussed at length with family and pt at bedside and they are in agreement with the pt's decision to be on comfort measures.  
 
10/9: 
 Pt to be transferred out of the ICU. Hospice for repeat evaluations. Pt is minimally symptomatic currently - therefore doubt he will qualify for inpatient hospice - expect pt to be discharged home with  Hospice once equipment is set up. Cardiology and Nephrology evals appreciated. 10/10: 
Plan had been for patient to go home with Hospice this morning; however pt  this morning prior to that happening. Family was at bedside - spoke with them and provided support. TOD 8:55am 10/10/2019. 
  
Hypothyroidism Gout 
___________________________________________________________________ Patient seen and examined by me on discharge day. Pertinent Findings: 
Gen:    Lying in bed, cyanotic, no response to verbal or painful stimuli HEENT: Pupils fixed and dilated Chest: No breath sounds CVS: No pulse, no heart sounds 
_____________________________________________________________________ 
________________________________________________________________ Condition at Discharge:   Total time in minutes spent:  35 minutes Signed: 
Dali Simmons MD

## 2019-10-10 NOTE — PROGRESS NOTES
0725- Bedside and Verbal shift change report given to Florinda Enrique Nathaly (oncoming nurse) by Casandra Claude RN (offgoing nurse). Report included the following information SBAR, Kardex, Intake/Output, Recent Results, Cardiac Rhythm NSR and Alarm Parameters . 0600- Shift assessment completed; see flow sheet for details. Pt A/V/Ox3; able to follow commands. Currently off the monitor due to being comfort care; radial pulse~60; BP stable. Lungs are clear; diminished in the bases; remains on room air. ABD is semi-soft; BS active. Up with one person assist to use the urinal; tolerates standing and pivoting well. Skin is warm and dry; fragile; scattered bruising to BUE. Pt denies pain/discomfort at this time. Family present at the bedside. Plan for patient to go home on Hospice; will consult with case management once they arrive 5023- Dr. Fany Oneal at the bedside; talking with patient at this time. 1932- This RN called to the bedside; patient care tech present at the bedside and was attempting to responds to pt's request to use the urinal. Per patient care tech when pt went to sit up on the side of the bed he \"slupmed over and turn blue. \" Patient care tech yelled out for assistance. Both Dr. Marcello France and Dr. Fany Oneal, responded to call for help; pt was noted to be without pulse; respirations and a BP. TOD pronounced by Dr. Fany Oneal at 6878. Both pt's spouse and son were present at the bedside during this time. Pastoral care cherrychato Lloyd called to comfort family. [facility] called and made aware of TOD 
 
1112- Called and spoke with the [employer] per spouse's request to inform them that family would like pt's thumb print recorded for the purpose of creating a keep-sake for both her and her family. Had to make [employer] aware of pt's death in order to get this information relayed. 1225- Pt provided with post-mortem care 1245- Pt transported down to the [facility] at this time; only personal belongings sent with the patient were pt's top dentures at the families request. All other belongings sent home with spouse.

## 2019-10-10 NOTE — PROGRESS NOTES
I had a discussion with the patient about going home. He was in good spirits and at ease with everything. He enjoyed his homemade peach ice cream last night. Shook my hand and he said \"I'm ready\". Wife came in followed by the son. Patient sat up to urinate and laid back in the bed. Pulseless with agonal respirations. Apnea ensued. Dr. Marcello France, myself, son, wife, and nurse @ bedside. Time of death 80. Preference is for [employer] [facility].

## 2019-10-10 NOTE — PROGRESS NOTES
Progress Note 10/10/2019 7:40 AM 
NAME: Neema Smith MRN:  [id number] Admit Diagnosis: Cardiogenic shock ([facility] [address] [R57.0] MARCELL (acute kidney injury) ([facility] [address] [N17.9] Problem List: 1. Acute on chronic systolic heart failure; Stage D, Class IV (end stage) 2. Acute on chronic renal insufficiency; Stg 3 
3. Cardiogenic shock 4. Shock liver 5. Hyponatremia 6. End stage cardiomyopathy 7. Dilated NICM; EF 25%. Daniel Belcher 7/18 w/ EF 20%, dil LV, nml RV, mild MR/TR 8. Persistent AFib s/p VIGNESH/DCCV 8/5/19 9. Remote ICD implantation 10. Recurrent ventricular tachycardia 11. Hyperlipidemia 12. Former smoker 13. DNR Assessment/Plan:  
79th birthday is [birth year]; party planned for the 19th 1. Comfort care 2. No labs, no tele 3. Remove PICC before he goes home 4. Off gtts 5. Lasix 80mg IV BID 6. ICD fxn turned off 7. Transfer to 1st floor pending, not sure this needs to happen if going home this AM from ICU 8. Home with hospice today  
 
  
 [x]       High complexity decision making was performed in this patient at high risk for decompensation with multiple organ involvement. Subjective:  
 
Neema Smith denies chest pain. Still w/ dyspnea but manageable at this point. Discussed with RN events overnight. Review of Systems: 
 
Symptom Y/N Comments  Symptom Y/N Comments Fever/Chills N   Chest Pain N Poor Appetite N   Edema N   
Cough N   Abdominal Pain N Sputum N   Joint Pain N   
SOB/MURILLO N   Pruritis/Rash N   
Nausea/vomit N   Tolerating PT/OT Y Diarrhea N   Tolerating Diet Y Constipation N   Other Could NOT obtain due to:   
 
Objective:  
  
Physical Exam: 
 
Last 24hrs VS reviewed since prior progress note. Most recent are: 
 
Visit Vitals BP 98/59 Pulse 96 Temp 98 °F (36.7 °C) Resp 18 Wt 78.4 kg (172 lb 13.5 oz) SpO2 93% BMI 25.52 kg/m² Intake/Output Summary (Last 24 hours) at 10/10/2019 5853 Last data filed at 10/10/2019 9608 Gross per 24 hour Intake  Output 1375 ml Net -1375 ml General Appearance: Well developed, well nourished, alert & oriented x 3,  
 no acute distress. Ears/Nose/Mouth/Throat: Hearing grossly normal. 
Neck: Supple. Chest: Lungs clear to auscultation bilaterally. Cardiovascular: Regular rate and rhythm, S1S2 normal, no murmur. Abdomen: Soft, non-tender, bowel sounds are active. Extremities: 2+ edema bilaterally. Skin: Warm and dry. []         Post-cath site without hematoma, bruit, tenderness, or thrill. Distal pulses intact. PMH/SH reviewed - no change compared to H&P Data Review Telemetry: N/A 
 
EKG:  
[x]  No new EKG for review Lab Data Personally Reviewed: 
 
Recent Labs 10/08/19 
0451 10/07/19 
1655 WBC 7.6 8.0 HGB 10.9* 12.9 HCT 32.0* 38.4  185 No results for input(s): INR, PTP, APTT, INREXT, INREXT in the last 72 hours. Recent Labs 10/08/19 
0451 10/07/19 
1655 * 126*  
K 3.9 6.0*  
CL 92* 91* CO2 28 25 BUN 68* 67* CREA 3.80* 4.12* * 118* CA 9.3 9.1 MG 2.4  --   
 
Recent Labs 10/07/19 
1655 TROIQ <0.05 No results found for: CHOL, CHOLX, CHLST, CHOLV, HDL, HDLP, LDL, LDLC, DLDLP, TGLX, TRIGL, TRIGP, CHHD, CHHDX Recent Labs 10/08/19 
0451 10/07/19 
1655 SGOT 882* 728* * 180* TP 6.1* 6.8 ALB 3.5 3.7 GLOB 2.6 3.1 No results for input(s): PH, PCO2, PO2 in the last 72 hours. Medications Personally Reviewed: 
 
Current Facility-Administered Medications Medication Dose Route Frequency  influenza vaccine 2019-20 (6 mos+)(PF) (FLUARIX/FLULAVAL/FLUZONE QUAD) injection 0.5 mL  0.5 mL IntraMUSCular PRIOR TO DISCHARGE  bacitracin 500 unit/gram packet 1 Packet  1 Packet Topical PRN  
 heparin (porcine) pf 300 Units  300 Units InterCATHeter PRN  
 furosemide (LASIX) injection 80 mg  80 mg IntraVENous BID  ondansetron (ZOFRAN) injection 4 mg  4 mg IntraVENous Q4H PRN  
  haloperidol (HALDOL) 2 mg/mL oral solution 2 mg  2 mg SubLINGual Q6H PRN Or  
 haloperidol lactate (HALDOL) injection 2 mg  2 mg IntraVENous Q6H PRN  
 LORazepam (ATIVAN) injection 0.5 mg  0.5 mg IntraVENous Q15MIN PRN  
 albuterol (PROVENTIL VENTOLIN) nebulizer solution 2.5 mg  2.5 mg Nebulization Q2H PRN  
 morphine injection 2 mg  2 mg IntraVENous Q15MIN PRN  
 scopolamine (TRANSDERM-SCOP) 1 mg over 3 days 1 Patch  1 Patch TransDERmal Q72H PRN  
 benzonatate (TESSALON) capsule 100 mg  100 mg Oral TID PRN  
 levothyroxine (SYNTHROID) tablet 100 mcg  100 mcg Oral QPM  
 loratadine (CLARITIN) tablet 10 mg  10 mg Oral DAILY  prednisoLONE acetate (PRED FORTE) 1 % ophthalmic suspension 1 Drop  1 Drop Right Eye QID  prednisoLONE acetate (PRED FORTE) 1 % ophthalmic suspension 1 Drop  1 Drop Left Eye BID  sodium chloride (NS) flush 5-40 mL  5-40 mL IntraVENous Q8H  
 sodium chloride (NS) flush 5-40 mL  5-40 mL IntraVENous PRN  
 acetaminophen (TYLENOL) tablet 650 mg  650 mg Oral Q4H PRN  
 famotidine (PEPCID) tablet 10 mg  10 mg Oral DAILY  alcohol 62% (NOZIN) nasal  1 Ampule  1 Ampule Topical Q12H Manisha Colon III, DO

## 2019-10-10 NOTE — PROGRESS NOTES
Responded to notification that patient  in CCU. Patient's wife and son were present. Provided pastoral presence and empathic listening as Mrs. Jamie Prince shared her thoughts and feelings. She expressed gratitude that patient was so much at peace with the decisions made yesterday. Offered condolences and assurance of prayer. MAGED Gil, [facility],  [facility] Paging Service  [phone])

## 2019-11-12 NOTE — PROGRESS NOTES
Bedside shift change report GIVEN TO VIKASH Stanford. Report included the following information SBAR, Kardex and MAR. SIGNIFICANT CHANGES DURING SHIFT:   
 
 
CONCERNS TO ADDRESS WITH MD:   
 
 
 
 
[facility] NURSING NOTE Admission Date 9/21/2019 Admission Diagnosis Acute on chronic heart failure ([address] [I50.9] Consults IP CONSULT TO CARDIOLOGY 
IP CONSULT TO NEPHROLOGY 
IP CONSULT TO PALLIATIVE CARE - PROVIDER 
IP CONSULT TO PALLIATIVE CARE - PROVIDER 
IP CONSULT TO ADVANCED HEART FAILURE Cardiac Monitoring [] Yes [] No  
  
Purposeful Hourly Rounding [x] Yes   
Froylan Score Total Score: 2 Froylan score 3 or > [x] Bed Alarm [] Avasys [] 1:1 sitter [] Patient refused (Signed refusal form in chart) Arsen Score Arsen Score: 19 Arsen score 14 or < [] PMT consult [] Wound Care consult  
 []  Specialty bed  [] Nutrition consult Influenza Vaccine Received Flu Vaccine for Current Season (usually Sept-March): Yes Oxygen needs? [x] Room air Oxygen @  []1L    []2L    []3L   []4L    []5L   []6L via NC Chronic home O2 use? [] Yes [] No 
Perform O2 challenge test and document in progress note using smartphrase (.Homeoxygen) Last bowel movement Last Bowel Movement Date: 09/30/19 Urinary Catheter LDAs PICC Double Lumen 30/03/53 Right;Basilic (Active) Central Line Being Utilized Yes 10/1/2019 11:40 PM  
Criteria for Appropriate Use Irritant/vesicant medication 10/1/2019 11:40 PM  
Site Assessment Clean, dry, & intact 10/1/2019 11:40 PM  
Phlebitis Assessment 0 10/1/2019 11:40 PM  
Infiltration Assessment 0 10/1/2019 11:40 PM  
Date of Last Dressing Change 10/01/19 10/1/2019 11:40 PM  
Dressing Status Clean, dry, & intact 10/1/2019 11:40 PM  
Dressing Type Disk with Chlorhexadine gluconate (CHG) 10/1/2019 11:40 PM  
Hub Color/Line Status Red;Flushed 10/1/2019 11:40 PM  
Positive Blood Return (Site #1) Yes 10/1/2019 11:40 PM  
 Hub Color/Line Status Purple; Infusing 10/1/2019 11:40 PM  
Positive Blood Return (Site #2) Yes 10/1/2019 11:40 PM  
Alcohol Cap Used Yes 10/1/2019 11:40 PM  
                        
  
Readmission Risk Assessment Tool Score High Risk   
      
 21 Total Score 3 Patient Length of Stay (>5 days = 3) 4 IP Visits Last 12 Months (1-3=4, 4=9, >4=11) 5 Pt. Coverage (Medicare=5 , Medicaid, or Self-Pay=4) 9 Charlson Comorbidity Score (Age + Comorbid Conditions) Criteria that do not apply:  
 Has Seen PCP in Last 6 Months (Yes=3, No=0) . Living with Significant Other. Assisted Living. LTAC. SNF. or  
Rehab Expected Length of Stay 4d 2h Actual Length of Stay 11  
  
 
 No pertinent family history in first degree relatives

## 2022-05-06 NOTE — PROGRESS NOTES
Progress Note 
 
 
10/8/2019 7:40 AM 
NAME: Jovanna Baeza MRN:  [id number] Admit Diagnosis: Cardiogenic shock ([facility] [address] [R57.0] MARCELL (acute kidney injury) ([facility] [address] [N17.9] Problem List: 1. Acute on chronic systolic heart failure; Stage D, Class IV 2. Acute on chronic renal insufficiency; Stg 3 
3. Cardiogenic shock 4. Shock liver 5. Hyponatremia 6. End stage cardiomyopathy 7. Dilated NICM; EF 25%. Elfrieda Fort 7/18 w/ EF 20%, dil LV, nml RV, mild MR/TR 8. Persistent AFib s/p VIGNESH/DCCV 8/5/19 9. Remote ICD implantation 10. Recurrent ventricular tachycardia 11. Hyperlipidemia 12. Former smoker 13. DNR Assessment/Plan: 1. Now on two inotropes 2. Prognosis is very poor 3. Need to have a conversation w/ pt, son, and wife 4. Hospice will be recommended along w/ discontinuation of ICD function of ICD 5. RN to call when family arrived 6. Continue as we are for now [x]       High complexity decision making was performed in this patient at high risk for decompensation with multiple organ involvement. Subjective:  
 
Jovanna Baeza denies chest pain. Still w/ dyspnea. Discussed with RN events overnight. Review of Systems: 
 
Symptom Y/N Comments  Symptom Y/N Comments Fever/Chills N   Chest Pain N Poor Appetite N   Edema N   
Cough N   Abdominal Pain N Sputum N   Joint Pain N   
SOB/MURILLO N   Pruritis/Rash N   
Nausea/vomit N   Tolerating PT/OT Y Diarrhea N   Tolerating Diet Y Constipation N   Other Could NOT obtain due to:   
 
Objective:  
  
Physical Exam: 
 
Last 24hrs VS reviewed since prior progress note. Most recent are: 
 
Visit Vitals /54 Pulse (!) 104 Temp 97.8 °F (36.6 °C) Resp 13 Wt 78.4 kg (172 lb 13.5 oz) SpO2 94% BMI 25.52 kg/m² Intake/Output Summary (Last 24 hours) at 10/8/2019 0775 Last data filed at 10/8/2019 2934 Gross per 24 hour Intake 433.96 ml Output 1326 ml Net -892.04 ml General Appearance: Well developed, well nourished, alert & oriented x 3,  
 no acute distress. Ears/Nose/Mouth/Throat: Hearing grossly normal. 
Neck: Supple. Chest: Lungs clear to auscultation bilaterally. Cardiovascular: Regular rate and rhythm, S1S2 normal, no murmur. Abdomen: Soft, non-tender, bowel sounds are active. Extremities: 2+ edema bilaterally. Skin: Warm and dry. []         Post-cath site without hematoma, bruit, tenderness, or thrill. Distal pulses intact. PMH/SH reviewed - no change compared to H&P Data Review Telemetry: sinus tach EKG:  
[x]  No new EKG for review Lab Data Personally Reviewed: 
 
Recent Labs 10/08/19 
0451 10/07/19 
1655 WBC 7.6 8.0 HGB 10.9* 12.9 HCT 32.0* 38.4  185 No results for input(s): INR, PTP, APTT, INREXT in the last 72 hours. Recent Labs 10/08/19 
0451 10/07/19 
1655 10/06/19 
0745 * 126* 130*  
K 3.9 6.0* 4.2 CL 92* 91* 93* CO2 28 25 27 BUN 68* 67* 45* CREA 3.80* 4.12* 2.58* * 118* 104* CA 9.3 9.1 8.8 MG 2.4  --   --   
 
Recent Labs 10/07/19 
1655 10/06/19 
0745 TROIQ <0.05 <0.05 No results found for: CHOL, CHOLX, CHLST, CHOLV, HDL, HDLP, LDL, LDLC, DLDLP, TGLX, TRIGL, TRIGP, CHHD, CHHDX Recent Labs 10/08/19 
0451 10/07/19 
1655 10/06/19 
0745 SGOT 882* 728* 72* * 180* 115  
TP 6.1* 6.8 6.6 ALB 3.5 3.7 3.6 GLOB 2.6 3.1 3.0 LPSE  --   --  225 No results for input(s): PH, PCO2, PO2 in the last 72 hours. Medications Personally Reviewed: 
 
Current Facility-Administered Medications Medication Dose Route Frequency  influenza vaccine 2019-20 (6 mos+)(PF) (FLUARIX/FLULAVAL/FLUZONE QUAD) injection 0.5 mL  0.5 mL IntraMUSCular PRIOR TO DISCHARGE  DOBUTamine (DOBUTREX) 500 mg/250 mL (2,000 mcg/mL) infusion  2.5-10 mcg/kg/min (Order-Specific) IntraVENous TITRATE  milrinone (PRIMACOR) 200 mcg/mL in 0.9% sodium chloride 100 mL infusion 0-0.75 mcg/kg/min (Order-Specific) IntraVENous TITRATE  benzonatate (TESSALON) capsule 100 mg  100 mg Oral TID PRN  
 levothyroxine (SYNTHROID) tablet 100 mcg  100 mcg Oral QPM  
 loratadine (CLARITIN) tablet 10 mg  10 mg Oral DAILY  prednisoLONE acetate (PRED FORTE) 1 % ophthalmic suspension 1 Drop  1 Drop Right Eye QID  prednisoLONE acetate (PRED FORTE) 1 % ophthalmic suspension 1 Drop  1 Drop Left Eye BID  sodium chloride (NS) flush 5-40 mL  5-40 mL IntraVENous Q8H  
 sodium chloride (NS) flush 5-40 mL  5-40 mL IntraVENous PRN  
 acetaminophen (TYLENOL) tablet 650 mg  650 mg Oral Q4H PRN  
 famotidine (PEPCID) tablet 10 mg  10 mg Oral DAILY  furosemide (LASIX) 100 mg in 0.9% sodium chloride 100 mL infusion  5 mg/hr IntraVENous CONTINUOUS  
 alcohol 62% (NOZIN) nasal  1 Ampule  1 Ampule Topical Q12H Herman Eye III, DO 
 
 
 PAST SURGICAL HISTORY:  No significant past surgical history PAST SURGICAL HISTORY:  No significant past surgical history

## 2022-10-07 NOTE — PROGRESS NOTES
(1) More than 48 hours/None Bedside shift change report GIVEN TO VIKASH Godfrey. Report included the following information SBAR, Kardex and MAR. SIGNIFICANT CHANGES DURING SHIFT:  Very little urine output (110 mL). 5 pound weight gain between today and yesterday. CONCERNS TO ADDRESS WITH MD:   
 
 
 
 
[facility] NURSING NOTE Admission Date 9/21/2019 Admission Diagnosis Acute on chronic heart failure ([address] [I50.9] Consults IP CONSULT TO CARDIOLOGY 
IP CONSULT TO NEPHROLOGY Cardiac Monitoring [x] Yes [] No  
  
Purposeful Hourly Rounding [x] Yes   
Froylan Score Total Score: 1 Froylan score 3 or > [x] Bed Alarm [] Avasys [] 1:1 sitter [] Patient refused (Signed refusal form in chart) Arsen Score Arsen Score: 21 Arsen score 14 or < [] PMT consult [] Wound Care consult  
 []  Specialty bed  [] Nutrition consult Influenza Vaccine Received Flu Vaccine for Current Season (usually Sept-March): Yes Oxygen needs? [x] Room air Oxygen @  []1L    []2L    []3L   []4L    []5L   []6L via NC Chronic home O2 use? [] Yes [] No 
Perform O2 challenge test and document in progress note using smartphrase (.Homeoxygen) Last bowel movement Last Bowel Movement Date: 09/22/19 Urinary Catheter LDAs Peripheral IV 09/21/19 Right Forearm (Active) Site Assessment Clean, dry, & intact 9/23/2019  7:35 PM  
Phlebitis Assessment 0 9/23/2019  7:35 PM  
Infiltration Assessment 0 9/23/2019  7:35 PM  
Dressing Status Clean, dry, & intact 9/23/2019  7:35 PM  
Dressing Type Tape;Transparent 9/23/2019  7:35 PM  
Hub Color/Line Status Blue; Infusing 9/23/2019  7:35 PM  
Alcohol Cap Used Yes 9/21/2019  7:44 PM  
                  
  
Readmission Risk Assessment Tool Score Medium Risk 25 Total Score 4 IP Visits Last 12 Months (1-3=4, 4=9, >4=11) 5 Pt. Coverage (Medicare=5 , Medicaid, or Self-Pay=4) 9 Charlson Comorbidity Score (Age + Comorbid Conditions) Criteria that do not apply:  
 Has Seen PCP in Last 6 Months (Yes=3, No=0) . Living with Significant Other. Assisted Living. LTAC. SNF. or  
Rehab Patient Length of Stay (>5 days = 3) Expected Length of Stay - - - Actual Length of Stay 3

## (undated) DEVICE — SWAN-GANZ TRUE SIZE THERMODULTION CATHETER, 5F: Brand: SWAN-GANZ TRUE SIZE

## (undated) DEVICE — 3M™ TEGADERM™ TRANSPARENT FILM DRESSING FRAME STYLE, 1626W, 4 IN X 4-3/4 IN (10 CM X 12 CM), 50/CT 4CT/CASE: Brand: 3M™ TEGADERM™

## (undated) DEVICE — ANGIOGRAPHY KIT CUST [K0910930B] [MERIT MEDICAL SYSTEMS INC]

## (undated) DEVICE — PACK PROCEDURE SURG HRT CATH

## (undated) DEVICE — GLIDESHEATH SLENDER ACCESS KIT: Brand: GLIDESHEATH SLENDER